# Patient Record
Sex: FEMALE | Race: WHITE | NOT HISPANIC OR LATINO | Employment: PART TIME | URBAN - METROPOLITAN AREA
[De-identification: names, ages, dates, MRNs, and addresses within clinical notes are randomized per-mention and may not be internally consistent; named-entity substitution may affect disease eponyms.]

---

## 2023-09-13 PROBLEM — G89.29 OTHER CHRONIC PAIN: Status: ACTIVE | Noted: 2023-09-13

## 2023-09-13 PROBLEM — F10.10 ALCOHOL ABUSE: Status: ACTIVE | Noted: 2023-09-13

## 2023-09-13 PROBLEM — K72.90: Status: ACTIVE | Noted: 2023-09-13

## 2023-09-13 PROBLEM — R18.8 ASCITES: Status: ACTIVE | Noted: 2023-09-13

## 2023-09-13 PROBLEM — Z86.39 HISTORY OF HYPOTHYROIDISM: Status: ACTIVE | Noted: 2023-09-13

## 2023-09-13 PROBLEM — K70.30 ALCOHOLIC CIRRHOSIS (MULTI): Status: ACTIVE | Noted: 2023-09-13

## 2023-09-13 PROBLEM — R41.82 ACUTE ALTERATION IN MENTAL STATUS: Status: ACTIVE | Noted: 2023-09-13

## 2023-09-13 PROBLEM — E83.19 IRON OVERLOAD: Status: ACTIVE | Noted: 2018-08-03

## 2023-09-13 PROBLEM — R42 DIZZINESS: Status: ACTIVE | Noted: 2023-09-13

## 2023-09-13 PROBLEM — R79.89 ABNORMAL TSH: Status: ACTIVE | Noted: 2018-08-03

## 2023-09-13 PROBLEM — K92.2 GASTROINTESTINAL HEMORRHAGE: Status: ACTIVE | Noted: 2023-09-13

## 2023-09-13 PROBLEM — R20.2 PARESTHESIA: Status: ACTIVE | Noted: 2018-08-08

## 2023-09-13 PROBLEM — K92.0 HEMATEMESIS: Status: ACTIVE | Noted: 2023-09-13

## 2023-09-13 PROBLEM — R93.89 ABNORMAL COMPUTED TOMOGRAPHY SCAN: Status: ACTIVE | Noted: 2023-09-13

## 2023-09-13 PROBLEM — Z78.9 MEDICAL HOME PATIENT: Status: ACTIVE | Noted: 2023-09-13

## 2023-09-13 PROBLEM — R53.1 GENERALIZED WEAKNESS: Status: ACTIVE | Noted: 2023-09-13

## 2023-09-13 PROBLEM — I89.0 LYMPHEDEMA OF BOTH LOWER EXTREMITIES: Status: ACTIVE | Noted: 2023-09-13

## 2023-09-13 PROBLEM — R40.1 CLOUDED CONSCIOUSNESS: Status: ACTIVE | Noted: 2023-06-03

## 2023-09-13 PROBLEM — E03.9 HYPOTHYROIDISM: Status: ACTIVE | Noted: 2023-09-13

## 2023-09-13 PROBLEM — E51.2 WERNICKE'S ENCEPHALOPATHY: Status: ACTIVE | Noted: 2023-09-13

## 2023-09-13 PROBLEM — A69.22 OTHER NEUROLOGIC DISORDERS IN LYME DISEASE: Status: ACTIVE | Noted: 2023-09-13

## 2023-09-13 PROBLEM — K76.9 LIVER DISEASE, UNSPECIFIED: Status: ACTIVE | Noted: 2023-09-13

## 2023-09-13 PROBLEM — F10.19: Status: ACTIVE | Noted: 2023-09-13

## 2023-09-13 PROBLEM — G62.9 SMALL FIBER NEUROPATHY: Status: ACTIVE | Noted: 2018-08-03

## 2023-09-13 PROBLEM — C22.0 LIVER CELL CARCINOMA (MULTI): Status: ACTIVE | Noted: 2023-09-13

## 2023-09-13 PROBLEM — I10 ESSENTIAL HYPERTENSION: Status: ACTIVE | Noted: 2023-09-13

## 2023-09-13 PROBLEM — E72.20 SERUM AMMONIA INCREASED (MULTI): Status: ACTIVE | Noted: 2023-09-13

## 2023-09-13 PROBLEM — K76.82 HEPATIC ENCEPHALOPATHY (MULTI): Status: ACTIVE | Noted: 2023-09-13

## 2023-09-13 PROBLEM — G82.20 PARAPARESIS OF BOTH LOWER LIMBS (MULTI): Status: ACTIVE | Noted: 2023-09-13

## 2023-09-13 PROBLEM — D64.9 ANEMIA: Status: ACTIVE | Noted: 2023-09-13

## 2023-09-13 PROBLEM — R31.0 FRANK HEMATURIA: Status: ACTIVE | Noted: 2023-09-13

## 2023-09-13 PROBLEM — R11.0 NAUSEA: Status: ACTIVE | Noted: 2023-09-13

## 2023-09-13 PROBLEM — K70.10 ALCOHOLIC HEPATITIS (MULTI): Status: ACTIVE | Noted: 2023-09-13

## 2023-09-13 PROBLEM — E87.8 ELECTROLYTE IMBALANCE: Status: ACTIVE | Noted: 2021-04-13

## 2023-09-13 PROBLEM — R74.8 ELEVATED LIVER ENZYMES: Status: ACTIVE | Noted: 2018-08-07

## 2023-09-13 PROBLEM — J15.9 BACTERIAL PNEUMONIA: Status: ACTIVE | Noted: 2023-09-13

## 2023-09-13 PROBLEM — G61.81 CHRONIC INFLAMMATORY DEMYELINATING POLYRADICULONEUROPATHY (MULTI): Status: ACTIVE | Noted: 2023-09-13

## 2023-09-13 PROBLEM — F10.20 ALCOHOLISM (MULTI): Status: ACTIVE | Noted: 2023-09-13

## 2023-09-13 PROBLEM — R79.89 INCREASED AMMONIA LEVEL: Status: ACTIVE | Noted: 2023-09-13

## 2023-09-13 PROBLEM — R17 JAUNDICE: Status: ACTIVE | Noted: 2023-09-13

## 2023-09-13 PROBLEM — E87.1 HYPONATREMIA: Status: ACTIVE | Noted: 2023-09-13

## 2023-09-13 PROBLEM — D72.829 LEUKOCYTOSIS: Status: ACTIVE | Noted: 2023-09-13

## 2023-09-13 PROBLEM — I50.9 CONGESTIVE HEART FAILURE (MULTI): Status: ACTIVE | Noted: 2023-09-13

## 2023-09-13 PROBLEM — R79.89 ABNORMAL LFTS: Status: ACTIVE | Noted: 2018-08-08

## 2023-09-13 PROBLEM — R10.9 FLANK PAIN: Status: ACTIVE | Noted: 2023-09-13

## 2023-09-13 PROBLEM — K74.60 UNSPECIFIED CIRRHOSIS OF LIVER (MULTI): Status: ACTIVE | Noted: 2023-09-13

## 2023-09-13 PROBLEM — R26.89 IMPAIRED GAIT AND MOBILITY: Status: ACTIVE | Noted: 2023-09-13

## 2023-09-13 PROBLEM — R10.9 ABDOMINAL PAIN: Status: ACTIVE | Noted: 2023-09-13

## 2023-09-13 PROBLEM — F32.1 MODERATE MAJOR DEPRESSION (MULTI): Status: ACTIVE | Noted: 2023-09-13

## 2023-09-13 PROBLEM — A41.9 SEPSIS (MULTI): Status: ACTIVE | Noted: 2023-09-13

## 2023-09-13 PROBLEM — K74.60: Status: ACTIVE | Noted: 2023-09-13

## 2023-09-13 PROBLEM — G92.8 TOXIC METABOLIC ENCEPHALOPATHY: Status: ACTIVE | Noted: 2023-09-13

## 2023-09-13 RX ORDER — PANTOPRAZOLE SODIUM 40 MG/1
1 TABLET, DELAYED RELEASE ORAL DAILY
Status: ON HOLD | COMMUNITY
Start: 2021-04-29 | End: 2023-10-25 | Stop reason: SDUPTHER

## 2023-09-13 RX ORDER — LEVOTHYROXINE SODIUM 50 UG/1
1 TABLET ORAL DAILY
COMMUNITY
Start: 2021-04-29 | End: 2024-03-04 | Stop reason: HOSPADM

## 2023-09-13 RX ORDER — NYSTATIN 100000 U/G
1 CREAM TOPICAL 3 TIMES DAILY
COMMUNITY
Start: 2021-04-29 | End: 2023-10-29 | Stop reason: HOSPADM

## 2023-09-13 RX ORDER — LEVONORGESTREL AND ETHINYL ESTRADIOL 6-5-10
1 KIT ORAL DAILY
COMMUNITY
Start: 2018-08-03 | End: 2023-10-29 | Stop reason: HOSPADM

## 2023-09-13 RX ORDER — FLUTICASONE PROPIONATE 50 MCG
1 SPRAY, SUSPENSION (ML) NASAL DAILY
COMMUNITY
Start: 2018-08-03 | End: 2023-10-29 | Stop reason: HOSPADM

## 2023-09-13 RX ORDER — SPIRONOLACTONE 100 MG/1
100 TABLET, FILM COATED ORAL DAILY
Status: ON HOLD | COMMUNITY
Start: 2021-04-29 | End: 2023-10-25 | Stop reason: SDUPTHER

## 2023-09-13 RX ORDER — LEVOTHYROXINE SODIUM 25 UG/1
25 TABLET ORAL DAILY
COMMUNITY
End: 2023-10-29 | Stop reason: HOSPADM

## 2023-09-13 RX ORDER — LANOLIN ALCOHOL/MO/W.PET/CERES
1 CREAM (GRAM) TOPICAL DAILY
Status: ON HOLD | COMMUNITY
Start: 2021-04-29 | End: 2023-10-25 | Stop reason: SDUPTHER

## 2023-09-13 RX ORDER — FUROSEMIDE 40 MG/1
1 TABLET ORAL DAILY
Status: ON HOLD | COMMUNITY
Start: 2021-04-29 | End: 2023-10-25 | Stop reason: SDUPTHER

## 2023-09-13 RX ORDER — IBUPROFEN 400 MG/1
400 TABLET ORAL EVERY 8 HOURS PRN
COMMUNITY
Start: 2021-04-29 | End: 2023-10-29 | Stop reason: HOSPADM

## 2023-09-13 RX ORDER — FOLIC ACID 1 MG/1
1 TABLET ORAL DAILY
Status: ON HOLD | COMMUNITY
Start: 2021-04-29 | End: 2023-10-25 | Stop reason: SDUPTHER

## 2023-09-13 RX ORDER — GABAPENTIN 300 MG/1
1 CAPSULE ORAL 3 TIMES DAILY
COMMUNITY
Start: 2021-10-21 | End: 2023-10-29 | Stop reason: HOSPADM

## 2023-09-13 RX ORDER — LEVONORGESTREL AND ETHINYL ESTRADIOL 6-5-10
1 KIT ORAL DAILY
COMMUNITY
Start: 2017-10-12 | End: 2023-10-29 | Stop reason: HOSPADM

## 2023-09-13 RX ORDER — LISINOPRIL AND HYDROCHLOROTHIAZIDE 20; 25 MG/1; MG/1
0.5 TABLET ORAL DAILY
COMMUNITY
Start: 2018-08-03 | End: 2023-10-29 | Stop reason: HOSPADM

## 2023-09-13 RX ORDER — LACTULOSE 10 G/15ML
30 SOLUTION ORAL; RECTAL DAILY
Status: ON HOLD | COMMUNITY
Start: 2021-05-05 | End: 2023-10-25 | Stop reason: SDUPTHER

## 2023-09-13 RX ORDER — LISINOPRIL 20 MG/1
20 TABLET ORAL DAILY
COMMUNITY
End: 2023-10-29 | Stop reason: HOSPADM

## 2023-10-19 ENCOUNTER — APPOINTMENT (OUTPATIENT)
Dept: GASTROENTEROLOGY | Facility: CLINIC | Age: 48
End: 2023-10-19

## 2023-10-21 ENCOUNTER — APPOINTMENT (OUTPATIENT)
Dept: RADIOLOGY | Facility: HOSPITAL | Age: 48
DRG: 441 | End: 2023-10-21
Payer: MEDICARE

## 2023-10-21 ENCOUNTER — HOSPITAL ENCOUNTER (INPATIENT)
Facility: HOSPITAL | Age: 48
LOS: 8 days | Discharge: HOME | DRG: 441 | End: 2023-10-29
Attending: STUDENT IN AN ORGANIZED HEALTH CARE EDUCATION/TRAINING PROGRAM | Admitting: INTERNAL MEDICINE
Payer: MEDICARE

## 2023-10-21 DIAGNOSIS — G89.29 OTHER CHRONIC PAIN: ICD-10-CM

## 2023-10-21 DIAGNOSIS — R11.0 NAUSEA: ICD-10-CM

## 2023-10-21 DIAGNOSIS — G62.9 SMALL FIBER NEUROPATHY: ICD-10-CM

## 2023-10-21 DIAGNOSIS — E72.20 SERUM AMMONIA INCREASED (MULTI): ICD-10-CM

## 2023-10-21 DIAGNOSIS — R17 JAUNDICE: ICD-10-CM

## 2023-10-21 DIAGNOSIS — A69.22 OTHER NEUROLOGIC DISORDERS IN LYME DISEASE: ICD-10-CM

## 2023-10-21 DIAGNOSIS — D64.9 ANEMIA, UNSPECIFIED TYPE: ICD-10-CM

## 2023-10-21 DIAGNOSIS — R58 BLEEDING: ICD-10-CM

## 2023-10-21 DIAGNOSIS — K70.31 ALCOHOLIC CIRRHOSIS OF LIVER WITH ASCITES (MULTI): ICD-10-CM

## 2023-10-21 DIAGNOSIS — K70.31 ASCITES DUE TO ALCOHOLIC CIRRHOSIS (MULTI): ICD-10-CM

## 2023-10-21 DIAGNOSIS — E87.6 HYPOKALEMIA: ICD-10-CM

## 2023-10-21 DIAGNOSIS — K76.82 HEPATIC ENCEPHALOPATHY (MULTI): Primary | ICD-10-CM

## 2023-10-21 DIAGNOSIS — F10.10 ALCOHOL ABUSE: ICD-10-CM

## 2023-10-21 LAB
ABO GROUP (TYPE) IN BLOOD: NORMAL
ALBUMIN SERPL-MCNC: 2.7 G/DL (ref 3.5–5)
ALP BLD-CCNC: 214 U/L (ref 35–125)
ALT SERPL-CCNC: 27 U/L (ref 5–40)
AMMONIA PLAS-SCNC: 141 UMOL/L (ref 12–45)
AMPHETAMINES UR QL SCN>1000 NG/ML: NEGATIVE
ANION GAP SERPL CALC-SCNC: 14 MMOL/L
ANTIBODY SCREEN: NORMAL
APPEARANCE UR: ABNORMAL
AST SERPL-CCNC: 86 U/L (ref 5–40)
BARBITURATES UR QL SCN>300 NG/ML: NEGATIVE
BASO STIPL BLD QL SMEAR: PRESENT
BASOPHILS # BLD AUTO: 0.03 X10*3/UL (ref 0–0.1)
BASOPHILS NFR BLD AUTO: 0.2 %
BENZODIAZ UR QL SCN>300 NG/ML: NEGATIVE
BILIRUB DIRECT SERPL-MCNC: 6.4 MG/DL (ref 0–0.2)
BILIRUB SERPL-MCNC: 13.5 MG/DL (ref 0.1–1.2)
BILIRUB UR STRIP.AUTO-MCNC: ABNORMAL MG/DL
BLOOD EXPIRATION DATE: NORMAL
BUN SERPL-MCNC: 15 MG/DL (ref 8–25)
BURR CELLS BLD QL SMEAR: NORMAL
BZE UR QL SCN>300 NG/ML: NEGATIVE
CALCIUM SERPL-MCNC: 8.3 MG/DL (ref 8.5–10.4)
CANNABINOIDS UR QL SCN>50 NG/ML: NEGATIVE
CHLORIDE SERPL-SCNC: 93 MMOL/L (ref 97–107)
CO2 SERPL-SCNC: 26 MMOL/L (ref 24–31)
COLOR UR: ABNORMAL
CREAT SERPL-MCNC: 0.6 MG/DL (ref 0.4–1.6)
DISPENSE STATUS: NORMAL
EOSINOPHIL # BLD AUTO: 0.11 X10*3/UL (ref 0–0.7)
EOSINOPHIL NFR BLD AUTO: 0.6 %
ERYTHROCYTE [DISTWIDTH] IN BLOOD BY AUTOMATED COUNT: 20.4 % (ref 11.5–14.5)
ETHANOL SERPL-MCNC: <0.01 G/DL
FENTANYL+NORFENTANYL UR QL SCN: NEGATIVE
GFR SERPL CREATININE-BSD FRML MDRD: >90 ML/MIN/1.73M*2
GLUCOSE SERPL-MCNC: 122 MG/DL (ref 65–99)
GLUCOSE UR STRIP.AUTO-MCNC: NORMAL MG/DL
HCT VFR BLD AUTO: 18.8 % (ref 36–46)
HGB BLD-MCNC: 6.6 G/DL (ref 12–16)
HOLD SPECIMEN: NORMAL
IMM GRANULOCYTES # BLD AUTO: 0.27 X10*3/UL (ref 0–0.7)
IMM GRANULOCYTES NFR BLD AUTO: 1.5 % (ref 0–0.9)
INR PPP: 3.5 (ref 0.9–1.2)
KETONES UR STRIP.AUTO-MCNC: NEGATIVE MG/DL
LEUKOCYTE ESTERASE UR QL STRIP.AUTO: ABNORMAL
LIPASE SERPL-CCNC: 16 U/L (ref 16–63)
LYMPHOCYTES # BLD AUTO: 3.03 X10*3/UL (ref 1.2–4.8)
LYMPHOCYTES NFR BLD AUTO: 16.5 %
MAGNESIUM SERPL-MCNC: 1.7 MG/DL (ref 1.6–3.1)
MCH RBC QN AUTO: 37.9 PG (ref 26–34)
MCHC RBC AUTO-ENTMCNC: 35.1 G/DL (ref 32–36)
MCV RBC AUTO: 108 FL (ref 80–100)
METHADONE UR QL SCN>300 NG/ML: NEGATIVE
MONOCYTES # BLD AUTO: 1.63 X10*3/UL (ref 0.1–1)
MONOCYTES NFR BLD AUTO: 8.9 %
NEUTROPHILS # BLD AUTO: 13.32 X10*3/UL (ref 1.2–7.7)
NEUTROPHILS NFR BLD AUTO: 72.3 %
NITRITE UR QL STRIP.AUTO: NEGATIVE
NRBC BLD-RTO: 0.6 /100 WBCS (ref 0–0)
OPIATES UR QL SCN>300 NG/ML: NEGATIVE
OXYCODONE UR QL: NEGATIVE
PCP UR QL SCN>25 NG/ML: NEGATIVE
PH UR STRIP.AUTO: 5.5 [PH]
PLATELET # BLD AUTO: 178 X10*3/UL (ref 150–450)
PMV BLD AUTO: 10.1 FL (ref 7.5–11.5)
POLYCHROMASIA BLD QL SMEAR: NORMAL
POTASSIUM SERPL-SCNC: 2.9 MMOL/L (ref 3.4–5.1)
PRODUCT BLOOD TYPE: 5100
PRODUCT CODE: NORMAL
PROT SERPL-MCNC: 6.3 G/DL (ref 5.9–7.9)
PROT UR STRIP.AUTO-MCNC: ABNORMAL MG/DL
PROTHROMBIN TIME: 34.5 SECONDS (ref 9.3–12.7)
RBC # BLD AUTO: 1.74 X10*6/UL (ref 4–5.2)
RBC # UR STRIP.AUTO: NEGATIVE /UL
RBC MORPH BLD: NORMAL
RH FACTOR (ANTIGEN D): NORMAL
SODIUM SERPL-SCNC: 133 MMOL/L (ref 133–145)
SP GR UR STRIP.AUTO: 1.02
TARGETS BLD QL SMEAR: NORMAL
UNIT ABO: NORMAL
UNIT NUMBER: NORMAL
UNIT RH: NORMAL
UNIT VOLUME: 400
UROBILINOGEN UR STRIP.AUTO-MCNC: ABNORMAL MG/DL
WBC # BLD AUTO: 18.4 X10*3/UL (ref 4.4–11.3)
XM INTEP: NORMAL

## 2023-10-21 PROCEDURE — 86920 COMPATIBILITY TEST SPIN: CPT

## 2023-10-21 PROCEDURE — 87186 SC STD MICRODIL/AGAR DIL: CPT | Mod: WESLAB | Performed by: PHYSICIAN ASSISTANT

## 2023-10-21 PROCEDURE — 99285 EMERGENCY DEPT VISIT HI MDM: CPT | Mod: 25 | Performed by: STUDENT IN AN ORGANIZED HEALTH CARE EDUCATION/TRAINING PROGRAM

## 2023-10-21 PROCEDURE — 83690 ASSAY OF LIPASE: CPT | Performed by: PHYSICIAN ASSISTANT

## 2023-10-21 PROCEDURE — 36430 TRANSFUSION BLD/BLD COMPNT: CPT

## 2023-10-21 PROCEDURE — 93010 ELECTROCARDIOGRAM REPORT: CPT | Performed by: INTERNAL MEDICINE

## 2023-10-21 PROCEDURE — 83735 ASSAY OF MAGNESIUM: CPT | Performed by: PHYSICIAN ASSISTANT

## 2023-10-21 PROCEDURE — 2500000001 HC RX 250 WO HCPCS SELF ADMINISTERED DRUGS (ALT 637 FOR MEDICARE OP): Performed by: PHYSICIAN ASSISTANT

## 2023-10-21 PROCEDURE — P9016 RBC LEUKOCYTES REDUCED: HCPCS

## 2023-10-21 PROCEDURE — 36415 COLL VENOUS BLD VENIPUNCTURE: CPT | Performed by: PHYSICIAN ASSISTANT

## 2023-10-21 PROCEDURE — 85610 PROTHROMBIN TIME: CPT | Performed by: PHYSICIAN ASSISTANT

## 2023-10-21 PROCEDURE — 2500000004 HC RX 250 GENERAL PHARMACY W/ HCPCS (ALT 636 FOR OP/ED): Performed by: PHYSICIAN ASSISTANT

## 2023-10-21 PROCEDURE — 2060000001 HC INTERMEDIATE ICU ROOM DAILY

## 2023-10-21 PROCEDURE — 2500000004 HC RX 250 GENERAL PHARMACY W/ HCPCS (ALT 636 FOR OP/ED): Performed by: INTERNAL MEDICINE

## 2023-10-21 PROCEDURE — 2550000001 HC RX 255 CONTRASTS: Performed by: STUDENT IN AN ORGANIZED HEALTH CARE EDUCATION/TRAINING PROGRAM

## 2023-10-21 PROCEDURE — 85025 COMPLETE CBC W/AUTO DIFF WBC: CPT | Performed by: PHYSICIAN ASSISTANT

## 2023-10-21 PROCEDURE — 74177 CT ABD & PELVIS W/CONTRAST: CPT | Mod: ME

## 2023-10-21 PROCEDURE — 2500000001 HC RX 250 WO HCPCS SELF ADMINISTERED DRUGS (ALT 637 FOR MEDICARE OP): Performed by: INTERNAL MEDICINE

## 2023-10-21 PROCEDURE — 82248 BILIRUBIN DIRECT: CPT | Performed by: PHYSICIAN ASSISTANT

## 2023-10-21 PROCEDURE — 82077 ASSAY SPEC XCP UR&BREATH IA: CPT | Performed by: PHYSICIAN ASSISTANT

## 2023-10-21 PROCEDURE — 80307 DRUG TEST PRSMV CHEM ANLYZR: CPT | Performed by: PHYSICIAN ASSISTANT

## 2023-10-21 PROCEDURE — C9113 INJ PANTOPRAZOLE SODIUM, VIA: HCPCS | Performed by: STUDENT IN AN ORGANIZED HEALTH CARE EDUCATION/TRAINING PROGRAM

## 2023-10-21 PROCEDURE — 81003 URINALYSIS AUTO W/O SCOPE: CPT | Performed by: PHYSICIAN ASSISTANT

## 2023-10-21 PROCEDURE — 2500000004 HC RX 250 GENERAL PHARMACY W/ HCPCS (ALT 636 FOR OP/ED): Performed by: STUDENT IN AN ORGANIZED HEALTH CARE EDUCATION/TRAINING PROGRAM

## 2023-10-21 PROCEDURE — 70450 CT HEAD/BRAIN W/O DYE: CPT | Mod: MG

## 2023-10-21 PROCEDURE — 71045 X-RAY EXAM CHEST 1 VIEW: CPT

## 2023-10-21 PROCEDURE — 82140 ASSAY OF AMMONIA: CPT | Performed by: PHYSICIAN ASSISTANT

## 2023-10-21 PROCEDURE — 86901 BLOOD TYPING SEROLOGIC RH(D): CPT | Performed by: PHYSICIAN ASSISTANT

## 2023-10-21 RX ORDER — GUAIFENESIN/DEXTROMETHORPHAN 100-10MG/5
5 SYRUP ORAL EVERY 4 HOURS PRN
Status: DISCONTINUED | OUTPATIENT
Start: 2023-10-21 | End: 2023-10-29 | Stop reason: HOSPADM

## 2023-10-21 RX ORDER — POTASSIUM CHLORIDE 14.9 MG/ML
20 INJECTION INTRAVENOUS
Status: DISPENSED | OUTPATIENT
Start: 2023-10-21 | End: 2023-10-21

## 2023-10-21 RX ORDER — CEFTRIAXONE 2 G/50ML
2 INJECTION, SOLUTION INTRAVENOUS DAILY
Status: DISCONTINUED | OUTPATIENT
Start: 2023-10-21 | End: 2023-10-29 | Stop reason: HOSPADM

## 2023-10-21 RX ORDER — LACTULOSE 10 G/15ML
20 SOLUTION ORAL ONCE
Status: COMPLETED | OUTPATIENT
Start: 2023-10-21 | End: 2023-10-21

## 2023-10-21 RX ORDER — LACTULOSE 10 G/15ML
20 SOLUTION ORAL 3 TIMES DAILY
Status: DISCONTINUED | OUTPATIENT
Start: 2023-10-21 | End: 2023-10-29 | Stop reason: HOSPADM

## 2023-10-21 RX ORDER — ACETAMINOPHEN 500 MG
5 TABLET ORAL NIGHTLY PRN
Status: DISCONTINUED | OUTPATIENT
Start: 2023-10-21 | End: 2023-10-29 | Stop reason: HOSPADM

## 2023-10-21 RX ORDER — ONDANSETRON HYDROCHLORIDE 2 MG/ML
4 INJECTION, SOLUTION INTRAVENOUS EVERY 8 HOURS PRN
Status: DISCONTINUED | OUTPATIENT
Start: 2023-10-21 | End: 2023-10-29 | Stop reason: HOSPADM

## 2023-10-21 RX ORDER — PANTOPRAZOLE SODIUM 40 MG/10ML
40 INJECTION, POWDER, LYOPHILIZED, FOR SOLUTION INTRAVENOUS ONCE
Status: COMPLETED | OUTPATIENT
Start: 2023-10-21 | End: 2023-10-21

## 2023-10-21 RX ORDER — PANTOPRAZOLE SODIUM 40 MG/10ML
40 INJECTION, POWDER, LYOPHILIZED, FOR SOLUTION INTRAVENOUS 2 TIMES DAILY
Status: DISCONTINUED | OUTPATIENT
Start: 2023-10-22 | End: 2023-10-24

## 2023-10-21 RX ORDER — ONDANSETRON 4 MG/1
4 TABLET, ORALLY DISINTEGRATING ORAL EVERY 8 HOURS PRN
Status: DISCONTINUED | OUTPATIENT
Start: 2023-10-21 | End: 2023-10-29 | Stop reason: HOSPADM

## 2023-10-21 RX ORDER — CEFTRIAXONE 1 G/50ML
1 INJECTION, SOLUTION INTRAVENOUS ONCE
Status: DISCONTINUED | OUTPATIENT
Start: 2023-10-21 | End: 2023-10-21

## 2023-10-21 RX ADMIN — CEFTRIAXONE SODIUM 2 G: 2 INJECTION, SOLUTION INTRAVENOUS at 22:14

## 2023-10-21 RX ADMIN — IOHEXOL 75 ML: 350 INJECTION, SOLUTION INTRAVENOUS at 17:50

## 2023-10-21 RX ADMIN — POTASSIUM CHLORIDE 20 MEQ: 14.9 INJECTION, SOLUTION INTRAVENOUS at 19:17

## 2023-10-21 RX ADMIN — LACTULOSE 20 G: 20 SOLUTION ORAL at 16:55

## 2023-10-21 RX ADMIN — PANTOPRAZOLE SODIUM 40 MG: 40 INJECTION, POWDER, LYOPHILIZED, FOR SOLUTION INTRAVENOUS at 19:17

## 2023-10-21 RX ADMIN — LACTULOSE 20 G: 20 SOLUTION ORAL at 22:07

## 2023-10-21 RX ADMIN — OCTREOTIDE ACETATE 50 MCG/HR: 500 INJECTION, SOLUTION INTRAVENOUS; SUBCUTANEOUS at 22:00

## 2023-10-21 ASSESSMENT — ENCOUNTER SYMPTOMS
ACTIVITY CHANGE: 1
ABDOMINAL PAIN: 0
ANAL BLEEDING: 0
BLOOD IN STOOL: 0
FATIGUE: 1
ABDOMINAL DISTENTION: 1

## 2023-10-21 ASSESSMENT — PAIN - FUNCTIONAL ASSESSMENT: PAIN_FUNCTIONAL_ASSESSMENT: 0-10

## 2023-10-21 ASSESSMENT — COLUMBIA-SUICIDE SEVERITY RATING SCALE - C-SSRS
6. HAVE YOU EVER DONE ANYTHING, STARTED TO DO ANYTHING, OR PREPARED TO DO ANYTHING TO END YOUR LIFE?: NO
1. IN THE PAST MONTH, HAVE YOU WISHED YOU WERE DEAD OR WISHED YOU COULD GO TO SLEEP AND NOT WAKE UP?: NO
2. HAVE YOU ACTUALLY HAD ANY THOUGHTS OF KILLING YOURSELF?: NO
2. HAVE YOU ACTUALLY HAD ANY THOUGHTS OF KILLING YOURSELF?: NO
1. IN THE PAST MONTH, HAVE YOU WISHED YOU WERE DEAD OR WISHED YOU COULD GO TO SLEEP AND NOT WAKE UP?: NO

## 2023-10-21 ASSESSMENT — LIFESTYLE VARIABLES
EVER FELT BAD OR GUILTY ABOUT YOUR DRINKING: NO
REASON UNABLE TO ASSESS: NO
EVER HAD A DRINK FIRST THING IN THE MORNING TO STEADY YOUR NERVES TO GET RID OF A HANGOVER: NO
HAVE YOU EVER FELT YOU SHOULD CUT DOWN ON YOUR DRINKING: NO
HAVE PEOPLE ANNOYED YOU BY CRITICIZING YOUR DRINKING: NO

## 2023-10-21 ASSESSMENT — PAIN SCALES - GENERAL: PAINLEVEL_OUTOF10: 0 - NO PAIN

## 2023-10-21 NOTE — ED PROVIDER NOTES
HPI   Chief Complaint   Patient presents with    Abdominal Pain     Pt hx of liver failure. Abdomen distended       48-year-old female presenting to the emergency department with a chief complaint of confusion, abdominal pain.  With she is a daily drinker.  She has known history of alcoholic cirrhosis.  She continues to drink alcohol.  She is unsure if she has fallen.  Denies lightheadedness dizziness numbness weakness.  Denies of vomiting or dysuria or diarrhea.  No other complaints.                          No data recorded                Patient History   Past Medical History:   Diagnosis Date    Alcoholic hepatitis without ascites 08/13/2021    Alcoholic hepatitis    Hepatic encephalopathy (CMS/HCC) 06/09/2021    Hepatic encephalopathy     Past Surgical History:   Procedure Laterality Date    OTHER SURGICAL HISTORY  06/09/2021    Breast augmentation    US GUIDED ABDOMINAL PARACENTESIS  4/28/2021    US GUIDED ABDOMINAL PARACENTESIS [facility] INPATIENT LEGACY    US GUIDED ABDOMINAL PARACENTESIS  4/23/2021    US GUIDED ABDOMINAL PARACENTESIS [facility] INPATIENT LEGACY    US GUIDED ABDOMINAL PARACENTESIS  5/14/2021    US GUIDED ABDOMINAL PARACENTESIS [facility] EMERGENCY LEGACY    US GUIDED ABDOMINAL PARACENTESIS  8/17/2023    US GUIDED ABDOMINAL PARACENTESIS [facility] INPATIENT LEGACY    US GUIDED ABDOMINAL PARACENTESIS  8/25/2023    US GUIDED ABDOMINAL PARACENTESIS [facility] INPATIENT LEGACY     Family History   Problem Relation Name Age of Onset    Diabetes Mother      Uterine cancer Mother      Heart attack Father      Other (CHEMICAL DEPENDENCY) Father      Hypertension Father       Social History     Tobacco Use    Smoking status: Not on file    Smokeless tobacco: Not on file   Substance Use Topics    Alcohol use: Not on file    Drug use: Not on file       Physical Exam   ED Triage Vitals [10/21/23 1452]   Temp Heart Rate Resp BP   36.8 °C (98.2 °F) 110 18 146/87      SpO2 Temp Source Heart Rate Source Patient Position   95 % Oral Monitor  Lying      BP Location FiO2 (%)     Left arm --       Physical Exam  Constitutional:       Appearance: She is well-developed.   HENT:      Head: Normocephalic and atraumatic.   Eyes:      Comments: Jaundice   Cardiovascular:      Rate and Rhythm: Normal rate and regular rhythm.   Pulmonary:      Effort: Pulmonary effort is normal.      Breath sounds: Normal breath sounds.   Abdominal:      Comments: Distended abdomen, soft no rebound or guarding   Skin:     General: Skin is warm and dry.      Comments: Diffuse jaundice   Neurological:      General: No focal deficit present.      Mental Status: She is alert and oriented to person, place, and time.   Psychiatric:         Mood and Affect: Mood normal.         ED Course & MDM   Diagnoses as of 10/21/23 1934   Hepatic encephalopathy (CMS/HCC)   Alcohol abuse   Alcoholic cirrhosis of liver with ascites (CMS/HCC)   Anemia, unspecified type       Medical Decision Making  Labs Reviewed  AMMONIA  CBC WITH AUTO DIFFERENTIAL  LIPASE  BASIC METABOLIC PANEL  HEPATIC FUNCTION PANEL  PROTIME-INR  ALCOHOL  URINALYSIS WITH REFLEX MICROSCOPIC AND CULTURE         Narrative: The following orders were created for panel order Urinalysis with Reflex Microscopic and Culture.                  Procedure                               Abnormality         Status                                     ---------                               -----------         ------                                     Urinalysis with Reflex M...[[id number]]                                                                 Extra Urine Gray Tube[[id number]]                                                                                         Please view results for these tests on the individual orders.  DRUG SCREEN,URINE  URINALYSIS WITH REFLEX MICROSCOPIC AND CULTURE  EXTRA URINE GRAY TUBE  XR chest 1 view    (Results Pending)  CT head wo IV contrast    (Results Pending)  CT abdomen pelvis w IV contrast    (Results  Pending)  I have seen and evaluated this patient.  The attending physician has also seen and evaluated this patient.  Vital signs, laboratory testing and diagnostic images if applicable have been reviewed.  All laboratory and imaging is interpreted by myself unless otherwise stated.  Radiology studies are also formally interpreted by radiologist.    Patient with 18,000 leukocytosis, anemia with hemoglobin 6.6.  Potassium 2.9, no renal impairment, patient with bilirubin 13 was 6 a month ago.  CT brain negative, ammonia elevated to 141.  Patient consented for blood transfusion.  1 unit given.  Lactulose given, potassium replaced, dose of IV Rocephin given.  Consulted with gastroenterology.  Admitted for further treatment and management.    I have seen and evaluated this patient and independently provided 31 minutes of nonconcurrent critical care time. This does not include separately billable procedures. Patient with high potential for deterioration, required frequent monitoring and assessment.        Procedure  Procedures     Crispin Hahn PA-C  10/21/23 1935

## 2023-10-21 NOTE — Clinical Note
Pt to IR room via bed for paracentesis.  AAO x 3, al vss, denies pain.  Arrived on 4L O2 NC w/ octreotide gtt infusing

## 2023-10-21 NOTE — ED NOTES
This RN assumed care of the pt. Pt resting in bed and is currently receiving blood. Pt is on the monitor. NO distress noted. Plan of care ongoing.     Jaquelin Godwin RN  10/21/23 [id number]

## 2023-10-21 NOTE — H&P
History Of Present Illness  Briseida Owen is a 48 y.o. female presenting with altered mental status patient has a long known history of alcoholic cirrhosis, and continues to drink alcohol.  She states that she does this to help with her chronic pain.  She does not know how much she has been drinking.  Presents with altered mental status, and found to have a hemoglobin in the 6 range without obvious bleeding in the emergency department, and a unit of packed red blood cells was ordered.  Last admitted to this facility in August.  She has been seen by palliative medicine in the past.  As today, she continues to drink alcohol.     Past Medical History  She has a past medical history of Alcoholic hepatitis without ascites (08/13/2021) and Hepatic encephalopathy (CMS/HCC) (06/09/2021).    Surgical History  She has a past surgical history that includes Other surgical history (06/09/2021); US guided abdominal paracentesis (4/28/2021); US guided abdominal paracentesis (4/23/2021); US guided abdominal paracentesis (5/14/2021); US guided abdominal paracentesis (8/17/2023); and US guided abdominal paracentesis (8/25/2023).     Social History  She reports that she has never smoked. She has never used smokeless tobacco. She reports current alcohol use of about 1.0 standard drink of alcohol per week. No history on file for drug use.    Family History  Family History   Problem Relation Name Age of Onset    Diabetes Mother      Uterine cancer Mother      Heart attack Father      Other (CHEMICAL DEPENDENCY) Father      Hypertension Father          Allergies  Sulfamethoxazole-trimethoprim    Review of Systems   Constitutional:  Positive for activity change and fatigue.   Gastrointestinal:  Positive for abdominal distention. Negative for abdominal pain, anal bleeding and blood in stool.   All other systems reviewed and are negative.       Physical Exam  Constitutional:       General: She is not in acute distress.     Appearance: She  is ill-appearing.   HENT:      Head: Normocephalic.      Comments: Scleral icterus     Nose: Nose normal.      Mouth/Throat:      Mouth: Mucous membranes are dry.   Eyes:      Extraocular Movements: Extraocular movements intact.   Cardiovascular:      Rate and Rhythm: Tachycardia present.   Pulmonary:      Effort: Pulmonary effort is normal.   Abdominal:      General: There is distension.      Tenderness: There is no abdominal tenderness.   Musculoskeletal:         General: No swelling.   Neurological:      Comments: Slow speech, but alert and oriented x3.          Last Recorded Vitals  /70   Pulse 102   Temp 36.8 °C (98.2 °F) (Oral)   Resp 25   Wt 75 kg (165 lb 5.5 oz)   SpO2 93%     Relevant Results        Results for orders placed or performed during the hospital encounter of 10/21/23 (from the past 24 hour(s))   Ammonia   Result Value Ref Range    Ammonia 141 (H) 12 - 45 umol/L   CBC and Auto Differential   Result Value Ref Range    WBC 18.4 (H) 4.4 - 11.3 x10*3/uL    nRBC 0.6 (H) 0.0 - 0.0 /100 WBCs    RBC 1.74 (L) 4.00 - 5.20 x10*6/uL    Hemoglobin 6.6 (L) 12.0 - 16.0 g/dL    Hematocrit 18.8 (L) 36.0 - 46.0 %     (H) 80 - 100 fL    MCH 37.9 (H) 26.0 - 34.0 pg    MCHC 35.1 32.0 - 36.0 g/dL    RDW 20.4 (H) 11.5 - 14.5 %    Platelets 178 150 - 450 x10*3/uL    MPV 10.1 7.5 - 11.5 fL    Neutrophils % 72.3 40.0 - 80.0 %    Immature Granulocytes %, Automated 1.5 (H) 0.0 - 0.9 %    Lymphocytes % 16.5 13.0 - 44.0 %    Monocytes % 8.9 2.0 - 10.0 %    Eosinophils % 0.6 0.0 - 6.0 %    Basophils % 0.2 0.0 - 2.0 %    Neutrophils Absolute 13.32 (H) 1.20 - 7.70 x10*3/uL    Immature Granulocytes Absolute, Automated 0.27 0.00 - 0.70 x10*3/uL    Lymphocytes Absolute 3.03 1.20 - 4.80 x10*3/uL    Monocytes Absolute 1.63 (H) 0.10 - 1.00 x10*3/uL    Eosinophils Absolute 0.11 0.00 - 0.70 x10*3/uL    Basophils Absolute 0.03 0.00 - 0.10 x10*3/uL   Lipase   Result Value Ref Range    Lipase 16 16 - 63 U/L   Basic  metabolic panel   Result Value Ref Range    Glucose 122 (H) 65 - 99 mg/dL    Sodium 133 133 - 145 mmol/L    Potassium 2.9 (LL) 3.4 - 5.1 mmol/L    Chloride 93 (L) 97 - 107 mmol/L    Bicarbonate 26 24 - 31 mmol/L    Urea Nitrogen 15 8 - 25 mg/dL    Creatinine 0.60 0.40 - 1.60 mg/dL    eGFR >90 >60 mL/min/1.73m*2    Calcium 8.3 (L) 8.5 - 10.4 mg/dL    Anion Gap 14 <=19 mmol/L   Hepatic function panel   Result Value Ref Range    AST 86 (H) 5 - 40 U/L    ALT 27 5 - 40 U/L    Alkaline Phosphatase 214 (H) 35 - 125 U/L    Bilirubin, Total 13.5 (H) 0.1 - 1.2 mg/dL    Bilirubin, Direct 6.4 (H) 0.0 - 0.2 mg/dL    Total Protein 6.3 5.9 - 7.9 g/dL    Albumin 2.7 (L) 3.5 - 5.0 g/dL   Protime-INR   Result Value Ref Range    Protime 34.5 (H) 9.3 - 12.7 seconds    INR 3.5 (H) 0.9 - 1.2   Alcohol   Result Value Ref Range    Alcohol <0.010 0.000 - 0.010 g/dL   Morphology   Result Value Ref Range    RBC Morphology See Below     Polychromasia Mild     Target Cells Many     [place] Cells Many     Basophilic Stippling Present    Magnesium   Result Value Ref Range    Magnesium 1.70 1.60 - 3.10 mg/dL   Type and screen   Result Value Ref Range    ABO TYPE O     Rh TYPE POS     ANTIBODY SCREEN NEG    Drug Screen, Urine   Result Value Ref Range    Amphetamine Screen, Urine Negative      Barbiturate Screen, Urine Negative      Benzodiazepines Screen, Urine Negative      Cannabinoid Screen, Urine Negative      Cocaine Metabolite Screen, Urine Negative      Fentanyl Screen, Urine Negative       Methadone Screen, Urine Negative      Opiate Screen, Urine Negative      Oxycodone Screen, Urine Negative      PCP Screen, Urine Negative     Urinalysis with Reflex Microscopic and Culture   Result Value Ref Range    Color, Urine Dark-Orange (N) Light-Yellow, Yellow, Dark-Yellow    Appearance, Urine Turbid (N) Clear    Specific Gravity, Urine 1.024 1.005 - 1.035    pH, Urine 5.5 5.0, 5.5, 6.0, 6.5, 7.0, 7.5, 8.0    Protein, Urine 50 (1+) (A) NEGATIVE, 10  (TRACE), 20 (TRACE) mg/dL    Glucose, Urine Normal Normal mg/dL    Blood, Urine NEGATIVE NEGATIVE    Ketones, Urine NEGATIVE NEGATIVE mg/dL    Bilirubin, Urine 3 (2+) (A) NEGATIVE    Urobilinogen, Urine 6 (3+) (A) Normal mg/dL    Nitrite, Urine NEGATIVE NEGATIVE    Leukocyte Esterase, Urine 75 Dalia/µL (A) NEGATIVE   Extra Urine Gray Tube   Result Value Ref Range    Extra Tube Hold for add-ons.    Prepare RBC: 1 Units   Result Value Ref Range    PRODUCT CODE V0214R45     Unit Number [id number]     Unit ABO O     Unit RH POS     XM INTEP COMP     Dispense Status IS     Blood Expiration Date October 27, 2023 23:59 EDT     PRODUCT BLOOD TYPE 5100     UNIT VOLUME 400          Assessment/Plan   Principal Problem:    Hepatic encephalopathy (CMS/HCC)      Alcoholic cirrhosis  -Worsening, bilirubin into the 13 range with a associated coagulopathy of with INR 3.5.  -Unfortunately, the patient continues to drink alcohol.  Not a candidate for liver transplant.  -For ascites, lower suspicion for spontaneous bacterial peritonitis but will cover ceftriaxone for now and ask interventional radiology for a paracentesis.  -Consider palliative medicine, though we do have established DNR and DNI on last note from previous electronic medical record.  -Continue furosemide and spironolactone    Anemia  -No poor active bleeding, though certainly high risk with cirrhosis  -Continue Protonix 40 mg twice a day.  -Continue octreotide.  -Gastroenterology as above.    Encephalopathy  -Hepatic encephalopathy.  Continue lactulose 3 times a day.    Hypokalemia  -Replaced in the emergency department, and daily labs.    Long-term poor prognosis.       Quintin Gonsales,

## 2023-10-21 NOTE — ED PROVIDER NOTES
Patient was seen by both myself and advanced practitioner.  I performed substantive portion of the visit including all aspects of the medical decision making.  Please refer to advanced practitioner's note further workup, evaluation.    Patient is a 48-year-old female with a history of alcoholic cirrhosis that presents emergency department for evaluation of altered mental status, abdominal discomfort and confusion.  Patient is a daily drinker.  She does have a known history of alcoholic cirrhosis of the liver however continues to drink alcohol.  Patient states her last drink was today.  Family members noticed that she has been increasingly weak and confused over the last several days.  This is consistent with previous episodes of hepatic encephalopathy.  Patient has not been taking her lactulose at home as prescribed.    On exam patient very jaundiced and uncomfortable appearing.  Vital signs are stable on arrival.  She has no abdominal tenderness palpation however abdomen is very swollen with a positive fluid wave.  Patient is alert and oriented to self and place which is worse than her baseline.  Blood work ordered including CBC, CMP, PT/INR, ammonia level.  CT scan of the brain was performed which showed no evidence of intracranial hemorrhage or mass.  Chest x-ray was clear showed no obvious evidence of pneumonia, pneumothorax, wide mediastinum.  CT scan of the abdomen pelvis showed large volume ascites and portal hypertension however no evidence of choledocholithiasis, obstruction or perforation.  Ammonia level is significantly elevated at 141 consistent with hepatic encephalopathy as it is increased from her previous admission.  She was also found to be anemic with a hemoglobin of 6.6.  No active bleeding at this time however given her history of varices and discussion with gastroenterology they recommend starting patient on octreotide infusion, dosing with pantoprazole and giving a dose of Rocephin for possible  GI bleeding.  Patient was transfused 1 unit of blood at this time.  Discussed case with hospitalist who excepted patient for admission to stepdown unit.     Crispin Lea,   10/21/23 1905

## 2023-10-21 NOTE — ED NOTES
Went into patients room to assess her.  Patient is severely jaundice.   When asking patient how much does she drink, she was unable to answer.   Very lethargic.     Significant other was present at bedside.    He stated she use to drink (2) big bottles of [place] wine.  Now she will only drink (1) and it can last a few days.      When asking patient is she would like resources for her drinking she shook her head no.   Significant other is requesting patient to get help.   Will notify       Kamilla Mae LPN  10/21/23 [id number]

## 2023-10-21 NOTE — Clinical Note
Paracentesis complete, total of 5.4L clear yellow fluid removed.  Bandaid to RLQ, pt tolerated well w/ no c/o, all vs remained stable

## 2023-10-21 NOTE — ED TRIAGE NOTES
Refills completed on 03/18/19   Second IV placed. Pt was given a warm blanket. Bed is low and locked. Call light in reach.

## 2023-10-22 LAB
ALBUMIN SERPL-MCNC: 2.4 G/DL (ref 3.5–5)
ALP BLD-CCNC: 173 U/L (ref 35–125)
ALT SERPL-CCNC: 21 U/L (ref 5–40)
ANION GAP SERPL CALC-SCNC: 10 MMOL/L
AST SERPL-CCNC: 66 U/L (ref 5–40)
BASOPHILS # BLD AUTO: 0.03 X10*3/UL (ref 0–0.1)
BASOPHILS NFR BLD AUTO: 0.2 %
BILIRUB SERPL-MCNC: 11.7 MG/DL (ref 0.1–1.2)
BLOOD EXPIRATION DATE: NORMAL
BLOOD EXPIRATION DATE: NORMAL
BUN SERPL-MCNC: 16 MG/DL (ref 8–25)
BURR CELLS BLD QL SMEAR: NORMAL
CALCIUM SERPL-MCNC: 7.7 MG/DL (ref 8.5–10.4)
CHLORIDE SERPL-SCNC: 97 MMOL/L (ref 97–107)
CO2 SERPL-SCNC: 28 MMOL/L (ref 24–31)
CREAT SERPL-MCNC: 0.7 MG/DL (ref 0.4–1.6)
DISPENSE STATUS: NORMAL
DISPENSE STATUS: NORMAL
EOSINOPHIL # BLD AUTO: 0.08 X10*3/UL (ref 0–0.7)
EOSINOPHIL NFR BLD AUTO: 0.6 %
ERYTHROCYTE [DISTWIDTH] IN BLOOD BY AUTOMATED COUNT: 21.5 % (ref 11.5–14.5)
GFR SERPL CREATININE-BSD FRML MDRD: >90 ML/MIN/1.73M*2
GLUCOSE SERPL-MCNC: 129 MG/DL (ref 65–99)
HCT VFR BLD AUTO: 19.1 % (ref 36–46)
HGB BLD-MCNC: 6.7 G/DL (ref 12–16)
IMM GRANULOCYTES # BLD AUTO: 0.14 X10*3/UL (ref 0–0.7)
IMM GRANULOCYTES NFR BLD AUTO: 1.1 % (ref 0–0.9)
INR PPP: 3.5 (ref 0.9–1.2)
LYMPHOCYTES # BLD AUTO: 2.55 X10*3/UL (ref 1.2–4.8)
LYMPHOCYTES NFR BLD AUTO: 20 %
MAGNESIUM SERPL-MCNC: 1.8 MG/DL (ref 1.6–3.1)
MCH RBC QN AUTO: 37 PG (ref 26–34)
MCHC RBC AUTO-ENTMCNC: 35.1 G/DL (ref 32–36)
MCV RBC AUTO: 106 FL (ref 80–100)
MONOCYTES # BLD AUTO: 1.09 X10*3/UL (ref 0.1–1)
MONOCYTES NFR BLD AUTO: 8.5 %
NEUTROPHILS # BLD AUTO: 8.87 X10*3/UL (ref 1.2–7.7)
NEUTROPHILS NFR BLD AUTO: 69.6 %
NRBC BLD-RTO: 0.4 /100 WBCS (ref 0–0)
PLATELET # BLD AUTO: 145 X10*3/UL (ref 150–450)
PMV BLD AUTO: 9.7 FL (ref 7.5–11.5)
POLYCHROMASIA BLD QL SMEAR: NORMAL
POTASSIUM SERPL-SCNC: 3.1 MMOL/L (ref 3.4–5.1)
PRODUCT BLOOD TYPE: 5100
PRODUCT BLOOD TYPE: 5100
PRODUCT CODE: NORMAL
PRODUCT CODE: NORMAL
PROT SERPL-MCNC: 5.3 G/DL (ref 5.9–7.9)
PROTHROMBIN TIME: 33.8 SECONDS (ref 9.3–12.7)
RBC # BLD AUTO: 1.81 X10*6/UL (ref 4–5.2)
RBC MORPH BLD: NORMAL
SCHISTOCYTES BLD QL SMEAR: NORMAL
SODIUM SERPL-SCNC: 135 MMOL/L (ref 133–145)
TARGETS BLD QL SMEAR: NORMAL
UNIT ABO: NORMAL
UNIT ABO: NORMAL
UNIT NUMBER: NORMAL
UNIT NUMBER: NORMAL
UNIT RH: NORMAL
UNIT RH: NORMAL
UNIT VOLUME: 217
UNIT VOLUME: 400
WBC # BLD AUTO: 12.8 X10*3/UL (ref 4.4–11.3)
XM INTEP: NORMAL

## 2023-10-22 PROCEDURE — 2500000001 HC RX 250 WO HCPCS SELF ADMINISTERED DRUGS (ALT 637 FOR MEDICARE OP): Performed by: INTERNAL MEDICINE

## 2023-10-22 PROCEDURE — 85610 PROTHROMBIN TIME: CPT | Performed by: INTERNAL MEDICINE

## 2023-10-22 PROCEDURE — C9113 INJ PANTOPRAZOLE SODIUM, VIA: HCPCS | Performed by: INTERNAL MEDICINE

## 2023-10-22 PROCEDURE — 36415 COLL VENOUS BLD VENIPUNCTURE: CPT | Performed by: INTERNAL MEDICINE

## 2023-10-22 PROCEDURE — 36430 TRANSFUSION BLD/BLD COMPNT: CPT

## 2023-10-22 PROCEDURE — 85025 COMPLETE CBC W/AUTO DIFF WBC: CPT | Performed by: INTERNAL MEDICINE

## 2023-10-22 PROCEDURE — 83735 ASSAY OF MAGNESIUM: CPT | Performed by: INTERNAL MEDICINE

## 2023-10-22 PROCEDURE — 2500000004 HC RX 250 GENERAL PHARMACY W/ HCPCS (ALT 636 FOR OP/ED): Performed by: INTERNAL MEDICINE

## 2023-10-22 PROCEDURE — 84075 ASSAY ALKALINE PHOSPHATASE: CPT | Performed by: INTERNAL MEDICINE

## 2023-10-22 PROCEDURE — P9017 PLASMA 1 DONOR FRZ W/IN 8 HR: HCPCS

## 2023-10-22 PROCEDURE — 2060000001 HC INTERMEDIATE ICU ROOM DAILY

## 2023-10-22 PROCEDURE — P9016 RBC LEUKOCYTES REDUCED: HCPCS

## 2023-10-22 RX ORDER — FUROSEMIDE 40 MG/1
40 TABLET ORAL DAILY
Status: DISCONTINUED | OUTPATIENT
Start: 2023-10-22 | End: 2023-10-29 | Stop reason: HOSPADM

## 2023-10-22 RX ORDER — LEVOTHYROXINE SODIUM 50 UG/1
50 TABLET ORAL
Status: DISCONTINUED | OUTPATIENT
Start: 2023-10-22 | End: 2023-10-29 | Stop reason: HOSPADM

## 2023-10-22 RX ORDER — LANOLIN ALCOHOL/MO/W.PET/CERES
100 CREAM (GRAM) TOPICAL DAILY
Status: DISCONTINUED | OUTPATIENT
Start: 2023-10-22 | End: 2023-10-29 | Stop reason: HOSPADM

## 2023-10-22 RX ORDER — FOLIC ACID 1 MG/1
1 TABLET ORAL DAILY
Status: DISCONTINUED | OUTPATIENT
Start: 2023-10-22 | End: 2023-10-29 | Stop reason: HOSPADM

## 2023-10-22 RX ORDER — FLUTICASONE PROPIONATE 50 MCG
1 SPRAY, SUSPENSION (ML) NASAL DAILY
Status: DISCONTINUED | OUTPATIENT
Start: 2023-10-22 | End: 2023-10-29 | Stop reason: HOSPADM

## 2023-10-22 RX ORDER — THIAMINE HYDROCHLORIDE 100 MG/ML
100 INJECTION, SOLUTION INTRAMUSCULAR; INTRAVENOUS DAILY
Status: DISCONTINUED | OUTPATIENT
Start: 2023-10-22 | End: 2023-10-22

## 2023-10-22 RX ORDER — POTASSIUM CHLORIDE 14.9 MG/ML
20 INJECTION INTRAVENOUS
Status: ACTIVE | OUTPATIENT
Start: 2023-10-22 | End: 2023-10-22

## 2023-10-22 RX ORDER — GABAPENTIN 300 MG/1
300 CAPSULE ORAL 3 TIMES DAILY
Status: DISCONTINUED | OUTPATIENT
Start: 2023-10-22 | End: 2023-10-29 | Stop reason: HOSPADM

## 2023-10-22 RX ORDER — SPIRONOLACTONE 50 MG/1
100 TABLET, FILM COATED ORAL DAILY
Status: DISCONTINUED | OUTPATIENT
Start: 2023-10-22 | End: 2023-10-29 | Stop reason: HOSPADM

## 2023-10-22 RX ORDER — ALBUMIN HUMAN 250 G/1000ML
25 SOLUTION INTRAVENOUS ONCE
Status: COMPLETED | OUTPATIENT
Start: 2023-10-23 | End: 2023-10-23

## 2023-10-22 RX ORDER — POTASSIUM CHLORIDE 14.9 MG/ML
20 INJECTION INTRAVENOUS
Status: COMPLETED | OUTPATIENT
Start: 2023-10-22 | End: 2023-10-22

## 2023-10-22 RX ADMIN — PANTOPRAZOLE SODIUM 40 MG: 40 INJECTION, POWDER, LYOPHILIZED, FOR SOLUTION INTRAVENOUS at 08:18

## 2023-10-22 RX ADMIN — LACTULOSE 20 G: 20 SOLUTION ORAL at 17:29

## 2023-10-22 RX ADMIN — GABAPENTIN 300 MG: 300 CAPSULE ORAL at 21:49

## 2023-10-22 RX ADMIN — CEFTRIAXONE SODIUM 2 G: 2 INJECTION, SOLUTION INTRAVENOUS at 21:51

## 2023-10-22 RX ADMIN — LACTULOSE 20 G: 20 SOLUTION ORAL at 21:50

## 2023-10-22 RX ADMIN — POTASSIUM CHLORIDE 20 MEQ: 14.9 INJECTION, SOLUTION INTRAVENOUS at 21:47

## 2023-10-22 RX ADMIN — ONDANSETRON 4 MG: 2 INJECTION INTRAMUSCULAR; INTRAVENOUS at 14:08

## 2023-10-22 RX ADMIN — POTASSIUM CHLORIDE 20 MEQ: 14.9 INJECTION, SOLUTION INTRAVENOUS at 18:31

## 2023-10-22 RX ADMIN — RIFAXIMIN 550 MG: 550 TABLET ORAL at 21:48

## 2023-10-22 RX ADMIN — LACTULOSE 20 G: 20 SOLUTION ORAL at 08:18

## 2023-10-22 RX ADMIN — PANTOPRAZOLE SODIUM 40 MG: 40 INJECTION, POWDER, LYOPHILIZED, FOR SOLUTION INTRAVENOUS at 21:51

## 2023-10-22 RX ADMIN — FOLIC ACID 1 MG: 1 TABLET ORAL at 14:08

## 2023-10-22 RX ADMIN — OCTREOTIDE ACETATE 50 MCG/HR: 500 INJECTION, SOLUTION INTRAVENOUS; SUBCUTANEOUS at 08:18

## 2023-10-22 SDOH — SOCIAL STABILITY: SOCIAL INSECURITY: DO YOU FEEL UNSAFE GOING BACK TO THE PLACE WHERE YOU ARE LIVING?: NO

## 2023-10-22 SDOH — SOCIAL STABILITY: SOCIAL INSECURITY: HAVE YOU HAD THOUGHTS OF HARMING ANYONE ELSE?: NO

## 2023-10-22 SDOH — SOCIAL STABILITY: SOCIAL INSECURITY: HAS ANYONE EVER THREATENED TO HURT YOUR FAMILY OR YOUR PETS?: NO

## 2023-10-22 SDOH — SOCIAL STABILITY: SOCIAL INSECURITY: ABUSE: ADULT

## 2023-10-22 SDOH — SOCIAL STABILITY: SOCIAL INSECURITY: DOES ANYONE TRY TO KEEP YOU FROM HAVING/CONTACTING OTHER FRIENDS OR DOING THINGS OUTSIDE YOUR HOME?: NO

## 2023-10-22 SDOH — SOCIAL STABILITY: SOCIAL INSECURITY: DO YOU FEEL ANYONE HAS EXPLOITED OR TAKEN ADVANTAGE OF YOU FINANCIALLY OR OF YOUR PERSONAL PROPERTY?: NO

## 2023-10-22 SDOH — SOCIAL STABILITY: SOCIAL INSECURITY: ARE YOU OR HAVE YOU BEEN THREATENED OR ABUSED PHYSICALLY, EMOTIONALLY, OR SEXUALLY BY ANYONE?: NO

## 2023-10-22 SDOH — SOCIAL STABILITY: SOCIAL INSECURITY: ARE THERE ANY APPARENT SIGNS OF INJURIES/BEHAVIORS THAT COULD BE RELATED TO ABUSE/NEGLECT?: NO

## 2023-10-22 SDOH — SOCIAL STABILITY: SOCIAL INSECURITY: WERE YOU ABLE TO COMPLETE ALL THE BEHAVIORAL HEALTH SCREENINGS?: YES

## 2023-10-22 ASSESSMENT — LIFESTYLE VARIABLES
HOW OFTEN DO YOU HAVE 6 OR MORE DRINKS ON ONE OCCASION: WEEKLY
HOW OFTEN DO YOU HAVE A DRINK CONTAINING ALCOHOL: 4 OR MORE TIMES A WEEK
SKIP TO QUESTIONS 9-10: 0
HOW MANY STANDARD DRINKS CONTAINING ALCOHOL DO YOU HAVE ON A TYPICAL DAY: 5 OR 6
AUDIT-C TOTAL SCORE: 9
AUDIT-C TOTAL SCORE: 9

## 2023-10-22 ASSESSMENT — ACTIVITIES OF DAILY LIVING (ADL)
HEARING - RIGHT EAR: FUNCTIONAL
TOILETING: DEPENDENT
HEARING - LEFT EAR: FUNCTIONAL
GROOMING: NEEDS ASSISTANCE
FEEDING YOURSELF: NEEDS ASSISTANCE
ADEQUATE_TO_COMPLETE_ADL: YES
DRESSING YOURSELF: NEEDS ASSISTANCE
WALKS IN HOME: DEPENDENT
PATIENT'S MEMORY ADEQUATE TO SAFELY COMPLETE DAILY ACTIVITIES?: NO
BATHING: NEEDS ASSISTANCE
JUDGMENT_ADEQUATE_SAFELY_COMPLETE_DAILY_ACTIVITIES: NO

## 2023-10-22 ASSESSMENT — PAIN SCALES - GENERAL
PAINLEVEL_OUTOF10: 0 - NO PAIN
PAINLEVEL_OUTOF10: 0 - NO PAIN

## 2023-10-22 ASSESSMENT — COGNITIVE AND FUNCTIONAL STATUS - GENERAL
WALKING IN HOSPITAL ROOM: TOTAL
DRESSING REGULAR LOWER BODY CLOTHING: A LOT
PERSONAL GROOMING: A LOT
DAILY ACTIVITIY SCORE: 13
DRESSING REGULAR UPPER BODY CLOTHING: A LITTLE
TOILETING: TOTAL
DRESSING REGULAR UPPER BODY CLOTHING: A LITTLE
EATING MEALS: A LITTLE
EATING MEALS: A LITTLE
MOVING TO AND FROM BED TO CHAIR: TOTAL
MOBILITY SCORE: 9
HELP NEEDED FOR BATHING: A LOT
HELP NEEDED FOR BATHING: A LOT
TOILETING: TOTAL
STANDING UP FROM CHAIR USING ARMS: TOTAL
STANDING UP FROM CHAIR USING ARMS: TOTAL
DAILY ACTIVITIY SCORE: 13
MOVING TO AND FROM BED TO CHAIR: TOTAL
CLIMB 3 TO 5 STEPS WITH RAILING: TOTAL
DRESSING REGULAR LOWER BODY CLOTHING: A LOT
CLIMB 3 TO 5 STEPS WITH RAILING: TOTAL
MOVING TO AND FROM BED TO CHAIR: TOTAL
TURNING FROM BACK TO SIDE WHILE IN FLAT BAD: A LOT
STANDING UP FROM CHAIR USING ARMS: TOTAL
DRESSING REGULAR UPPER BODY CLOTHING: A LITTLE
EATING MEALS: A LITTLE
MOBILITY SCORE: 9
MOBILITY SCORE: 9
PATIENT BASELINE BEDBOUND: NO
WALKING IN HOSPITAL ROOM: TOTAL
MOVING FROM LYING ON BACK TO SITTING ON SIDE OF FLAT BED WITH BEDRAILS: A LITTLE
TURNING FROM BACK TO SIDE WHILE IN FLAT BAD: A LOT
HELP NEEDED FOR BATHING: A LOT
DRESSING REGULAR LOWER BODY CLOTHING: A LOT
TOILETING: TOTAL
MOVING FROM LYING ON BACK TO SITTING ON SIDE OF FLAT BED WITH BEDRAILS: A LITTLE
MOVING FROM LYING ON BACK TO SITTING ON SIDE OF FLAT BED WITH BEDRAILS: A LITTLE
PERSONAL GROOMING: A LOT
CLIMB 3 TO 5 STEPS WITH RAILING: TOTAL
DAILY ACTIVITIY SCORE: 13
TURNING FROM BACK TO SIDE WHILE IN FLAT BAD: A LOT
PERSONAL GROOMING: A LOT
WALKING IN HOSPITAL ROOM: TOTAL

## 2023-10-22 ASSESSMENT — ENCOUNTER SYMPTOMS
CONFUSION: 1
RESPIRATORY NEGATIVE: 1
COLOR CHANGE: 1
EYES NEGATIVE: 1
ABDOMINAL DISTENTION: 1
ENDOCRINE NEGATIVE: 1
MUSCULOSKELETAL NEGATIVE: 1
ACTIVITY CHANGE: 1
CARDIOVASCULAR NEGATIVE: 1
WEAKNESS: 1
BRUISES/BLEEDS EASILY: 1

## 2023-10-22 ASSESSMENT — PATIENT HEALTH QUESTIONNAIRE - PHQ9
2. FEELING DOWN, DEPRESSED OR HOPELESS: NOT AT ALL
1. LITTLE INTEREST OR PLEASURE IN DOING THINGS: NOT AT ALL
SUM OF ALL RESPONSES TO PHQ9 QUESTIONS 1 & 2: 0

## 2023-10-22 ASSESSMENT — PAIN - FUNCTIONAL ASSESSMENT: PAIN_FUNCTIONAL_ASSESSMENT: 0-10

## 2023-10-22 NOTE — CARE PLAN
Problem: Risk for falls  Goal: I will remain free from falls  Outcome: Progressing     Problem: Resident is at risk for impaired nutritional status  Goal: I will maintain stable nutritional status  Outcome: Progressing     Problem: Skin  Goal: Participates in plan/prevention/treatment measures  Outcome: Progressing  Goal: Prevent/manage excess moisture  Outcome: Progressing  Goal: Prevent/minimize sheer/friction injuries  Outcome: Progressing  Goal: Promote/optimize nutrition  Outcome: Progressing  Goal: Promote skin healing  Outcome: Progressing   The patient's goals for the shift include feel better    The clinical goals for the shift include stable h&h

## 2023-10-22 NOTE — PROGRESS NOTES
Briseida Owen is a 48 y.o. female on day 1 of admission presenting with Hepatic encephalopathy (CMS/HCC).      Subjective   Some slight epistaxis with blowing nose. No BM. No worsening abd pain. SO at bedside states that patient continues to drink 1L at least and orders through simón.        Objective     Last Recorded Vitals  /55 (BP Location: Right arm, Patient Position: Lying)   Pulse 86   Temp 36.3 °C (97.3 °F) (Temporal)   Resp 19   Wt 77.3 kg (170 lb 6.7 oz)   SpO2 92%   Intake/Output last 3 Shifts:    Intake/Output Summary (Last 24 hours) at 10/22/2023 1035  Last data filed at 10/22/2023 0500  Gross per 24 hour   Intake 725 ml   Output --   Net 725 ml       Admission Weight  Weight: 75 kg (165 lb 5.5 oz) (10/21/23 1452)    Daily Weight  10/21/23 : 77.3 kg (170 lb 6.7 oz)    Image Results  CT abdomen pelvis w IV contrast  Narrative: Interpreted By:  Bre Nicholson,   STUDY:  CT ABDOMEN PELVIS W IV CONTRAST; 10/21/2023 5:47 pm      INDICATION:  Signs/Symptoms:abdominal pain;      COMPARISON:  None      ACCESSION NUMBER(S):  [id number]      ORDERING CLINICIAN:  LELO WAY      TECHNIQUE:  Contiguous axial images of the abdomen/pelvis were performed with IV  contrast. 75 ml of Omnipaque 350 was utilized.      FINDINGS:  There is again a cirrhotic configuration of the liver with sequela of  portal hypertension including splenomegaly and moderate to large  volume intra-abdominal ascites.      The  common bile duct, pancreas, spleen, and adrenal glands are  unremarkable. Cholelithiasis without evidence of cholecystitis.      The kidneys enhance symmetrically.  No urolithiasis is seen. No  hydroureteronephrosis is seen.      The visualized aorta is unremarkable.      The small bowel is not dilated. The appendix is normal.      No free intraperitoneal air or fluid is seen.      The bladder is well distended with no gross wall thickening.      The visualized osseous structures are intact.      Limited  images of the lower thorax are unremarkable.      Impression: 1. No acute abdominopelvic pathology.  2. Liver cirrhosis with sequela of portal hypertension with moderate  to large volume ascites.  3. Cholelithiasis without evidence of cholecystitis.      MACRO:  None      Signed by: Bre Nicholson 10/21/2023 6:00 PM  Dictation workstation:   [id number]  CT head wo IV contrast  Narrative: Interpreted By:  Bre Nicholson,   STUDY:  CT HEAD WO IV CONTRAST;  10/21/2023 5:46 pm      INDICATION:  Signs/Symptoms:confusion.      COMPARISON:  04/13/2021.      ACCESSION NUMBER(S):  [id number]      ORDERING CLINICIAN:  LELO WAY      TECHNIQUE:  CT axial images through the Brain were obtained without contrast.  Examination is limited due to a portion of the right skull lying  outside the field of view.      FINDINGS:  There is no mass effect, hemorrhage, or infarct. The ventricles  appear normal. Gray-white differentiation is maintained. The  visualized paranasal sinuses appear clear.      Impression: No acute intracranial abnormality.      MACRO:  None.      Signed by: Bre Nicholson 10/21/2023 5:50 PM  Dictation workstation:   [id number]  XR chest 1 view  Narrative: Interpreted By:  Arley West,   STUDY:  XR CHEST 1 VIEW;  10/21/2023 3:43 pm      INDICATION:  Signs/Symptoms:confusion.      COMPARISON:  None.      ACCESSION NUMBER(S):  [id number]      ORDERING CLINICIAN:  LELO WAY      FINDINGS:                  CARDIOMEDIASTINAL SILHOUETTE:  Cardiomediastinal silhouette is normal in size and configuration.      LUNGS:  Lungs are hypoinflated. No focal airspace consolidations or effusions.      ABDOMEN:  No remarkable upper abdominal findings.      BONES:  No acute osseous changes.      Impression: No evidence of acute cardiopulmonary process.          MACRO:  None      Signed by: Arley West 10/21/2023 3:57 PM  Dictation workstation:   [id number]      Physical Exam  HENT:      Head: Atraumatic.      Nose:       Comments: Small amount of blood in tissue.     Eyes:      Comments: Scleral icterus   Cardiovascular:      Rate and Rhythm: Normal rate.   Pulmonary:      Effort: Pulmonary effort is normal.   Abdominal:      General: There is distension.      Palpations: There is no mass.      Tenderness: There is no abdominal tenderness. There is no guarding.   Skin:     General: Skin is warm.   Neurological:      Mental Status: She is alert. She is disoriented.         Relevant Results               Assessment/Plan   This patient currently has cardiac telemetry ordered; if you would like to modify or discontinue the telemetry order, click here to go to the orders activity to modify/discontinue the order.              Principal Problem:    Hepatic encephalopathy (CMS/HCC)    Alcoholic cirrhosis  -worsening at baseline  -Unfortunately, the patient continues to drink alcohol.  Not a candidate for liver transplant.  -For ascites, possible spontaneous bacterial peritonitis and covering with ceftriaxone for now and ask interventional radiology for a paracentesis.  -Consider palliative medicine, though we do have established DNR and DNI on last note from previous electronic medical record.  -Continue furosemide and spironolactone  - GI on consult     Anemia  -No major active bleeding, though certainly high risk with cirrhosis.   -Continue Protonix 40 mg twice a day.  -Continue octreotide.  - transfuse 1u pRBCs, 1u FFP  -Gastroenterology as above.     Encephalopathy  - improving  -Hepatic encephalopathy.  Continue lactulose 3 times a day.     Hypokalemia  -Replaced in the emergency department, and daily labs.     Long-term poor prognosis.              Quintin Gonsales,

## 2023-10-22 NOTE — NURSING NOTE
Assumed care of this pt. Arrived to room 443 from ED, breathing even and unlabored on 2L NC. Pt is A&Ox3 with some forgetfulness, oriented to room and call light. IV potassium and PRBC transfusing at this time. Hand off report received. Bed low, call light and belongings in reach. Continuing to monitor

## 2023-10-22 NOTE — CONSULTS
Inpatient consult to gastroenterology  Consult performed by: Mikaela Price, ULISSES-CNP  Consult ordered by: Quintin Gonsales DO        Reason For Consult  Cirrhosis    History Of Present Illness  Briseida Owen is a 48 y.o. female presenting with presenting with altered mental status patient has a long known history of alcoholic cirrhosis, and continues to drink alcohol. She was admitted in August for similar symptoms. She admits she has not been taking her medications at home. ER work up showed a hgb of 6. She denies any melena, hematochezia, nausea, vomiting, hematemesis. She is jaundiced, with abdominal distension.      Past Medical History  She has a past medical history of Alcoholic hepatitis without ascites (08/13/2021) and Hepatic encephalopathy (CMS/HCC) (06/09/2021).    Surgical History  She has a past surgical history that includes Other surgical history (06/09/2021); US guided abdominal paracentesis (4/28/2021); US guided abdominal paracentesis (4/23/2021); US guided abdominal paracentesis (5/14/2021); US guided abdominal paracentesis (8/17/2023); and US guided abdominal paracentesis (8/25/2023).     Social History  She reports that she has never smoked. She has never used smokeless tobacco. She reports current alcohol use of about 1.0 standard drink of alcohol per week. No history on file for drug use.    Family History  Family History   Problem Relation Name Age of Onset    Diabetes Mother      Uterine cancer Mother      Heart attack Father      Other (CHEMICAL DEPENDENCY) Father      Hypertension Father          Allergies  Sulfamethoxazole-trimethoprim    Review of Systems   Constitutional:  Positive for activity change.   HENT: Negative.     Eyes: Negative.    Respiratory: Negative.     Cardiovascular: Negative.    Gastrointestinal:  Positive for abdominal distention.   Endocrine: Negative.    Genitourinary: Negative.    Musculoskeletal: Negative.    Skin:  Positive for color change.  "  Neurological:  Positive for weakness.   Hematological:  Bruises/bleeds easily.   Psychiatric/Behavioral:  Positive for confusion.         Physical Exam  Constitutional:       Appearance: She is ill-appearing.   HENT:      Nose: Nose normal.   Eyes:      Pupils: Pupils are equal, round, and reactive to light.   Cardiovascular:      Rate and Rhythm: Normal rate.   Pulmonary:      Effort: Pulmonary effort is normal.   Abdominal:      General: There is distension.   Musculoskeletal:         General: Normal range of motion.      Cervical back: Normal range of motion.   Skin:     Coloration: Skin is jaundiced.   Neurological:      Mental Status: She is disoriented.   Psychiatric:         Mood and Affect: Mood normal.          Last Recorded Vitals  Blood pressure 113/66, pulse 87, temperature 36.7 °C (98.1 °F), temperature source Tympanic, resp. rate 18, height 1.575 m (5' 2\"), weight 77.3 kg (170 lb 6.7 oz), SpO2 98 %.    Relevant Results  Results for orders placed or performed during the hospital encounter of 10/21/23 (from the past 24 hour(s))   Type and screen   Result Value Ref Range    ABO TYPE O     Rh TYPE POS     ANTIBODY SCREEN NEG    Drug Screen, Urine   Result Value Ref Range    Amphetamine Screen, Urine Negative      Barbiturate Screen, Urine Negative      Benzodiazepines Screen, Urine Negative      Cannabinoid Screen, Urine Negative      Cocaine Metabolite Screen, Urine Negative      Fentanyl Screen, Urine Negative       Methadone Screen, Urine Negative      Opiate Screen, Urine Negative      Oxycodone Screen, Urine Negative      PCP Screen, Urine Negative     Urinalysis with Reflex Microscopic and Culture   Result Value Ref Range    Color, Urine Dark-Orange (N) Light-Yellow, Yellow, Dark-Yellow    Appearance, Urine Turbid (N) Clear    Specific Gravity, Urine 1.024 1.005 - 1.035    pH, Urine 5.5 5.0, 5.5, 6.0, 6.5, 7.0, 7.5, 8.0    Protein, Urine 50 (1+) (A) NEGATIVE, 10 (TRACE), 20 (TRACE) mg/dL    " Glucose, Urine Normal Normal mg/dL    Blood, Urine NEGATIVE NEGATIVE    Ketones, Urine NEGATIVE NEGATIVE mg/dL    Bilirubin, Urine 3 (2+) (A) NEGATIVE    Urobilinogen, Urine 6 (3+) (A) Normal mg/dL    Nitrite, Urine NEGATIVE NEGATIVE    Leukocyte Esterase, Urine 75 Dalia/µL (A) NEGATIVE   Extra Urine Gray Tube   Result Value Ref Range    Extra Tube Hold for add-ons.    Prepare RBC: 1 Units   Result Value Ref Range    PRODUCT CODE U5646E54     Unit Number [id number]     Unit ABO O     Unit RH POS     XM INTEP COMP     Dispense Status TR     Blood Expiration Date October 27, 2023 23:59 EDT     PRODUCT BLOOD TYPE 5100     UNIT VOLUME 400    Comprehensive metabolic panel   Result Value Ref Range    Glucose 129 (H) 65 - 99 mg/dL    Sodium 135 133 - 145 mmol/L    Potassium 3.1 (L) 3.4 - 5.1 mmol/L    Chloride 97 97 - 107 mmol/L    Bicarbonate 28 24 - 31 mmol/L    Urea Nitrogen 16 8 - 25 mg/dL    Creatinine 0.70 0.40 - 1.60 mg/dL    eGFR >90 >60 mL/min/1.73m*2    Calcium 7.7 (L) 8.5 - 10.4 mg/dL    Albumin 2.4 (L) 3.5 - 5.0 g/dL    Alkaline Phosphatase 173 (H) 35 - 125 U/L    Total Protein 5.3 (L) 5.9 - 7.9 g/dL    AST 66 (H) 5 - 40 U/L    Bilirubin, Total 11.7 (H) 0.1 - 1.2 mg/dL    ALT 21 5 - 40 U/L    Anion Gap 10 <=19 mmol/L   CBC and Auto Differential   Result Value Ref Range    WBC 12.8 (H) 4.4 - 11.3 x10*3/uL    nRBC 0.4 (H) 0.0 - 0.0 /100 WBCs    RBC 1.81 (L) 4.00 - 5.20 x10*6/uL    Hemoglobin 6.7 (L) 12.0 - 16.0 g/dL    Hematocrit 19.1 (L) 36.0 - 46.0 %     (H) 80 - 100 fL    MCH 37.0 (H) 26.0 - 34.0 pg    MCHC 35.1 32.0 - 36.0 g/dL    RDW 21.5 (H) 11.5 - 14.5 %    Platelets 145 (L) 150 - 450 x10*3/uL    MPV 9.7 7.5 - 11.5 fL    Neutrophils % 69.6 40.0 - 80.0 %    Immature Granulocytes %, Automated 1.1 (H) 0.0 - 0.9 %    Lymphocytes % 20.0 13.0 - 44.0 %    Monocytes % 8.5 2.0 - 10.0 %    Eosinophils % 0.6 0.0 - 6.0 %    Basophils % 0.2 0.0 - 2.0 %    Neutrophils Absolute 8.87 (H) 1.20 - 7.70 x10*3/uL     Immature Granulocytes Absolute, Automated 0.14 0.00 - 0.70 x10*3/uL    Lymphocytes Absolute 2.55 1.20 - 4.80 x10*3/uL    Monocytes Absolute 1.09 (H) 0.10 - 1.00 x10*3/uL    Eosinophils Absolute 0.08 0.00 - 0.70 x10*3/uL    Basophils Absolute 0.03 0.00 - 0.10 x10*3/uL   Protime-INR   Result Value Ref Range    Protime 33.8 (H) 9.3 - 12.7 seconds    INR 3.5 (H) 0.9 - 1.2   Morphology   Result Value Ref Range    RBC Morphology See Below     Polychromasia Mild     RBC Fragments Few     Target Cells Few     Loda Cells Many    Magnesium   Result Value Ref Range    Magnesium 1.80 1.60 - 3.10 mg/dL   Prepare Plasma: 1 Units   Result Value Ref Range    PRODUCT CODE M1958M04     Unit Number [id number]     Unit ABO O     Unit RH POS     Dispense Status TR     Blood Expiration Date October 27, 2023 10:00 EDT     PRODUCT BLOOD TYPE 5100     UNIT VOLUME 217    Prepare RBC: 1 Units   Result Value Ref Range    PRODUCT CODE L7291C30     Unit Number [id number]     Unit ABO O     Unit RH POS     XM INTEP COMP     Dispense Status IS     Blood Expiration Date October 26, 2023 23:59 EDT     PRODUCT BLOOD TYPE 5100     UNIT VOLUME 400       CT abdomen pelvis w IV contrast    Result Date: 10/21/2023  Interpreted By:  Bre Nicholson, STUDY: CT ABDOMEN PELVIS W IV CONTRAST; 10/21/2023 5:47 pm   INDICATION: Signs/Symptoms:abdominal pain;   COMPARISON: None   ACCESSION NUMBER(S): [id number]   ORDERING CLINICIAN: LELO WAY   TECHNIQUE: Contiguous axial images of the abdomen/pelvis were performed with IV contrast. 75 ml of Omnipaque 350 was utilized.   FINDINGS: There is again a cirrhotic configuration of the liver with sequela of portal hypertension including splenomegaly and moderate to large volume intra-abdominal ascites.   The  common bile duct, pancreas, spleen, and adrenal glands are unremarkable. Cholelithiasis without evidence of cholecystitis.   The kidneys enhance symmetrically.  No urolithiasis is seen. No  hydroureteronephrosis is seen.   The visualized aorta is unremarkable.   The small bowel is not dilated. The appendix is normal.   No free intraperitoneal air or fluid is seen.   The bladder is well distended with no gross wall thickening.   The visualized osseous structures are intact.   Limited images of the lower thorax are unremarkable.       1. No acute abdominopelvic pathology. 2. Liver cirrhosis with sequela of portal hypertension with moderate to large volume ascites. 3. Cholelithiasis without evidence of cholecystitis.   MACRO: None   Signed by: Bre Nicholson 10/21/2023 6:00 PM Dictation workstation:   [id number]    CT head wo IV contrast    Result Date: 10/21/2023  Interpreted By:  Bre Nicholson, STUDY: CT HEAD WO IV CONTRAST;  10/21/2023 5:46 pm   INDICATION: Signs/Symptoms:confusion.   COMPARISON: 04/13/2021.   ACCESSION NUMBER(S): [id number]   ORDERING CLINICIAN: LELO WAY   TECHNIQUE: CT axial images through the Brain were obtained without contrast. Examination is limited due to a portion of the right skull lying outside the field of view.   FINDINGS: There is no mass effect, hemorrhage, or infarct. The ventricles appear normal. Gray-white differentiation is maintained. The visualized paranasal sinuses appear clear.       No acute intracranial abnormality.   MACRO: None.   Signed by: Bre Nicholson 10/21/2023 5:50 PM Dictation workstation:   [id number]    XR chest 1 view    Result Date: 10/21/2023  Interpreted By:  Arley West, STUDY: XR CHEST 1 VIEW;  10/21/2023 3:43 pm   INDICATION: Signs/Symptoms:confusion.   COMPARISON: None.   ACCESSION NUMBER(S): [id number]   ORDERING CLINICIAN: LELO WAY   FINDINGS:         CARDIOMEDIASTINAL SILHOUETTE: Cardiomediastinal silhouette is normal in size and configuration.   LUNGS: Lungs are hypoinflated. No focal airspace consolidations or effusions.   ABDOMEN: No remarkable upper abdominal findings.   BONES: No acute osseous changes.       No  evidence of acute cardiopulmonary process.     MACRO: None   Signed by: Arley West 10/21/2023 3:57 PM Dictation workstation:   [id number]       Assessment/Plan     Alcoholic Cirrhosis of Liver  -Details Decomp (HE, ascities) in setting chronic alcohol abuse high MELD score of 31 , Worsening,    bilirubin into the 13 range with a associated coagulopathy of with INR 3.5.  -HE: - asterixis. A/Ox 2 - start lactulose 20-30 gm/dose, 2-3 times per day, titrated to 2-3 soft BMs per day        -EV: June 2023 Dr Yepez, grade II, banded. Started on nadolol. May need repeat based in clinical course        -HCC: AFP ordered Last one in May        -Ascites.  US showed portal hypertension with moderate to large volume ascites. Paracentesis       ordered ceftriaxone started . Will check for SBP          -Transfuse Albumin with paracentesis         -Continue furosemide and spironolactone         - Fluid restriction to 1.2-1.5L daily        -Low Na diet         -CIWA protocol        -Start thiamine daily        - Please avoid opioids, benzos etc. sedative medications as able.        2.    Anemia         Macrocytic anemia hgb 6.6 No overt bleeding         - monitor H/H q 6 for a goal >7         - continue PPI     3.    Alcohol Abuse,          - Advised cessation. She is still drinking daily            I spent 30 minutes in the professional and overall care of this patient.

## 2023-10-22 NOTE — NURSING NOTE
Pt changed of incontinent urine. Assisted to reposition in bed. Pt denies further needs at this time. Prior assessment unchanged.

## 2023-10-23 ENCOUNTER — APPOINTMENT (OUTPATIENT)
Dept: RADIOLOGY | Facility: HOSPITAL | Age: 48
DRG: 441 | End: 2023-10-23
Payer: MEDICARE

## 2023-10-23 LAB
ALBUMIN SERPL-MCNC: 3 G/DL (ref 3.5–5)
ALP BLD-CCNC: 165 U/L (ref 35–125)
ALT SERPL-CCNC: 19 U/L (ref 5–40)
ANION GAP SERPL CALC-SCNC: 11 MMOL/L
AST SERPL-CCNC: 60 U/L (ref 5–40)
BASOPHILS # BLD AUTO: 0.04 X10*3/UL (ref 0–0.1)
BASOPHILS NFR BLD AUTO: 0.4 %
BILIRUB SERPL-MCNC: 11.7 MG/DL (ref 0.1–1.2)
BUN SERPL-MCNC: 15 MG/DL (ref 8–25)
CALCIUM SERPL-MCNC: 8.1 MG/DL (ref 8.5–10.4)
CHLORIDE SERPL-SCNC: 97 MMOL/L (ref 97–107)
CO2 SERPL-SCNC: 27 MMOL/L (ref 24–31)
CREAT SERPL-MCNC: 0.7 MG/DL (ref 0.4–1.6)
EOSINOPHIL # BLD AUTO: 0.18 X10*3/UL (ref 0–0.7)
EOSINOPHIL NFR BLD AUTO: 1.6 %
ERYTHROCYTE [DISTWIDTH] IN BLOOD BY AUTOMATED COUNT: 23.2 % (ref 11.5–14.5)
GFR SERPL CREATININE-BSD FRML MDRD: >90 ML/MIN/1.73M*2
GLUCOSE SERPL-MCNC: 130 MG/DL (ref 65–99)
HCT VFR BLD AUTO: 21.5 % (ref 36–46)
HGB BLD-MCNC: 7.5 G/DL (ref 12–16)
IMM GRANULOCYTES # BLD AUTO: 0.1 X10*3/UL (ref 0–0.7)
IMM GRANULOCYTES NFR BLD AUTO: 0.9 % (ref 0–0.9)
INR PPP: 2.9 (ref 0.9–1.2)
LYMPHOCYTES # BLD AUTO: 1.62 X10*3/UL (ref 1.2–4.8)
LYMPHOCYTES NFR BLD AUTO: 14.6 %
MCH RBC QN AUTO: 35.9 PG (ref 26–34)
MCHC RBC AUTO-ENTMCNC: 34.9 G/DL (ref 32–36)
MCV RBC AUTO: 103 FL (ref 80–100)
MONOCYTES # BLD AUTO: 0.92 X10*3/UL (ref 0.1–1)
MONOCYTES NFR BLD AUTO: 8.3 %
NEUTROPHILS # BLD AUTO: 8.21 X10*3/UL (ref 1.2–7.7)
NEUTROPHILS NFR BLD AUTO: 74.2 %
NRBC BLD-RTO: 0.3 /100 WBCS (ref 0–0)
PLATELET # BLD AUTO: 151 X10*3/UL (ref 150–450)
PMV BLD AUTO: 9.7 FL (ref 7.5–11.5)
POTASSIUM SERPL-SCNC: 3.6 MMOL/L (ref 3.4–5.1)
PROT SERPL-MCNC: 5.9 G/DL (ref 5.9–7.9)
PROTHROMBIN TIME: 28.8 SECONDS (ref 9.3–12.7)
RBC # BLD AUTO: 2.09 X10*6/UL (ref 4–5.2)
SODIUM SERPL-SCNC: 135 MMOL/L (ref 133–145)
WBC # BLD AUTO: 11.1 X10*3/UL (ref 4.4–11.3)

## 2023-10-23 PROCEDURE — 2500000002 HC RX 250 W HCPCS SELF ADMINISTERED DRUGS (ALT 637 FOR MEDICARE OP, ALT 636 FOR OP/ED): Performed by: INTERNAL MEDICINE

## 2023-10-23 PROCEDURE — P9047 ALBUMIN (HUMAN), 25%, 50ML: HCPCS | Mod: JZ

## 2023-10-23 PROCEDURE — 36415 COLL VENOUS BLD VENIPUNCTURE: CPT | Performed by: INTERNAL MEDICINE

## 2023-10-23 PROCEDURE — 0W9G3ZZ DRAINAGE OF PERITONEAL CAVITY, PERCUTANEOUS APPROACH: ICD-10-PCS | Performed by: RADIOLOGY

## 2023-10-23 PROCEDURE — 2500000004 HC RX 250 GENERAL PHARMACY W/ HCPCS (ALT 636 FOR OP/ED): Performed by: INTERNAL MEDICINE

## 2023-10-23 PROCEDURE — 49083 ABD PARACENTESIS W/IMAGING: CPT

## 2023-10-23 PROCEDURE — 49083 ABD PARACENTESIS W/IMAGING: CPT | Performed by: RADIOLOGY

## 2023-10-23 PROCEDURE — 2720000007 HC OR 272 NO HCPCS

## 2023-10-23 PROCEDURE — 80053 COMPREHEN METABOLIC PANEL: CPT | Performed by: INTERNAL MEDICINE

## 2023-10-23 PROCEDURE — C9113 INJ PANTOPRAZOLE SODIUM, VIA: HCPCS | Performed by: INTERNAL MEDICINE

## 2023-10-23 PROCEDURE — 2500000005 HC RX 250 GENERAL PHARMACY W/O HCPCS

## 2023-10-23 PROCEDURE — 2060000001 HC INTERMEDIATE ICU ROOM DAILY

## 2023-10-23 PROCEDURE — 85025 COMPLETE CBC W/AUTO DIFF WBC: CPT | Performed by: INTERNAL MEDICINE

## 2023-10-23 PROCEDURE — 2500000004 HC RX 250 GENERAL PHARMACY W/ HCPCS (ALT 636 FOR OP/ED): Mod: JZ

## 2023-10-23 PROCEDURE — 85610 PROTHROMBIN TIME: CPT | Performed by: INTERNAL MEDICINE

## 2023-10-23 PROCEDURE — 2500000001 HC RX 250 WO HCPCS SELF ADMINISTERED DRUGS (ALT 637 FOR MEDICARE OP): Performed by: INTERNAL MEDICINE

## 2023-10-23 RX ORDER — FOLIC ACID 1 MG/1
1 TABLET ORAL DAILY
Status: DISCONTINUED | OUTPATIENT
Start: 2023-10-23 | End: 2023-10-23

## 2023-10-23 RX ORDER — LIDOCAINE HYDROCHLORIDE 10 MG/ML
INJECTION, SOLUTION EPIDURAL; INFILTRATION; INTRACAUDAL; PERINEURAL AS NEEDED
Status: COMPLETED | OUTPATIENT
Start: 2023-10-23 | End: 2023-10-23

## 2023-10-23 RX ADMIN — FOLIC ACID 1 MG: 1 TABLET ORAL at 08:38

## 2023-10-23 RX ADMIN — GABAPENTIN 300 MG: 300 CAPSULE ORAL at 20:57

## 2023-10-23 RX ADMIN — LIDOCAINE HYDROCHLORIDE 5 ML: 10 INJECTION, SOLUTION EPIDURAL; INFILTRATION; INTRACAUDAL; PERINEURAL at 10:26

## 2023-10-23 RX ADMIN — PANTOPRAZOLE SODIUM 40 MG: 40 INJECTION, POWDER, LYOPHILIZED, FOR SOLUTION INTRAVENOUS at 08:38

## 2023-10-23 RX ADMIN — RIFAXIMIN 550 MG: 550 TABLET ORAL at 20:57

## 2023-10-23 RX ADMIN — Medication 100 MG: at 08:38

## 2023-10-23 RX ADMIN — RIFAXIMIN 550 MG: 550 TABLET ORAL at 08:44

## 2023-10-23 RX ADMIN — ALBUMIN (HUMAN) 25 G: 0.25 INJECTION, SOLUTION INTRAVENOUS at 03:19

## 2023-10-23 RX ADMIN — FLUTICASONE PROPIONATE 1 SPRAY: 50 SPRAY, METERED NASAL at 08:44

## 2023-10-23 RX ADMIN — SPIRONOLACTONE 100 MG: 50 TABLET ORAL at 08:38

## 2023-10-23 RX ADMIN — FUROSEMIDE 40 MG: 40 TABLET ORAL at 08:38

## 2023-10-23 RX ADMIN — CEFTRIAXONE SODIUM 2 G: 2 INJECTION, SOLUTION INTRAVENOUS at 21:10

## 2023-10-23 RX ADMIN — PANTOPRAZOLE SODIUM 40 MG: 40 INJECTION, POWDER, LYOPHILIZED, FOR SOLUTION INTRAVENOUS at 20:57

## 2023-10-23 RX ADMIN — LACTULOSE 20 G: 20 SOLUTION ORAL at 14:12

## 2023-10-23 RX ADMIN — GABAPENTIN 300 MG: 300 CAPSULE ORAL at 14:12

## 2023-10-23 RX ADMIN — OCTREOTIDE ACETATE 50 MCG/HR: 500 INJECTION, SOLUTION INTRAVENOUS; SUBCUTANEOUS at 18:26

## 2023-10-23 RX ADMIN — LACTULOSE 20 G: 20 SOLUTION ORAL at 08:38

## 2023-10-23 RX ADMIN — LEVOTHYROXINE SODIUM 50 MCG: 0.14 TABLET ORAL at 05:54

## 2023-10-23 RX ADMIN — GABAPENTIN 300 MG: 300 CAPSULE ORAL at 08:38

## 2023-10-23 ASSESSMENT — COGNITIVE AND FUNCTIONAL STATUS - GENERAL
MOVING FROM LYING ON BACK TO SITTING ON SIDE OF FLAT BED WITH BEDRAILS: A LITTLE
DAILY ACTIVITIY SCORE: 13
MOBILITY SCORE: 9
STANDING UP FROM CHAIR USING ARMS: TOTAL
MOVING TO AND FROM BED TO CHAIR: TOTAL
DRESSING REGULAR LOWER BODY CLOTHING: A LOT
CLIMB 3 TO 5 STEPS WITH RAILING: TOTAL
MOBILITY SCORE: 9
WALKING IN HOSPITAL ROOM: TOTAL
TOILETING: TOTAL
STANDING UP FROM CHAIR USING ARMS: TOTAL
DRESSING REGULAR LOWER BODY CLOTHING: A LOT
EATING MEALS: A LITTLE
PERSONAL GROOMING: A LOT
TURNING FROM BACK TO SIDE WHILE IN FLAT BAD: A LOT
DRESSING REGULAR UPPER BODY CLOTHING: A LITTLE
CLIMB 3 TO 5 STEPS WITH RAILING: TOTAL
DRESSING REGULAR UPPER BODY CLOTHING: A LITTLE
HELP NEEDED FOR BATHING: A LOT
WALKING IN HOSPITAL ROOM: TOTAL
DAILY ACTIVITIY SCORE: 13
TOILETING: TOTAL
MOVING FROM LYING ON BACK TO SITTING ON SIDE OF FLAT BED WITH BEDRAILS: A LITTLE
HELP NEEDED FOR BATHING: A LOT
PERSONAL GROOMING: A LOT
EATING MEALS: A LITTLE
MOVING TO AND FROM BED TO CHAIR: TOTAL
TURNING FROM BACK TO SIDE WHILE IN FLAT BAD: A LOT

## 2023-10-23 ASSESSMENT — PAIN SCALES - GENERAL
PAINLEVEL_OUTOF10: 0 - NO PAIN

## 2023-10-23 ASSESSMENT — PAIN - FUNCTIONAL ASSESSMENT: PAIN_FUNCTIONAL_ASSESSMENT: 0-10

## 2023-10-23 NOTE — PROGRESS NOTES
Briseida Owen is a 48 y.o. female on day 2 of admission presenting with Hepatic encephalopathy (CMS/HCC).      Subjective   Paracentesis done today with about 6 L removed.  Feels better, though still feeling very weak.       Objective     Last Recorded Vitals  /61 (BP Location: Left arm, Patient Position: Lying)   Pulse 84   Temp 36.4 °C (97.5 °F) (Temporal)   Resp 16   Wt 77.9 kg (171 lb 11.8 oz)   SpO2 95%   Intake/Output last 3 Shifts:    Intake/Output Summary (Last 24 hours) at 10/23/2023 1338  Last data filed at 10/23/2023 1053  Gross per 24 hour   Intake 559.17 ml   Output --   Net 559.17 ml         Admission Weight  Weight: 75 kg (165 lb 5.5 oz) (10/21/23 1452)    Daily Weight  10/23/23 : 77.9 kg (171 lb 11.8 oz)    Image Results  CT abdomen pelvis w IV contrast  Narrative: Interpreted By:  Bre Nicholson,   STUDY:  CT ABDOMEN PELVIS W IV CONTRAST; 10/21/2023 5:47 pm      INDICATION:  Signs/Symptoms:abdominal pain;      COMPARISON:  None      ACCESSION NUMBER(S):  [id number]      ORDERING CLINICIAN:  LELO WAY      TECHNIQUE:  Contiguous axial images of the abdomen/pelvis were performed with IV  contrast. 75 ml of Omnipaque 350 was utilized.      FINDINGS:  There is again a cirrhotic configuration of the liver with sequela of  portal hypertension including splenomegaly and moderate to large  volume intra-abdominal ascites.      The  common bile duct, pancreas, spleen, and adrenal glands are  unremarkable. Cholelithiasis without evidence of cholecystitis.      The kidneys enhance symmetrically.  No urolithiasis is seen. No  hydroureteronephrosis is seen.      The visualized aorta is unremarkable.      The small bowel is not dilated. The appendix is normal.      No free intraperitoneal air or fluid is seen.      The bladder is well distended with no gross wall thickening.      The visualized osseous structures are intact.      Limited images of the lower thorax are unremarkable.       Impression: 1. No acute abdominopelvic pathology.  2. Liver cirrhosis with sequela of portal hypertension with moderate  to large volume ascites.  3. Cholelithiasis without evidence of cholecystitis.      MACRO:  None      Signed by: Bre Nicholson 10/21/2023 6:00 PM  Dictation workstation:   [id number]  CT head wo IV contrast  Narrative: Interpreted By:  Bre Nicholson,   STUDY:  CT HEAD WO IV CONTRAST;  10/21/2023 5:46 pm      INDICATION:  Signs/Symptoms:confusion.      COMPARISON:  04/13/2021.      ACCESSION NUMBER(S):  [id number]      ORDERING CLINICIAN:  LELO WAY      TECHNIQUE:  CT axial images through the Brain were obtained without contrast.  Examination is limited due to a portion of the right skull lying  outside the field of view.      FINDINGS:  There is no mass effect, hemorrhage, or infarct. The ventricles  appear normal. Gray-white differentiation is maintained. The  visualized paranasal sinuses appear clear.      Impression: No acute intracranial abnormality.      MACRO:  None.      Signed by: Bre Nicholson 10/21/2023 5:50 PM  Dictation workstation:   [id number]  XR chest 1 view  Narrative: Interpreted By:  Arley West,   STUDY:  XR CHEST 1 VIEW;  10/21/2023 3:43 pm      INDICATION:  Signs/Symptoms:confusion.      COMPARISON:  None.      ACCESSION NUMBER(S):  [id number]      ORDERING CLINICIAN:  LELO WAY      FINDINGS:                  CARDIOMEDIASTINAL SILHOUETTE:  Cardiomediastinal silhouette is normal in size and configuration.      LUNGS:  Lungs are hypoinflated. No focal airspace consolidations or effusions.      ABDOMEN:  No remarkable upper abdominal findings.      BONES:  No acute osseous changes.      Impression: No evidence of acute cardiopulmonary process.          MACRO:  None      Signed by: Arley West 10/21/2023 3:57 PM  Dictation workstation:   [id number]      Physical Exam  HENT:      Head: Atraumatic.      Nose:      Comments: Small amount of blood in tissue.      Eyes:      Comments: Scleral icterus   Cardiovascular:      Rate and Rhythm: Normal rate.   Pulmonary:      Effort: Pulmonary effort is normal.   Abdominal:      General: There is distension.      Palpations: There is no mass.      Tenderness: There is no abdominal tenderness. There is no guarding.   Skin:     General: Skin is warm.   Neurological:      Mental Status: She is alert. She is disoriented.         Relevant Results               Assessment/Plan   This patient currently has cardiac telemetry ordered; if you would like to modify or discontinue the telemetry order, click here to go to the orders activity to modify/discontinue the order.              Principal Problem:    Hepatic encephalopathy (CMS/HCC)    Alcoholic cirrhosis  -worsening at baseline  -Unfortunately, the patient continues to drink alcohol.  Not a candidate for liver transplant.  -Paracentesis was done.  Currently treating for spontaneous bacterial peritonitis I have a somewhat lower suspicion, suspect the leukocytosis is more from urinary tract infection.  -Consider palliative medicine, though we do have established DNR and DNI on last note from previous electronic medical record.  -Continue furosemide and spironolactone  - GI on consult     Anemia  -No major active bleeding, though certainly high risk with cirrhosis.   -  Continue to monitor, and transfuse for hemoglobin above 7.     Encephalopathy  -Resolving.  -Hepatic encephalopathy.  Continue lactulose 3 times a day.     Hypokalemia  -Replaced in the emergency department, and daily labs.     Long-term poor prognosis.  For time being, had physical therapy Occupational Therapy work with the patient, try get her back to a place where she has some strength, but in the long run I believe a palliative approach would likely be best for her especially if she continues to drink.              Quintin Gonsales, DO

## 2023-10-23 NOTE — CARE PLAN
Problem: Risk for falls  Goal: I will remain free from falls  10/23/2023 0809 by Daniela Yadav RN  Outcome: Progressing  10/23/2023 0805 by Daniela Yadav RN  Outcome: Progressing     Problem: Resident is at risk for impaired nutritional status  Goal: I will maintain stable nutritional status  10/23/2023 0809 by Daniela Yadav RN  Outcome: Progressing  10/23/2023 0805 by Daniela Yadav RN  Outcome: Progressing     Problem: Skin  Goal: Participates in plan/prevention/treatment measures  10/23/2023 0809 by Daniela Yadav RN  Outcome: Progressing  10/23/2023 0805 by Daniela Yadav RN  Outcome: Progressing  Goal: Prevent/manage excess moisture  10/23/2023 0809 by Daniela Yadav RN  Outcome: Progressing  10/23/2023 0805 by Daniela Yadav RN  Outcome: Progressing  Goal: Prevent/minimize sheer/friction injuries  10/23/2023 0809 by Daniela Yadav RN  Outcome: Progressing  10/23/2023 0805 by Daniela Yadav RN  Outcome: Progressing  Goal: Promote/optimize nutrition  10/23/2023 0809 by Daniela Yadav RN  Outcome: Progressing  10/23/2023 0805 by Daniela Yadav RN  Outcome: Progressing  Goal: Promote skin healing  10/23/2023 0809 by Daniela Yadav RN  Outcome: Progressing  10/23/2023 0805 by Daniela Yadav RN  Outcome: Progressing   The patient's goals for the shift include paracentesis    The clinical goals for the shift include monitor labs

## 2023-10-23 NOTE — CARE PLAN
Problem: Risk for falls  Goal: I will remain free from falls  Outcome: Progressing     Problem: Resident is at risk for impaired nutritional status  Goal: I will maintain stable nutritional status  Outcome: Progressing     Problem: Skin  Goal: Participates in plan/prevention/treatment measures  Outcome: Progressing  Goal: Prevent/manage excess moisture  Outcome: Progressing  Goal: Prevent/minimize sheer/friction injuries  Outcome: Progressing  Goal: Promote/optimize nutrition  Outcome: Progressing  Goal: Promote skin healing  Outcome: Progressing

## 2023-10-23 NOTE — PROGRESS NOTES
"Briseida Owen is a 48 y.o. female on day 2 of admission presenting with Hepatic encephalopathy (CMS/HCC).    Subjective   Denies dark, tarry, bloody stools. Having loose stools       Objective     Physical Exam  HENT:      Head: Normocephalic and atraumatic.      Mouth/Throat:      Mouth: Mucous membranes are moist.   Pulmonary:      Effort: Pulmonary effort is normal.   Abdominal:      General: There is distension.      Tenderness: There is no abdominal tenderness. There is no guarding.   Musculoskeletal:         General: Normal range of motion.      Cervical back: Normal range of motion.   Skin:     Coloration: Skin is jaundiced and pale.   Neurological:      General: No focal deficit present.      Mental Status: She is alert and oriented to person, place, and time. Mental status is at baseline.      Comments: +asterixis    Psychiatric:         Mood and Affect: Mood normal.         Last Recorded Vitals  Blood pressure 107/63, pulse 85, temperature 36.4 °C (97.5 °F), temperature source Temporal, resp. rate 20, height 1.575 m (5' 2\"), weight 77.9 kg (171 lb 11.8 oz), SpO2 96 %.  Intake/Output last 3 Shifts:  I/O last 3 completed shifts:  In: 1400.3 (18 mL/kg) [P.O.:300; I.V.:217 (2.8 mL/kg); Blood:833.3; IV Piggyback:50]  Out: - (0 mL/kg)   Weight: 77.9 kg     Relevant Results           This patient currently has cardiac telemetry ordered; if you would like to modify or discontinue the telemetry order, click here to go to the orders activity to modify/discontinue the order.    Results for orders placed or performed during the hospital encounter of 10/21/23 (from the past 24 hour(s))   Prepare Plasma: 1 Units   Result Value Ref Range    PRODUCT CODE E9299M25     Unit Number [id number]     Unit ABO O     Unit RH POS     Dispense Status TR     Blood Expiration Date October 27, 2023 10:00 EDT     PRODUCT BLOOD TYPE 5100     UNIT VOLUME 217    Prepare RBC: 1 Units   Result Value Ref Range    PRODUCT CODE X4856N68 "     Unit Number [id number]     Unit ABO O     Unit RH POS     XM INTEP COMP     Dispense Status TR     Blood Expiration Date October 26, 2023 23:59 EDT     PRODUCT BLOOD TYPE 5100     UNIT VOLUME 400    Comprehensive Metabolic Panel   Result Value Ref Range    Glucose 130 (H) 65 - 99 mg/dL    Sodium 135 133 - 145 mmol/L    Potassium 3.6 3.4 - 5.1 mmol/L    Chloride 97 97 - 107 mmol/L    Bicarbonate 27 24 - 31 mmol/L    Urea Nitrogen 15 8 - 25 mg/dL    Creatinine 0.70 0.40 - 1.60 mg/dL    eGFR >90 >60 mL/min/1.73m*2    Calcium 8.1 (L) 8.5 - 10.4 mg/dL    Albumin 3.0 (L) 3.5 - 5.0 g/dL    Alkaline Phosphatase 165 (H) 35 - 125 U/L    Total Protein 5.9 5.9 - 7.9 g/dL    AST 60 (H) 5 - 40 U/L    Bilirubin, Total 11.7 (H) 0.1 - 1.2 mg/dL    ALT 19 5 - 40 U/L    Anion Gap 11 <=19 mmol/L   CBC and Auto Differential   Result Value Ref Range    WBC 11.1 4.4 - 11.3 x10*3/uL    nRBC 0.3 (H) 0.0 - 0.0 /100 WBCs    RBC 2.09 (L) 4.00 - 5.20 x10*6/uL    Hemoglobin 7.5 (L) 12.0 - 16.0 g/dL    Hematocrit 21.5 (L) 36.0 - 46.0 %     (H) 80 - 100 fL    MCH 35.9 (H) 26.0 - 34.0 pg    MCHC 34.9 32.0 - 36.0 g/dL    RDW 23.2 (H) 11.5 - 14.5 %    Platelets 151 150 - 450 x10*3/uL    MPV 9.7 7.5 - 11.5 fL    Neutrophils % 74.2 40.0 - 80.0 %    Immature Granulocytes %, Automated 0.9 0.0 - 0.9 %    Lymphocytes % 14.6 13.0 - 44.0 %    Monocytes % 8.3 2.0 - 10.0 %    Eosinophils % 1.6 0.0 - 6.0 %    Basophils % 0.4 0.0 - 2.0 %    Neutrophils Absolute 8.21 (H) 1.20 - 7.70 x10*3/uL    Immature Granulocytes Absolute, Automated 0.10 0.00 - 0.70 x10*3/uL    Lymphocytes Absolute 1.62 1.20 - 4.80 x10*3/uL    Monocytes Absolute 0.92 0.10 - 1.00 x10*3/uL    Eosinophils Absolute 0.18 0.00 - 0.70 x10*3/uL    Basophils Absolute 0.04 0.00 - 0.10 x10*3/uL   Protime-INR   Result Value Ref Range    Protime 28.8 (H) 9.3 - 12.7 seconds    INR 2.9 (H) 0.9 - 1.2     CT abdomen pelvis w IV contrast    Result Date: 10/21/2023  Interpreted By:  Bharat  Bre, STUDY: CT ABDOMEN PELVIS W IV CONTRAST; 10/21/2023 5:47 pm   INDICATION: Signs/Symptoms:abdominal pain;   COMPARISON: None   ACCESSION NUMBER(S): [id number]   ORDERING CLINICIAN: LELO WAY   TECHNIQUE: Contiguous axial images of the abdomen/pelvis were performed with IV contrast. 75 ml of Omnipaque 350 was utilized.   FINDINGS: There is again a cirrhotic configuration of the liver with sequela of portal hypertension including splenomegaly and moderate to large volume intra-abdominal ascites.   The  common bile duct, pancreas, spleen, and adrenal glands are unremarkable. Cholelithiasis without evidence of cholecystitis.   The kidneys enhance symmetrically.  No urolithiasis is seen. No hydroureteronephrosis is seen.   The visualized aorta is unremarkable.   The small bowel is not dilated. The appendix is normal.   No free intraperitoneal air or fluid is seen.   The bladder is well distended with no gross wall thickening.   The visualized osseous structures are intact.   Limited images of the lower thorax are unremarkable.       1. No acute abdominopelvic pathology. 2. Liver cirrhosis with sequela of portal hypertension with moderate to large volume ascites. 3. Cholelithiasis without evidence of cholecystitis.   MACRO: None   Signed by: Bre Nicholson 10/21/2023 6:00 PM Dictation workstation:   [id number]    CT head wo IV contrast    Result Date: 10/21/2023  Interpreted By:  Bre Nicholson, STUDY: CT HEAD WO IV CONTRAST;  10/21/2023 5:46 pm   INDICATION: Signs/Symptoms:confusion.   COMPARISON: 04/13/2021.   ACCESSION NUMBER(S): [id number]   ORDERING CLINICIAN: LELO WAY   TECHNIQUE: CT axial images through the Brain were obtained without contrast. Examination is limited due to a portion of the right skull lying outside the field of view.   FINDINGS: There is no mass effect, hemorrhage, or infarct. The ventricles appear normal. Gray-white differentiation is maintained. The visualized paranasal  sinuses appear clear.       No acute intracranial abnormality.   MACRO: None.   Signed by: Bre Nicholson 10/21/2023 5:50 PM Dictation workstation:   [id number]    XR chest 1 view    Result Date: 10/21/2023  Interpreted By:  Arley West, STUDY: XR CHEST 1 VIEW;  10/21/2023 3:43 pm   INDICATION: Signs/Symptoms:confusion.   COMPARISON: None.   ACCESSION NUMBER(S): [id number]   ORDERING CLINICIAN: LELO WAY   FINDINGS:         CARDIOMEDIASTINAL SILHOUETTE: Cardiomediastinal silhouette is normal in size and configuration.   LUNGS: Lungs are hypoinflated. No focal airspace consolidations or effusions.   ABDOMEN: No remarkable upper abdominal findings.   BONES: No acute osseous changes.       No evidence of acute cardiopulmonary process.     MACRO: None   Signed by: Arley West 10/21/2023 3:57 PM Dictation workstation:   [id number]                Assessment/Plan   Principal Problem:    Hepatic encephalopathy (CMS/HCC)    Alcoholic Cirrhosis of Liver (Decomp in setting chronic alcohol abuse high MELD score of 31, poor prognosis)  -HE: - asterixis. A/Ox 3 but asterixis - lactulose 20-30 gm/dose, 2-3 times per day, titrated to 2-3 soft BMs per day        -EV: June 2023 Dr Yepez, grade II, banded. Started on nadolol. No overt bleeding since arrival. INR is 2.9, no plan for urgent repeat         -HCC: AFP pending        -Ascites.  US showed portal hypertension with moderate to large volume ascites. Paracentesis ordered ceftriaxone started . Will check for SBP          -Transfuse Albumin with paracentesis         -Continue furosemide and spironolactone         -Low Na diet         -CIWA protocol        -Start thiamine daily        - Please avoid opioids, benzos etc. sedative medications as able.          2.    Anemia         Macrocytic anemia hgb 7.5 No overt bleeding         - monitor H/H q 6 for a goal >7         - continue PPI      3.    Alcohol Abuse,          - Advised cessation. She is still drinking daily.  Educated that if she continues to drink, she will die        I spent 20 minutes in the professional and overall care of this patient.      Melissa Jerez, APRN-CNP

## 2023-10-23 NOTE — CARE PLAN
Problem: Risk for falls  Goal: I will remain free from falls  Outcome: Progressing     Problem: Resident is at risk for impaired nutritional status  Goal: I will maintain stable nutritional status  Outcome: Progressing     Problem: Skin  Goal: Participates in plan/prevention/treatment measures  Outcome: Progressing  Goal: Prevent/manage excess moisture  Outcome: Progressing  Goal: Prevent/minimize sheer/friction injuries  Outcome: Progressing  Goal: Promote/optimize nutrition  Outcome: Progressing  Goal: Promote skin healing  Outcome: Progressing   The patient's goals for the shift include paracentesis    The clinical goals for the shift include monitor labs

## 2023-10-24 LAB
ALBUMIN SERPL-MCNC: 2.7 G/DL (ref 3.5–5)
ALP BLD-CCNC: 150 U/L (ref 35–125)
ALT SERPL-CCNC: 18 U/L (ref 5–40)
ANION GAP SERPL CALC-SCNC: 10 MMOL/L
AST SERPL-CCNC: 59 U/L (ref 5–40)
BACTERIA UR CULT: ABNORMAL
BASOPHILS # BLD AUTO: 0.05 X10*3/UL (ref 0–0.1)
BASOPHILS NFR BLD AUTO: 0.5 %
BILIRUB SERPL-MCNC: 10.2 MG/DL (ref 0.1–1.2)
BUN SERPL-MCNC: 15 MG/DL (ref 8–25)
BURR CELLS BLD QL SMEAR: NORMAL
CALCIUM SERPL-MCNC: 8.2 MG/DL (ref 8.5–10.4)
CHLORIDE SERPL-SCNC: 97 MMOL/L (ref 97–107)
CO2 SERPL-SCNC: 28 MMOL/L (ref 24–31)
CREAT SERPL-MCNC: 0.8 MG/DL (ref 0.4–1.6)
EOSINOPHIL # BLD AUTO: 0.17 X10*3/UL (ref 0–0.7)
EOSINOPHIL NFR BLD AUTO: 1.6 %
ERYTHROCYTE [DISTWIDTH] IN BLOOD BY AUTOMATED COUNT: 21.5 % (ref 11.5–14.5)
GFR SERPL CREATININE-BSD FRML MDRD: >90 ML/MIN/1.73M*2
GLUCOSE SERPL-MCNC: 154 MG/DL (ref 65–99)
HCT VFR BLD AUTO: 22 % (ref 36–46)
HGB BLD-MCNC: 8 G/DL (ref 12–16)
IMM GRANULOCYTES # BLD AUTO: 0.09 X10*3/UL (ref 0–0.7)
IMM GRANULOCYTES NFR BLD AUTO: 0.8 % (ref 0–0.9)
INR PPP: 2.8 (ref 0.9–1.2)
LYMPHOCYTES # BLD AUTO: 1.58 X10*3/UL (ref 1.2–4.8)
LYMPHOCYTES NFR BLD AUTO: 14.5 %
MCH RBC QN AUTO: 35.9 PG (ref 26–34)
MCHC RBC AUTO-ENTMCNC: 36.4 G/DL (ref 32–36)
MCV RBC AUTO: 99 FL (ref 80–100)
MONOCYTES # BLD AUTO: 0.79 X10*3/UL (ref 0.1–1)
MONOCYTES NFR BLD AUTO: 7.3 %
NEUTROPHILS # BLD AUTO: 8.19 X10*3/UL (ref 1.2–7.7)
NEUTROPHILS NFR BLD AUTO: 75.3 %
NRBC BLD-RTO: 0 /100 WBCS (ref 0–0)
PLATELET # BLD AUTO: 169 X10*3/UL (ref 150–450)
PMV BLD AUTO: 9.8 FL (ref 7.5–11.5)
POLYCHROMASIA BLD QL SMEAR: NORMAL
POTASSIUM SERPL-SCNC: 3.3 MMOL/L (ref 3.4–5.1)
PROT SERPL-MCNC: 5.5 G/DL (ref 5.9–7.9)
PROTHROMBIN TIME: 28 SECONDS (ref 9.3–12.7)
RBC # BLD AUTO: 2.23 X10*6/UL (ref 4–5.2)
RBC MORPH BLD: NORMAL
SCHISTOCYTES BLD QL SMEAR: NORMAL
SODIUM SERPL-SCNC: 135 MMOL/L (ref 133–145)
TARGETS BLD QL SMEAR: NORMAL
WBC # BLD AUTO: 10.9 X10*3/UL (ref 4.4–11.3)

## 2023-10-24 PROCEDURE — 85610 PROTHROMBIN TIME: CPT | Performed by: INTERNAL MEDICINE

## 2023-10-24 PROCEDURE — 80053 COMPREHEN METABOLIC PANEL: CPT | Performed by: INTERNAL MEDICINE

## 2023-10-24 PROCEDURE — 97161 PT EVAL LOW COMPLEX 20 MIN: CPT | Mod: GP

## 2023-10-24 PROCEDURE — 97530 THERAPEUTIC ACTIVITIES: CPT | Mod: GO

## 2023-10-24 PROCEDURE — 2500000001 HC RX 250 WO HCPCS SELF ADMINISTERED DRUGS (ALT 637 FOR MEDICARE OP): Performed by: INTERNAL MEDICINE

## 2023-10-24 PROCEDURE — C9113 INJ PANTOPRAZOLE SODIUM, VIA: HCPCS | Performed by: INTERNAL MEDICINE

## 2023-10-24 PROCEDURE — 2060000001 HC INTERMEDIATE ICU ROOM DAILY

## 2023-10-24 PROCEDURE — 2500000004 HC RX 250 GENERAL PHARMACY W/ HCPCS (ALT 636 FOR OP/ED): Performed by: INTERNAL MEDICINE

## 2023-10-24 PROCEDURE — 36415 COLL VENOUS BLD VENIPUNCTURE: CPT | Performed by: INTERNAL MEDICINE

## 2023-10-24 PROCEDURE — 85025 COMPLETE CBC W/AUTO DIFF WBC: CPT | Performed by: INTERNAL MEDICINE

## 2023-10-24 PROCEDURE — 82140 ASSAY OF AMMONIA: CPT | Performed by: INTERNAL MEDICINE

## 2023-10-24 PROCEDURE — 97165 OT EVAL LOW COMPLEX 30 MIN: CPT | Mod: GO

## 2023-10-24 PROCEDURE — 97530 THERAPEUTIC ACTIVITIES: CPT | Mod: GP

## 2023-10-24 RX ORDER — PANTOPRAZOLE SODIUM 40 MG/1
40 TABLET, DELAYED RELEASE ORAL
Status: DISCONTINUED | OUTPATIENT
Start: 2023-10-24 | End: 2023-10-29 | Stop reason: HOSPADM

## 2023-10-24 RX ADMIN — LEVOTHYROXINE SODIUM 50 MCG: 0.14 TABLET ORAL at 05:30

## 2023-10-24 RX ADMIN — PANTOPRAZOLE SODIUM 40 MG: 40 TABLET, DELAYED RELEASE ORAL at 15:09

## 2023-10-24 RX ADMIN — FUROSEMIDE 40 MG: 40 TABLET ORAL at 09:17

## 2023-10-24 RX ADMIN — RIFAXIMIN 550 MG: 550 TABLET ORAL at 09:17

## 2023-10-24 RX ADMIN — LACTULOSE 20 G: 20 SOLUTION ORAL at 09:17

## 2023-10-24 RX ADMIN — GABAPENTIN 300 MG: 300 CAPSULE ORAL at 21:12

## 2023-10-24 RX ADMIN — Medication 100 MG: at 09:17

## 2023-10-24 RX ADMIN — LACTULOSE 20 G: 20 SOLUTION ORAL at 14:50

## 2023-10-24 RX ADMIN — RIFAXIMIN 550 MG: 550 TABLET ORAL at 21:12

## 2023-10-24 RX ADMIN — FLUTICASONE PROPIONATE 1 SPRAY: 50 SPRAY, METERED NASAL at 09:18

## 2023-10-24 RX ADMIN — PANTOPRAZOLE SODIUM 40 MG: 40 INJECTION, POWDER, LYOPHILIZED, FOR SOLUTION INTRAVENOUS at 09:17

## 2023-10-24 RX ADMIN — SPIRONOLACTONE 100 MG: 50 TABLET ORAL at 09:17

## 2023-10-24 RX ADMIN — GABAPENTIN 300 MG: 300 CAPSULE ORAL at 14:50

## 2023-10-24 RX ADMIN — LACTULOSE 20 G: 20 SOLUTION ORAL at 21:12

## 2023-10-24 RX ADMIN — FOLIC ACID 1 MG: 1 TABLET ORAL at 09:17

## 2023-10-24 RX ADMIN — GABAPENTIN 300 MG: 300 CAPSULE ORAL at 09:17

## 2023-10-24 RX ADMIN — CEFTRIAXONE SODIUM 2 G: 2 INJECTION, SOLUTION INTRAVENOUS at 21:12

## 2023-10-24 ASSESSMENT — COGNITIVE AND FUNCTIONAL STATUS - GENERAL
DAILY ACTIVITIY SCORE: 14
DAILY ACTIVITIY SCORE: 14
PERSONAL GROOMING: A LOT
CLIMB 3 TO 5 STEPS WITH RAILING: TOTAL
DRESSING REGULAR LOWER BODY CLOTHING: A LOT
MOVING FROM LYING ON BACK TO SITTING ON SIDE OF FLAT BED WITH BEDRAILS: A LOT
DRESSING REGULAR LOWER BODY CLOTHING: A LOT
DRESSING REGULAR UPPER BODY CLOTHING: A LITTLE
WALKING IN HOSPITAL ROOM: TOTAL
TOILETING: TOTAL
DAILY ACTIVITIY SCORE: 14
TURNING FROM BACK TO SIDE WHILE IN FLAT BAD: A LOT
WALKING IN HOSPITAL ROOM: TOTAL
MOBILITY SCORE: 10
TURNING FROM BACK TO SIDE WHILE IN FLAT BAD: A LOT
HELP NEEDED FOR BATHING: A LOT
STANDING UP FROM CHAIR USING ARMS: A LOT
MOVING FROM LYING ON BACK TO SITTING ON SIDE OF FLAT BED WITH BEDRAILS: A LOT
DRESSING REGULAR LOWER BODY CLOTHING: A LOT
CLIMB 3 TO 5 STEPS WITH RAILING: TOTAL
TOILETING: TOTAL
MOVING TO AND FROM BED TO CHAIR: A LOT
PERSONAL GROOMING: A LOT
PERSONAL GROOMING: A LOT
DRESSING REGULAR UPPER BODY CLOTHING: A LITTLE
HELP NEEDED FOR BATHING: A LOT
DRESSING REGULAR UPPER BODY CLOTHING: A LITTLE
TOILETING: TOTAL
HELP NEEDED FOR BATHING: A LOT
MOVING TO AND FROM BED TO CHAIR: A LOT
MOBILITY SCORE: 10
STANDING UP FROM CHAIR USING ARMS: A LOT

## 2023-10-24 ASSESSMENT — PAIN - FUNCTIONAL ASSESSMENT
PAIN_FUNCTIONAL_ASSESSMENT: 0-10

## 2023-10-24 ASSESSMENT — ACTIVITIES OF DAILY LIVING (ADL)
BATHING_ASSISTANCE: MODERATE
ADLS_ADDRESSED: NO
ADL_ASSISTANCE: INDEPENDENT
LACK_OF_TRANSPORTATION: PATIENT DECLINED

## 2023-10-24 ASSESSMENT — PAIN SCALES - GENERAL
PAINLEVEL_OUTOF10: 0 - NO PAIN

## 2023-10-24 NOTE — PROGRESS NOTES
MSW went to meet with pt at bedside to discuss outpatient resources and see if pt was interested in any. Pt was sound asleep. Per morning rounds Pt has had a paracentisis and been given 2 units of blood. Palliative med has been consulted per MD. ADOD 1-2 days    Mariana KASPERA, LSW

## 2023-10-24 NOTE — PROGRESS NOTES
Physical Therapy    Physical Therapy Evaluation & Treatment    Patient Name: Briseida Owen  MRN: [id number]  Today's Date: 10/24/2023   Time Calculation  Start Time: 0819  Stop Time: 0846  Time Calculation (min): 27 min    Assessment/Plan   PT Assessment  PT Assessment Results: Decreased strength, Decreased range of motion, Decreased endurance, Impaired balance, Decreased mobility, Decreased coordination, Decreased safety awareness, Impaired tone, Impaired sensation, Decreased skin integrity  Rehab Prognosis: Fair  Barriers to Discharge: medical status  Evaluation/Treatment Tolerance: Patient limited by fatigue  Medical Staff Made Aware: Yes  Barriers to Participation: Comorbidities  End of Session Communication: Bedside nurse  Assessment Comment: pt with significant B LE weakness, poor tolerance to activity, and fair safety awareness. pt is functioning below her baseline level of function and may benefit from continued skilled therapy services to improve funcitonal performance and maximize safety.  End of Session Patient Position: Bed, 3 rail up, Alarm on  IP OR SWING BED PT PLAN  Inpatient or Swing Bed: Inpatient  PT Plan  Treatment/Interventions: Bed mobility, Transfer training, Gait training, Stair training, Balance training, Neuromuscular re-education, Strengthening, Endurance training, Range of motion, Therapeutic exercise, Therapeutic activity  PT Plan: Skilled PT  PT Frequency: 3 times per week  PT Discharge Recommendations: Moderate intensity level of continued care  Equipment Recommended upon Discharge: Wheeled walker  PT Recommended Transfer Status: Assist x2  PT - OK to Discharge: Yes      Subjective     General Visit Information:  General  Reason for Referral: Impaired Mobility, pt admitted with confusion, change in mental status, abdominal pain, and anemia. pt is s/p paracentesis with removal of 6L of ascites.  Referred By: Quintin Gonsales DO  Past Medical History Relevant to Rehab: alcoholic  cirrhosis, Wernicke's encephalopathy, CHF, depression  Family/Caregiver Present: No  Prior to Session Communication: Bedside nurse  Patient Position Received: Bed, 3 rail up, Alarm on  Preferred Learning Style: verbal  General Comment: pt supine in bed, agreeable to therapy, paracentesis site oozing blood, RN in to change dressing.  Home Living:  Home Living  Type of Home: House  Lives With: Significant other  Home Adaptive Equipment: Wheelchair-manual, Cane  Home Layout: Laundry in basement, Stairs to alternate level with rails, Two level, Other (Comment) (walk-in shower in basement, main bathroom is being remodeled)  Alternate Level Stairs-Rails: Right  Alternate Level Stairs-Number of Steps: 10  Home Access: Stairs to enter with rails  Entrance Stairs-Rails: Right  Entrance Stairs-Number of Steps: 2  Bathroom Shower/Tub: Walk-in shower  Bathroom Toilet: Standard  Bathroom Equipment: Shower chair with back  Home Living Comments: pt sleeps in the living room, moves around home very little  Prior Level of Function:  Prior Function Per Pt/Caregiver Report  Level of Eads:  (supervision for stair negotiation and showers, uses the microwave to warm up her meals, furniture walks)  Receives Help From: Other (Comment) (significant other)  Prior Function Comments: pt does not drive, do laundry, or clean. significant other performs all tasks.  Precautions:  Precautions  Hearing/Visual Limitations: wears glasses intermittently  Medical Precautions: Fall precautions    Objective   Pain:  Pain Assessment  Pain Score: 0 - No pain  Patient's Stated Pain Goal: No pain  Cognition:  Cognition  Overall Cognitive Status: Within Functional Limits  Orientation Level: Oriented X4    General Assessments:  General Observation  General Observation: pt with edema in B LEs, scleral icterus and jaundiced skin. paracentesis site oozing blood-RN aware and redressed puncture site.     Activity Tolerance  Endurance: Decreased tolerance for  upright activites  Activity Tolerance Comments: fair-, fatigues quickly    Sensation  Sensation Comment: numbness and tingling B hands/fingers and B feet, beginning to ascend B LEs    Coordination  Coordination Comment: pt tremulous, poor fine motor control, increased time to complete tasks, non-fluid movements    Postural Control  Posture Comment: large abdominal girth from ascites    Static Sitting Balance  Static Sitting-Balance Support: Bilateral upper extremity supported  Static Sitting-Level of Assistance: Close supervision    Static Standing Balance  Static Standing-Balance Support: Bilateral upper extremity supported  Static Standing-Level of Assistance: Minimum assistance  Functional Assessments:  ADL  ADL's Addressed: No    Bed Mobility  Bed Mobility: Yes (supine to sit with mod A for log roll technique, pt using the bed rail to help pull herself over, mod A to elevate trunk. min A to square hips at EOB. mod A for trunk and B LE management to transfer back to supine. pt able to pull self to HOB using rails)    Transfers  Transfer: Yes (sit to stand with mod A to guide trunk up and establish COG over WHITNEY, hands on back of short chair for support. min to return to supine with poor eccentric control.)    Ambulation/Gait Training  Ambulation/Gait Training Performed: Yes (2 lateral steps to HOB with mod A for balance and weight shfiting.)    Stairs  Stairs: No  Extremity/Trunk Assessments:  RLE   RLE : Exceptions to WFL (hypotonia, unable to stretch to neutral at ankle, 3/5 strength)  LLE   LLE : Exceptions to WFL (hypotonia, plantarflexed ankle, lacks several degrees from neutral, strength 3-/5)  Treatments:  Therapeutic Activity  Therapeutic Activity Performed: Yes (pt sat EOB x 5 minutes with close superivsion for balance, stood in place x 45 seconds with min/mod A and B hands supported on back of chair. +multiplanar postural sway noted when standing in place.)  Outcome Measures:  [facility] Basic  Mobility  Turning from your back to your side while in a flat bed without using bedrails: A lot  Moving from lying on your back to sitting on the side of a flat bed without using bedrails: A lot  Moving to and from bed to chair (including a wheelchair): A lot  Standing up from a chair using your arms (e.g. wheelchair or bedside chair): A lot  To walk in hospital room: Total  Climbing 3-5 steps with railing: Total  Basic Mobility - Total Score: 10    Encounter Problems       Encounter Problems (Active)       Balance       LTG - Patient will maintain balance to allow for safe mobility with close supervision. (Progressing)       Start:  10/24/23    Expected End:  11/27/23               Mobility       STG - Patient will ambulate 100' with rolling walker and close supervision (Progressing)       Start:  10/24/23    Expected End:  11/27/23            STG - Patient will ascend and descend a flight of stairs with one rail and min A (Not Progressing)       Start:  10/24/23    Expected End:  11/27/23               Transfers       STG - Transfer from bed to chair with close supervision (Progressing)       Start:  10/24/23    Expected End:  11/27/23            STG - Patient to transfer to and from sit to supine with close supervision (Progressing)       Start:  10/24/23    Expected End:  11/27/23            STG - Patient will transfer sit to and from stand with close supervision (Progressing)       Start:  10/24/23    Expected End:  11/27/23                   Education Documentation  Mobility Training, taught by Constanza Merritt PT at 10/24/2023  9:25 AM.  Learner: Patient  Readiness: Acceptance  Method: Explanation  Response: Verbalizes Understanding    Education Comments  No comments found.

## 2023-10-24 NOTE — PROGRESS NOTES
Briseida Owen is a 48 y.o. female on day 3 of admission presenting with Hepatic encephalopathy (CMS/HCC).      Subjective   Patient feels weak. Not as interested in rehab. Discussed her drinking and lack of options.        Objective     Last Recorded Vitals  /58 (BP Location: Left arm, Patient Position: Lying)   Pulse 97   Temp 36.2 °C (97.2 °F) (Temporal)   Resp 20   Wt 71.5 kg (157 lb 10.1 oz)   SpO2 91%   Intake/Output last 3 Shifts:    Intake/Output Summary (Last 24 hours) at 10/24/2023 1339  Last data filed at 10/24/2023 0919  Gross per 24 hour   Intake 347.5 ml   Output 425 ml   Net -77.5 ml         Admission Weight  Weight: 75 kg (165 lb 5.5 oz) (10/21/23 1452)    Daily Weight  10/24/23 : 71.5 kg (157 lb 10.1 oz)    Image Results  US guided abdominal paracentesis  Narrative: Interpreted By:  Huy Alvarez,   STUDY:  US GUIDED ABDOMINAL PARACENTESIS; 10/23/2023 10:47 am      INDICATION:  Signs/Symptoms:ascites, paracentesis.      COMPARISON:  None.      ACCESSION NUMBER(S):  [id number]      ORDERING CLINICIAN:  EVA GUADARRAMA      TECHNIQUE:  Ultrasound-guided paracentesis      FINDINGS:  Informed consent obtained. Patient positioned supine. Right lower  quadrant prepped, draped and anesthetized. Under ultrasound guidance,  8 Martiniquais centesis sheath needle inserted into peritoneal cavity.  5.4of clear yellowfluid aspiratedwith sample sent for analysis.  Patient tolerated procedure well      Impression: Ultrasound-guided paracentesis.      Signed by: Huy Alvarez 10/23/2023 2:41 PM  Dictation workstation:   [id number]    General: [place] female, chronically ill-appearing,  Eyes: Scleral icterus  Neck: No edema.  Rate and rhythm  Respiratory: No distress.  Abdomen: Protuberant.  Some fluid.  Nontender.  Extremities: Bilateral lower extremity  Neuro: Currently alert and oriented x3.  Somewhat slow speech.  No current tremors.  Psychiatric: Feel there is poor insight.      Relevant  Results               Assessment/Plan   This patient currently has cardiac telemetry ordered; if you would like to modify or discontinue the telemetry order, click here to go to the orders activity to modify/discontinue the order.              Principal Problem:    Hepatic encephalopathy (CMS/HCC)    Alcoholic cirrhosis  -worsening at baseline  -Unfortunately, the patient continues to drink alcohol.  Not a candidate for liver transplant.  -Paracentesis was done.  Currently treating for spontaneous bacterial peritonitis I have a somewhat lower suspicion, suspect the leukocytosis is more from urinary tract infection.  -With her continued drinking and no desire to quit drinking, and her advanced cirrhosis, I discussed pursuing hospice outpatient, I will ask for palliative care consult.  -Continue furosemide and spironolactone  - GI on consult     Anemia  -No major active bleeding, though certainly high risk with cirrhosis.   - improved     Encephalopathy  -Resolving.  -Hepatic encephalopathy.  Continue lactulose 3 times a day.     Hypokalemia  -Replaced in the emergency department, and daily labs.     Feel the patient needs skilled nursing facility though she is resistant to this.  She is clearly an extremely high fall risk.  As above, palliative approach is more appropriate for this patient with alcoholic cirrhosis and who continues to drink, and have consulted palliative medicine.              Quintin Gonsales,

## 2023-10-24 NOTE — CARE PLAN
Problem: Risk for falls  Goal: I will remain free from falls  Outcome: Progressing     Problem: Resident is at risk for impaired nutritional status  Goal: I will maintain stable nutritional status  Outcome: Progressing     Problem: Skin  Goal: Participates in plan/prevention/treatment measures  Outcome: Progressing  Goal: Prevent/manage excess moisture  Outcome: Progressing  Goal: Prevent/minimize sheer/friction injuries  Outcome: Progressing  Goal: Promote/optimize nutrition  Outcome: Progressing  Goal: Promote skin healing  Outcome: Progressing   The patient's goals for the shift include advance diet.    The clinical goals for the shift include safety, monitor for s/s bleeding, bleeding precautions.    Over the shift, the patient did not make progress toward the following goals. Barriers to progression include none. Recommendations to address these barriers include none.

## 2023-10-24 NOTE — PROGRESS NOTES
Occupational Therapy    Evaluation/Treatment    Patient Name: Briseida Owen  MRN: [id number]  : 1975  Today's Date: 10/24/23  Time Calculation  Start Time: 1032  Stop Time: 1052  Time Calculation (min): 20 min       Assessment:  OT Assessment: Pt presents with dizziness, generalized weakness, cognitive deficits, and poor standing balance for ADL's and functional transfers/mobility. Pt will benefit from skilled OT services to address these deficits.  Prognosis: Fair  Evaluation/Treatment Tolerance: Patient limited by fatigue, Other (Comment) (limited by dizziness also)  Medical Staff Made Aware: Yes  End of Session Communication: Bedside nurse  End of Session Patient Position: Bed, 3 rail up, Alarm on  OT Assessment Results: Decreased ADL status, Decreased upper extremity strength, Decreased safe judgment during ADL, Decreased cognition, Decreased endurance, Decreased functional mobility, Decreased trunk control for functional activities  Prognosis: Fair  Evaluation/Treatment Tolerance: Patient limited by fatigue, Other (Comment) (limited by dizziness also)  Medical Staff Made Aware: Yes    Plan:  Treatment Interventions: ADL retraining, Functional transfer training, UE strengthening/ROM, Endurance training, Patient/family training, Equipment evaluation/education, Neuromuscular reeducation, Continued evaluation  OT Frequency: 3 times per week  OT Discharge Recommendations: Moderate intensity level of continued care  OT - OK to Discharge: Yes  Treatment Interventions: ADL retraining, Functional transfer training, UE strengthening/ROM, Endurance training, Patient/family training, Equipment evaluation/education, Neuromuscular reeducation, Continued evaluation    Subjective     Current Problem:  1. Hepatic encephalopathy (CMS/HCC)        2. Alcohol abuse        3. Alcoholic cirrhosis of liver with ascites (CMS/HCC)        4. Anemia, unspecified type        5. Hypokalemia          General:   OT Received On:  10/24/23  General  Reason for Referral: Hepatic Encephalopathy, weakness, impaired ADL's  Referred By: Dr. Gonsales  Past Medical History Relevant to Rehab: alcoholic cirrhosis, Wernicke's encephalopathy, CHF, depression  Family/Caregiver Present: No  Prior to Session Communication: Bedside nurse  Patient Position Received: Bed, 3 rail up, Alarm on  Preferred Learning Style: verbal  General Comment: Cleared by nurse prior to session and pt agreeable to OT eval, but for only minimal activity.    Precautions:  Hearing/Visual Limitations: wears glasses intermittently  Medical Precautions: Fall precautions    Vital Signs:  SpO2: 97 % (currently on 4L 02)    Pain:  Pain Assessment  Pain Assessment: 0-10  Pain Score: 0 - No pain    Objective     Cognition:  Overall Cognitive Status: Impaired  Orientation Level: Oriented X4  Safety Judgment: Decreased awareness of need for assistance  Problem Solving: Assistance required to generate solutions  Cognition Comments: Pt presents during conversation with memory deficits and word finding difficulty, which is most likely related to her Wernicke's Encephalopathy with chronic ETOH.    Home Living:  Type of Home: House  Lives With: Significant other  Home Adaptive Equipment: Wheelchair-manual, Cane  Home Layout: Laundry in basement, Stairs to alternate level with rails, Two level, Other (Comment) (walk-in shower in basement, main bathroom is being remodeled)  Alternate Level Stairs-Rails: Right  Alternate Level Stairs-Number of Steps: 10  Home Access: Stairs to enter with rails  Entrance Stairs-Rails: Right  Entrance Stairs-Number of Steps: 2  Bathroom Shower/Tub: Walk-in shower  Bathroom Toilet: Standard  Bathroom Equipment: Shower chair with back  Home Living Comments: pt sleeps in the living room, moves around home very little    Prior Function:  Level of [place]: Independent with ADLs and functional transfers, Independent with homemaking with ambulation, Other (Comment)  (supervision for stair negotiation and showers, uses the microwave to warm up her meals, furniture walks)  Receives Help From: Other (Comment) (SO)  ADL Assistance: Independent  Homemaking Assistance: Needs assistance (SO)  Ambulatory Assistance: Independent (furniture walks as needed)  Hand Dominance: Right  Prior Function Comments: pt does not drive, do laundry, or clean. significant other performs all tasks.       ADL:  Eating Assistance: Independent  Grooming Assistance: Moderate  Grooming Deficit: Steadying, Supervision/safety (in standing due to poor balance)  Bathing Assistance: Moderate  UE Dressing Assistance: Stand by  UE Dressing Deficit: Setup  LE Dressing Assistance: Moderate  Toileting Assistance with Device: Unable to assess  Toileting Deficit: Other (Comment) (pt reports using a brief or pad at home)    Activity Tolerance:  Activity Tolerance Comments: impaired by generalized weakness and reports of dizziness       Bed Mobility/Transfers: Bed Mobility  Bed Mobility: Yes  Bed Mobility 1  Bed Mobility 1: Supine to sitting, Sitting to supine  Level of Assistance 1: Close supervision, Minimal verbal cues  Bed Mobility Comments 1: Pt requires supervision for safety during performance due to cognitive deficits and verbal cues as needed.       Therapy/Activity: Therapeutic Activity  Therapeutic Activity Performed: Yes  Therapeutic Activity 1: Pt tolerated sitting EOB unsupported for approx. 8-10 minutes with F balance and close S for safety due to reports of worsening dizziness and impaired cognition.    Sensation:  Sensation Comment: SILVIANO's WFL    Strength:  Strength Comments: SILVIANO's AROM WFL, MMT 4-/5    Coordination:  Coordination Comment: SILVIANO's WFL     Hand Function:  Hand Function  Gross Grasp: Functional  Coordination: Functional      Outcome Measures: [facility] Daily Activity  Putting on and taking off regular lower body clothing: A lot  Bathing (including washing, rinsing, drying): A lot  Putting on and  taking off regular upper body clothing: A little  Toileting, which includes using toilet, bedpan or urinal: Total  Taking care of personal grooming such as brushing teeth: A lot  Eating Meals: None  Daily Activity - Total Score: 14      Education Documentation  ADL Training, taught by Dylan Granda Jr., OT at 10/24/2023 12:29 PM.  Learner: Patient  Readiness: Acceptance  Method: Explanation, Demonstration  Response: Needs Reinforcement    Education Comments  No comments found.           Goals:    Problem: OT Goals  Goal: Pt will complete all functional transfers and mobility with mod indep using a device as needed.  Outcome: Progressing  Goal: Pt will complete all grooming tasks with setup in standing.  Outcome: Progressing  Goal: Pt will complete UB dressing/bathing with setup/indep and LB dressing/bathing with setup/mod indep.  Outcome: Progressing  Goal: Pt will complete all toileting tasks with mod indep.  Outcome: Progressing  Goal: Pt will participate in BUE exercises in order to enhance functional performance.  Outcome: Progressing

## 2023-10-24 NOTE — PROGRESS NOTES
"Briseida Owen is a 48 y.o. female on day 3 of admission presenting with Hepatic encephalopathy (CMS/HCC).    Subjective   Denies abdominal pain. Some nausea       Objective     Physical Exam  HENT:      Head: Normocephalic and atraumatic.      Mouth/Throat:      Mouth: Mucous membranes are moist.   Pulmonary:      Effort: Pulmonary effort is normal.   Abdominal:      General: There is distension.      Tenderness: There is no abdominal tenderness. There is no guarding.   Musculoskeletal:         General: Normal range of motion.      Cervical back: Normal range of motion.   Skin:     Coloration: Skin is jaundiced and pale.   Neurological:      General: No focal deficit present.      Mental Status: She is alert and oriented to person, place, and time. Mental status is at baseline.      Comments: +asterixis    Psychiatric:         Mood and Affect: Mood normal.         Last Recorded Vitals  Blood pressure 110/62, pulse 88, temperature 36.5 °C (97.7 °F), temperature source Temporal, resp. rate 20, height 1.575 m (5' 2\"), weight 71.5 kg (157 lb 10.1 oz), SpO2 96 %.  Intake/Output last 3 Shifts:  I/O last 3 completed shifts:  In: 700.8 (9.8 mL/kg) [P.O.:480; I.V.:220.8 (3.1 mL/kg)]  Out: 575 (8 mL/kg) [Urine:575 (0.2 mL/kg/hr)]  Weight: 71.5 kg     Relevant Results           This patient currently has cardiac telemetry ordered; if you would like to modify or discontinue the telemetry order, click here to go to the orders activity to modify/discontinue the order.    Results for orders placed or performed during the hospital encounter of 10/21/23 (from the past 24 hour(s))   Comprehensive Metabolic Panel   Result Value Ref Range    Glucose 154 (H) 65 - 99 mg/dL    Sodium 135 133 - 145 mmol/L    Potassium 3.3 (L) 3.4 - 5.1 mmol/L    Chloride 97 97 - 107 mmol/L    Bicarbonate 28 24 - 31 mmol/L    Urea Nitrogen 15 8 - 25 mg/dL    Creatinine 0.80 0.40 - 1.60 mg/dL    eGFR >90 >60 mL/min/1.73m*2    Calcium 8.2 (L) 8.5 - 10.4 " mg/dL    Albumin 2.7 (L) 3.5 - 5.0 g/dL    Alkaline Phosphatase 150 (H) 35 - 125 U/L    Total Protein 5.5 (L) 5.9 - 7.9 g/dL    AST 59 (H) 5 - 40 U/L    Bilirubin, Total 10.2 (H) 0.1 - 1.2 mg/dL    ALT 18 5 - 40 U/L    Anion Gap 10 <=19 mmol/L   CBC and Auto Differential   Result Value Ref Range    WBC 10.9 4.4 - 11.3 x10*3/uL    nRBC 0.0 0.0 - 0.0 /100 WBCs    RBC 2.23 (L) 4.00 - 5.20 x10*6/uL    Hemoglobin 8.0 (L) 12.0 - 16.0 g/dL    Hematocrit 22.0 (L) 36.0 - 46.0 %    MCV      MCH 35.9 (H) 26.0 - 34.0 pg    MCHC 36.4 (H) 32.0 - 36.0 g/dL    RDW 21.5 (H) 11.5 - 14.5 %    Platelets 169 150 - 450 x10*3/uL    MPV 9.8 7.5 - 11.5 fL    Neutrophils %      Immature Granulocytes %, Automated      Lymphocytes %      Monocytes %      Eosinophils %      Basophils %      Neutrophils Absolute      Lymphocytes Absolute      Monocytes Absolute      Eosinophils Absolute      Basophils Absolute     Protime-INR   Result Value Ref Range    Protime 28.0 (H) 9.3 - 12.7 seconds    INR 2.8 (H) 0.9 - 1.2     CT abdomen pelvis w IV contrast    Result Date: 10/21/2023  Interpreted By:  Bre Nicholson, STUDY: CT ABDOMEN PELVIS W IV CONTRAST; 10/21/2023 5:47 pm   INDICATION: Signs/Symptoms:abdominal pain;   COMPARISON: None   ACCESSION NUMBER(S): [id number]   ORDERING CLINICIAN: LELO WAY   TECHNIQUE: Contiguous axial images of the abdomen/pelvis were performed with IV contrast. 75 ml of Omnipaque 350 was utilized.   FINDINGS: There is again a cirrhotic configuration of the liver with sequela of portal hypertension including splenomegaly and moderate to large volume intra-abdominal ascites.   The  common bile duct, pancreas, spleen, and adrenal glands are unremarkable. Cholelithiasis without evidence of cholecystitis.   The kidneys enhance symmetrically.  No urolithiasis is seen. No hydroureteronephrosis is seen.   The visualized aorta is unremarkable.   The small bowel is not dilated. The appendix is normal.   No free  intraperitoneal air or fluid is seen.   The bladder is well distended with no gross wall thickening.   The visualized osseous structures are intact.   Limited images of the lower thorax are unremarkable.       1. No acute abdominopelvic pathology. 2. Liver cirrhosis with sequela of portal hypertension with moderate to large volume ascites. 3. Cholelithiasis without evidence of cholecystitis.   MACRO: None   Signed by: Bre Nicholson 10/21/2023 6:00 PM Dictation workstation:   [id number]    CT head wo IV contrast    Result Date: 10/21/2023  Interpreted By:  Bre Nicholson, STUDY: CT HEAD WO IV CONTRAST;  10/21/2023 5:46 pm   INDICATION: Signs/Symptoms:confusion.   COMPARISON: 04/13/2021.   ACCESSION NUMBER(S): [id number]   ORDERING CLINICIAN: LELO WAY   TECHNIQUE: CT axial images through the Brain were obtained without contrast. Examination is limited due to a portion of the right skull lying outside the field of view.   FINDINGS: There is no mass effect, hemorrhage, or infarct. The ventricles appear normal. Gray-white differentiation is maintained. The visualized paranasal sinuses appear clear.       No acute intracranial abnormality.   MACRO: None.   Signed by: Bre Nicholson 10/21/2023 5:50 PM Dictation workstation:   [id number]    XR chest 1 view    Result Date: 10/21/2023  Interpreted By:  Arley West, STUDY: XR CHEST 1 VIEW;  10/21/2023 3:43 pm   INDICATION: Signs/Symptoms:confusion.   COMPARISON: None.   ACCESSION NUMBER(S): [id number]   ORDERING CLINICIAN: LELO WAY   FINDINGS:         CARDIOMEDIASTINAL SILHOUETTE: Cardiomediastinal silhouette is normal in size and configuration.   LUNGS: Lungs are hypoinflated. No focal airspace consolidations or effusions.   ABDOMEN: No remarkable upper abdominal findings.   BONES: No acute osseous changes.       No evidence of acute cardiopulmonary process.     MACRO: None   Signed by: Arley West 10/21/2023 3:57 PM Dictation workstation:   [id number]                 Assessment/Plan   Principal Problem:    Hepatic encephalopathy (CMS/HCC)    Alcoholic Cirrhosis of Liver (Decomp in setting chronic alcohol abuse high MELD score of 31, poor prognosis)  -HE: - asterixis. A/Ox 3 but asterixis - lactulose 20-30 gm/dose, 2-3 times per day, titrated to 2-3 soft BMs per day. Continue Xifaxin BID. Treat UTI         -EV: June 2023 Dr Yepez, grade II, banded. Started on nadolol. No overt bleeding since arrival. INR is 2.9, no plan for urgent repeat         -HCC: AFP pending        -Ascites.  S/p paracentesis with 5.4 L. This was not send for culture, she will be on antibiotic coverage for SBP as she has a UTI as well          -Continue furosemide and spironolactone         -Low Na diet     2.    Anemia         Macrocytic anemia. Stable HH. No overt bleeding          - monitor H/H q 6 for a goal >7         - continue PPI      3.    Alcohol Abuse,          - Advised cessation. She is still drinking daily. Educated that if she continues to drink, she will die. She is not interested in quitting. Recommend palliative care. She has a high MELD score, at risk for deterioration. Patient should discuss Hospice is this is what she wants         I spent 20 minutes in the professional and overall care of this patient.      ULISSES Bolden-CNP

## 2023-10-25 ENCOUNTER — HOME HEALTH ADMISSION (OUTPATIENT)
Dept: HOME HEALTH SERVICES | Facility: HOME HEALTH | Age: 48
End: 2023-10-25
Payer: MEDICARE

## 2023-10-25 ENCOUNTER — APPOINTMENT (OUTPATIENT)
Dept: CARDIOLOGY | Facility: HOSPITAL | Age: 48
DRG: 441 | End: 2023-10-25
Payer: MEDICARE

## 2023-10-25 LAB
ABO GROUP (TYPE) IN BLOOD: NORMAL
ACANTHOCYTES BLD QL SMEAR: NORMAL
ALBUMIN SERPL-MCNC: 2.6 G/DL (ref 3.5–5)
ALP BLD-CCNC: 145 U/L (ref 35–125)
ALT SERPL-CCNC: 18 U/L (ref 5–40)
AMMONIA PLAS-SCNC: 93 UMOL/L (ref 12–45)
ANION GAP SERPL CALC-SCNC: 12 MMOL/L
ANTIBODY SCREEN: NORMAL
AST SERPL-CCNC: 65 U/L (ref 5–40)
ATRIAL RATE: 107 BPM
BASOPHILS # BLD AUTO: 0.04 X10*3/UL (ref 0–0.1)
BASOPHILS NFR BLD AUTO: 0.3 %
BILIRUB SERPL-MCNC: 8.4 MG/DL (ref 0.1–1.2)
BLOOD EXPIRATION DATE: NORMAL
BUN SERPL-MCNC: 13 MG/DL (ref 8–25)
BURR CELLS BLD QL SMEAR: NORMAL
CALCIUM SERPL-MCNC: 8.4 MG/DL (ref 8.5–10.4)
CHLORIDE SERPL-SCNC: 98 MMOL/L (ref 97–107)
CO2 SERPL-SCNC: 27 MMOL/L (ref 24–31)
CREAT SERPL-MCNC: 0.9 MG/DL (ref 0.4–1.6)
DISPENSE STATUS: NORMAL
EOSINOPHIL # BLD AUTO: 0.33 X10*3/UL (ref 0–0.7)
EOSINOPHIL NFR BLD AUTO: 2.6 %
ERYTHROCYTE [DISTWIDTH] IN BLOOD BY AUTOMATED COUNT: 21.1 % (ref 11.5–14.5)
GFR SERPL CREATININE-BSD FRML MDRD: 80 ML/MIN/1.73M*2
GLUCOSE SERPL-MCNC: 133 MG/DL (ref 65–99)
HCT VFR BLD AUTO: 19.6 % (ref 36–46)
HGB BLD-MCNC: 7 G/DL (ref 12–16)
IMM GRANULOCYTES # BLD AUTO: 0.17 X10*3/UL (ref 0–0.7)
IMM GRANULOCYTES NFR BLD AUTO: 1.3 % (ref 0–0.9)
INR PPP: 3.1 (ref 0.9–1.2)
LYMPHOCYTES # BLD AUTO: 2.18 X10*3/UL (ref 1.2–4.8)
LYMPHOCYTES NFR BLD AUTO: 17.1 %
MAGNESIUM SERPL-MCNC: 1.5 MG/DL (ref 1.6–3.1)
MCH RBC QN AUTO: 35.2 PG (ref 26–34)
MCHC RBC AUTO-ENTMCNC: 35.7 G/DL (ref 32–36)
MCV RBC AUTO: 99 FL (ref 80–100)
MONOCYTES # BLD AUTO: 1.03 X10*3/UL (ref 0.1–1)
MONOCYTES NFR BLD AUTO: 8.1 %
NEUTROPHILS # BLD AUTO: 8.99 X10*3/UL (ref 1.2–7.7)
NEUTROPHILS NFR BLD AUTO: 70.6 %
NRBC BLD-RTO: 0 /100 WBCS (ref 0–0)
P AXIS: 52 DEGREES
P OFFSET: 200 MS
P ONSET: 156 MS
PLATELET # BLD AUTO: 163 X10*3/UL (ref 150–450)
PMV BLD AUTO: 9.7 FL (ref 7.5–11.5)
POLYCHROMASIA BLD QL SMEAR: NORMAL
POTASSIUM SERPL-SCNC: 3.1 MMOL/L (ref 3.4–5.1)
PR INTERVAL: 138 MS
PRODUCT BLOOD TYPE: 600
PRODUCT CODE: NORMAL
PROT SERPL-MCNC: 5.5 G/DL (ref 5.9–7.9)
PROTHROMBIN TIME: 30.3 SECONDS (ref 9.3–12.7)
Q ONSET: 225 MS
QRS COUNT: 18 BEATS
QRS DURATION: 66 MS
QT INTERVAL: 300 MS
QTC CALCULATION(BAZETT): 400 MS
QTC FREDERICIA: 364 MS
R AXIS: 15 DEGREES
RBC # BLD AUTO: 1.99 X10*6/UL (ref 4–5.2)
RBC MORPH BLD: NORMAL
RH FACTOR (ANTIGEN D): NORMAL
SODIUM SERPL-SCNC: 137 MMOL/L (ref 133–145)
T AXIS: 74 DEGREES
T OFFSET: 375 MS
TARGETS BLD QL SMEAR: NORMAL
UNIT ABO: NORMAL
UNIT NUMBER: NORMAL
UNIT RH: NORMAL
UNIT VOLUME: 300
VENTRICULAR RATE: 107 BPM
WBC # BLD AUTO: 12.7 X10*3/UL (ref 4.4–11.3)

## 2023-10-25 PROCEDURE — P9017 PLASMA 1 DONOR FRZ W/IN 8 HR: HCPCS

## 2023-10-25 PROCEDURE — 36415 COLL VENOUS BLD VENIPUNCTURE: CPT | Performed by: INTERNAL MEDICINE

## 2023-10-25 PROCEDURE — 93005 ELECTROCARDIOGRAM TRACING: CPT

## 2023-10-25 PROCEDURE — 36430 TRANSFUSION BLD/BLD COMPNT: CPT

## 2023-10-25 PROCEDURE — 86920 COMPATIBILITY TEST SPIN: CPT

## 2023-10-25 PROCEDURE — 85025 COMPLETE CBC W/AUTO DIFF WBC: CPT | Performed by: INTERNAL MEDICINE

## 2023-10-25 PROCEDURE — 86901 BLOOD TYPING SEROLOGIC RH(D): CPT | Performed by: INTERNAL MEDICINE

## 2023-10-25 PROCEDURE — 2500000004 HC RX 250 GENERAL PHARMACY W/ HCPCS (ALT 636 FOR OP/ED): Performed by: INTERNAL MEDICINE

## 2023-10-25 PROCEDURE — 83735 ASSAY OF MAGNESIUM: CPT | Performed by: INTERNAL MEDICINE

## 2023-10-25 PROCEDURE — 2500000001 HC RX 250 WO HCPCS SELF ADMINISTERED DRUGS (ALT 637 FOR MEDICARE OP): Performed by: INTERNAL MEDICINE

## 2023-10-25 PROCEDURE — 85610 PROTHROMBIN TIME: CPT | Performed by: INTERNAL MEDICINE

## 2023-10-25 PROCEDURE — 2060000001 HC INTERMEDIATE ICU ROOM DAILY

## 2023-10-25 PROCEDURE — 80053 COMPREHEN METABOLIC PANEL: CPT | Performed by: INTERNAL MEDICINE

## 2023-10-25 RX ORDER — LACTULOSE 10 G/15ML
30 SOLUTION ORAL; RECTAL 2 TIMES DAILY
Qty: 2000 ML | Refills: 2 | Status: SHIPPED | OUTPATIENT
Start: 2023-10-25 | End: 2024-03-04 | Stop reason: HOSPADM

## 2023-10-25 RX ORDER — ONDANSETRON 4 MG/1
4 TABLET, ORALLY DISINTEGRATING ORAL EVERY 8 HOURS PRN
Qty: 20 TABLET | Refills: 2 | Status: SHIPPED | OUTPATIENT
Start: 2023-10-25 | End: 2024-03-04 | Stop reason: HOSPADM

## 2023-10-25 RX ORDER — POTASSIUM CHLORIDE 20 MEQ/1
20 TABLET, EXTENDED RELEASE ORAL DAILY
Qty: 30 TABLET | Refills: 2 | Status: SHIPPED | OUTPATIENT
Start: 2023-10-25 | End: 2024-01-09 | Stop reason: HOSPADM

## 2023-10-25 RX ORDER — MAGNESIUM SULFATE HEPTAHYDRATE 40 MG/ML
2 INJECTION, SOLUTION INTRAVENOUS ONCE
Status: COMPLETED | OUTPATIENT
Start: 2023-10-25 | End: 2023-10-25

## 2023-10-25 RX ORDER — CEFUROXIME AXETIL 500 MG/1
500 TABLET ORAL 2 TIMES DAILY
Qty: 14 TABLET | Refills: 0 | Status: SHIPPED | OUTPATIENT
Start: 2023-10-25 | End: 2024-01-09 | Stop reason: HOSPADM

## 2023-10-25 RX ORDER — PANTOPRAZOLE SODIUM 40 MG/1
40 TABLET, DELAYED RELEASE ORAL DAILY
Qty: 30 TABLET | Refills: 2 | Status: SHIPPED | OUTPATIENT
Start: 2023-10-25 | End: 2024-03-04 | Stop reason: HOSPADM

## 2023-10-25 RX ORDER — POTASSIUM CHLORIDE 1.5 G/1.58G
20 POWDER, FOR SOLUTION ORAL 2 TIMES DAILY
Status: DISCONTINUED | OUTPATIENT
Start: 2023-10-25 | End: 2023-10-29 | Stop reason: HOSPADM

## 2023-10-25 RX ORDER — FOLIC ACID 1 MG/1
1 TABLET ORAL DAILY
Qty: 30 TABLET | Refills: 2 | Status: SHIPPED | OUTPATIENT
Start: 2023-10-25 | End: 2024-03-04 | Stop reason: HOSPADM

## 2023-10-25 RX ORDER — SPIRONOLACTONE 100 MG/1
100 TABLET, FILM COATED ORAL DAILY
Qty: 30 TABLET | Refills: 2 | Status: ON HOLD | OUTPATIENT
Start: 2023-10-25 | End: 2024-01-09 | Stop reason: SDUPTHER

## 2023-10-25 RX ORDER — FUROSEMIDE 40 MG/1
40 TABLET ORAL DAILY
Qty: 30 TABLET | Refills: 2 | Status: SHIPPED | OUTPATIENT
Start: 2023-10-25 | End: 2024-01-09 | Stop reason: HOSPADM

## 2023-10-25 RX ORDER — LANOLIN ALCOHOL/MO/W.PET/CERES
100 CREAM (GRAM) TOPICAL DAILY
Qty: 30 TABLET | Refills: 2 | Status: SHIPPED | OUTPATIENT
Start: 2023-10-25 | End: 2024-03-04 | Stop reason: HOSPADM

## 2023-10-25 RX ORDER — CALCIUM CARBONATE 300MG(750)
400 TABLET,CHEWABLE ORAL DAILY
Qty: 30 TABLET | Refills: 2 | Status: SHIPPED | OUTPATIENT
Start: 2023-10-25 | End: 2024-03-04 | Stop reason: HOSPADM

## 2023-10-25 RX ADMIN — RIFAXIMIN 550 MG: 550 TABLET ORAL at 20:07

## 2023-10-25 RX ADMIN — LEVOTHYROXINE SODIUM 50 MCG: 0.14 TABLET ORAL at 06:26

## 2023-10-25 RX ADMIN — CEFTRIAXONE SODIUM 2 G: 2 INJECTION, SOLUTION INTRAVENOUS at 21:12

## 2023-10-25 RX ADMIN — GABAPENTIN 300 MG: 300 CAPSULE ORAL at 20:07

## 2023-10-25 RX ADMIN — GABAPENTIN 300 MG: 300 CAPSULE ORAL at 08:33

## 2023-10-25 RX ADMIN — RIFAXIMIN 550 MG: 550 TABLET ORAL at 08:34

## 2023-10-25 RX ADMIN — MAGNESIUM SULFATE HEPTAHYDRATE 2 G: 40 INJECTION, SOLUTION INTRAVENOUS at 11:37

## 2023-10-25 RX ADMIN — PANTOPRAZOLE SODIUM 40 MG: 40 TABLET, DELAYED RELEASE ORAL at 06:26

## 2023-10-25 RX ADMIN — FUROSEMIDE 40 MG: 40 TABLET ORAL at 08:41

## 2023-10-25 RX ADMIN — PANTOPRAZOLE SODIUM 40 MG: 40 TABLET, DELAYED RELEASE ORAL at 16:05

## 2023-10-25 RX ADMIN — LACTULOSE 20 G: 20 SOLUTION ORAL at 16:05

## 2023-10-25 RX ADMIN — Medication 100 MG: at 08:33

## 2023-10-25 RX ADMIN — POTASSIUM CHLORIDE 20 MEQ: 1.5 FOR SOLUTION ORAL at 21:00

## 2023-10-25 RX ADMIN — FLUTICASONE PROPIONATE 1 SPRAY: 50 SPRAY, METERED NASAL at 08:33

## 2023-10-25 RX ADMIN — GABAPENTIN 300 MG: 300 CAPSULE ORAL at 16:05

## 2023-10-25 RX ADMIN — LACTULOSE 20 G: 20 SOLUTION ORAL at 08:34

## 2023-10-25 RX ADMIN — ONDANSETRON 4 MG: 2 INJECTION INTRAMUSCULAR; INTRAVENOUS at 11:31

## 2023-10-25 RX ADMIN — POTASSIUM CHLORIDE 20 MEQ: 1.5 FOR SOLUTION ORAL at 08:34

## 2023-10-25 RX ADMIN — SPIRONOLACTONE 100 MG: 50 TABLET ORAL at 08:33

## 2023-10-25 RX ADMIN — FOLIC ACID 1 MG: 1 TABLET ORAL at 08:33

## 2023-10-25 ASSESSMENT — COGNITIVE AND FUNCTIONAL STATUS - GENERAL
PERSONAL GROOMING: A LOT
TOILETING: TOTAL
STANDING UP FROM CHAIR USING ARMS: A LOT
MOVING TO AND FROM BED TO CHAIR: A LOT
DRESSING REGULAR UPPER BODY CLOTHING: A LITTLE
TURNING FROM BACK TO SIDE WHILE IN FLAT BAD: A LOT
DAILY ACTIVITIY SCORE: 14
TOILETING: TOTAL
PERSONAL GROOMING: A LOT
HELP NEEDED FOR BATHING: A LOT
MOBILITY SCORE: 10
TURNING FROM BACK TO SIDE WHILE IN FLAT BAD: A LOT
HELP NEEDED FOR BATHING: A LOT
MOVING TO AND FROM BED TO CHAIR: A LOT
WALKING IN HOSPITAL ROOM: TOTAL
DRESSING REGULAR LOWER BODY CLOTHING: A LOT
MOVING FROM LYING ON BACK TO SITTING ON SIDE OF FLAT BED WITH BEDRAILS: A LOT
WALKING IN HOSPITAL ROOM: TOTAL
STANDING UP FROM CHAIR USING ARMS: A LOT
DAILY ACTIVITIY SCORE: 14
MOVING FROM LYING ON BACK TO SITTING ON SIDE OF FLAT BED WITH BEDRAILS: A LOT
MOBILITY SCORE: 10
DRESSING REGULAR LOWER BODY CLOTHING: A LOT
DRESSING REGULAR UPPER BODY CLOTHING: A LITTLE
CLIMB 3 TO 5 STEPS WITH RAILING: TOTAL
CLIMB 3 TO 5 STEPS WITH RAILING: TOTAL

## 2023-10-25 ASSESSMENT — PAIN SCALES - GENERAL: PAINLEVEL_OUTOF10: 0 - NO PAIN

## 2023-10-25 ASSESSMENT — PAIN - FUNCTIONAL ASSESSMENT: PAIN_FUNCTIONAL_ASSESSMENT: 0-10

## 2023-10-25 NOTE — CARE PLAN
The patient's goals for the shift include paracentesis    The clinical goals for the shift include monitor labs    Over the shift, the patient did not make progress toward the following goals. Barriers to progression include . Recommendations to address these barriers include .

## 2023-10-25 NOTE — DISCHARGE SUMMARY
Discharge Diagnosis  Hepatic encephalopathy (CMS/HCC)    Issues Requiring Follow-Up  Follow-up gastroenterology  Quit drinking alcohol and make every effort to this end.  Take medications as prescribed    Discharge Meds     Your medication list        ASK your doctor about these medications        Instructions Last Dose Given Next Dose Due   amoxicillin 500 mg capsule  Commonly known as: Amoxil      TAKE 1 CAPSULE BY MOUTH THREE TIMES A DAY       ciprofloxacin 500 mg tablet  Commonly known as: Cipro      TAKE 1 TABLET BY MOUTH ONE TIME DAILY       Flonase Allergy Relief 50 mcg/actuation nasal spray  Generic drug: fluticasone           folic acid 1 mg tablet  Commonly known as: Folvite           furosemide 40 mg tablet  Commonly known as: Lasix           gabapentin 300 mg capsule  Commonly known as: Neurontin           ibuprofen 400 mg tablet           lactulose 20 gram/30 mL oral solution           levonorg-eth estrad triphasic 50-30 (6)/75-40 (5)/125-30(10) per tablet  Commonly known as: TriVora,Enpresse           Levonest (28) 50-30 (6)/75-40 (5)/125-30(10) per tablet  Generic drug: levonorg-eth estrad triphasic           levothyroxine 25 mcg tablet  Commonly known as: Synthroid, Levoxyl           levothyroxine 50 mcg tablet  Commonly known as: Synthroid, Levoxyl           lisinopril 20 mg tablet           lisinopriL-hydrochlorothiazide 20-25 mg tablet           MULTIPLE VITAMINS ORAL           nystatin cream  Commonly known as: Mycostatin           pantoprazole 40 mg EC tablet  Commonly known as: ProtoNix           potassium chloride CR 20 mEq ER tablet  Commonly known as: Klor-Con M20      TAKE 1 TABLET BY MOUTH ONE TIME DAILY WITH OR AFTER A MEAL       QUETIAPINE ORAL           rifAXIMin 550 mg tablet  Commonly known as: Xifaxan           spironolactone 100 mg tablet  Commonly known as: Aldactone           thiamine 100 mg tablet  Commonly known as: Vitamin B-1                    Test Results Pending At  Discharge  Pending Labs       No current pending labs.            Hospital Course   48-year-old female past medical history of alcoholic cirrhosis who continues to drink alcohol and daily, presenting with confusion.  Patient was admitted for hepatic encephalopathy and ascites possible spontaneous bacterial peritonitis.  Treated ceftriaxone with improvement.  Paracentesis was done with 6 L removed.  Mentation improved very quickly with lactulose.  Discussion with the patient and with her significant other, and once medications run out home, she does not continue to use.  She does not have any interest in quitting drinking.  I, and gastroenterology, were very candid with the patient that there is no curative treatment for her cirrhosis other than a liver transplant, and she would not be a candidate with her continued drinking.  Patient was very upset with this, and wished for more optimism, I did discuss that I could not paint a very keon picture of this unfortunate situation.  She is given prescriptions for potassium, spironolactone, furosemide, lactulose, rifaximin, and clear instructions to follow-up with gastroenterology for periodic paracenteses.  Ultimately, discussed that following with hospice outpatient would be the best course going forward.    Pertinent Physical Exam At Time of Discharge  Physical Exam    Outpatient Follow-Up  Future Appointments   Date Time Provider Department Center   11/9/2023  2:30 PM Tesha Hill MD [id number] None         Quintin Gonsales DO

## 2023-10-25 NOTE — PROGRESS NOTES
"Briseida Owen is a 48 y.o. female on day 4 of admission presenting with Hepatic encephalopathy (CMS/HCC).    Subjective   Denies abdominal pain. Some nausea, appetite improved        Objective     Physical Exam    Last Recorded Vitals  Blood pressure 113/72, pulse 89, temperature 36.7 °C (98.1 °F), temperature source Temporal, resp. rate 23, height 1.575 m (5' 2\"), weight 71.5 kg (157 lb 10.1 oz), SpO2 94 %.  Intake/Output last 3 Shifts:  I/O last 3 completed shifts:  In: 347.5 (4.9 mL/kg) [P.O.:200; I.V.:147.5 (2.1 mL/kg)]  Out: - (0 mL/kg)   Weight: 71.5 kg     Relevant Results      Scheduled medications  cefTRIAXone, 2 g, intravenous, Daily  fluticasone, 1 spray, Each Nostril, Daily  folic acid, 1 mg, oral, Daily  furosemide, 40 mg, oral, Daily  gabapentin, 300 mg, oral, TID  lactulose, 20 g, oral, TID  levothyroxine, 50 mcg, oral, Daily  magnesium sulfate, 2 g, intravenous, Once  pantoprazole, 40 mg, oral, BID AC  potassium chloride, 20 mEq, oral, BID  rifAXIMin, 550 mg, oral, BID  spironolactone, 100 mg, oral, Daily  thiamine, 100 mg, oral, Daily      Continuous medications     PRN medications  PRN medications: benzocaine-menthol, dextromethorphan-guaifenesin, melatonin, ondansetron ODT **OR** ondansetron, promethazine  Results for orders placed or performed during the hospital encounter of 10/21/23 (from the past 24 hour(s))   Ammonia   Result Value Ref Range    Ammonia 93 (H) 12 - 45 umol/L   Comprehensive Metabolic Panel   Result Value Ref Range    Glucose 133 (H) 65 - 99 mg/dL    Sodium 137 133 - 145 mmol/L    Potassium 3.1 (L) 3.4 - 5.1 mmol/L    Chloride 98 97 - 107 mmol/L    Bicarbonate 27 24 - 31 mmol/L    Urea Nitrogen 13 8 - 25 mg/dL    Creatinine 0.90 0.40 - 1.60 mg/dL    eGFR 80 >60 mL/min/1.73m*2    Calcium 8.4 (L) 8.5 - 10.4 mg/dL    Albumin 2.6 (L) 3.5 - 5.0 g/dL    Alkaline Phosphatase 145 (H) 35 - 125 U/L    Total Protein 5.5 (L) 5.9 - 7.9 g/dL    AST 65 (H) 5 - 40 U/L    Bilirubin, Total " 8.4 (H) 0.1 - 1.2 mg/dL    ALT 18 5 - 40 U/L    Anion Gap 12 <=19 mmol/L   CBC and Auto Differential   Result Value Ref Range    WBC 12.7 (H) 4.4 - 11.3 x10*3/uL    nRBC 0.0 0.0 - 0.0 /100 WBCs    RBC 1.99 (L) 4.00 - 5.20 x10*6/uL    Hemoglobin 7.0 (L) 12.0 - 16.0 g/dL    Hematocrit 19.6 (L) 36.0 - 46.0 %    MCV 99 80 - 100 fL    MCH 35.2 (H) 26.0 - 34.0 pg    MCHC 35.7 32.0 - 36.0 g/dL    RDW 21.1 (H) 11.5 - 14.5 %    Platelets 163 150 - 450 x10*3/uL    MPV 9.7 7.5 - 11.5 fL    Neutrophils % 70.6 40.0 - 80.0 %    Immature Granulocytes %, Automated 1.3 (H) 0.0 - 0.9 %    Lymphocytes % 17.1 13.0 - 44.0 %    Monocytes % 8.1 2.0 - 10.0 %    Eosinophils % 2.6 0.0 - 6.0 %    Basophils % 0.3 0.0 - 2.0 %    Neutrophils Absolute 8.99 (H) 1.20 - 7.70 x10*3/uL    Immature Granulocytes Absolute, Automated 0.17 0.00 - 0.70 x10*3/uL    Lymphocytes Absolute 2.18 1.20 - 4.80 x10*3/uL    Monocytes Absolute 1.03 (H) 0.10 - 1.00 x10*3/uL    Eosinophils Absolute 0.33 0.00 - 0.70 x10*3/uL    Basophils Absolute 0.04 0.00 - 0.10 x10*3/uL   Protime-INR   Result Value Ref Range    Protime 30.3 (H) 9.3 - 12.7 seconds    INR 3.1 (H) 0.9 - 1.2   Magnesium   Result Value Ref Range    Magnesium 1.50 (L) 1.60 - 3.10 mg/dL   Morphology   Result Value Ref Range    RBC Morphology See Below     Polychromasia Mild     Target Cells Few     [place] Cells Many     Acanthocytes Few    ECG 12 lead   Result Value Ref Range    Ventricular Rate 107 BPM    Atrial Rate 107 BPM    NV Interval 138 ms    QRS Duration 66 ms    QT Interval 300 ms    QTC Calculation(Bazett) 400 ms    P Axis 52 degrees    R Axis 15 degrees    T Axis 74 degrees    QRS Count 18 beats    Q Onset 225 ms    P Onset 156 ms    P Offset 200 ms    T Offset 375 ms    QTC Fredericia 364 ms          Assessment/Plan   Principal Problem:    Hepatic encephalopathy (CMS/HCC)  Alcoholic Cirrhosis of Liver (Decomp in setting chronic alcohol abuse high MELD score of 31, poor prognosis)  -HE: - asterixis.  A/Ox 3 but asterixis - lactulose 20-30 gm/dose, 2-3 times per day, titrated to 2-3 soft BMs per day. Continue Xifaxin BID. Treat UTI         -EV: June 2023 Dr Yepez, grade II, banded. Started on nadolol. No overt bleeding since arrival. INR is 2.9, no plan for urgent repeat         -HCC: AFP pending        -Ascites.  S/p paracentesis with 5.4 L. This was not send for culture, she will be on antibiotic coverage for SBP as she has a UTI as well          -Continue furosemide and spironolactone         -Low Na diet      2.    Anemia         Macrocytic anemia. Stable HH. No overt bleeding          - monitor H/H q 6 for a goal >7         - continue PPI      3.    Alcohol Abuse,          - Advised cessation. She is still drinking daily. Educated that if she continues to drink, she will die. She is not interested in quitting. Recommend palliative care. She has a high MELD score, at risk for deterioration. Patient should discuss Hospice is this is what she wants       She will be discharged today.      I spent 20 minutes in the professional and overall care of this patient.      Mikaela Price, APRN-CNP

## 2023-10-25 NOTE — NURSING NOTE
2300 patient became restless, she pulled out all three of her PIV's. Hospitalist called, lynda initiated.

## 2023-10-25 NOTE — PROGRESS NOTES
Occupational Therapy                 Therapy Communication Note    Patient Name: Briseida Owen  MRN: [id number]  Today's Date: 10/25/2023     Discipline: Occupational Therapy    Missed Visit Reason: Missed Visit Reason: Patient refused (Pt verbalizing multiple excuses for not wanting to participate in skilled OT treatment session.)

## 2023-10-25 NOTE — DISCHARGE INSTRUCTIONS
You need to make every effort to quit drinking, and this was discussed.  You ultimately need a liver transplant, and this will not be a possibility with continued alcohol use.    Follow with listed physicians    Take medications as prescribed.  New paper prescription has been provided of necessary medications.

## 2023-10-25 NOTE — CARE PLAN
The patient's goals for the shift include paracentesis    The clinical goals for the shift include monitor labs    Over the shift, the patient did not make progress toward the following goals. Barriers to progression include n/a. Recommendations to address these barriers include n/a.

## 2023-10-25 NOTE — PROGRESS NOTES
[facility] receive call from attending  who states pt will dc home with homecare. He was able to confirm with pt that her PCP is Dr Stacy James. [facility] sent referral to  Homecare intake RN Edgar Juarez via haiku and made them aware of pts need for RN/PT as well as that attending  aware of need for internal referral for homecare which will be put in shortly.

## 2023-10-25 NOTE — PROGRESS NOTES
"Briseida Owen is a 48 y.o. female on day 4 of admission presenting with Hepatic encephalopathy (CMS/HCC).      Subjective   Patient upset that there is no \"optimism\" in her situation when her cirrhosis was discussed.        Objective     Last Recorded Vitals  /72 (BP Location: Right arm, Patient Position: Lying)   Pulse 89   Temp 36.7 °C (98.1 °F) (Temporal)   Resp 23   Wt 71.5 kg (157 lb 10.1 oz)   SpO2 94%   Intake/Output last 3 Shifts:    Intake/Output Summary (Last 24 hours) at 10/25/2023 1557  Last data filed at 10/25/2023 0900  Gross per 24 hour   Intake 360 ml   Output --   Net 360 ml         Admission Weight  Weight: 75 kg (165 lb 5.5 oz) (10/21/23 1452)    Daily Weight  10/24/23 : 71.5 kg (157 lb 10.1 oz)    Image Results  ECG 12 lead  Sinus tachycardia  Low voltage QRS  Cannot rule out Anterior infarct , age undetermined  Abnormal ECG  No previous ECGs available  Confirmed by Castro Clarke ([id number]) on 10/25/2023 7:19:02 AM    General: [place] female, chronically ill-appearing,  Eyes: Scleral icterus  Neck: No edema.  Rate and rhythm  Respiratory: No distress.  Abdomen: Protuberant.  Some fluid.  Nontender.  Extremities: Bilateral lower extremity  Neuro: Currently alert and oriented x3.  Somewhat slow speech.  No current tremors.  Psychiatric: Feel there is poor insight.      Relevant Results               Assessment/Plan   This patient currently has cardiac telemetry ordered; if you would like to modify or discontinue the telemetry order, click here to go to the orders activity to modify/discontinue the order.              Principal Problem:    Hepatic encephalopathy (CMS/HCC)    Alcoholic cirrhosis  -worsening at baseline  -Unfortunately, the patient continues to drink alcohol.  Not a candidate for liver transplant.  -Paracentesis was done.  Currently treating for spontaneous bacterial peritonitis I have a somewhat lower suspicion, suspect the leukocytosis is more from urinary tract " infection.  -With her continued drinking and no desire to quit drinking, and her advanced cirrhosis, I discussed pursuing hospice outpatient, I will ask for palliative care consult.  -Continue furosemide and spironolactone  - GI on consult    Bleeding  -There is continued slow oozing from the paracentesis site  -We will give 1 unit of FFP.  Continue dressing changes.     Anemia  -No major active bleeding, though certainly high risk with cirrhosis.   - improved     Encephalopathy  -Resolving.  -Hepatic encephalopathy.  Continue lactulose 3 times a day.     Hypokalemia  -Replaced in the emergency department, and daily labs.     Patient is not interested in skilled nursing facility despite being extremely high fall risk.  She is also not interested in following with hospice and wishes to continue to drink.  While she is upset about the lack of optimism, I did tell her that it is my job to be candid about her underlying cirrhosis and poor prognosis              Quintin Gonsales, DO

## 2023-10-26 ENCOUNTER — APPOINTMENT (OUTPATIENT)
Dept: RADIOLOGY | Facility: HOSPITAL | Age: 48
DRG: 441 | End: 2023-10-26
Payer: MEDICARE

## 2023-10-26 LAB
ACANTHOCYTES BLD QL SMEAR: NORMAL
ALBUMIN SERPL-MCNC: 2.7 G/DL (ref 3.5–5)
ALP BLD-CCNC: 140 U/L (ref 35–125)
ALT SERPL-CCNC: 19 U/L (ref 5–40)
ANION GAP SERPL CALC-SCNC: 14 MMOL/L
AST SERPL-CCNC: 67 U/L (ref 5–40)
BASOPHILS # BLD AUTO: 0.04 X10*3/UL (ref 0–0.1)
BASOPHILS NFR BLD AUTO: 0.3 %
BILIRUB SERPL-MCNC: 7.6 MG/DL (ref 0.1–1.2)
BLOOD EXPIRATION DATE: NORMAL
BUN SERPL-MCNC: 11 MG/DL (ref 8–25)
BURR CELLS BLD QL SMEAR: NORMAL
CALCIUM SERPL-MCNC: 8.1 MG/DL (ref 8.5–10.4)
CHLORIDE SERPL-SCNC: 95 MMOL/L (ref 97–107)
CO2 SERPL-SCNC: 25 MMOL/L (ref 24–31)
CREAT SERPL-MCNC: 0.7 MG/DL (ref 0.4–1.6)
DISPENSE STATUS: NORMAL
EOSINOPHIL # BLD AUTO: 0.34 X10*3/UL (ref 0–0.7)
EOSINOPHIL NFR BLD AUTO: 2.3 %
ERYTHROCYTE [DISTWIDTH] IN BLOOD BY AUTOMATED COUNT: 20.9 % (ref 11.5–14.5)
GFR SERPL CREATININE-BSD FRML MDRD: >90 ML/MIN/1.73M*2
GLUCOSE SERPL-MCNC: 128 MG/DL (ref 65–99)
HCT VFR BLD AUTO: 19.7 % (ref 36–46)
HCT VFR BLD AUTO: 23 % (ref 36–46)
HGB BLD-MCNC: 6.7 G/DL (ref 12–16)
HGB BLD-MCNC: 8.2 G/DL (ref 12–16)
IMM GRANULOCYTES # BLD AUTO: 0.18 X10*3/UL (ref 0–0.7)
IMM GRANULOCYTES NFR BLD AUTO: 1.2 % (ref 0–0.9)
INR PPP: 2.5 (ref 0.9–1.2)
LYMPHOCYTES # BLD AUTO: 2.43 X10*3/UL (ref 1.2–4.8)
LYMPHOCYTES NFR BLD AUTO: 16.5 %
MCH RBC QN AUTO: 35.6 PG (ref 26–34)
MCHC RBC AUTO-ENTMCNC: 34 G/DL (ref 32–36)
MCV RBC AUTO: 105 FL (ref 80–100)
MONOCYTES # BLD AUTO: 1.24 X10*3/UL (ref 0.1–1)
MONOCYTES NFR BLD AUTO: 8.4 %
NEUTROPHILS # BLD AUTO: 10.48 X10*3/UL (ref 1.2–7.7)
NEUTROPHILS NFR BLD AUTO: 71.3 %
NRBC BLD-RTO: 0 /100 WBCS (ref 0–0)
PLATELET # BLD AUTO: 162 X10*3/UL (ref 150–450)
PMV BLD AUTO: 9.9 FL (ref 7.5–11.5)
POLYCHROMASIA BLD QL SMEAR: NORMAL
POTASSIUM SERPL-SCNC: 3.1 MMOL/L (ref 3.4–5.1)
PRODUCT BLOOD TYPE: 5100
PRODUCT CODE: NORMAL
PROT SERPL-MCNC: 5.4 G/DL (ref 5.9–7.9)
PROTHROMBIN TIME: 24.5 SECONDS (ref 9.3–12.7)
RBC # BLD AUTO: 1.88 X10*6/UL (ref 4–5.2)
RBC MORPH BLD: NORMAL
SCHISTOCYTES BLD QL SMEAR: NORMAL
SODIUM SERPL-SCNC: 134 MMOL/L (ref 133–145)
TARGETS BLD QL SMEAR: NORMAL
UNIT ABO: NORMAL
UNIT NUMBER: NORMAL
UNIT RH: NORMAL
UNIT VOLUME: 200
WBC # BLD AUTO: 14.7 X10*3/UL (ref 4.4–11.3)

## 2023-10-26 PROCEDURE — 87075 CULTR BACTERIA EXCEPT BLOOD: CPT | Mod: WESLAB | Performed by: INTERNAL MEDICINE

## 2023-10-26 PROCEDURE — 36430 TRANSFUSION BLD/BLD COMPNT: CPT

## 2023-10-26 PROCEDURE — 0W9G3ZX DRAINAGE OF PERITONEAL CAVITY, PERCUTANEOUS APPROACH, DIAGNOSTIC: ICD-10-PCS | Performed by: RADIOLOGY

## 2023-10-26 PROCEDURE — 85025 COMPLETE CBC W/AUTO DIFF WBC: CPT | Performed by: INTERNAL MEDICINE

## 2023-10-26 PROCEDURE — 36415 COLL VENOUS BLD VENIPUNCTURE: CPT | Performed by: INTERNAL MEDICINE

## 2023-10-26 PROCEDURE — P9016 RBC LEUKOCYTES REDUCED: HCPCS

## 2023-10-26 PROCEDURE — 97110 THERAPEUTIC EXERCISES: CPT | Mod: GP,CQ

## 2023-10-26 PROCEDURE — 87070 CULTURE OTHR SPECIMN AEROBIC: CPT | Mod: WESLAB | Performed by: INTERNAL MEDICINE

## 2023-10-26 PROCEDURE — P9017 PLASMA 1 DONOR FRZ W/IN 8 HR: HCPCS

## 2023-10-26 PROCEDURE — 87040 BLOOD CULTURE FOR BACTERIA: CPT | Mod: WESLAB | Performed by: INTERNAL MEDICINE

## 2023-10-26 PROCEDURE — 49083 ABD PARACENTESIS W/IMAGING: CPT

## 2023-10-26 PROCEDURE — 2060000001 HC INTERMEDIATE ICU ROOM DAILY

## 2023-10-26 PROCEDURE — C1729 CATH, DRAINAGE: HCPCS

## 2023-10-26 PROCEDURE — 85610 PROTHROMBIN TIME: CPT | Performed by: INTERNAL MEDICINE

## 2023-10-26 PROCEDURE — 2720000007 HC OR 272 NO HCPCS

## 2023-10-26 PROCEDURE — 2500000001 HC RX 250 WO HCPCS SELF ADMINISTERED DRUGS (ALT 637 FOR MEDICARE OP): Performed by: INTERNAL MEDICINE

## 2023-10-26 PROCEDURE — 85014 HEMATOCRIT: CPT | Performed by: INTERNAL MEDICINE

## 2023-10-26 PROCEDURE — 80053 COMPREHEN METABOLIC PANEL: CPT | Performed by: INTERNAL MEDICINE

## 2023-10-26 PROCEDURE — 97116 GAIT TRAINING THERAPY: CPT | Mod: GP,CQ

## 2023-10-26 PROCEDURE — 2500000005 HC RX 250 GENERAL PHARMACY W/O HCPCS: Performed by: RADIOLOGY

## 2023-10-26 PROCEDURE — 49083 ABD PARACENTESIS W/IMAGING: CPT | Mod: GC | Performed by: RADIOLOGY

## 2023-10-26 PROCEDURE — 2500000004 HC RX 250 GENERAL PHARMACY W/ HCPCS (ALT 636 FOR OP/ED): Performed by: INTERNAL MEDICINE

## 2023-10-26 RX ORDER — LIDOCAINE HYDROCHLORIDE 10 MG/ML
INJECTION, SOLUTION EPIDURAL; INFILTRATION; INTRACAUDAL; PERINEURAL AS NEEDED
Status: COMPLETED | OUTPATIENT
Start: 2023-10-26 | End: 2023-10-26

## 2023-10-26 RX ADMIN — LACTULOSE 20 G: 20 SOLUTION ORAL at 08:26

## 2023-10-26 RX ADMIN — GABAPENTIN 300 MG: 300 CAPSULE ORAL at 21:08

## 2023-10-26 RX ADMIN — FLUTICASONE PROPIONATE 1 SPRAY: 50 SPRAY, METERED NASAL at 09:00

## 2023-10-26 RX ADMIN — PANTOPRAZOLE SODIUM 40 MG: 40 TABLET, DELAYED RELEASE ORAL at 15:42

## 2023-10-26 RX ADMIN — CEFTRIAXONE SODIUM 2 G: 2 INJECTION, SOLUTION INTRAVENOUS at 21:08

## 2023-10-26 RX ADMIN — PANTOPRAZOLE SODIUM 40 MG: 40 TABLET, DELAYED RELEASE ORAL at 05:11

## 2023-10-26 RX ADMIN — LEVOTHYROXINE SODIUM 50 MCG: 0.14 TABLET ORAL at 05:12

## 2023-10-26 RX ADMIN — RIFAXIMIN 550 MG: 550 TABLET ORAL at 08:26

## 2023-10-26 RX ADMIN — LACTULOSE 20 G: 20 SOLUTION ORAL at 15:42

## 2023-10-26 RX ADMIN — FOLIC ACID 1 MG: 1 TABLET ORAL at 08:26

## 2023-10-26 RX ADMIN — RIFAXIMIN 550 MG: 550 TABLET ORAL at 21:09

## 2023-10-26 RX ADMIN — LIDOCAINE HYDROCHLORIDE 10 ML: 10 INJECTION, SOLUTION EPIDURAL; INFILTRATION; INTRACAUDAL; PERINEURAL at 16:03

## 2023-10-26 RX ADMIN — GABAPENTIN 300 MG: 300 CAPSULE ORAL at 15:42

## 2023-10-26 RX ADMIN — POTASSIUM CHLORIDE 20 MEQ: 1.5 FOR SOLUTION ORAL at 21:08

## 2023-10-26 RX ADMIN — GABAPENTIN 300 MG: 300 CAPSULE ORAL at 08:27

## 2023-10-26 RX ADMIN — SPIRONOLACTONE 100 MG: 50 TABLET ORAL at 08:26

## 2023-10-26 RX ADMIN — POTASSIUM CHLORIDE 20 MEQ: 1.5 FOR SOLUTION ORAL at 08:26

## 2023-10-26 RX ADMIN — Medication 100 MG: at 09:00

## 2023-10-26 RX ADMIN — FUROSEMIDE 40 MG: 40 TABLET ORAL at 09:00

## 2023-10-26 RX ADMIN — LACTULOSE 20 G: 20 SOLUTION ORAL at 21:08

## 2023-10-26 ASSESSMENT — COGNITIVE AND FUNCTIONAL STATUS - GENERAL
CLIMB 3 TO 5 STEPS WITH RAILING: TOTAL
TURNING FROM BACK TO SIDE WHILE IN FLAT BAD: A LITTLE
TOILETING: TOTAL
MOVING TO AND FROM BED TO CHAIR: A LITTLE
HELP NEEDED FOR BATHING: A LOT
MOBILITY SCORE: 15
DRESSING REGULAR LOWER BODY CLOTHING: A LOT
WALKING IN HOSPITAL ROOM: A LITTLE
MOVING TO AND FROM BED TO CHAIR: A LOT
PERSONAL GROOMING: A LOT
STANDING UP FROM CHAIR USING ARMS: A LOT
DAILY ACTIVITIY SCORE: 14
DRESSING REGULAR LOWER BODY CLOTHING: A LOT
WALKING IN HOSPITAL ROOM: A LITTLE
MOVING FROM LYING ON BACK TO SITTING ON SIDE OF FLAT BED WITH BEDRAILS: A LITTLE
STANDING UP FROM CHAIR USING ARMS: A LOT
MOBILITY SCORE: 15
TURNING FROM BACK TO SIDE WHILE IN FLAT BAD: A LOT
CLIMB 3 TO 5 STEPS WITH RAILING: TOTAL
TOILETING: TOTAL
WALKING IN HOSPITAL ROOM: TOTAL
TURNING FROM BACK TO SIDE WHILE IN FLAT BAD: A LITTLE
MOVING FROM LYING ON BACK TO SITTING ON SIDE OF FLAT BED WITH BEDRAILS: A LOT
MOVING FROM LYING ON BACK TO SITTING ON SIDE OF FLAT BED WITH BEDRAILS: A LITTLE
DAILY ACTIVITIY SCORE: 14
CLIMB 3 TO 5 STEPS WITH RAILING: TOTAL
MOBILITY SCORE: 10
HELP NEEDED FOR BATHING: A LOT
MOVING TO AND FROM BED TO CHAIR: A LITTLE
DRESSING REGULAR UPPER BODY CLOTHING: A LITTLE
PERSONAL GROOMING: A LOT
DRESSING REGULAR UPPER BODY CLOTHING: A LITTLE
STANDING UP FROM CHAIR USING ARMS: A LOT

## 2023-10-26 ASSESSMENT — PAIN SCALES - GENERAL
PAINLEVEL_OUTOF10: 0 - NO PAIN
PAINLEVEL_OUTOF10: 0 - NO PAIN

## 2023-10-26 ASSESSMENT — PAIN - FUNCTIONAL ASSESSMENT
PAIN_FUNCTIONAL_ASSESSMENT: 0-10
PAIN_FUNCTIONAL_ASSESSMENT: 0-10

## 2023-10-26 NOTE — PROGRESS NOTES
Briseida Owen is a 48 y.o. female on day 5 of admission presenting with Hepatic encephalopathy (CMS/HCC).      Subjective   No new bleeding from previous paracentesis site.  Worsening abdominal bloat.  Hemoglobin 6.7.       Objective     Last Recorded Vitals  /64 (BP Location: Right arm, Patient Position: Lying)   Pulse 87   Temp 36.3 °C (97.3 °F) (Temporal)   Resp 15   Wt 71.5 kg (157 lb 10.1 oz)   SpO2 93%   Intake/Output last 3 Shifts:    Intake/Output Summary (Last 24 hours) at 10/26/2023 1243  Last data filed at 10/25/2023 2114  Gross per 24 hour   Intake 357 ml   Output --   Net 357 ml         Admission Weight  Weight: 75 kg (165 lb 5.5 oz) (10/21/23 1452)    Daily Weight  10/24/23 : 71.5 kg (157 lb 10.1 oz)    Image Results  ECG 12 lead  Sinus tachycardia  Low voltage QRS  Cannot rule out Anterior infarct , age undetermined  Abnormal ECG  No previous ECGs available  Confirmed by Castro Clarke ([id number]) on 10/25/2023 7:19:02 AM    General: [place] female, chronically ill-appearing,  Eyes: Scleral icterus  Neck: No edema.  Rate and rhythm  Respiratory: No distress.  Abdomen: Protuberant.  Some fluid.  Nontender.  Extremities: Bilateral lower extremity  Neuro: Currently alert and oriented x3.  Somewhat slow speech.  No current tremors.  Psychiatric: Feel there is poor insight.      Relevant Results               Assessment/Plan   This patient currently has cardiac telemetry ordered; if you would like to modify or discontinue the telemetry order, click here to go to the orders activity to modify/discontinue the order.              Principal Problem:    Hepatic encephalopathy (CMS/HCC)    Alcoholic cirrhosis  -worsening from baseline  -Unfortunately, the patient continues to drink alcohol.  Not a candidate for liver transplant.  -Paracentesis was done and there was some bleeding site improved now after pressure and FFP  -Patient does have some worsening swelling, will ask for another paracentesis with  culture given the worsening leukocytosis  -Limit fluids 1500 mL/day, low-sodium diet  -Continue spironolactone and furosemide.  -We will continue ceftriaxone for possible SBP.  If worsening leukocytosis, will change to broader coverage and will ask for opinion from infectious disease.    Bleeding  -Resolving, though patient is more anemic with needing a transfusion for  -For possible paracentesis, I will give another dose of FFP.     Anemia  -No major active bleeding, though certainly high risk with cirrhosis.   - improved     Encephalopathy  -Resolving.  -Hepatic encephalopathy.  Continue lactulose 3 times a day.     Hypokalemia  -Replaced in the emergency department, and daily labs.     With leukocytosis worsening anemia, patient needs to stay here.  She is not interested in going to a skilled nursing facility which is clearly the best option for her.  I had lengthy discussions about quitting drinking, she is not interested in this.  Other doctors and nurse practitioners have also.  Ultimately, when the patient is ready to be discharged, hospice would be the best fit for her            Quintin Gonsales, DO

## 2023-10-26 NOTE — PROGRESS NOTES
Physical Therapy    Physical Therapy Treatment    Patient Name: Briseida Owen  MRN: [id number]  Today's Date: 10/26/2023  Time Calculation  Start Time: 0835  Stop Time: 0859  Time Calculation (min): 24 min       Assessment/Plan   PT Assessment  PT Assessment Results: Decreased strength, Decreased range of motion, Decreased endurance, Impaired balance, Decreased mobility, Decreased coordination, Decreased safety awareness, Impaired tone, Impaired sensation, Decreased skin integrity  Rehab Prognosis: Fair  Barriers to Discharge: medical status  Evaluation/Treatment Tolerance: Patient limited by fatigue  Medical Staff Made Aware: Yes  Barriers to Participation: Comorbidities  End of Session Communication: Bedside nurse  Assessment Comment: Pt continues to present with decreased strength and endurance. Poor insight into deficits. Pt remains a high fall risk at this time.  End of Session Patient Position: Up in chair  PT Plan  Inpatient/Swing Bed or Outpatient: Inpatient  PT Plan  Treatment/Interventions: Bed mobility, Transfer training, Gait training, Stair training, Balance training, Neuromuscular re-education, Strengthening, Endurance training, Range of motion, Therapeutic exercise, Therapeutic activity  PT Plan: Skilled PT  PT Frequency: 3 times per week  PT Discharge Recommendations: Moderate intensity level of continued care  Equipment Recommended upon Discharge: Wheeled walker  PT Recommended Transfer Status: Assist x2  PT - OK to Discharge: Yes      General Visit Information:   PT  Visit  PT Received On: 10/26/23  General  Prior to Session Communication: Bedside nurse  Patient Position Received: Bed, 3 rail up  General Comment: Cleared by nursing to be seen for therapy, pt agreeable with tx, supine in bed upon arrival.    Subjective   Precautions:  Precautions  Hearing/Visual Limitations: wears glasses intermittently  Medical Precautions: Fall precautions  Vital Signs:       Objective   Pain:  Pain  Assessment  Pain Assessment: 0-10  Pain Score: 0 - No pain  Cognition:     Postural Control:     Activity Tolerance:     Treatments:  Therapeutic Exercise  Therapeutic Exercise Performed: Yes  Therapeutic Exercise Activity 1: Bilateral ankle pumps x15  Therapeutic Exercise Activity 2: Bilateral hip flexion x15  Therapeutic Exercise Activity 3: Bilateral knee extension x15  Therapeutic Exercise Activity 4: Resisted hip abd/add 15    Therapeutic Activity  Therapeutic Activity Performed: No    Balance/Neuromuscular Re-Education  Balance/Neuromuscular Re-Education Activity Performed: Yes  Balance/Neuromuscular Re-Education Activity 1: Fair seated static balance. Poor static standing balance with bilateral UE's on walker.    Bed Mobility  Bed Mobility: Yes  Bed Mobility 1  Bed Mobility 1: Supine to sitting  Level of Assistance 1: Close supervision  Bed Mobility Comments 1: Increased time and effort to complete  bed mobility.    Ambulation/Gait Training  Ambulation/Gait Training Performed: Yes  Ambulation/Gait Training 1  Surface 1: Level tile  Device 1: Rolling walker  Assistance 1: Minimum assistance  Comments/Distance (ft) 1: 10' with wheeled walker, presents with bilateral foot drop, heavy use of UE's on walker, flexed posture, discontinuous step length, cues to remain in WHITNEY of walker, min assist for balance.  Transfers  Transfer: Yes  Transfer 1  Transfer From 1: Sit to  Transfer to 1: Stand  Transfer Level of Assistance 1: Moderate assistance  Trials/Comments 1: Mod assist for trunk up during sit to stand, cues for proper hand placement, decreased eccentric control in sitting.    Stairs  Stairs: No    Outcome Measures:  [facility] Basic Mobility  Turning from your back to your side while in a flat bed without using bedrails: A little  Moving from lying on your back to sitting on the side of a flat bed without using bedrails: A little  Moving to and from bed to chair (including a wheelchair): A little  Standing up from a  chair using your arms (e.g. wheelchair or bedside chair): A lot  To walk in hospital room: A little  Climbing 3-5 steps with railing: Total  Basic Mobility - Total Score: 15    Education Documentation  No documentation found.  Education Comments  No comments found.        OP EDUCATION:       Encounter Problems       Encounter Problems (Active)       Balance       LTG - Patient will maintain balance to allow for safe mobility with close supervision. (Progressing)       Start:  10/24/23    Expected End:  11/27/23               Mobility       STG - Patient will ambulate 100' with rolling walker and close supervision (Progressing)       Start:  10/24/23    Expected End:  11/27/23            STG - Patient will ascend and descend a flight of stairs with one rail and min A (Progressing)       Start:  10/24/23    Expected End:  11/27/23               Transfers       STG - Transfer from bed to chair with close supervision (Progressing)       Start:  10/24/23    Expected End:  11/27/23            STG - Patient to transfer to and from sit to supine with close supervision (Progressing)       Start:  10/24/23    Expected End:  11/27/23            STG - Patient will transfer sit to and from stand with close supervision (Progressing)       Start:  10/24/23    Expected End:  11/27/23

## 2023-10-26 NOTE — PROGRESS NOTES
Pts paracentesis site has been bleeding, if bleeding stops pt anticipated to dc today. Plan is for pt to dc home with  [facility] aware pt might dc today. Internal homecare referral is already in.     Update [facility] called pts daughter Stephani to make her aware pt ready for dc and transport today. Stephani expressing frustration, stating she told many people she wants 8 hours notice before dc, also stating she does not have clarity on vascular plan and also that she wants to be at [facility] to receive the pt when she arrives which cannot happen today as she is on her way to work. [facility] asked vascular CNP to call Stephani and he is agreeable, also made attending Dr aware of daughters request for pt to dc on 10/27 in the morning, he is agreeable. [facility] made bedside Rn aware. [facility] will set up transport morning of 10/27 via roundtrip.     *Daughter would like staff to save the purple and yellow flowers that can be replanted and states she will pick them up.*

## 2023-10-26 NOTE — PROGRESS NOTES
Occupational Therapy                 Therapy Communication Note    Patient Name: Briseida Owen  MRN: [id number]  Today's Date: 10/26/2023     Discipline: Occupational Therapy    Missed Visit Reason: Missed Visit Reason: Patient refused (Consulted with nursing and pt to have paracentesis shortly. Pt is declining to participate in therapy despite Max cues for encouragement and education on OT goals.)    Missed Time: Attempt    Comment:

## 2023-10-27 ENCOUNTER — APPOINTMENT (OUTPATIENT)
Dept: RADIOLOGY | Facility: HOSPITAL | Age: 48
DRG: 441 | End: 2023-10-27
Payer: MEDICARE

## 2023-10-27 LAB
ACANTHOCYTES BLD QL SMEAR: NORMAL
ALBUMIN SERPL-MCNC: 2.9 G/DL (ref 3.5–5)
ALP BLD-CCNC: 142 U/L (ref 35–125)
ALT SERPL-CCNC: 22 U/L (ref 5–40)
ANION GAP SERPL CALC-SCNC: 13 MMOL/L
AST SERPL-CCNC: 83 U/L (ref 5–40)
BASOPHILS # BLD AUTO: 0.05 X10*3/UL (ref 0–0.1)
BASOPHILS NFR BLD AUTO: 0.4 %
BILIRUB SERPL-MCNC: 7.8 MG/DL (ref 0.1–1.2)
BLOOD EXPIRATION DATE: NORMAL
BUN SERPL-MCNC: 10 MG/DL (ref 8–25)
BURR CELLS BLD QL SMEAR: NORMAL
CALCIUM SERPL-MCNC: 8.7 MG/DL (ref 8.5–10.4)
CHLORIDE SERPL-SCNC: 97 MMOL/L (ref 97–107)
CO2 SERPL-SCNC: 26 MMOL/L (ref 24–31)
CREAT SERPL-MCNC: 0.7 MG/DL (ref 0.4–1.6)
DISPENSE STATUS: NORMAL
EOSINOPHIL # BLD AUTO: 0.45 X10*3/UL (ref 0–0.7)
EOSINOPHIL NFR BLD AUTO: 3.3 %
ERYTHROCYTE [DISTWIDTH] IN BLOOD BY AUTOMATED COUNT: 22.1 % (ref 11.5–14.5)
GFR SERPL CREATININE-BSD FRML MDRD: >90 ML/MIN/1.73M*2
GLUCOSE SERPL-MCNC: 153 MG/DL (ref 65–99)
HCT VFR BLD AUTO: 22.6 % (ref 36–46)
HGB BLD-MCNC: 8 G/DL (ref 12–16)
IMM GRANULOCYTES # BLD AUTO: 0.18 X10*3/UL (ref 0–0.7)
IMM GRANULOCYTES NFR BLD AUTO: 1.3 % (ref 0–0.9)
INR PPP: 2.4 (ref 0.9–1.2)
LYMPHOCYTES # BLD AUTO: 2.44 X10*3/UL (ref 1.2–4.8)
LYMPHOCYTES NFR BLD AUTO: 17.7 %
MCH RBC QN AUTO: 34.6 PG (ref 26–34)
MCHC RBC AUTO-ENTMCNC: 35.4 G/DL (ref 32–36)
MCV RBC AUTO: 98 FL (ref 80–100)
MONOCYTES # BLD AUTO: 1.12 X10*3/UL (ref 0.1–1)
MONOCYTES NFR BLD AUTO: 8.1 %
NEUTROPHILS # BLD AUTO: 9.53 X10*3/UL (ref 1.2–7.7)
NEUTROPHILS NFR BLD AUTO: 69.2 %
NRBC BLD-RTO: 0 /100 WBCS (ref 0–0)
PLATELET # BLD AUTO: 196 X10*3/UL (ref 150–450)
PMV BLD AUTO: 10 FL (ref 7.5–11.5)
POLYCHROMASIA BLD QL SMEAR: NORMAL
POTASSIUM SERPL-SCNC: 3.8 MMOL/L (ref 3.4–5.1)
PRODUCT BLOOD TYPE: 5100
PRODUCT CODE: NORMAL
PROT SERPL-MCNC: 5.8 G/DL (ref 5.9–7.9)
PROTHROMBIN TIME: 24 SECONDS (ref 9.3–12.7)
RBC # BLD AUTO: 2.31 X10*6/UL (ref 4–5.2)
RBC MORPH BLD: NORMAL
SCHISTOCYTES BLD QL SMEAR: NORMAL
SODIUM SERPL-SCNC: 136 MMOL/L (ref 133–145)
TARGETS BLD QL SMEAR: NORMAL
UNIT ABO: NORMAL
UNIT NUMBER: NORMAL
UNIT RH: NORMAL
UNIT VOLUME: 350
WBC # BLD AUTO: 13.8 X10*3/UL (ref 4.4–11.3)
XM INTEP: NORMAL

## 2023-10-27 PROCEDURE — 36415 COLL VENOUS BLD VENIPUNCTURE: CPT | Performed by: INTERNAL MEDICINE

## 2023-10-27 PROCEDURE — 2500000001 HC RX 250 WO HCPCS SELF ADMINISTERED DRUGS (ALT 637 FOR MEDICARE OP): Performed by: INTERNAL MEDICINE

## 2023-10-27 PROCEDURE — 49083 ABD PARACENTESIS W/IMAGING: CPT | Performed by: RADIOLOGY

## 2023-10-27 PROCEDURE — P9047 ALBUMIN (HUMAN), 25%, 50ML: HCPCS | Mod: JZ | Performed by: INTERNAL MEDICINE

## 2023-10-27 PROCEDURE — 84075 ASSAY ALKALINE PHOSPHATASE: CPT | Performed by: INTERNAL MEDICINE

## 2023-10-27 PROCEDURE — 85610 PROTHROMBIN TIME: CPT | Performed by: INTERNAL MEDICINE

## 2023-10-27 PROCEDURE — 2500000005 HC RX 250 GENERAL PHARMACY W/O HCPCS

## 2023-10-27 PROCEDURE — 97535 SELF CARE MNGMENT TRAINING: CPT | Mod: GO,CO

## 2023-10-27 PROCEDURE — 85025 COMPLETE CBC W/AUTO DIFF WBC: CPT | Performed by: INTERNAL MEDICINE

## 2023-10-27 PROCEDURE — 2720000007 HC OR 272 NO HCPCS

## 2023-10-27 PROCEDURE — 49083 ABD PARACENTESIS W/IMAGING: CPT

## 2023-10-27 PROCEDURE — 2060000001 HC INTERMEDIATE ICU ROOM DAILY

## 2023-10-27 PROCEDURE — 2500000004 HC RX 250 GENERAL PHARMACY W/ HCPCS (ALT 636 FOR OP/ED): Performed by: INTERNAL MEDICINE

## 2023-10-27 RX ORDER — LIDOCAINE HYDROCHLORIDE 20 MG/ML
INJECTION, SOLUTION EPIDURAL; INFILTRATION; INTRACAUDAL; PERINEURAL AS NEEDED
Status: COMPLETED | OUTPATIENT
Start: 2023-10-27 | End: 2023-10-27

## 2023-10-27 RX ORDER — ALBUMIN HUMAN 250 G/1000ML
25 SOLUTION INTRAVENOUS ONCE
Status: COMPLETED | OUTPATIENT
Start: 2023-10-27 | End: 2023-10-27

## 2023-10-27 RX ADMIN — GABAPENTIN 300 MG: 300 CAPSULE ORAL at 15:17

## 2023-10-27 RX ADMIN — PANTOPRAZOLE SODIUM 40 MG: 40 TABLET, DELAYED RELEASE ORAL at 15:17

## 2023-10-27 RX ADMIN — GABAPENTIN 300 MG: 300 CAPSULE ORAL at 08:21

## 2023-10-27 RX ADMIN — ALBUMIN (HUMAN) 25 G: 12.5 SOLUTION INTRAVENOUS at 16:17

## 2023-10-27 RX ADMIN — RIFAXIMIN 550 MG: 550 TABLET ORAL at 08:21

## 2023-10-27 RX ADMIN — FLUTICASONE PROPIONATE 1 SPRAY: 50 SPRAY, METERED NASAL at 08:22

## 2023-10-27 RX ADMIN — LIDOCAINE HYDROCHLORIDE 5 ML: 20 INJECTION, SOLUTION EPIDURAL; INFILTRATION; INTRACAUDAL at 15:43

## 2023-10-27 RX ADMIN — GABAPENTIN 300 MG: 300 CAPSULE ORAL at 21:28

## 2023-10-27 RX ADMIN — FUROSEMIDE 40 MG: 40 TABLET ORAL at 08:21

## 2023-10-27 RX ADMIN — CEFTRIAXONE SODIUM 2 G: 2 INJECTION, SOLUTION INTRAVENOUS at 21:27

## 2023-10-27 RX ADMIN — LEVOTHYROXINE SODIUM 50 MCG: 0.14 TABLET ORAL at 06:36

## 2023-10-27 RX ADMIN — LACTULOSE 20 G: 20 SOLUTION ORAL at 15:17

## 2023-10-27 RX ADMIN — RIFAXIMIN 550 MG: 550 TABLET ORAL at 21:28

## 2023-10-27 RX ADMIN — SPIRONOLACTONE 100 MG: 50 TABLET ORAL at 08:21

## 2023-10-27 RX ADMIN — POTASSIUM CHLORIDE 20 MEQ: 1.5 FOR SOLUTION ORAL at 08:21

## 2023-10-27 RX ADMIN — Medication 100 MG: at 08:21

## 2023-10-27 RX ADMIN — LACTULOSE 20 G: 20 SOLUTION ORAL at 08:21

## 2023-10-27 RX ADMIN — LACTULOSE 20 G: 20 SOLUTION ORAL at 21:28

## 2023-10-27 RX ADMIN — POTASSIUM CHLORIDE 20 MEQ: 1.5 FOR SOLUTION ORAL at 21:28

## 2023-10-27 RX ADMIN — FOLIC ACID 1 MG: 1 TABLET ORAL at 08:21

## 2023-10-27 RX ADMIN — PANTOPRAZOLE SODIUM 40 MG: 40 TABLET, DELAYED RELEASE ORAL at 06:36

## 2023-10-27 ASSESSMENT — COGNITIVE AND FUNCTIONAL STATUS - GENERAL
TOILETING: A LOT
DRESSING REGULAR LOWER BODY CLOTHING: A LOT
WALKING IN HOSPITAL ROOM: TOTAL
MOBILITY SCORE: 12
DRESSING REGULAR LOWER BODY CLOTHING: A LOT
DRESSING REGULAR UPPER BODY CLOTHING: A LITTLE
PERSONAL GROOMING: A LITTLE
DAILY ACTIVITIY SCORE: 16
DRESSING REGULAR UPPER BODY CLOTHING: A LITTLE
STANDING UP FROM CHAIR USING ARMS: A LOT
PERSONAL GROOMING: A LITTLE
HELP NEEDED FOR BATHING: A LOT
DAILY ACTIVITIY SCORE: 16
TURNING FROM BACK TO SIDE WHILE IN FLAT BAD: A LITTLE
MOVING TO AND FROM BED TO CHAIR: A LOT
MOVING FROM LYING ON BACK TO SITTING ON SIDE OF FLAT BED WITH BEDRAILS: A LITTLE
HELP NEEDED FOR BATHING: A LOT
CLIMB 3 TO 5 STEPS WITH RAILING: TOTAL
TOILETING: A LOT

## 2023-10-27 ASSESSMENT — ACTIVITIES OF DAILY LIVING (ADL)
BATHING_WHERE_ASSESSED: EDGE OF BED
BATHING_LEVEL_OF_ASSISTANCE: CLOSE SUPERVISION

## 2023-10-27 ASSESSMENT — PAIN SCALES - GENERAL
PAINLEVEL_OUTOF10: 7
PAINLEVEL_OUTOF10: 0 - NO PAIN
PAINLEVEL_OUTOF10: 6
PAINLEVEL_OUTOF10: 7
PAINLEVEL_OUTOF10: 0 - NO PAIN

## 2023-10-27 ASSESSMENT — PAIN - FUNCTIONAL ASSESSMENT
PAIN_FUNCTIONAL_ASSESSMENT: 0-10

## 2023-10-27 ASSESSMENT — PAIN DESCRIPTION - DESCRIPTORS
DESCRIPTORS: PRESSURE
DESCRIPTORS: PRESSURE

## 2023-10-27 NOTE — CARE PLAN
Problem: Risk for falls  Goal: I will remain free from falls  Outcome: Progressing     Problem: Resident is at risk for impaired nutritional status  Goal: I will maintain stable nutritional status  Outcome: Progressing     Problem: Skin  Goal: Participates in plan/prevention/treatment measures  Outcome: Progressing  Goal: Prevent/manage excess moisture  Outcome: Progressing  Goal: Prevent/minimize sheer/friction injuries  Outcome: Progressing  Goal: Promote/optimize nutrition  Outcome: Progressing  Goal: Promote skin healing  Outcome: Progressing   The patient's goals for the shift include paracentesis    The clinical goals for the shift include safety, monitor labs    Over the shift, the patient did not make progress toward the following goals. Barriers to progression include H&H and inability to urinate d/t abdomen. Recommendations to address these barriers include monitoring H&H and educating patient on ascites/distended abdomen and encouraging her to try to urinate. Educate on plan for tomorrow regarding paracentesis.

## 2023-10-27 NOTE — NURSING NOTE
EOS: Alert and oriented. RA. NSR. Jaundiced appearance with jaundiced sclera. Ascitic abdomen. Paracentesis today with 4.5L of fluid removed. Albumin given post paracentesis. Incontinent of bowel and bladder. In no acute distress at this time. Call light within reach. Able to verbalize needs.

## 2023-10-27 NOTE — PROGRESS NOTES
Occupational Therapy    OT Treatment    Patient Name: Briseida Owen  MRN: [id number]  Today's Date: 10/27/2023  Time Calculation  Start Time: 0930  Stop Time: 1000  Time Calculation (min): 30 min         Assessment:  End of Session Communication: Bedside nurse  End of Session Patient Position: Bed, 2 rail up  Barriers to Participation: Insight into problems  Plan:  Treatment Interventions: ADL retraining, Functional transfer training  Treatment Interventions: ADL retraining, Functional transfer training    Subjective   General:  OT Received On: 10/27/23  Prior to Session Communication: Bedside nurse  Patient Position Received: Bed, 2 rail up  General Comment: Pt cleared by nursing to be seen for therapy. Pt supine in bed upon arrival and agreeable for therapy session with Max cues for encouragement to participate and education on OT goals.  Vital Signs:     Pain:  Pain Assessment  Pain Assessment: 0-10  Pain Score: 7  Pain Location: Abdomen  Pain Interventions: Medication (See MAR)    Objective    Activities of Daily Living: Grooming  Grooming Level of Assistance: Close supervision, Maximum assistance  Grooming Where Assessed: Edge of bed  Grooming Comments: Pt required close SUP to brush teeth with Mod verbal cues for sequencing. Pt required Max A to comb hair secondary to tangles    UE Bathing  UE Bathing Level of Assistance: Close supervision  UE Bathing Where Assessed: Edge of bed  UE Bathing Comments: to wash face/neck and arms with 1 verbal cue for thoroughness    LE Bathing  LE Bathing Level of Assistance: Close supervision  LE Bathing Where Assessed: Edge of bed  LE Bathing Comments: to wash BLE. Pt demo G sitting balance throughout EOB ADL tasks    UE Dressing  UE Dressing Comments: attempted and pt completed prior    LE Dressing  LE Dressing:  (attempted and pt completed prior)       Functional Standing Tolerance:     Bed Mobility/Transfers: Bed Mobility  Bed Mobility: Yes  Bed Mobility 1  Bed Mobility 1:  Supine to sitting, Sitting to supine  Level of Assistance 1: Minimum assistance  Bed Mobility Comments 1: with Mod verbal/visual cues for proper hand/foot placement    Transfers  Transfer: No (attempted to transfer to chair and pt declined. Pt educated on the importance of getting OOB to prevent deconditioning and pt continued to decline)      Outcome Measures:[facility] Daily Activity  Putting on and taking off regular lower body clothing: A lot  Bathing (including washing, rinsing, drying): A lot  Putting on and taking off regular upper body clothing: A little  Toileting, which includes using toilet, bedpan or urinal: A lot  Taking care of personal grooming such as brushing teeth: A little  Eating Meals: None  Daily Activity - Total Score: 16        Education Documentation  ADL Training, taught by MARIANA Magdaleno at 10/27/2023 11:14 AM.  Learner: Patient  Readiness: Acceptance  Method: Explanation, Demonstration  Response: Verbalizes Understanding, Needs Reinforcement  Comment: Pt educated on safe transfer techniques for proper hand/foot placement to reduce risk of falls    Education Comments  No comments found.        OP EDUCATION:       Goals:      Problem: OT Goals  Goal: Pt will complete all functional transfers and mobility with mod indep using a device as needed.  Outcome: Not Progressing  Goal: Pt will complete all grooming tasks with setup in standing.  Outcome: Not Progressing  Goal: Pt will complete UB dressing/bathing with setup/indep and LB dressing/bathing with setup/mod indep.  Outcome: Not Progressing  Goal: Pt will complete all toileting tasks with mod indep.  Outcome: Not Progressing  Goal: Pt will participate in BUE exercises in order to enhance functional performance.  Outcome: Not Progressing

## 2023-10-27 NOTE — CARE PLAN
Problem: OT Goals  Goal: Pt will complete all functional transfers and mobility with mod indep using a device as needed.  Outcome: Not Progressing  Goal: Pt will complete all grooming tasks with setup in standing.  Outcome: Not Progressing  Goal: Pt will complete UB dressing/bathing with setup/indep and LB dressing/bathing with setup/mod indep.  Outcome: Not Progressing  Goal: Pt will complete all toileting tasks with mod indep.  Outcome: Not Progressing  Goal: Pt will participate in BUE exercises in order to enhance functional performance.  Outcome: Not Progressing

## 2023-10-27 NOTE — PROGRESS NOTES
Pt received blood and plasma as well as paracentesis on 10/26. Anticipate dc on 10/28    DC Plan:  Homecare - [facility] making homecare aware pt anticipated to dc tomorrow.

## 2023-10-27 NOTE — NURSING NOTE
Assumed care of patient from [facility] nurse. Patient resting comfortably in bed at this time with no complaints of pain and or discomfort. Unit of blood hung earlier in day by [facility] nurse infusing at this time. Patient asked if she was going down for another paracentesis tomorrow; I told her I would check the notes from the doctor and let her know. Abdomen distended at this time, non-tender to palpation. Patient asked [facility] nurse and myself why she was not able to urinate; [facility] nurse told the patient that the doctor told her that when the abdomen is distended it can affect the bladder and make it difficult to urinate. Bed in lowest position and locked, bed alarm on and functioning, and call bell and personal belongings within reach. No further interventions at this time

## 2023-10-27 NOTE — PROGRESS NOTES
Briseida Owen is a 48 y.o. female on day 6 of admission presenting with Hepatic encephalopathy (CMS/HCC).      Subjective     Complaining abdominal fullness.  She did have a diagnostic paracentesis done yesterday; I see a nursing report but no formal amount of amount of fluid removed    No new fevers or chills.  No signs of bleeding.         Objective     Last Recorded Vitals  /65 (BP Location: Right arm, Patient Position: Lying)   Pulse 88   Temp 37.1 °C (98.8 °F) (Oral)   Resp 16   Wt 71.5 kg (157 lb 10.1 oz)   SpO2 94%   Intake/Output last 3 Shifts:    Intake/Output Summary (Last 24 hours) at 10/27/2023 1228  Last data filed at 10/26/2023 1530  Gross per 24 hour   Intake 200 ml   Output --   Net 200 ml         Admission Weight  Weight: 75 kg (165 lb 5.5 oz) (10/21/23 1452)    Daily Weight  10/24/23 : 71.5 kg (157 lb 10.1 oz)    Image Results  ECG 12 lead  Sinus tachycardia  Low voltage QRS  Cannot rule out Anterior infarct , age undetermined  Abnormal ECG  No previous ECGs available  Confirmed by Castro Clarke ([id number]) on 10/25/2023 7:19:02 AM    General: [place] female, chronically ill-appearing,  Eyes: Scleral icterus  Neck: No edema.  Rate and rhythm  Respiratory: No distress.  Abdomen: Protuberant.  Some fluid.  Nontender.  Extremities: Bilateral lower extremity  Neuro: Currently alert and oriented x3.  Somewhat slow speech.  No current tremors.  Psychiatric: Feel there is poor insight.      Relevant Results               Assessment/Plan   This patient currently has cardiac telemetry ordered; if you would like to modify or discontinue the telemetry order, click here to go to the orders activity to modify/discontinue the order.              Principal Problem:    Hepatic encephalopathy (CMS/HCC)    Alcoholic cirrhosis  -worsening from baseline  -Unfortunately, the patient continues to drink alcohol.  Not a candidate for liver transplant.  -She had initial therapeutic paracentesis with 5.4 L  removed on 1023.  A paracentesis was done yesterday, and I see a culture was obtained but no formal report on amount removed.  -With worsening swelling today, I am reach out to interventional radiology and have to left a message with them to see how much fluid was removed.  -Repeat ultrasound to also further assess  -Reconsult gastroenterology for further assistance, possibly medical.    Bleeding  -Resolved.  Patient will always be high risk of bleeding given the reduced synthetic ability of the liver.     Anemia  -No major active bleeding, though certainly high risk with cirrhosis.   - improved     Encephalopathy  -Resolved     Hypokalemia  -Replaced in the emergency department, and daily labs.    Reach out to interventional radiology about paracentesis.            Quintin Gonsales, DO

## 2023-10-27 NOTE — NURSING NOTE
Patient resting comfortably in bed at this time with no complaints of and or discomfort. Repeat H&H drawn following unit of blood that was infused earlier in the day. No further interventions at this time.

## 2023-10-28 LAB
ALBUMIN SERPL-MCNC: 2.8 G/DL (ref 3.5–5)
ALP BLD-CCNC: 130 U/L (ref 35–125)
ALT SERPL-CCNC: 20 U/L (ref 5–40)
ANION GAP SERPL CALC-SCNC: 11 MMOL/L
AST SERPL-CCNC: 77 U/L (ref 5–40)
BASOPHILS # BLD AUTO: 0.05 X10*3/UL (ref 0–0.1)
BASOPHILS NFR BLD AUTO: 0.4 %
BILIRUB SERPL-MCNC: 7.5 MG/DL (ref 0.1–1.2)
BUN SERPL-MCNC: 7 MG/DL (ref 8–25)
BURR CELLS BLD QL SMEAR: NORMAL
CALCIUM SERPL-MCNC: 8.5 MG/DL (ref 8.5–10.4)
CHLORIDE SERPL-SCNC: 98 MMOL/L (ref 97–107)
CO2 SERPL-SCNC: 27 MMOL/L (ref 24–31)
CREAT SERPL-MCNC: 0.7 MG/DL (ref 0.4–1.6)
EOSINOPHIL # BLD AUTO: 0.35 X10*3/UL (ref 0–0.7)
EOSINOPHIL NFR BLD AUTO: 3.1 %
ERYTHROCYTE [DISTWIDTH] IN BLOOD BY AUTOMATED COUNT: 23 % (ref 11.5–14.5)
GFR SERPL CREATININE-BSD FRML MDRD: >90 ML/MIN/1.73M*2
GLUCOSE SERPL-MCNC: 105 MG/DL (ref 65–99)
HCT VFR BLD AUTO: 22.6 % (ref 36–46)
HGB BLD-MCNC: 7.7 G/DL (ref 12–16)
IMM GRANULOCYTES # BLD AUTO: 0.13 X10*3/UL (ref 0–0.7)
IMM GRANULOCYTES NFR BLD AUTO: 1.1 % (ref 0–0.9)
INR PPP: 2.2 (ref 0.9–1.2)
LYMPHOCYTES # BLD AUTO: 2.67 X10*3/UL (ref 1.2–4.8)
LYMPHOCYTES NFR BLD AUTO: 23.6 %
MCH RBC QN AUTO: 35 PG (ref 26–34)
MCHC RBC AUTO-ENTMCNC: 34.1 G/DL (ref 32–36)
MCV RBC AUTO: 103 FL (ref 80–100)
MONOCYTES # BLD AUTO: 0.95 X10*3/UL (ref 0.1–1)
MONOCYTES NFR BLD AUTO: 8.4 %
NEUTROPHILS # BLD AUTO: 7.16 X10*3/UL (ref 1.2–7.7)
NEUTROPHILS NFR BLD AUTO: 63.4 %
NRBC BLD-RTO: 0 /100 WBCS (ref 0–0)
PLATELET # BLD AUTO: 178 X10*3/UL (ref 150–450)
PMV BLD AUTO: 10.2 FL (ref 7.5–11.5)
POLYCHROMASIA BLD QL SMEAR: NORMAL
POTASSIUM SERPL-SCNC: 4 MMOL/L (ref 3.4–5.1)
PROT SERPL-MCNC: 5.5 G/DL (ref 5.9–7.9)
PROTHROMBIN TIME: 22.4 SECONDS (ref 9.3–12.7)
RBC # BLD AUTO: 2.2 X10*6/UL (ref 4–5.2)
RBC MORPH BLD: NORMAL
SCHISTOCYTES BLD QL SMEAR: NORMAL
SODIUM SERPL-SCNC: 136 MMOL/L (ref 133–145)
WBC # BLD AUTO: 11.3 X10*3/UL (ref 4.4–11.3)

## 2023-10-28 PROCEDURE — 36415 COLL VENOUS BLD VENIPUNCTURE: CPT | Performed by: INTERNAL MEDICINE

## 2023-10-28 PROCEDURE — 85610 PROTHROMBIN TIME: CPT | Performed by: INTERNAL MEDICINE

## 2023-10-28 PROCEDURE — 2500000004 HC RX 250 GENERAL PHARMACY W/ HCPCS (ALT 636 FOR OP/ED): Performed by: INTERNAL MEDICINE

## 2023-10-28 PROCEDURE — 80053 COMPREHEN METABOLIC PANEL: CPT | Performed by: INTERNAL MEDICINE

## 2023-10-28 PROCEDURE — 85025 COMPLETE CBC W/AUTO DIFF WBC: CPT | Performed by: INTERNAL MEDICINE

## 2023-10-28 PROCEDURE — 2060000001 HC INTERMEDIATE ICU ROOM DAILY

## 2023-10-28 PROCEDURE — 2500000001 HC RX 250 WO HCPCS SELF ADMINISTERED DRUGS (ALT 637 FOR MEDICARE OP): Performed by: INTERNAL MEDICINE

## 2023-10-28 RX ADMIN — LACTULOSE 20 G: 20 SOLUTION ORAL at 15:17

## 2023-10-28 RX ADMIN — LACTULOSE 20 G: 20 SOLUTION ORAL at 22:35

## 2023-10-28 RX ADMIN — GABAPENTIN 300 MG: 300 CAPSULE ORAL at 15:17

## 2023-10-28 RX ADMIN — CEFTRIAXONE SODIUM 2 G: 2 INJECTION, SOLUTION INTRAVENOUS at 22:35

## 2023-10-28 RX ADMIN — RIFAXIMIN 550 MG: 550 TABLET ORAL at 09:24

## 2023-10-28 RX ADMIN — PANTOPRAZOLE SODIUM 40 MG: 40 TABLET, DELAYED RELEASE ORAL at 15:17

## 2023-10-28 RX ADMIN — SPIRONOLACTONE 100 MG: 50 TABLET ORAL at 09:24

## 2023-10-28 RX ADMIN — POTASSIUM CHLORIDE 20 MEQ: 1.5 FOR SOLUTION ORAL at 22:34

## 2023-10-28 RX ADMIN — PANTOPRAZOLE SODIUM 40 MG: 40 TABLET, DELAYED RELEASE ORAL at 06:00

## 2023-10-28 RX ADMIN — FLUTICASONE PROPIONATE 1 SPRAY: 50 SPRAY, METERED NASAL at 09:25

## 2023-10-28 RX ADMIN — RIFAXIMIN 550 MG: 550 TABLET ORAL at 22:34

## 2023-10-28 RX ADMIN — LACTULOSE 20 G: 20 SOLUTION ORAL at 09:24

## 2023-10-28 RX ADMIN — GABAPENTIN 300 MG: 300 CAPSULE ORAL at 09:24

## 2023-10-28 RX ADMIN — Medication 100 MG: at 09:24

## 2023-10-28 RX ADMIN — POTASSIUM CHLORIDE 20 MEQ: 1.5 FOR SOLUTION ORAL at 09:24

## 2023-10-28 RX ADMIN — FUROSEMIDE 40 MG: 40 TABLET ORAL at 12:01

## 2023-10-28 RX ADMIN — GABAPENTIN 300 MG: 300 CAPSULE ORAL at 22:34

## 2023-10-28 RX ADMIN — FOLIC ACID 1 MG: 1 TABLET ORAL at 09:24

## 2023-10-28 RX ADMIN — LEVOTHYROXINE SODIUM 50 MCG: 0.14 TABLET ORAL at 06:00

## 2023-10-28 ASSESSMENT — COGNITIVE AND FUNCTIONAL STATUS - GENERAL
STANDING UP FROM CHAIR USING ARMS: A LOT
PERSONAL GROOMING: A LITTLE
DAILY ACTIVITIY SCORE: 14
DRESSING REGULAR LOWER BODY CLOTHING: A LOT
TOILETING: A LOT
MOVING FROM LYING ON BACK TO SITTING ON SIDE OF FLAT BED WITH BEDRAILS: A LITTLE
TURNING FROM BACK TO SIDE WHILE IN FLAT BAD: A LITTLE
CLIMB 3 TO 5 STEPS WITH RAILING: A LOT
DAILY ACTIVITIY SCORE: 14
STANDING UP FROM CHAIR USING ARMS: A LOT
MOVING TO AND FROM BED TO CHAIR: A LOT
HELP NEEDED FOR BATHING: A LOT
EATING MEALS: A LITTLE
PERSONAL GROOMING: A LITTLE
MOBILITY SCORE: 14
MOBILITY SCORE: 15
HELP NEEDED FOR BATHING: A LOT
WALKING IN HOSPITAL ROOM: A LOT
EATING MEALS: A LITTLE
DRESSING REGULAR UPPER BODY CLOTHING: A LOT
MOVING TO AND FROM BED TO CHAIR: A LOT
DRESSING REGULAR UPPER BODY CLOTHING: A LOT
TURNING FROM BACK TO SIDE WHILE IN FLAT BAD: A LITTLE
CLIMB 3 TO 5 STEPS WITH RAILING: A LOT
WALKING IN HOSPITAL ROOM: A LOT
TOILETING: A LOT
DRESSING REGULAR LOWER BODY CLOTHING: A LOT

## 2023-10-28 ASSESSMENT — PAIN SCALES - GENERAL
PAINLEVEL_OUTOF10: 0 - NO PAIN
PAINLEVEL_OUTOF10: 0 - NO PAIN

## 2023-10-28 ASSESSMENT — PAIN - FUNCTIONAL ASSESSMENT
PAIN_FUNCTIONAL_ASSESSMENT: 0-10
PAIN_FUNCTIONAL_ASSESSMENT: 0-10

## 2023-10-28 NOTE — PROGRESS NOTES
"Briseida Owen is a 48 y.o. female on day 7 of admission presenting with Hepatic encephalopathy (CMS/HCC).    Subjective   Denies abdominal pain, nausea, vomiting         Objective     Physical Exam  Constitutional:       Appearance: Normal appearance.   HENT:      Head: Normocephalic and atraumatic.      Mouth/Throat:      Mouth: Mucous membranes are moist.   Pulmonary:      Effort: Pulmonary effort is normal.   Abdominal:      General: There is distension.      Palpations: Abdomen is soft.      Tenderness: There is no abdominal tenderness. There is no guarding.   Musculoskeletal:      Cervical back: Normal range of motion.   Skin:     General: Skin is warm.      Coloration: Skin is jaundiced.   Neurological:      General: No focal deficit present.      Mental Status: She is alert. Mental status is at baseline.   Psychiatric:         Mood and Affect: Mood normal.         Last Recorded Vitals  Blood pressure 95/62, pulse 86, temperature 36.7 °C (98.1 °F), temperature source Oral, resp. rate 18, height 1.575 m (5' 2\"), weight 70.7 kg (155 lb 13.8 oz), SpO2 94 %.  Intake/Output last 3 Shifts:  I/O last 3 completed shifts:  In: 100 (1.4 mL/kg) [IV Piggyback:100]  Out: - (0 mL/kg)   Weight: 70.7 kg     Relevant Results      Scheduled medications  cefTRIAXone, 2 g, intravenous, Daily  fluticasone, 1 spray, Each Nostril, Daily  folic acid, 1 mg, oral, Daily  furosemide, 40 mg, oral, Daily  gabapentin, 300 mg, oral, TID  lactulose, 20 g, oral, TID  levothyroxine, 50 mcg, oral, Daily  pantoprazole, 40 mg, oral, BID AC  potassium chloride, 20 mEq, oral, BID  rifAXIMin, 550 mg, oral, BID  spironolactone, 100 mg, oral, Daily  thiamine, 100 mg, oral, Daily      Continuous medications     PRN medications  PRN medications: benzocaine-menthol, dextromethorphan-guaifenesin, melatonin, ondansetron ODT **OR** ondansetron, promethazine  Results for orders placed or performed during the hospital encounter of 10/21/23 (from the past " 24 hour(s))   CBC and Auto Differential   Result Value Ref Range    WBC 11.3 4.4 - 11.3 x10*3/uL    nRBC 0.0 0.0 - 0.0 /100 WBCs    RBC 2.20 (L) 4.00 - 5.20 x10*6/uL    Hemoglobin 7.7 (L) 12.0 - 16.0 g/dL    Hematocrit 22.6 (L) 36.0 - 46.0 %     (H) 80 - 100 fL    MCH 35.0 (H) 26.0 - 34.0 pg    MCHC 34.1 32.0 - 36.0 g/dL    RDW 23.0 (H) 11.5 - 14.5 %    Platelets 178 150 - 450 x10*3/uL    MPV 10.2 7.5 - 11.5 fL    Neutrophils % 63.4 40.0 - 80.0 %    Immature Granulocytes %, Automated 1.1 (H) 0.0 - 0.9 %    Lymphocytes % 23.6 13.0 - 44.0 %    Monocytes % 8.4 2.0 - 10.0 %    Eosinophils % 3.1 0.0 - 6.0 %    Basophils % 0.4 0.0 - 2.0 %    Neutrophils Absolute 7.16 1.20 - 7.70 x10*3/uL    Immature Granulocytes Absolute, Automated 0.13 0.00 - 0.70 x10*3/uL    Lymphocytes Absolute 2.67 1.20 - 4.80 x10*3/uL    Monocytes Absolute 0.95 0.10 - 1.00 x10*3/uL    Eosinophils Absolute 0.35 0.00 - 0.70 x10*3/uL    Basophils Absolute 0.05 0.00 - 0.10 x10*3/uL   Morphology   Result Value Ref Range    RBC Morphology See Below     Polychromasia Mild     RBC Fragments Few     [place] Cells Few    Comprehensive Metabolic Panel   Result Value Ref Range    Glucose 105 (H) 65 - 99 mg/dL    Sodium 136 133 - 145 mmol/L    Potassium 4.0 3.4 - 5.1 mmol/L    Chloride 98 97 - 107 mmol/L    Bicarbonate 27 24 - 31 mmol/L    Urea Nitrogen 7 (L) 8 - 25 mg/dL    Creatinine 0.70 0.40 - 1.60 mg/dL    eGFR >90 >60 mL/min/1.73m*2    Calcium 8.5 8.5 - 10.4 mg/dL    Albumin 2.8 (L) 3.5 - 5.0 g/dL    Alkaline Phosphatase 130 (H) 35 - 125 U/L    Total Protein 5.5 (L) 5.9 - 7.9 g/dL    AST 77 (H) 5 - 40 U/L    Bilirubin, Total 7.5 (H) 0.1 - 1.2 mg/dL    ALT 20 5 - 40 U/L    Anion Gap 11 <=19 mmol/L   Protime-INR   Result Value Ref Range    Protime 22.4 (H) 9.3 - 12.7 seconds    INR 2.2 (H) 0.9 - 1.2          Assessment/Plan   Principal Problem:    Hepatic encephalopathy (CMS/HCC)  Alcoholic Cirrhosis of Liver (Decomp in setting chronic alcohol abuse high  MELD score of 31)   HE: Continue Lactulose, Xifaxin         EV: June 2023 Dr Yepez, grade II, banded. Started on nadolol. No overt bleeding since arrival. INR is 2.9, should have repeat EGD as outpatient          HCC: AFP up to date         Ascites: Recurrent paracentesis since admission         -Continue furosemide and spironolactone         -Low Na diet      2.    Anemia         Macrocytic anemia. Stable HH. No overt bleeding          - monitor H/H q 6 for a goal >7         - continue PPI      3.    Alcohol Abuse,          -Wants to speak with  for resources     I gave the patient two options. She can/should stop drinking. She is only 48. She could ultimately become a transplant candidate. OR she can continue to drink and become palliative. At that point, we could talk about placing a PleurX. She would like to talk with psych. I explained to her in detail that we can follow closely for outpatient paracentesis and increase diuretic therapy     I spent 20 minutes in the professional and overall care of this patient.      Melissa Jerez, APRN-CNP

## 2023-10-28 NOTE — CARE PLAN
Problem: Risk for falls  Goal: I will remain free from falls  Outcome: Progressing     Problem: Resident is at risk for impaired nutritional status  Goal: I will maintain stable nutritional status  Outcome: Progressing     Problem: Skin  Goal: Participates in plan/prevention/treatment measures  Outcome: Progressing  Goal: Prevent/manage excess moisture  Outcome: Progressing  Goal: Prevent/minimize sheer/friction injuries  Outcome: Progressing  Goal: Promote/optimize nutrition  Outcome: Progressing  Goal: Promote skin healing  Outcome: Progressing   The patient's goals for the shift include paracentesis    The clinical goals for the shift include safety    Over the shift, the patient did not make progress toward the following goals. Barriers to progression include constant ascites and liver cirrhosis. Recommendations to address these barriers include paracentesis earlier in day and as needed after discharge; changing patient as needed when incontinent to prevent skin break down/infection.

## 2023-10-28 NOTE — CARE PLAN
The patient's goals for the shift include paracentesis    The clinical goals for the shift include safety      Problem: Skin  Goal: Participates in plan/prevention/treatment measures  Outcome: Progressing  Goal: Prevent/manage excess moisture  Outcome: Progressing  Goal: Prevent/minimize sheer/friction injuries  Outcome: Progressing  Goal: Promote/optimize nutrition  Outcome: Progressing  Goal: Promote skin healing  Outcome: Progressing     Problem: Resident is at risk for impaired nutritional status  Goal: I will maintain stable nutritional status  Outcome: Progressing     Problem: Risk for falls  Goal: I will remain free from falls  Outcome: Progressing

## 2023-10-28 NOTE — CARE PLAN
Problem: Skin  Goal: Participates in plan/prevention/treatment measures  10/28/2023 1039 by Yecenia Munoz RN  Outcome: Progressing  10/28/2023 1039 by Yecenia Munoz RN  Outcome: Progressing  10/28/2023 1038 by Yecenia Munoz RN  Outcome: Progressing  Goal: Prevent/manage excess moisture  10/28/2023 1039 by Yecenia Munoz RN  Outcome: Progressing  10/28/2023 1039 by Yecenia Munoz RN  Outcome: Progressing  10/28/2023 1038 by Yecenia Munoz RN  Outcome: Progressing  Goal: Prevent/minimize sheer/friction injuries  10/28/2023 1039 by Yecenia Munoz RN  Outcome: Progressing  10/28/2023 1039 by Yecenia Munoz RN  Outcome: Progressing  10/28/2023 1038 by Yecenia Munoz RN  Outcome: Progressing  Goal: Promote/optimize nutrition  10/28/2023 1039 by Yecenia Munoz RN  Outcome: Progressing  10/28/2023 1039 by Yecenia Munoz RN  Outcome: Progressing  10/28/2023 1038 by Yecenia Munoz RN  Outcome: Progressing  Goal: Promote skin healing  10/28/2023 1039 by Yecenia Munoz RN  Outcome: Progressing  10/28/2023 1039 by Yecenia Munoz RN  Outcome: Progressing  10/28/2023 1038 by Yecenia Munoz RN  Outcome: Progressing   The patient's goals for the shift include paracentesis    The clinical goals for the shift include safety

## 2023-10-28 NOTE — NURSING NOTE
Patient asleep at this time, no signs of pain and or discomfort. No need for interventions at this time.

## 2023-10-28 NOTE — PROGRESS NOTES
Briseida Owen is a 48 y.o. female on day 7 of admission presenting with Hepatic encephalopathy (CMS/HCC).      Subjective     Patient has about discharge.  No new signs of bleeding.  Her significant other is at bedside.  She discussed with gastroenterology today, and stated she would like to have resources she was presented again with the quit drinking or go hospice pathways.         Objective     Last Recorded Vitals  /62 (BP Location: Right arm, Patient Position: Lying)   Pulse 86   Temp 37 °C (98.6 °F) (Oral)   Resp 15   Wt 70.7 kg (155 lb 13.8 oz)   SpO2 92%   Intake/Output last 3 Shifts:    Intake/Output Summary (Last 24 hours) at 10/28/2023 1501  Last data filed at 10/28/2023 0900  Gross per 24 hour   Intake 100 ml   Output 1 ml   Net 99 ml         Admission Weight  Weight: 75 kg (165 lb 5.5 oz) (10/21/23 1452)    Daily Weight  10/28/23 : 70.7 kg (155 lb 13.8 oz)    Image Results  ECG 12 lead  Sinus tachycardia  Low voltage QRS  Cannot rule out Anterior infarct , age undetermined  Abnormal ECG  No previous ECGs available  Confirmed by Castro Clarke ([id number]) on 10/25/2023 7:19:02 AM    General: [place] female, chronically ill-appearing,  Eyes: Scleral icterus  Neck: No edema.  Rate and rhythm  Respiratory: No distress.  Abdomen: Protuberant.  Some fluid.  Nontender.  Extremities: Bilateral lower extremity  Neuro: Currently alert and oriented x3.  Somewhat slow speech.  No current tremors.  Psychiatric: Feel there is poor insight.      Relevant Results               Assessment/Plan   This patient currently has cardiac telemetry ordered; if you would like to modify or discontinue the telemetry order, click here to go to the orders activity to modify/discontinue the order.              Principal Problem:    Hepatic encephalopathy (CMS/HCC)    Alcoholic cirrhosis  -Unfortunately, the patient continues to drink alcohol.  Not a candidate for liver transplant.  -She had initial therapeutic paracentesis  with 5.4 L removed on 1023, and another 4.6 L removed on October 28th.  -Continue Lasix and spironolactone  -Gastroenterology reconsulted due to rapidly feeling ascites, and appreciate their discussion with the patient about quitting alcohol or or transitioning with hospice.    Urinary tract infection  -Patient has been on ceftriaxone, and have doses also for possible spontaneous bacterial peritonitis  -Can likely transition off this in the next few days or even to by mouth.    Bleeding  -Resolved paracentesis site.  -No obvious acute bleeding from gastrointestinal system.     Anemia  -There has been a continued downtrend.  -Need to watch another day.  If stabilized, possible discharge with outpatient follow-up.  Also discussed that she may need endoscopic evaluation if she has continued downtrend.     Encephalopathy  -Resolved      Patient again refused skilled nursing facility.  Watch hemoglobin.  Discussed with significant other with whom the patient lives at home.  If hemoglobin stabilizes, possible discharge to home tomorrow.  She is extremely high fall risk.         Quintin Gonsales, DO

## 2023-10-28 NOTE — NURSING NOTE
Assumed care of patient from dayshift nurse. Patient resting comfortably in bed at this time with no complaints of pain and or discomfort. Patient stated that she feels much better today compared to last night after having a paracentesis done earlier in the day today. Skin and sclera remain jaundiced. Per dayshift nurse patient was able to urinate after paracentesis and had a bout of stool incontinence following morning and afternoon lactulose. Bed in lowest position and locked, bed alarm on and functioning, and call bell and personal belongings within reach. No further interventions at this time.

## 2023-10-28 NOTE — CARE PLAN
The patient's goals for the shift include paracentesis    The clinical goals for the shift include safety    Problem: Skin  Goal: Participates in plan/prevention/treatment measures  10/28/2023 1039 by Yecenia Munoz RN  Outcome: Progressing  10/28/2023 1038 by Yecenia Munoz RN  Outcome: Progressing  Goal: Prevent/manage excess moisture  10/28/2023 1039 by Yecenia Munoz RN  Outcome: Progressing  10/28/2023 1038 by Yecenia Munoz RN  Outcome: Progressing  Goal: Prevent/minimize sheer/friction injuries  10/28/2023 1039 by Yecenia Munoz RN  Outcome: Progressing  10/28/2023 1038 by Yecenia Munoz RN  Outcome: Progressing  Goal: Promote/optimize nutrition  10/28/2023 1039 by Yecenia Munoz RN  Outcome: Progressing  10/28/2023 1038 by Yecenia Munoz RN  Outcome: Progressing  Goal: Promote skin healing  10/28/2023 1039 by Yecenia Munoz RN  Outcome: Progressing  10/28/2023 1038 by Yecenia Munoz RN  Outcome: Progressing

## 2023-10-29 VITALS
WEIGHT: 155.87 LBS | HEART RATE: 96 BPM | TEMPERATURE: 97.7 F | DIASTOLIC BLOOD PRESSURE: 67 MMHG | BODY MASS INDEX: 28.68 KG/M2 | HEIGHT: 62 IN | RESPIRATION RATE: 17 BRPM | OXYGEN SATURATION: 94 % | SYSTOLIC BLOOD PRESSURE: 104 MMHG

## 2023-10-29 LAB
ERYTHROCYTE [DISTWIDTH] IN BLOOD BY AUTOMATED COUNT: 22.2 % (ref 11.5–14.5)
HCT VFR BLD AUTO: 22.9 % (ref 36–46)
HGB BLD-MCNC: 7.8 G/DL (ref 12–16)
MCH RBC QN AUTO: 35.3 PG (ref 26–34)
MCHC RBC AUTO-ENTMCNC: 34.1 G/DL (ref 32–36)
MCV RBC AUTO: 104 FL (ref 80–100)
NRBC BLD-RTO: 0 /100 WBCS (ref 0–0)
PLATELET # BLD AUTO: 208 X10*3/UL (ref 150–450)
PMV BLD AUTO: 10.1 FL (ref 7.5–11.5)
RBC # BLD AUTO: 2.21 X10*6/UL (ref 4–5.2)
WBC # BLD AUTO: 13.7 X10*3/UL (ref 4.4–11.3)

## 2023-10-29 PROCEDURE — 36415 COLL VENOUS BLD VENIPUNCTURE: CPT | Performed by: HOSPITALIST

## 2023-10-29 PROCEDURE — 85027 COMPLETE CBC AUTOMATED: CPT | Performed by: HOSPITALIST

## 2023-10-29 PROCEDURE — 2500000004 HC RX 250 GENERAL PHARMACY W/ HCPCS (ALT 636 FOR OP/ED): Performed by: INTERNAL MEDICINE

## 2023-10-29 PROCEDURE — 2500000001 HC RX 250 WO HCPCS SELF ADMINISTERED DRUGS (ALT 637 FOR MEDICARE OP): Performed by: INTERNAL MEDICINE

## 2023-10-29 RX ADMIN — FUROSEMIDE 40 MG: 40 TABLET ORAL at 08:15

## 2023-10-29 RX ADMIN — SPIRONOLACTONE 100 MG: 50 TABLET ORAL at 08:15

## 2023-10-29 RX ADMIN — PANTOPRAZOLE SODIUM 40 MG: 40 TABLET, DELAYED RELEASE ORAL at 06:43

## 2023-10-29 RX ADMIN — LACTULOSE 20 G: 20 SOLUTION ORAL at 08:15

## 2023-10-29 RX ADMIN — FOLIC ACID 1 MG: 1 TABLET ORAL at 08:15

## 2023-10-29 RX ADMIN — RIFAXIMIN 550 MG: 550 TABLET ORAL at 08:15

## 2023-10-29 RX ADMIN — LEVOTHYROXINE SODIUM 50 MCG: 0.14 TABLET ORAL at 06:44

## 2023-10-29 RX ADMIN — GABAPENTIN 300 MG: 300 CAPSULE ORAL at 08:15

## 2023-10-29 RX ADMIN — Medication 100 MG: at 08:15

## 2023-10-29 RX ADMIN — POTASSIUM CHLORIDE 20 MEQ: 1.5 FOR SOLUTION ORAL at 08:16

## 2023-10-29 ASSESSMENT — COGNITIVE AND FUNCTIONAL STATUS - GENERAL
DAILY ACTIVITIY SCORE: 18
DRESSING REGULAR UPPER BODY CLOTHING: A LITTLE
MOBILITY SCORE: 15
DRESSING REGULAR LOWER BODY CLOTHING: A LITTLE
STANDING UP FROM CHAIR USING ARMS: A LOT
CLIMB 3 TO 5 STEPS WITH RAILING: A LOT
MOVING TO AND FROM BED TO CHAIR: A LOT
HELP NEEDED FOR BATHING: A LITTLE
EATING MEALS: A LITTLE
PERSONAL GROOMING: A LITTLE
TOILETING: A LITTLE
TURNING FROM BACK TO SIDE WHILE IN FLAT BAD: A LITTLE
WALKING IN HOSPITAL ROOM: A LOT

## 2023-10-29 ASSESSMENT — PAIN - FUNCTIONAL ASSESSMENT
PAIN_FUNCTIONAL_ASSESSMENT: FLACC (FACE, LEGS, ACTIVITY, CRY, CONSOLABILITY)
PAIN_FUNCTIONAL_ASSESSMENT: FLACC (FACE, LEGS, ACTIVITY, CRY, CONSOLABILITY)

## 2023-10-29 ASSESSMENT — PAIN SCALES - GENERAL
PAINLEVEL_OUTOF10: 0 - NO PAIN

## 2023-10-29 NOTE — CARE PLAN
Problem: Risk for falls  Goal: I will remain free from falls  Outcome: Progressing     Problem: Resident is at risk for impaired nutritional status  Goal: I will maintain stable nutritional status  Outcome: Progressing     Problem: Skin  Goal: Participates in plan/prevention/treatment measures  Outcome: Progressing  Goal: Prevent/manage excess moisture  Outcome: Progressing  Goal: Prevent/minimize sheer/friction injuries  Outcome: Progressing  Goal: Promote/optimize nutrition  Outcome: Progressing  Goal: Promote skin healing  Outcome: Progressing   The patient's goals for the shift include paracentesis    The clinical goals for the shift include To remain free from injury    Over the shift, the patient did not make progress toward the following goals. Barriers to progression include reportedly not wanting to quit drinking. Recommendations to address these barriers include outside support groups, behavioral health consult input.

## 2023-10-29 NOTE — NURSING NOTE
Took over care of pt at this time. Pt laying in bed, alert, aox3. Pt denies pain and has no needs or complaints at this time. Puncture site on right abdomen dressing c/d/intact. Pt oriented to call bell and it and belongings are placed in reach. Telemetry leads connected and reading on monitor. Bed alarm set. Continuing to monitor.

## 2023-10-29 NOTE — NURSING NOTE
Rounded on pt. Pt laying in bed, eyes closed, respirations even and unlabored. Pt appears to be sleeping and in no apparent pain or distress. Call bell and belongings are placed in reach. Telemetry leads connected and reading on monitor. Bed alarm set. Continuing to monitor.

## 2023-10-29 NOTE — PROGRESS NOTES
Briseida Owen is a 48 y.o. female on day 8 of admission presenting with Hepatic encephalopathy (CMS/HCC).      Subjective   Patient reports that she feels okay today. Says she did not sleep well yesterday.  She wants to know if she can go home today. I had a long discussion with her regarding her anemia and how we need to recheck counts right now before deciding on discharge plan.  She does report some discomfort- says her abdomen is somewhat firm but much better than when she came in.       Objective     Last Recorded Vitals  /67 (BP Location: Right arm, Patient Position: Lying)   Pulse 96   Temp 36.5 °C (97.7 °F) (Temporal)   Resp 17   Wt 70.7 kg (155 lb 13.8 oz)   SpO2 94%   Intake/Output last 3 Shifts:    Intake/Output Summary (Last 24 hours) at 10/29/2023 1127  Last data filed at 10/29/2023 0942  Gross per 24 hour   Intake 390 ml   Output 300 ml   Net 90 ml       Admission Weight  Weight: 75 kg (165 lb 5.5 oz) (10/21/23 1452)    Daily Weight  10/28/23 : 70.7 kg (155 lb 13.8 oz)    Image Results  ECG 12 lead  Sinus tachycardia  Low voltage QRS  Cannot rule out Anterior infarct , age undetermined  Abnormal ECG  No previous ECGs available  Confirmed by Castro Clarke ([id number]) on 10/25/2023 7:19:02 AM      Physical Exam  General: alert, no diaphoresis   Lungs: CTA BL   Heart: RRR, no LE edema BL   GI: abdomen softly distended, nontender, BS present   MSK: no joint effusion or deformity   Skin: no rashes, erythema, or ecchymosis   Neuro: grossly normal cognition, motor strength, sensation      Relevant Results               Assessment/Plan   This patient currently has cardiac telemetry ordered; if you would like to modify or discontinue the telemetry order, click here to go to the orders activity to modify/discontinue the order.              Principal Problem:    Hepatic encephalopathy (CMS/HCC)    Alcoholic cirrhosis  -Unfortunately, the patient continues to drink alcohol.  Not a candidate for liver  transplant at this time. I discussed with her the importance of alcohol cessation and that if she continues to abstain from alcohol, she could potentially be evaluated for transplant in the future.  -She had initial therapeutic paracentesis with 5.4 L removed on 1023, and another 4.6 L removed on October 28th.  -Continue Lasix and spironolactone  -Gastroenterology reconsulted due to rapidly feeling ascites, and appreciate their discussion with the patient about quitting alcohol or transitioning with hospice with potential placement of pleurx for palliative drainage.     Urinary tract infection  -Patient has been on ceftriaxone, and have doses also for possible spontaneous bacterial peritonitis  -Can likely transition off this in the next few days or even to by mouth.     Bleeding  -Resolved paracentesis site.  -No obvious acute bleeding from gastrointestinal system.     Anemia- likely related to chronic disease/hepatic dysfunction  -There has been a continued downtrend. Pending labs right now.  -If stabilized, possible discharge with outpatient follow-up.  Also discussed that she may need endoscopic evaluation if she has continued downtrend.      Encephalopathy  -Resolved        Patient again refused skilled nursing facility.  Discussed with significant other with whom the patient lives at home.  If hemoglobin stabilizes, possible discharge to home today.  She is extremely high fall risk. Otherwise, will keep here again overnight and check labs in the morning. I worry about how quickly her ascites reaccumulate and how long she will stay at home before needing to come in for another paracentesis. GI spoke with patient giving the options of alcohol cessation (to ultimately go for transplant evaluation) vs hospice and palliative pleurx. I likewise reiterated the need for abstinence.                  Leyla Holguin,

## 2023-10-29 NOTE — CARE PLAN
Problem: Risk for falls  Goal: I will remain free from falls  10/28/2023 2043 by Becca Pompa RN  Outcome: Progressing  10/28/2023 2030 by Becca Pompa RN  Outcome: Progressing     Problem: Resident is at risk for impaired nutritional status  Goal: I will maintain stable nutritional status  10/28/2023 2043 by Becca Pompa RN  Outcome: Progressing  10/28/2023 2030 by Becca Pompa RN  Outcome: Progressing     Problem: Skin  Goal: Participates in plan/prevention/treatment measures  10/28/2023 2043 by Becca Pompa RN  Outcome: Progressing  10/28/2023 2030 by Becca Pompa RN  Outcome: Progressing  Goal: Prevent/manage excess moisture  10/28/2023 2043 by Becca Pompa RN  Outcome: Progressing  10/28/2023 2030 by Becca Pompa RN  Outcome: Progressing  Goal: Prevent/minimize sheer/friction injuries  10/28/2023 2043 by Becca Pompa RN  Outcome: Progressing  10/28/2023 2030 by Becca Pompa RN  Outcome: Progressing  Goal: Promote/optimize nutrition  10/28/2023 2043 by Becca Pompa RN  Outcome: Progressing  10/28/2023 2030 by Becca Pompa RN  Outcome: Progressing  Goal: Promote skin healing  10/28/2023 2043 by Becca Pompa RN  Outcome: Progressing  10/28/2023 2030 by Becca Pompa RN  Outcome: Progressing   The patient's goals for the shift include no falls.    The clinical goals for the shift include no falls    Over the shift, the patient did make progress toward the following goals.

## 2023-10-29 NOTE — CARE PLAN
Problem: Risk for falls  Goal: I will remain free from falls  Outcome: Progressing     Problem: Resident is at risk for impaired nutritional status  Goal: I will maintain stable nutritional status  Outcome: Progressing     Problem: Skin  Goal: Participates in plan/prevention/treatment measures  Outcome: Progressing  Goal: Prevent/manage excess moisture  Outcome: Progressing  Goal: Prevent/minimize sheer/friction injuries  Outcome: Progressing  Goal: Promote/optimize nutrition  Outcome: Progressing  Goal: Promote skin healing  Outcome: Progressing   The patient's goals for the shift include paracentesis    The clinical goals for the shift include no falls    Over the shift, the patient did not make progress toward the following goals.

## 2023-10-29 NOTE — DISCHARGE SUMMARY
Discharge Diagnosis  Hepatic encephalopathy (CMS/HCC)    Issues Requiring Follow-Up  Follow up with gastroenterology  Quit drinking alcohol and make every effort to abstain  Take medications as prescribed    Discharge Meds     Your medication list        ASK your doctor about these medications        Instructions Last Dose Given Next Dose Due   amoxicillin 500 mg capsule  Commonly known as: Amoxil      TAKE 1 CAPSULE BY MOUTH THREE TIMES A DAY       ciprofloxacin 500 mg tablet  Commonly known as: Cipro      TAKE 1 TABLET BY MOUTH ONE TIME DAILY       Flonase Allergy Relief 50 mcg/actuation nasal spray  Generic drug: fluticasone           folic acid 1 mg tablet  Commonly known as: Folvite           furosemide 40 mg tablet  Commonly known as: Lasix           gabapentin 300 mg capsule  Commonly known as: Neurontin           ibuprofen 400 mg tablet           lactulose 20 gram/30 mL oral solution           levonorg-eth estrad triphasic 50-30 (6)/75-40 (5)/125-30(10) per tablet  Commonly known as: TriVora,Enpresse           Levonest (28) 50-30 (6)/75-40 (5)/125-30(10) per tablet  Generic drug: levonorg-eth estrad triphasic           levothyroxine 25 mcg tablet  Commonly known as: Synthroid, Levoxyl           levothyroxine 50 mcg tablet  Commonly known as: Synthroid, Levoxyl           lisinopril 20 mg tablet           lisinopriL-hydrochlorothiazide 20-25 mg tablet           MULTIPLE VITAMINS ORAL           nystatin cream  Commonly known as: Mycostatin           pantoprazole 40 mg EC tablet  Commonly known as: ProtoNix           potassium chloride CR 20 mEq ER tablet  Commonly known as: Klor-Con M20      TAKE 1 TABLET BY MOUTH ONE TIME DAILY WITH OR AFTER A MEAL       QUETIAPINE ORAL           rifAXIMin 550 mg tablet  Commonly known as: Xifaxan           spironolactone 100 mg tablet  Commonly known as: Aldactone           thiamine 100 mg tablet  Commonly known as: Vitamin B-1                    Test Results Pending At  Discharge  Pending Labs       Order Current Status    Blood Culture Preliminary result    Blood Culture Preliminary result    Sterile Fluid Culture/Smear Preliminary result            Hospital Course   Patient is a 49yo woman with past medical history of alcoholic cirrhosis, who continues to drink alcohol daily, presenting with confusion. She was admitted for hepatic encephalopathy and ascites, concern for possible spontaneous bacterial peritonitis. Treated with ceftriaxone with improvement. Also receiving rifaximin and lactulose.  She had 2 paracenteses done, total of over 10 L were removed.  Patient issues resolved with these therapies.  Gastroenterology as well as myself were both very candid with patient that there is no curative treatment for her cirrhosis other than a liver transplant.  She was encouraged multiple times that she must abstain from all alcohol and that eventually she could become a transplant candidate.  He was given prescriptions for potassium, spironolactone, furosemide, lactulose, rifaximin, antibiotic.  She needs to follow-up with gastroenterology for periodic paracenteses.  I discussed the importance of calling GI as soon as possible tomorrow.  She was also given the option of hospice which would allow her to get a palliative Pleurx catheter for further draining.  Patient did not make any decisions regarding these conversations.    Pertinent Physical Exam At Time of Discharge  Physical Exam  See same day physical exam      Outpatient Follow-Up  Future Appointments   Date Time Provider Department Center   11/9/2023  2:30 PM Tesha Hill MD [id number] None       35 minutes time spent  Leyla Holguin DO

## 2023-10-30 LAB
BACTERIA BLD CULT: NORMAL
BACTERIA FLD CULT: NORMAL
GRAM STN SPEC: NORMAL
GRAM STN SPEC: NORMAL

## 2023-10-31 LAB — BACTERIA BLD CULT: NORMAL

## 2023-11-09 ENCOUNTER — APPOINTMENT (OUTPATIENT)
Dept: GASTROENTEROLOGY | Facility: CLINIC | Age: 48
End: 2023-11-09
Payer: MEDICARE

## 2023-12-07 ENCOUNTER — APPOINTMENT (OUTPATIENT)
Dept: GASTROENTEROLOGY | Facility: CLINIC | Age: 48
End: 2023-12-07
Payer: MEDICARE

## 2024-01-03 ENCOUNTER — APPOINTMENT (OUTPATIENT)
Dept: RADIOLOGY | Facility: HOSPITAL | Age: 49
DRG: 433 | End: 2024-01-03
Payer: MEDICARE

## 2024-01-03 ENCOUNTER — HOSPITAL ENCOUNTER (INPATIENT)
Facility: HOSPITAL | Age: 49
LOS: 5 days | Discharge: HOME | DRG: 433 | End: 2024-01-09
Attending: STUDENT IN AN ORGANIZED HEALTH CARE EDUCATION/TRAINING PROGRAM | Admitting: INTERNAL MEDICINE
Payer: MEDICARE

## 2024-01-03 ENCOUNTER — APPOINTMENT (OUTPATIENT)
Dept: CARDIOLOGY | Facility: HOSPITAL | Age: 49
DRG: 433 | End: 2024-01-03
Payer: MEDICARE

## 2024-01-03 DIAGNOSIS — E87.1 HYPONATREMIA: ICD-10-CM

## 2024-01-03 DIAGNOSIS — K70.31 ALCOHOLIC CIRRHOSIS OF LIVER WITH ASCITES (MULTI): ICD-10-CM

## 2024-01-03 DIAGNOSIS — R18.8 CIRRHOSIS OF LIVER WITH ASCITES, UNSPECIFIED HEPATIC CIRRHOSIS TYPE (MULTI): ICD-10-CM

## 2024-01-03 DIAGNOSIS — K74.60 CIRRHOSIS OF LIVER WITH ASCITES, UNSPECIFIED HEPATIC CIRRHOSIS TYPE (MULTI): ICD-10-CM

## 2024-01-03 DIAGNOSIS — K70.31 ASCITES DUE TO ALCOHOLIC CIRRHOSIS (MULTI): ICD-10-CM

## 2024-01-03 DIAGNOSIS — R60.0 EDEMA OF BOTH LOWER EXTREMITIES: ICD-10-CM

## 2024-01-03 DIAGNOSIS — M54.9 UPPER BACK PAIN: ICD-10-CM

## 2024-01-03 DIAGNOSIS — D64.9 ANEMIA, UNSPECIFIED TYPE: Primary | ICD-10-CM

## 2024-01-03 DIAGNOSIS — I85.00 ESOPHAGEAL VARICES WITHOUT BLEEDING, UNSPECIFIED ESOPHAGEAL VARICES TYPE (MULTI): ICD-10-CM

## 2024-01-03 LAB
ABO GROUP (TYPE) IN BLOOD: NORMAL
ALBUMIN SERPL-MCNC: 3.4 G/DL (ref 3.5–5)
ALP BLD-CCNC: 179 U/L (ref 35–125)
ALT SERPL-CCNC: 20 U/L (ref 5–40)
AMMONIA PLAS-SCNC: 65 UMOL/L (ref 12–45)
ANION GAP SERPL CALC-SCNC: 14 MMOL/L
ANTIBODY SCREEN: NORMAL
APTT PPP: 42.7 SECONDS (ref 22–32.5)
AST SERPL-CCNC: 53 U/L (ref 5–40)
BILIRUB DIRECT SERPL-MCNC: 6.5 MG/DL (ref 0–0.2)
BILIRUB SERPL-MCNC: 16.4 MG/DL (ref 0.1–1.2)
BUN SERPL-MCNC: 12 MG/DL (ref 8–25)
CALCIUM SERPL-MCNC: 8.7 MG/DL (ref 8.5–10.4)
CHLORIDE SERPL-SCNC: 83 MMOL/L (ref 97–107)
CO2 SERPL-SCNC: 18 MMOL/L (ref 24–31)
CREAT SERPL-MCNC: 0.7 MG/DL (ref 0.4–1.6)
ERYTHROCYTE [DISTWIDTH] IN BLOOD BY AUTOMATED COUNT: 18.4 % (ref 11.5–14.5)
ETHANOL SERPL-MCNC: 0.04 G/DL
FLUAV RNA RESP QL NAA+PROBE: NOT DETECTED
FLUBV RNA RESP QL NAA+PROBE: NOT DETECTED
GFR SERPL CREATININE-BSD FRML MDRD: >90 ML/MIN/1.73M*2
GLUCOSE SERPL-MCNC: 141 MG/DL (ref 65–99)
HCT VFR BLD AUTO: 15.5 % (ref 36–46)
HGB BLD-MCNC: 5.2 G/DL (ref 12–16)
INR PPP: 1.8 (ref 0.9–1.2)
LACTATE BLDV-SCNC: 3.9 MMOL/L (ref 0.4–2)
LIPASE SERPL-CCNC: 17 U/L (ref 16–63)
MCH RBC QN AUTO: 41.6 PG (ref 26–34)
MCHC RBC AUTO-ENTMCNC: 33.5 G/DL (ref 32–36)
MCV RBC AUTO: 124 FL (ref 80–100)
NRBC BLD-RTO: 1.1 /100 WBCS (ref 0–0)
PLATELET # BLD AUTO: 163 X10*3/UL (ref 150–450)
POTASSIUM SERPL-SCNC: 5.2 MMOL/L (ref 3.4–5.1)
PROT SERPL-MCNC: 6.3 G/DL (ref 5.9–7.9)
PROTHROMBIN TIME: 18.7 SECONDS (ref 9.3–12.7)
RBC # BLD AUTO: 1.25 X10*6/UL (ref 4–5.2)
RH FACTOR (ANTIGEN D): NORMAL
SARS-COV-2 RNA RESP QL NAA+PROBE: NOT DETECTED
SODIUM SERPL-SCNC: 115 MMOL/L (ref 133–145)
WBC # BLD AUTO: 15.1 X10*3/UL (ref 4.4–11.3)

## 2024-01-03 PROCEDURE — 87636 SARSCOV2 & INF A&B AMP PRB: CPT | Performed by: STUDENT IN AN ORGANIZED HEALTH CARE EDUCATION/TRAINING PROGRAM

## 2024-01-03 PROCEDURE — 82140 ASSAY OF AMMONIA: CPT | Performed by: STUDENT IN AN ORGANIZED HEALTH CARE EDUCATION/TRAINING PROGRAM

## 2024-01-03 PROCEDURE — 80053 COMPREHEN METABOLIC PANEL: CPT | Performed by: STUDENT IN AN ORGANIZED HEALTH CARE EDUCATION/TRAINING PROGRAM

## 2024-01-03 PROCEDURE — 86920 COMPATIBILITY TEST SPIN: CPT

## 2024-01-03 PROCEDURE — 83690 ASSAY OF LIPASE: CPT | Performed by: STUDENT IN AN ORGANIZED HEALTH CARE EDUCATION/TRAINING PROGRAM

## 2024-01-03 PROCEDURE — 83605 ASSAY OF LACTIC ACID: CPT | Performed by: STUDENT IN AN ORGANIZED HEALTH CARE EDUCATION/TRAINING PROGRAM

## 2024-01-03 PROCEDURE — 84075 ASSAY ALKALINE PHOSPHATASE: CPT | Performed by: STUDENT IN AN ORGANIZED HEALTH CARE EDUCATION/TRAINING PROGRAM

## 2024-01-03 PROCEDURE — 36415 COLL VENOUS BLD VENIPUNCTURE: CPT | Performed by: STUDENT IN AN ORGANIZED HEALTH CARE EDUCATION/TRAINING PROGRAM

## 2024-01-03 PROCEDURE — 93010 ELECTROCARDIOGRAM REPORT: CPT | Performed by: INTERNAL MEDICINE

## 2024-01-03 PROCEDURE — 71045 X-RAY EXAM CHEST 1 VIEW: CPT | Mod: FOREIGN READ | Performed by: RADIOLOGY

## 2024-01-03 PROCEDURE — 2500000004 HC RX 250 GENERAL PHARMACY W/ HCPCS (ALT 636 FOR OP/ED): Performed by: STUDENT IN AN ORGANIZED HEALTH CARE EDUCATION/TRAINING PROGRAM

## 2024-01-03 PROCEDURE — 82077 ASSAY SPEC XCP UR&BREATH IA: CPT | Performed by: STUDENT IN AN ORGANIZED HEALTH CARE EDUCATION/TRAINING PROGRAM

## 2024-01-03 PROCEDURE — 96375 TX/PRO/DX INJ NEW DRUG ADDON: CPT

## 2024-01-03 PROCEDURE — 85027 COMPLETE CBC AUTOMATED: CPT | Performed by: STUDENT IN AN ORGANIZED HEALTH CARE EDUCATION/TRAINING PROGRAM

## 2024-01-03 PROCEDURE — 82248 BILIRUBIN DIRECT: CPT | Performed by: STUDENT IN AN ORGANIZED HEALTH CARE EDUCATION/TRAINING PROGRAM

## 2024-01-03 PROCEDURE — 86850 RBC ANTIBODY SCREEN: CPT | Performed by: STUDENT IN AN ORGANIZED HEALTH CARE EDUCATION/TRAINING PROGRAM

## 2024-01-03 PROCEDURE — 84702 CHORIONIC GONADOTROPIN TEST: CPT | Performed by: STUDENT IN AN ORGANIZED HEALTH CARE EDUCATION/TRAINING PROGRAM

## 2024-01-03 PROCEDURE — 93005 ELECTROCARDIOGRAM TRACING: CPT

## 2024-01-03 PROCEDURE — 85730 THROMBOPLASTIN TIME PARTIAL: CPT | Performed by: STUDENT IN AN ORGANIZED HEALTH CARE EDUCATION/TRAINING PROGRAM

## 2024-01-03 PROCEDURE — 99291 CRITICAL CARE FIRST HOUR: CPT | Mod: 25 | Performed by: STUDENT IN AN ORGANIZED HEALTH CARE EDUCATION/TRAINING PROGRAM

## 2024-01-03 PROCEDURE — 85610 PROTHROMBIN TIME: CPT | Performed by: STUDENT IN AN ORGANIZED HEALTH CARE EDUCATION/TRAINING PROGRAM

## 2024-01-03 PROCEDURE — 83930 ASSAY OF BLOOD OSMOLALITY: CPT | Mod: WESLAB | Performed by: STUDENT IN AN ORGANIZED HEALTH CARE EDUCATION/TRAINING PROGRAM

## 2024-01-03 PROCEDURE — 71045 X-RAY EXAM CHEST 1 VIEW: CPT | Mod: FR

## 2024-01-03 RX ORDER — ONDANSETRON HYDROCHLORIDE 2 MG/ML
4 INJECTION, SOLUTION INTRAVENOUS ONCE
Status: COMPLETED | OUTPATIENT
Start: 2024-01-03 | End: 2024-01-03

## 2024-01-03 RX ORDER — DIPHENHYDRAMINE HYDROCHLORIDE 50 MG/ML
25 INJECTION INTRAMUSCULAR; INTRAVENOUS ONCE
Status: COMPLETED | OUTPATIENT
Start: 2024-01-04 | End: 2024-01-04

## 2024-01-03 RX ORDER — CEFTRIAXONE 1 G/50ML
1 INJECTION, SOLUTION INTRAVENOUS ONCE
Status: COMPLETED | OUTPATIENT
Start: 2024-01-03 | End: 2024-01-04

## 2024-01-03 RX ORDER — PROCHLORPERAZINE EDISYLATE 5 MG/ML
10 INJECTION INTRAMUSCULAR; INTRAVENOUS ONCE
Status: COMPLETED | OUTPATIENT
Start: 2024-01-04 | End: 2024-01-04

## 2024-01-03 RX ADMIN — ONDANSETRON 4 MG: 2 INJECTION INTRAMUSCULAR; INTRAVENOUS at 22:59

## 2024-01-03 ASSESSMENT — COLUMBIA-SUICIDE SEVERITY RATING SCALE - C-SSRS
2. HAVE YOU ACTUALLY HAD ANY THOUGHTS OF KILLING YOURSELF?: NO
6. HAVE YOU EVER DONE ANYTHING, STARTED TO DO ANYTHING, OR PREPARED TO DO ANYTHING TO END YOUR LIFE?: NO
1. IN THE PAST MONTH, HAVE YOU WISHED YOU WERE DEAD OR WISHED YOU COULD GO TO SLEEP AND NOT WAKE UP?: NO

## 2024-01-03 ASSESSMENT — PAIN DESCRIPTION - DESCRIPTORS: DESCRIPTORS: DISCOMFORT;HEAVINESS

## 2024-01-03 ASSESSMENT — PAIN - FUNCTIONAL ASSESSMENT: PAIN_FUNCTIONAL_ASSESSMENT: 0-10

## 2024-01-03 ASSESSMENT — PAIN SCALES - GENERAL: PAINLEVEL_OUTOF10: 10 - WORST POSSIBLE PAIN

## 2024-01-03 ASSESSMENT — PAIN DESCRIPTION - LOCATION: LOCATION: ABDOMEN

## 2024-01-03 ASSESSMENT — PAIN DESCRIPTION - FREQUENCY: FREQUENCY: CONSTANT/CONTINUOUS

## 2024-01-04 ENCOUNTER — APPOINTMENT (OUTPATIENT)
Dept: RADIOLOGY | Facility: HOSPITAL | Age: 49
DRG: 433 | End: 2024-01-04
Payer: MEDICARE

## 2024-01-04 PROBLEM — D64.9 ANEMIA, UNSPECIFIED TYPE: Status: ACTIVE | Noted: 2024-01-04

## 2024-01-04 LAB
ALBUMIN SERPL-MCNC: 3.7 G/DL (ref 3.5–5)
ALP BLD-CCNC: 197 U/L (ref 35–125)
ALT SERPL-CCNC: 21 U/L (ref 5–40)
ANION GAP SERPL CALC-SCNC: 11 MMOL/L
ANION GAP SERPL CALC-SCNC: 12 MMOL/L
ANION GAP SERPL CALC-SCNC: 13 MMOL/L
ANION GAP SERPL CALC-SCNC: 14 MMOL/L
AST SERPL-CCNC: 61 U/L (ref 5–40)
BASOPHILS NFR FLD MANUAL: 0 %
BILIRUB SERPL-MCNC: 18.8 MG/DL (ref 0.1–1.2)
BLASTS NFR FLD MANUAL: 0 %
BUN SERPL-MCNC: 14 MG/DL (ref 8–25)
BUN SERPL-MCNC: 15 MG/DL (ref 8–25)
BUN SERPL-MCNC: 16 MG/DL (ref 8–25)
BUN SERPL-MCNC: 16 MG/DL (ref 8–25)
CALCIUM SERPL-MCNC: 8.7 MG/DL (ref 8.5–10.4)
CALCIUM SERPL-MCNC: 8.7 MG/DL (ref 8.5–10.4)
CALCIUM SERPL-MCNC: 8.8 MG/DL (ref 8.5–10.4)
CALCIUM SERPL-MCNC: 8.9 MG/DL (ref 8.5–10.4)
CHLORIDE SERPL-SCNC: 85 MMOL/L (ref 97–107)
CHLORIDE SERPL-SCNC: 86 MMOL/L (ref 97–107)
CHLORIDE SERPL-SCNC: 87 MMOL/L (ref 97–107)
CHLORIDE SERPL-SCNC: 87 MMOL/L (ref 97–107)
CLARITY FLD: CLEAR
CO2 SERPL-SCNC: 18 MMOL/L (ref 24–31)
CO2 SERPL-SCNC: 19 MMOL/L (ref 24–31)
CO2 SERPL-SCNC: 20 MMOL/L (ref 24–31)
CO2 SERPL-SCNC: 20 MMOL/L (ref 24–31)
COLOR FLD: YELLOW
CREAT SERPL-MCNC: 0.7 MG/DL (ref 0.4–1.6)
CREAT SERPL-MCNC: 0.8 MG/DL (ref 0.4–1.6)
CREAT UR-MCNC: 123.5 MG/DL
EOSINOPHIL NFR FLD MANUAL: 0 %
ERYTHROCYTE [DISTWIDTH] IN BLOOD BY AUTOMATED COUNT: 25.1 % (ref 11.5–14.5)
GFR SERPL CREATININE-BSD FRML MDRD: >90 ML/MIN/1.73M*2
GLUCOSE SERPL-MCNC: 102 MG/DL (ref 65–99)
GLUCOSE SERPL-MCNC: 128 MG/DL (ref 65–99)
GLUCOSE SERPL-MCNC: 52 MG/DL (ref 65–99)
GLUCOSE SERPL-MCNC: 80 MG/DL (ref 65–99)
HCG SERPL-ACNC: <1 MIU/ML
HCT VFR BLD AUTO: 23.2 % (ref 36–46)
HEMOCCULT SP1 STL QL: NEGATIVE
HGB BLD-MCNC: 7.7 G/DL (ref 12–16)
HOLD SPECIMEN: NORMAL
IMMATURE GRANULOCYTES IN FLUID: 0 %
LACTATE BLDV-SCNC: 3.2 MMOL/L (ref 0.4–2)
LYMPHOCYTES NFR FLD MANUAL: 66 %
MCH RBC QN AUTO: 36.3 PG (ref 26–34)
MCHC RBC AUTO-ENTMCNC: 33.2 G/DL (ref 32–36)
MCV RBC AUTO: 109 FL (ref 80–100)
MONOS+MACROS NFR FLD MANUAL: 23 %
NEUTROPHILS NFR FLD MANUAL: 6 %
NRBC BLD-RTO: 1.3 /100 WBCS (ref 0–0)
OSMOLALITY SERPL: 271 MOSM/KG (ref 280–300)
OSMOLALITY UR: 418 MOSM/KG (ref 200–1200)
OTHER CELLS NFR FLD MANUAL: 5 %
PATH REVIEW-CBC DIFFERENTIAL: NORMAL
PLASMA CELLS NFR FLD MANUAL: 0 %
PLATELET # BLD AUTO: 130 X10*3/UL (ref 150–450)
POTASSIUM SERPL-SCNC: 5.1 MMOL/L (ref 3.4–5.1)
POTASSIUM SERPL-SCNC: 5.4 MMOL/L (ref 3.4–5.1)
POTASSIUM SERPL-SCNC: 5.7 MMOL/L (ref 3.4–5.1)
POTASSIUM SERPL-SCNC: 6 MMOL/L (ref 3.4–5.1)
PROT SERPL-MCNC: 6.9 G/DL (ref 5.9–7.9)
RBC # BLD AUTO: 2.12 X10*6/UL (ref 4–5.2)
RBC # FLD AUTO: 3000 /UL
SODIUM SERPL-SCNC: 117 MMOL/L (ref 133–145)
SODIUM SERPL-SCNC: 118 MMOL/L (ref 133–145)
SODIUM SERPL-SCNC: 118 MMOL/L (ref 133–145)
SODIUM SERPL-SCNC: 119 MMOL/L (ref 133–145)
SODIUM UR-SCNC: 20 MMOL/L
SODIUM/CREAT UR-RTO: 16 MMOL/G CREAT
TOTAL CELLS COUNTED FLD: 100
WBC # BLD AUTO: 15 X10*3/UL (ref 4.4–11.3)
WBC # FLD AUTO: 551 /UL

## 2024-01-04 PROCEDURE — P9016 RBC LEUKOCYTES REDUCED: HCPCS

## 2024-01-04 PROCEDURE — 87070 CULTURE OTHR SPECIMN AEROBIC: CPT | Mod: WESLAB | Performed by: INTERNAL MEDICINE

## 2024-01-04 PROCEDURE — 2500000005 HC RX 250 GENERAL PHARMACY W/O HCPCS: Performed by: RADIOLOGY

## 2024-01-04 PROCEDURE — 85027 COMPLETE CBC AUTOMATED: CPT | Performed by: INTERNAL MEDICINE

## 2024-01-04 PROCEDURE — 2500000004 HC RX 250 GENERAL PHARMACY W/ HCPCS (ALT 636 FOR OP/ED): Performed by: INTERNAL MEDICINE

## 2024-01-04 PROCEDURE — 85007 BL SMEAR W/DIFF WBC COUNT: CPT | Performed by: INTERNAL MEDICINE

## 2024-01-04 PROCEDURE — 89051 BODY FLUID CELL COUNT: CPT | Performed by: INTERNAL MEDICINE

## 2024-01-04 PROCEDURE — 2500000002 HC RX 250 W HCPCS SELF ADMINISTERED DRUGS (ALT 637 FOR MEDICARE OP, ALT 636 FOR OP/ED): Performed by: INTERNAL MEDICINE

## 2024-01-04 PROCEDURE — 74177 CT ABD & PELVIS W/CONTRAST: CPT | Mod: FR

## 2024-01-04 PROCEDURE — 85060 BLOOD SMEAR INTERPRETATION: CPT | Performed by: PATHOLOGY

## 2024-01-04 PROCEDURE — 88104 CYTOPATH FL NONGYN SMEARS: CPT | Performed by: PATHOLOGY

## 2024-01-04 PROCEDURE — 0W9G3ZZ DRAINAGE OF PERITONEAL CAVITY, PERCUTANEOUS APPROACH: ICD-10-PCS | Performed by: STUDENT IN AN ORGANIZED HEALTH CARE EDUCATION/TRAINING PROGRAM

## 2024-01-04 PROCEDURE — 36415 COLL VENOUS BLD VENIPUNCTURE: CPT | Performed by: STUDENT IN AN ORGANIZED HEALTH CARE EDUCATION/TRAINING PROGRAM

## 2024-01-04 PROCEDURE — 88112 CYTOPATH CELL ENHANCE TECH: CPT | Mod: TC | Performed by: INTERNAL MEDICINE

## 2024-01-04 PROCEDURE — 2500000001 HC RX 250 WO HCPCS SELF ADMINISTERED DRUGS (ALT 637 FOR MEDICARE OP): Performed by: INTERNAL MEDICINE

## 2024-01-04 PROCEDURE — 49083 ABD PARACENTESIS W/IMAGING: CPT | Performed by: RADIOLOGY

## 2024-01-04 PROCEDURE — 2500000004 HC RX 250 GENERAL PHARMACY W/ HCPCS (ALT 636 FOR OP/ED): Performed by: STUDENT IN AN ORGANIZED HEALTH CARE EDUCATION/TRAINING PROGRAM

## 2024-01-04 PROCEDURE — 74177 CT ABD & PELVIS W/CONTRAST: CPT | Mod: FOREIGN READ | Performed by: RADIOLOGY

## 2024-01-04 PROCEDURE — 2500000005 HC RX 250 GENERAL PHARMACY W/O HCPCS: Performed by: INTERNAL MEDICINE

## 2024-01-04 PROCEDURE — C9113 INJ PANTOPRAZOLE SODIUM, VIA: HCPCS | Performed by: INTERNAL MEDICINE

## 2024-01-04 PROCEDURE — 49083 ABD PARACENTESIS W/IMAGING: CPT

## 2024-01-04 PROCEDURE — 82570 ASSAY OF URINE CREATININE: CPT | Performed by: STUDENT IN AN ORGANIZED HEALTH CARE EDUCATION/TRAINING PROGRAM

## 2024-01-04 PROCEDURE — 2020000001 HC ICU ROOM DAILY

## 2024-01-04 PROCEDURE — 36430 TRANSFUSION BLD/BLD COMPNT: CPT

## 2024-01-04 PROCEDURE — 84075 ASSAY ALKALINE PHOSPHATASE: CPT | Performed by: INTERNAL MEDICINE

## 2024-01-04 PROCEDURE — 89050 BODY FLUID CELL COUNT: CPT | Performed by: INTERNAL MEDICINE

## 2024-01-04 PROCEDURE — 96375 TX/PRO/DX INJ NEW DRUG ADDON: CPT | Mod: 59

## 2024-01-04 PROCEDURE — 88305 TISSUE EXAM BY PATHOLOGIST: CPT | Performed by: PATHOLOGY

## 2024-01-04 PROCEDURE — 82270 OCCULT BLOOD FECES: CPT | Performed by: STUDENT IN AN ORGANIZED HEALTH CARE EDUCATION/TRAINING PROGRAM

## 2024-01-04 PROCEDURE — 80048 BASIC METABOLIC PNL TOTAL CA: CPT | Mod: CCI | Performed by: INTERNAL MEDICINE

## 2024-01-04 PROCEDURE — 2550000001 HC RX 255 CONTRASTS: Performed by: STUDENT IN AN ORGANIZED HEALTH CARE EDUCATION/TRAINING PROGRAM

## 2024-01-04 PROCEDURE — 83935 ASSAY OF URINE OSMOLALITY: CPT | Mod: WESLAB | Performed by: STUDENT IN AN ORGANIZED HEALTH CARE EDUCATION/TRAINING PROGRAM

## 2024-01-04 PROCEDURE — 87040 BLOOD CULTURE FOR BACTERIA: CPT | Mod: WESLAB | Performed by: STUDENT IN AN ORGANIZED HEALTH CARE EDUCATION/TRAINING PROGRAM

## 2024-01-04 PROCEDURE — 96365 THER/PROPH/DIAG IV INF INIT: CPT | Mod: 59

## 2024-01-04 PROCEDURE — 83605 ASSAY OF LACTIC ACID: CPT | Performed by: STUDENT IN AN ORGANIZED HEALTH CARE EDUCATION/TRAINING PROGRAM

## 2024-01-04 PROCEDURE — 2720000007 HC OR 272 NO HCPCS

## 2024-01-04 PROCEDURE — 88112 CYTOPATH CELL ENHANCE TECH: CPT | Performed by: PATHOLOGY

## 2024-01-04 RX ORDER — THIAMINE HYDROCHLORIDE 100 MG/ML
100 INJECTION, SOLUTION INTRAMUSCULAR; INTRAVENOUS DAILY
Status: COMPLETED | OUTPATIENT
Start: 2024-01-04 | End: 2024-01-06

## 2024-01-04 RX ORDER — PANTOPRAZOLE SODIUM 40 MG/10ML
40 INJECTION, POWDER, LYOPHILIZED, FOR SOLUTION INTRAVENOUS
Status: DISCONTINUED | OUTPATIENT
Start: 2024-01-04 | End: 2024-01-04

## 2024-01-04 RX ORDER — LIDOCAINE HYDROCHLORIDE 10 MG/ML
INJECTION, SOLUTION EPIDURAL; INFILTRATION; INTRACAUDAL; PERINEURAL
Status: COMPLETED | OUTPATIENT
Start: 2024-01-04 | End: 2024-01-04

## 2024-01-04 RX ORDER — MORPHINE SULFATE 4 MG/ML
4 INJECTION, SOLUTION INTRAMUSCULAR; INTRAVENOUS ONCE
Status: COMPLETED | OUTPATIENT
Start: 2024-01-04 | End: 2024-01-04

## 2024-01-04 RX ORDER — LANOLIN ALCOHOL/MO/W.PET/CERES
100 CREAM (GRAM) TOPICAL DAILY
Status: DISCONTINUED | OUTPATIENT
Start: 2024-01-07 | End: 2024-01-09 | Stop reason: HOSPADM

## 2024-01-04 RX ORDER — PANTOPRAZOLE SODIUM 40 MG/1
40 TABLET, DELAYED RELEASE ORAL DAILY
Status: DISCONTINUED | OUTPATIENT
Start: 2024-01-05 | End: 2024-01-09 | Stop reason: HOSPADM

## 2024-01-04 RX ORDER — LORAZEPAM 2 MG/ML
2 INJECTION INTRAMUSCULAR EVERY 2 HOUR PRN
Status: DISCONTINUED | OUTPATIENT
Start: 2024-01-04 | End: 2024-01-09 | Stop reason: HOSPADM

## 2024-01-04 RX ORDER — LEVOTHYROXINE SODIUM 50 UG/1
50 TABLET ORAL
Status: DISCONTINUED | OUTPATIENT
Start: 2024-01-04 | End: 2024-01-09 | Stop reason: HOSPADM

## 2024-01-04 RX ORDER — FOLIC ACID 1 MG/1
1 TABLET ORAL DAILY
Status: DISCONTINUED | OUTPATIENT
Start: 2024-01-04 | End: 2024-01-09 | Stop reason: HOSPADM

## 2024-01-04 RX ORDER — LACTULOSE 10 G/15ML
30 SOLUTION ORAL 2 TIMES DAILY
Status: DISCONTINUED | OUTPATIENT
Start: 2024-01-04 | End: 2024-01-09 | Stop reason: HOSPADM

## 2024-01-04 RX ORDER — MULTIVIT-MIN/IRON FUM/FOLIC AC 7.5 MG-4
1 TABLET ORAL DAILY
Status: DISCONTINUED | OUTPATIENT
Start: 2024-01-04 | End: 2024-01-04

## 2024-01-04 RX ORDER — LANOLIN ALCOHOL/MO/W.PET/CERES
400 CREAM (GRAM) TOPICAL DAILY
Status: DISCONTINUED | OUTPATIENT
Start: 2024-01-04 | End: 2024-01-09 | Stop reason: HOSPADM

## 2024-01-04 RX ORDER — BISMUTH SUBSALICYLATE 262 MG
1 TABLET,CHEWABLE ORAL DAILY
Status: DISCONTINUED | OUTPATIENT
Start: 2024-01-05 | End: 2024-01-09 | Stop reason: HOSPADM

## 2024-01-04 RX ORDER — LORAZEPAM 2 MG/ML
1 INJECTION INTRAMUSCULAR EVERY 2 HOUR PRN
Status: DISCONTINUED | OUTPATIENT
Start: 2024-01-04 | End: 2024-01-09 | Stop reason: HOSPADM

## 2024-01-04 RX ORDER — LORAZEPAM 2 MG/ML
0.5 INJECTION INTRAMUSCULAR EVERY 2 HOUR PRN
Status: DISCONTINUED | OUTPATIENT
Start: 2024-01-04 | End: 2024-01-09 | Stop reason: HOSPADM

## 2024-01-04 RX ORDER — FUROSEMIDE 40 MG/1
40 TABLET ORAL DAILY
Status: DISCONTINUED | OUTPATIENT
Start: 2024-01-04 | End: 2024-01-06

## 2024-01-04 RX ORDER — CALCIUM GLUCONATE 20 MG/ML
1 INJECTION, SOLUTION INTRAVENOUS ONCE
Status: COMPLETED | OUTPATIENT
Start: 2024-01-04 | End: 2024-01-04

## 2024-01-04 RX ORDER — DEXTROSE 50 % IN WATER (D50W) INTRAVENOUS SYRINGE
25 ONCE
Status: COMPLETED | OUTPATIENT
Start: 2024-01-04 | End: 2024-01-04

## 2024-01-04 RX ADMIN — PIPERACILLIN SODIUM AND TAZOBACTAM SODIUM 3.38 G: 3; .375 INJECTION, SOLUTION INTRAVENOUS at 04:30

## 2024-01-04 RX ADMIN — FOLIC ACID 1 MG: 1 TABLET ORAL at 09:52

## 2024-01-04 RX ADMIN — DIPHENHYDRAMINE HYDROCHLORIDE 25 MG: 50 INJECTION, SOLUTION INTRAMUSCULAR; INTRAVENOUS at 00:29

## 2024-01-04 RX ADMIN — DEXTROSE MONOHYDRATE 500 MG: 50 INJECTION, SOLUTION INTRAVENOUS at 01:59

## 2024-01-04 RX ADMIN — FUROSEMIDE 40 MG: 40 TABLET ORAL at 13:15

## 2024-01-04 RX ADMIN — LEVOTHYROXINE SODIUM 50 MCG: 0.05 TABLET ORAL at 09:52

## 2024-01-04 RX ADMIN — THIAMINE HYDROCHLORIDE 100 MG: 100 INJECTION, SOLUTION INTRAMUSCULAR; INTRAVENOUS at 09:51

## 2024-01-04 RX ADMIN — RIFAXIMIN 550 MG: 550 TABLET ORAL at 22:34

## 2024-01-04 RX ADMIN — CALCIUM GLUCONATE 1 G: 20 INJECTION, SOLUTION INTRAVENOUS at 11:06

## 2024-01-04 RX ADMIN — IOHEXOL 75 ML: 350 INJECTION, SOLUTION INTRAVENOUS at 01:39

## 2024-01-04 RX ADMIN — CEFTRIAXONE SODIUM 1 G: 1 INJECTION, SOLUTION INTRAVENOUS at 00:30

## 2024-01-04 RX ADMIN — INSULIN HUMAN 10 UNITS: 100 INJECTION, SOLUTION PARENTERAL at 11:05

## 2024-01-04 RX ADMIN — LACTULOSE 30 G: 20 SOLUTION ORAL at 22:34

## 2024-01-04 RX ADMIN — PIPERACILLIN SODIUM AND TAZOBACTAM SODIUM 3.38 G: 3; .375 INJECTION, SOLUTION INTRAVENOUS at 09:52

## 2024-01-04 RX ADMIN — MULTIPLE VITAMINS W/ MINERALS TAB 1 TABLET: TAB at 09:51

## 2024-01-04 RX ADMIN — Medication 400 MG: at 16:03

## 2024-01-04 RX ADMIN — PANTOPRAZOLE SODIUM 40 MG: 40 INJECTION, POWDER, FOR SOLUTION INTRAVENOUS at 09:51

## 2024-01-04 RX ADMIN — LACTULOSE 30 G: 20 SOLUTION ORAL at 09:51

## 2024-01-04 RX ADMIN — RIFAXIMIN 550 MG: 550 TABLET ORAL at 09:51

## 2024-01-04 RX ADMIN — DEXTROSE MONOHYDRATE 25 G: 25 INJECTION, SOLUTION INTRAVENOUS at 11:08

## 2024-01-04 RX ADMIN — PROCHLORPERAZINE EDISYLATE 10 MG: 5 INJECTION INTRAMUSCULAR; INTRAVENOUS at 00:29

## 2024-01-04 RX ADMIN — LIDOCAINE HYDROCHLORIDE 5 ML: 10 INJECTION, SOLUTION EPIDURAL; INFILTRATION; INTRACAUDAL; PERINEURAL at 09:05

## 2024-01-04 RX ADMIN — MORPHINE SULFATE 4 MG: 4 INJECTION, SOLUTION INTRAMUSCULAR; INTRAVENOUS at 02:28

## 2024-01-04 RX ADMIN — PIPERACILLIN SODIUM AND TAZOBACTAM SODIUM 3.38 G: 3; .375 INJECTION, SOLUTION INTRAVENOUS at 22:36

## 2024-01-04 RX ADMIN — PIPERACILLIN SODIUM AND TAZOBACTAM SODIUM 3.38 G: 3; .375 INJECTION, SOLUTION INTRAVENOUS at 16:03

## 2024-01-04 SDOH — SOCIAL STABILITY: SOCIAL INSECURITY: HAS ANYONE EVER THREATENED TO HURT YOUR FAMILY OR YOUR PETS?: NO

## 2024-01-04 SDOH — SOCIAL STABILITY: SOCIAL INSECURITY: DO YOU FEEL ANYONE HAS EXPLOITED OR TAKEN ADVANTAGE OF YOU FINANCIALLY OR OF YOUR PERSONAL PROPERTY?: NO

## 2024-01-04 SDOH — SOCIAL STABILITY: SOCIAL INSECURITY: ARE THERE ANY APPARENT SIGNS OF INJURIES/BEHAVIORS THAT COULD BE RELATED TO ABUSE/NEGLECT?: NO

## 2024-01-04 SDOH — ECONOMIC STABILITY: INCOME INSECURITY: HOW HARD IS IT FOR YOU TO PAY FOR THE VERY BASICS LIKE FOOD, HOUSING, MEDICAL CARE, AND HEATING?: NOT VERY HARD

## 2024-01-04 SDOH — SOCIAL STABILITY: SOCIAL INSECURITY: ARE YOU OR HAVE YOU BEEN THREATENED OR ABUSED PHYSICALLY, EMOTIONALLY, OR SEXUALLY BY ANYONE?: NO

## 2024-01-04 SDOH — SOCIAL STABILITY: SOCIAL INSECURITY: ABUSE: ADULT

## 2024-01-04 SDOH — ECONOMIC STABILITY: HOUSING INSECURITY
IN THE LAST 12 MONTHS, WAS THERE A TIME WHEN YOU DID NOT HAVE A STEADY PLACE TO SLEEP OR SLEPT IN A SHELTER (INCLUDING NOW)?: NO

## 2024-01-04 SDOH — SOCIAL STABILITY: SOCIAL INSECURITY: DO YOU FEEL UNSAFE GOING BACK TO THE PLACE WHERE YOU ARE LIVING?: NO

## 2024-01-04 SDOH — ECONOMIC STABILITY: TRANSPORTATION INSECURITY
IN THE PAST 12 MONTHS, HAS LACK OF TRANSPORTATION KEPT YOU FROM MEETINGS, WORK, OR FROM GETTING THINGS NEEDED FOR DAILY LIVING?: NO

## 2024-01-04 SDOH — ECONOMIC STABILITY: INCOME INSECURITY: IN THE LAST 12 MONTHS, WAS THERE A TIME WHEN YOU WERE NOT ABLE TO PAY THE MORTGAGE OR RENT ON TIME?: NO

## 2024-01-04 SDOH — ECONOMIC STABILITY: HOUSING INSECURITY: IN THE LAST 12 MONTHS, HOW MANY PLACES HAVE YOU LIVED?: 1

## 2024-01-04 SDOH — ECONOMIC STABILITY: TRANSPORTATION INSECURITY
IN THE PAST 12 MONTHS, HAS THE LACK OF TRANSPORTATION KEPT YOU FROM MEDICAL APPOINTMENTS OR FROM GETTING MEDICATIONS?: NO

## 2024-01-04 SDOH — SOCIAL STABILITY: SOCIAL INSECURITY: WERE YOU ABLE TO COMPLETE ALL THE BEHAVIORAL HEALTH SCREENINGS?: YES

## 2024-01-04 SDOH — SOCIAL STABILITY: SOCIAL INSECURITY: HAVE YOU HAD THOUGHTS OF HARMING ANYONE ELSE?: NO

## 2024-01-04 SDOH — SOCIAL STABILITY: SOCIAL INSECURITY: DOES ANYONE TRY TO KEEP YOU FROM HAVING/CONTACTING OTHER FRIENDS OR DOING THINGS OUTSIDE YOUR HOME?: NO

## 2024-01-04 ASSESSMENT — LIFESTYLE VARIABLES
VISUAL DISTURBANCES: NOT PRESENT
VISUAL DISTURBANCES: NOT PRESENT
TREMOR: NOT VISIBLE, BUT CAN BE FELT FINGERTIP TO FINGERTIP
PAROXYSMAL SWEATS: NO SWEAT VISIBLE
AUDIT-C TOTAL SCORE: 5
AUDITORY DISTURBANCES: NOT PRESENT
PAROXYSMAL SWEATS: NO SWEAT VISIBLE
HEADACHE, FULLNESS IN HEAD: NOT PRESENT
ORIENTATION AND CLOUDING OF SENSORIUM: ORIENTED AND CAN DO SERIAL ADDITIONS
NAUSEA AND VOMITING: NO NAUSEA AND NO VOMITING
TREMOR: NO TREMOR
AGITATION: NORMAL ACTIVITY
AUDITORY DISTURBANCES: NOT PRESENT
ANXIETY: MILDLY ANXIOUS
SUBSTANCE_ABUSE_PAST_12_MONTHS: NO
PAROXYSMAL SWEATS: NO SWEAT VISIBLE
ANXIETY: MILDLY ANXIOUS
HEADACHE, FULLNESS IN HEAD: NOT PRESENT
PRESCIPTION_ABUSE_PAST_12_MONTHS: NO
AGITATION: NORMAL ACTIVITY
TOTAL SCORE: 2
ORIENTATION AND CLOUDING OF SENSORIUM: ORIENTED AND CAN DO SERIAL ADDITIONS
AGITATION: NORMAL ACTIVITY
HEADACHE, FULLNESS IN HEAD: NOT PRESENT
HEADACHE, FULLNESS IN HEAD: NOT PRESENT
NAUSEA AND VOMITING: NO NAUSEA AND NO VOMITING
AGITATION: NORMAL ACTIVITY
ANXIETY: MILDLY ANXIOUS
TOTAL SCORE: 1
AGITATION: NORMAL ACTIVITY
NAUSEA AND VOMITING: NO NAUSEA AND NO VOMITING
AUDITORY DISTURBANCES: NOT PRESENT
VISUAL DISTURBANCES: NOT PRESENT
VISUAL DISTURBANCES: NOT PRESENT
AUDITORY DISTURBANCES: NOT PRESENT
AUDITORY DISTURBANCES: NOT PRESENT
HEADACHE, FULLNESS IN HEAD: NOT PRESENT
TREMOR: NO TREMOR
ANXIETY: MILDLY ANXIOUS
NAUSEA AND VOMITING: NO NAUSEA AND NO VOMITING
TOTAL SCORE: 1
VISUAL DISTURBANCES: NOT PRESENT
NAUSEA AND VOMITING: NO NAUSEA AND NO VOMITING
HOW OFTEN DO YOU HAVE A DRINK CONTAINING ALCOHOL: 4 OR MORE TIMES A WEEK
ORIENTATION AND CLOUDING OF SENSORIUM: ORIENTED AND CAN DO SERIAL ADDITIONS
ORIENTATION AND CLOUDING OF SENSORIUM: ORIENTED AND CAN DO SERIAL ADDITIONS
TREMOR: NO TREMOR
TOTAL SCORE: 1
TREMOR: NOT VISIBLE, BUT CAN BE FELT FINGERTIP TO FINGERTIP
HOW OFTEN DO YOU HAVE 6 OR MORE DRINKS ON ONE OCCASION: NEVER
AUDIT-C TOTAL SCORE: 5
ORIENTATION AND CLOUDING OF SENSORIUM: ORIENTED AND CAN DO SERIAL ADDITIONS
ANXIETY: MILDLY ANXIOUS
HOW MANY STANDARD DRINKS CONTAINING ALCOHOL DO YOU HAVE ON A TYPICAL DAY: 3 OR 4
SKIP TO QUESTIONS 9-10: 0
TOTAL SCORE: 2
PAROXYSMAL SWEATS: NO SWEAT VISIBLE
PAROXYSMAL SWEATS: NO SWEAT VISIBLE

## 2024-01-04 ASSESSMENT — ENCOUNTER SYMPTOMS
WHEEZING: 0
JOINT SWELLING: 0
CONSTIPATION: 0
DIARRHEA: 1
FATIGUE: 0
ABDOMINAL PAIN: 1
MYALGIAS: 0
TREMORS: 0
BRUISES/BLEEDS EASILY: 0
NUMBNESS: 0
PALPITATIONS: 0
POLYPHAGIA: 0
DIZZINESS: 0
HEMATURIA: 0
NAUSEA: 0
ADENOPATHY: 0
NECK PAIN: 0
BACK PAIN: 0
SPEECH DIFFICULTY: 0
LIGHT-HEADEDNESS: 0
ARTHRALGIAS: 0
WEAKNESS: 1
SHORTNESS OF BREATH: 0
DIARRHEA: 0
HEADACHES: 0
VOMITING: 0
CHILLS: 0
WOUND: 0
SLEEP DISTURBANCE: 0
FEVER: 0
COLOR CHANGE: 0
RECTAL PAIN: 0
DYSURIA: 0
BLOOD IN STOOL: 0
FATIGUE: 1
ABDOMINAL DISTENTION: 1
FREQUENCY: 0
HALLUCINATIONS: 0
CONFUSION: 0
SORE THROAT: 0
POLYDIPSIA: 0
SEIZURES: 0
PHOTOPHOBIA: 0
SINUS PRESSURE: 0
COUGH: 0
APNEA: 0

## 2024-01-04 ASSESSMENT — PAIN SCALES - GENERAL
PAINLEVEL_OUTOF10: 0 - NO PAIN

## 2024-01-04 ASSESSMENT — ACTIVITIES OF DAILY LIVING (ADL)
ADEQUATE_TO_COMPLETE_ADL: YES
HEARING - LEFT EAR: FUNCTIONAL
FEEDING YOURSELF: NEEDS ASSISTANCE
ASSISTIVE_DEVICE: WALKER
TOILETING: NEEDS ASSISTANCE
WALKS IN HOME: NEEDS ASSISTANCE
JUDGMENT_ADEQUATE_SAFELY_COMPLETE_DAILY_ACTIVITIES: YES
GROOMING: NEEDS ASSISTANCE
HEARING - RIGHT EAR: FUNCTIONAL
PATIENT'S MEMORY ADEQUATE TO SAFELY COMPLETE DAILY ACTIVITIES?: YES
DRESSING YOURSELF: NEEDS ASSISTANCE
BATHING: NEEDS ASSISTANCE

## 2024-01-04 ASSESSMENT — COGNITIVE AND FUNCTIONAL STATUS - GENERAL
MOVING TO AND FROM BED TO CHAIR: A LOT
DRESSING REGULAR UPPER BODY CLOTHING: A LOT
WALKING IN HOSPITAL ROOM: A LOT
HELP NEEDED FOR BATHING: A LOT
MOBILITY SCORE: 15
STANDING UP FROM CHAIR USING ARMS: A LOT
TURNING FROM BACK TO SIDE WHILE IN FLAT BAD: A LITTLE
DRESSING REGULAR LOWER BODY CLOTHING: A LOT
TOILETING: A LITTLE
PATIENT BASELINE BEDBOUND: NO
CLIMB 3 TO 5 STEPS WITH RAILING: A LOT
DAILY ACTIVITIY SCORE: 17

## 2024-01-04 ASSESSMENT — PAIN - FUNCTIONAL ASSESSMENT
PAIN_FUNCTIONAL_ASSESSMENT: 0-10

## 2024-01-04 ASSESSMENT — PATIENT HEALTH QUESTIONNAIRE - PHQ9
1. LITTLE INTEREST OR PLEASURE IN DOING THINGS: NEARLY EVERY DAY
2. FEELING DOWN, DEPRESSED OR HOPELESS: NOT AT ALL
SUM OF ALL RESPONSES TO PHQ9 QUESTIONS 1 & 2: 3

## 2024-01-04 NOTE — ED PROVIDER NOTES
HPI   Chief Complaint   Patient presents with    Abdominal Pain       Patient presents with complaints of abdominal pain and nausea.  She states that she has not had much to eat today secondary to symptoms.  She states that she does drink several glasses of wine daily.  She states that her abdomen has been distended and it has needed to be drained before.  She believes this may need to happen again.                          Dhruv Coma Scale Score: 15                  Patient History   Past Medical History:   Diagnosis Date    Alcoholic hepatitis without ascites 08/13/2021    Alcoholic hepatitis    Hepatic encephalopathy (CMS/HCC) 06/09/2021    Hepatic encephalopathy     Past Surgical History:   Procedure Laterality Date    OTHER SURGICAL HISTORY  06/09/2021    Breast augmentation    US GUIDED ABDOMINAL PARACENTESIS  4/28/2021    US GUIDED ABDOMINAL PARACENTESIS [facility]    US GUIDED ABDOMINAL PARACENTESIS  4/23/2021    US GUIDED ABDOMINAL PARACENTESIS [facility]    US GUIDED ABDOMINAL PARACENTESIS  5/14/2021    US GUIDED ABDOMINAL PARACENTESIS [facility]    US GUIDED ABDOMINAL PARACENTESIS  8/17/2023    US GUIDED ABDOMINAL PARACENTESIS [facility]    US GUIDED ABDOMINAL PARACENTESIS  8/25/2023    US GUIDED ABDOMINAL PARACENTESIS [facility]    US GUIDED ABDOMINAL PARACENTESIS  10/23/2023    US GUIDED ABDOMINAL PARACENTESIS 10/23/2023 Huy Alvarez MD [facility]    US GUIDED ABDOMINAL PARACENTESIS  10/27/2023    US GUIDED ABDOMINAL PARACENTESIS 10/27/2023 Huy Alvarez MD [facility]    US GUIDED ABDOMINAL PARACENTESIS  10/26/2023    US GUIDED ABDOMINAL PARACENTESIS 10/26/2023 [facility]     Family History   Problem Relation Name Age of Onset    Diabetes Mother      Uterine cancer Mother      Heart attack Father      Other (CHEMICAL DEPENDENCY) Father      Hypertension Father       Social History     Tobacco Use    Smoking status: Never    Smokeless tobacco: Never   Substance  Use Topics    Alcohol use: Yes     Alcohol/week: 1.0 standard drink of alcohol     Types: 1 Glasses of wine per week     Comment: daily drinking    Drug use: Not on file       Physical Exam   ED Triage Vitals [01/03/24 2038]   Temp Heart Rate Resp BP   36.8 °C (98.2 °F) 87 16 107/58      SpO2 Temp Source Heart Rate Source Patient Position   92 % Temporal Monitor Lying      BP Location FiO2 (%)     Left arm --       Physical Exam  HENT:      Head: Normocephalic.   Eyes:      General: Scleral icterus present.   Cardiovascular:      Rate and Rhythm: Normal rate.   Pulmonary:      Effort: Pulmonary effort is normal.   Abdominal:      Comments: Diffuse tenderness to palpation without guarding.  Not tense.   Skin:     Coloration: Skin is jaundiced.   Neurological:      Mental Status: She is alert.         ED Course & MDM   ED Course as of 01/03/24 2248 Wed Jan 03, 2024 2116 EKG: Normal sinus rhythm with rate of 87 beats minute.  Normal axis.  QTc is 469 ms, CA interval 168.  No ST elevation or depression, no acute ischemic pattern.  No STEMI.  Low voltage qrs noted.  [NT]      ED Course User Index  [NT] Nomi Anaya DO         Diagnoses as of 01/03/24 2248   Anemia, unspecified type   Hyponatremia   Cirrhosis of liver with ascites, unspecified hepatic cirrhosis type (CMS/HCC)       Medical Decision Making  Patient presents with abdominal pain.  CAT scan most consistent with ascites as etiology of abdominal pain.  My suspicion for bowel obstruction, bowel ischemia, AAA, aortic dissection, ectopic pregnancy, ovarian torsion, appendicitis, cholecystitis as etiology of symptoms is low.  Laboratory studies with multiple abnormalities, most significant for anemia.  Previous laboratory studies reviewed and while patient has been anemic in the past, it is substantially worse today.  2 units of blood transfusion ordered given severe anemia.  Hemoccult test performed and this was negative.  Patient also noted to have  severe hyponatremia.  This is felt to be multifactorial in setting of poor oral intake and alcohol abuse.  Patient accepted to hospitalist service for further management of anemia and hyponatremia.  Chest x-ray was noted to be suggestive of retrocardiac infiltrate, however CT abdomen pelvis which did include portion of lung does not reveal infiltrate.  Clinical picture felt to be more likely consistent with fluid overload/pleural effusion than pneumonia.  I did consider possibility of bacterial peritonitis in setting of abdominal pain, however as patient does not have fever and with only mild leukocytosis, ascites felt to be more likely etiology of pain at this time.  Parts of this chart were completed with dictation software, please excuse any errors in transcription.        Procedure  Critical Care    Performed by: Yonathan Alfred MD  Authorized by: Yonathan Alfred MD    Critical care provider statement:     Critical care time (minutes):  35    Critical care was necessary to treat or prevent imminent or life-threatening deterioration of the following conditions:  Metabolic crisis    Critical care was time spent personally by me on the following activities:  Development of treatment plan with patient or surrogate, evaluation of patient's response to treatment, examination of patient, ordering and performing treatments and interventions, ordering and review of laboratory studies, ordering and review of radiographic studies, pulse oximetry and re-evaluation of patient's condition       Yonathan Alfred MD  01/04/24 7104       Yonathan Alfred MD  01/04/24 3413

## 2024-01-04 NOTE — CONSULTS
.Reason For Consult  Natremia and hyperkalemia    History Of Present Illness  Briseida Owen is a 48 y.o. female who is known to have a history of alcoholic cirrhosis of the liver with hepatitis in the past ascites came into the hospital because of severe distention and pain in her abdomen she just underwent paracentesis this morning I was asked to see the patient in consultation because of of hyponatremia with a sodium level of 115 today her mental status is okay she did not have any seizure activity no nausea vomiting or diarrhea.     Review of Systems  Review of Systems   Constitutional:  Negative for chills, fatigue and fever.   HENT:  Negative for sinus pressure, sore throat and tinnitus.    Eyes:  Negative for photophobia and visual disturbance.   Respiratory:  Negative for apnea, cough, shortness of breath and wheezing.    Cardiovascular:  Negative for chest pain, palpitations and leg swelling.   Gastrointestinal:  Positive for abdominal distention and abdominal pain. Negative for blood in stool, constipation, diarrhea, nausea, rectal pain and vomiting.   Endocrine: Negative for cold intolerance, heat intolerance, polydipsia, polyphagia and polyuria.   Genitourinary:  Negative for decreased urine volume, dysuria, frequency, hematuria and urgency.   Musculoskeletal:  Negative for arthralgias, back pain, joint swelling, myalgias and neck pain.   Skin:  Negative for color change, pallor, rash and wound.   Neurological:  Positive for weakness. Negative for dizziness, tremors, seizures, syncope, speech difficulty, light-headedness, numbness and headaches.   Hematological:  Negative for adenopathy. Does not bruise/bleed easily.   Psychiatric/Behavioral:  Negative for confusion, hallucinations, sleep disturbance and suicidal ideas.         Past Medical History  She has a past medical history of Alcoholic hepatitis without ascites (08/13/2021) and Hepatic encephalopathy (CMS/HCC) (06/09/2021).    Surgical  History  She has a past surgical history that includes Other surgical history (06/09/2021); US guided abdominal paracentesis (4/28/2021); US guided abdominal paracentesis (4/23/2021); US guided abdominal paracentesis (5/14/2021); US guided abdominal paracentesis (8/17/2023); US guided abdominal paracentesis (8/25/2023); US guided abdominal paracentesis (10/23/2023); US guided abdominal paracentesis (10/27/2023); US guided abdominal paracentesis (10/26/2023); and US guided abdominal paracentesis (1/4/2024).     Social History  She reports that she has never smoked. She has never used smokeless tobacco. She reports current alcohol use of about 1.0 standard drink of alcohol per week. No history on file for drug use.    Family History  Family History   Problem Relation Name Age of Onset    Diabetes Mother      Uterine cancer Mother      Heart attack Father      Other (CHEMICAL DEPENDENCY) Father      Hypertension Father          Current Facility-Administered Medications:     folic acid (Folvite) tablet 1 mg, 1 mg, oral, Daily, Ronn Hernandez MD, 1 mg at 01/04/24 0952    lactulose 20 gram/30 mL oral solution 30 g, 30 g, oral, BID, Ronn Hernandez MD, 30 g at 01/04/24 0951    levothyroxine (Synthroid, Levoxyl) tablet 50 mcg, 50 mcg, oral, Daily before breakfast, Ronn Hernandez MD, 50 mcg at 01/04/24 0952    LORazepam (Ativan) injection 0.5 mg, 0.5 mg, intravenous, q2h PRN **OR** LORazepam (Ativan) injection 1 mg, 1 mg, intravenous, q2h PRN **OR** LORazepam (Ativan) injection 2 mg, 2 mg, intravenous, q2h PRN, Ronn Hernandez MD    multivitamin with minerals 1 tablet, 1 tablet, oral, Daily, Ronn Hernandez MD, 1 tablet at 01/04/24 0951    pantoprazole (ProtoNix) injection 40 mg, 40 mg, intravenous, Daily before breakfast, Ronn Hernandez MD, 40 mg at 01/04/24 0951    piperacillin-tazobactam-dextrose (Zosyn) IV 3.375 g, 3.375 g, intravenous, q6h, Ronn Hernandez MD, Stopped at 01/04/24 1022    rifAXIMin (Xifaxan) tablet 550 mg, 550 mg,  oral, BID, Ronn Hernandez MD, 550 mg at 01/04/24 0951    [START ON 1/7/2024] thiamine (Vitamin B-1) tablet 100 mg, 100 mg, oral, Daily, Ronn Hernandez MD    thiamine (Vitamin B1) injection 100 mg, 100 mg, intravenous, Daily, Ronn Hernandez MD, 100 mg at 01/04/24 0951    Current Outpatient Medications:     cefuroxime (Ceftin) 500 mg tablet, Take 1 tablet (500 mg) by mouth 2 times a day., Disp: 14 tablet, Rfl: 0    folic acid (Folvite) 1 mg tablet, Take 1 tablet (1 mg) by mouth once daily., Disp: 30 tablet, Rfl: 2    furosemide (Lasix) 40 mg tablet, Take 1 tablet (40 mg) by mouth once daily., Disp: 30 tablet, Rfl: 2    lactulose 20 gram/30 mL oral solution, Take 45 mL (30 g) by mouth 2 times a day., Disp: 2000 mL, Rfl: 2    levothyroxine (Synthroid, Levoxyl) 50 mcg tablet, Take 1 tablet (50 mcg) by mouth once daily., Disp: , Rfl:     magnesium oxide (Mag-Ox) 400 mg tablet, Take 1 tablet (400 mg) by mouth once daily., Disp: 30 tablet, Rfl: 2    multivitamin (MULTIPLE VITAMINS ORAL), Take 1 tablet by mouth once daily., Disp: , Rfl:     ondansetron ODT (Zofran-ODT) 4 mg disintegrating tablet, Take 1 tablet (4 mg) by mouth every 8 hours if needed for nausea or vomiting., Disp: 20 tablet, Rfl: 2    pantoprazole (ProtoNix) 40 mg EC tablet, Take 1 tablet (40 mg) by mouth once daily., Disp: 30 tablet, Rfl: 2    potassium chloride CR (Klor-Con M20) 20 mEq ER tablet, Take 1 tablet (20 mEq) by mouth once daily. Do not crush or chew., Disp: 30 tablet, Rfl: 2    rifAXIMin (Xifaxan) 550 mg tablet, Take 1 tablet (550 mg) by mouth 2 times a day., Disp: 60 tablet, Rfl: 2    spironolactone (Aldactone) 100 mg tablet, Take 1 tablet (100 mg) by mouth once daily., Disp: 30 tablet, Rfl: 2    thiamine 100 mg tablet, Take 1 tablet (100 mg) by mouth once daily., Disp: 30 tablet, Rfl: 2   Allergies  Sulfamethoxazole-trimethoprim         Physical Exam  Physical Exam         I&O 24HR    Intake/Output Summary (Last 24 hours) at 1/4/2024 1203  Last  "data filed at 1/4/2024 0451  Gross per 24 hour   Intake 700 ml   Output --   Net 700 ml       Vitals 24HR  Heart Rate:  [66-99]   Temp:  [36.8 °C (98.2 °F)-37.2 °C (98.9 °F)]   Resp:  [11-23]   BP: ()/(48-69)   Height:  [157.5 cm (5' 2\")]   Weight:  [65.8 kg (145 lb)]   SpO2:  [87 %-100 %]     Relevant Results        Results for orders placed or performed during the hospital encounter of 01/03/24 (from the past 96 hour(s))   APTT   Result Value Ref Range    aPTT 42.7 (H) 22.0 - 32.5 seconds   Comprehensive metabolic panel   Result Value Ref Range    Glucose 141 (H) 65 - 99 mg/dL    Sodium 115 (LL) 133 - 145 mmol/L    Potassium 5.2 (H) 3.4 - 5.1 mmol/L    Chloride 83 (L) 97 - 107 mmol/L    Bicarbonate 18 (L) 24 - 31 mmol/L    Urea Nitrogen 12 8 - 25 mg/dL    Creatinine 0.70 0.40 - 1.60 mg/dL    eGFR >90 >60 mL/min/1.73m*2    Calcium 8.7 8.5 - 10.4 mg/dL    Albumin 3.4 (L) 3.5 - 5.0 g/dL    Alkaline Phosphatase 179 (H) 35 - 125 U/L    Total Protein 6.3 5.9 - 7.9 g/dL    AST 53 (H) 5 - 40 U/L    Bilirubin, Total 16.4 (H) 0.1 - 1.2 mg/dL    ALT 20 5 - 40 U/L    Anion Gap 14 <=19 mmol/L   CBC   Result Value Ref Range    WBC 15.1 (H) 4.4 - 11.3 x10*3/uL    nRBC 1.1 (H) 0.0 - 0.0 /100 WBCs    RBC 1.25 (L) 4.00 - 5.20 x10*6/uL    Hemoglobin 5.2 (LL) 12.0 - 16.0 g/dL    Hematocrit 15.5 (L) 36.0 - 46.0 %     (H) 80 - 100 fL    MCH 41.6 (H) 26.0 - 34.0 pg    MCHC 33.5 32.0 - 36.0 g/dL    RDW 18.4 (H) 11.5 - 14.5 %    Platelets 163 150 - 450 x10*3/uL   Lipase   Result Value Ref Range    Lipase 17 16 - 63 U/L   Ammonia   Result Value Ref Range    Ammonia 65 (H) 12 - 45 umol/L   BLOOD GAS LACTIC ACID, VENOUS   Result Value Ref Range    POCT Lactate, Venous 3.9 (H) 0.4 - 2.0 mmol/L   Bilirubin, Direct   Result Value Ref Range    Bilirubin, Direct 6.5 (H) 0.0 - 0.2 mg/dL   Protime-INR   Result Value Ref Range    Protime 18.7 (H) 9.3 - 12.7 seconds    INR 1.8 (H) 0.9 - 1.2   Sars-CoV-2 and Influenza A/B PCR   Result " Value Ref Range    Flu A Result Not Detected Not Detected    Flu B Result Not Detected Not Detected    Coronavirus 2019, PCR Not Detected Not Detected   Type and screen   Result Value Ref Range    ABO TYPE O     Rh TYPE POS     ANTIBODY SCREEN NEG    Alcohol   Result Value Ref Range    Alcohol 0.042 (H) 0.000 - 0.010 g/dL   hCG, quantitative, pregnancy   Result Value Ref Range    HCG, Beta-Quantitative <1 SEE COMMENT BELOW mIU/mL   Prepare RBC: 2 Units   Result Value Ref Range    PRODUCT CODE T3124X64     Unit Number [id number]     Unit ABO O     Unit RH POS     XM INTEP COMP     Dispense Status IS     Blood Expiration Date January 25, 2024 23:59 EST     PRODUCT BLOOD TYPE 5100     UNIT VOLUME 350     PRODUCT CODE E8498Z69     Unit Number [id number]     Unit ABO O     Unit RH POS     XM INTEP COMP     Dispense Status TR     Blood Expiration Date January 25, 2024 23:59 EST     PRODUCT BLOOD TYPE 5100     UNIT VOLUME 350    Blood Gas Lactic Acid, Venous   Result Value Ref Range    POCT Lactate, Venous 3.2 (H) 0.4 - 2.0 mmol/L   Occult Blood, Stool    Specimen: Stool   Result Value Ref Range    Occult Blood, Stool X1 Negative Negative   Sodium, urine, random   Result Value Ref Range    Sodium, Urine Random 20 NOT ESTABLISHED mmol/L    Creatinine, Urine Random 123.5 NOT ESTABLISHED mg/dL    Sodium/Creatinine Ratio 16 Not established. mmol/g Creat   Comprehensive Metabolic Panel   Result Value Ref Range    Glucose 102 (H) 65 - 99 mg/dL    Sodium 117 (LL) 133 - 145 mmol/L    Potassium 6.0 (H) 3.4 - 5.1 mmol/L    Chloride 85 (L) 97 - 107 mmol/L    Bicarbonate 18 (L) 24 - 31 mmol/L    Urea Nitrogen 14 8 - 25 mg/dL    Creatinine 0.70 0.40 - 1.60 mg/dL    eGFR >90 >60 mL/min/1.73m*2    Calcium 8.9 8.5 - 10.4 mg/dL    Albumin 3.7 3.5 - 5.0 g/dL    Alkaline Phosphatase 197 (H) 35 - 125 U/L    Total Protein 6.9 5.9 - 7.9 g/dL    AST 61 (H) 5 - 40 U/L    Bilirubin, Total 18.8 (H) 0.1 - 1.2 mg/dL    ALT 21 5 - 40 U/L     Anion Gap 14 <=19 mmol/L   CBC   Result Value Ref Range    WBC 15.0 (H) 4.4 - 11.3 x10*3/uL    nRBC 1.3 (H) 0.0 - 0.0 /100 WBCs    RBC 2.12 (L) 4.00 - 5.20 x10*6/uL    Hemoglobin 7.7 (L) 12.0 - 16.0 g/dL    Hematocrit 23.2 (L) 36.0 - 46.0 %     (H) 80 - 100 fL    MCH 36.3 (H) 26.0 - 34.0 pg    MCHC 33.2 32.0 - 36.0 g/dL    RDW 25.1 (H) 11.5 - 14.5 %    Platelets 130 (L) 150 - 450 x10*3/uL   Body fluid cell count   Result Value Ref Range    Color, Fluid      Clarity, Fluid      WBC, Fluid 551 See Comment /uL    RBC, Fluid 3,000 0  /uL /uL          Assessment/Plan     US guided abdominal paracentesis    Result Date: 1/4/2024  Interpreted By:  Huy Alvarez, STUDY: US GUIDED ABDOMINAL PARACENTESIS; 1/4/2024 9:25 am   INDICATION: Signs/Symptoms:ascites.   COMPARISON: None.   ACCESSION NUMBER(S): [id number]   ORDERING CLINICIAN: MATTHEW MAYORGA   TECHNIQUE: Ultrasound-guided paracentesis   FINDINGS: Informed consent obtained. Patient positioned supine. Left lower quadrant prepped, draped and anesthetized. Under ultrasound guidance, 8 Egyptian centesis sheath needle inserted into peritoneal cavity. 3.1of dark yellowfluid aspiratedwith sample sent for analysis. Patient tolerated procedure well       Ultrasound-guided paracentesis.   Signed by: Huy Alvraez 1/4/2024 10:51 AM Dictation workstation:   [id number]    CT abdomen pelvis w IV contrast    Result Date: 1/4/2024  STUDY: CT Abdomen and Pelvis with IV Contrast; 1/4/2024 1:46 AM. INDICATION: Abdominal pain. COMPARISON: US abd 10/27/2023 .  CT AP 10/21/2023 ACCESSION NUMBER(S): [id number] ORDERING CLINICIAN: JAZMINE GILLESPIE TECHNIQUE: CT of the abdomen and pelvis was performed.  Contiguous axial images were obtained at 3 mm slice thickness through the abdomen and pelvis. Coronal and sagittal reconstructions at 3 mm slice thickness were performed.  Omnipaque 350 75 mL was administered intravenously.  FINDINGS: LOWER CHEST: Cardiac size is mildly enlarged.  No  pericardial effusion.  Moderate to large left pleural effusion is noted with partial collapse of the visualized portion of the left lower lobe. There is suggestion of a right breast prosthesis partially visualized.   ABDOMEN:  LIVER: No hepatomegaly.  Liver demonstrates a markedly cirrhotic morphology with a nodular hepatic contour and hypertrophy of the left hepatic lobe.  Simple fluid density cyst is seen in the anterior segment of the right hepatic lobe measuring approximately 1 cm in diameter. Normal attenuation.  BILE DUCTS: No intrahepatic or extrahepatic biliary ductal dilatation.  GALLBLADDER: The gallbladder is distended and contains gallstones in the gallbladder neck. STOMACH: No abnormalities identified.  PANCREAS: No masses or ductal dilatation.  SPLEEN: Spleen is borderline enlarged.  No focal splenic lesion.  ADRENAL GLANDS: No thickening or nodules.  KIDNEYS AND URETERS: Kidneys are normal in size and location.  No renal or ureteral calculi.  PELVIS:  BLADDER: No abnormalities identified.  REPRODUCTIVE ORGANS: No abnormalities identified.  BOWEL: Appendix is not definitively identified.  Terminal ileum is unremarkable.  No abnormalities identified.  VESSELS: No abnormalities identified.  Abdominal aorta is normal in caliber.  PERITONEUM/RETROPERITONEUM/LYMPH NODES: Large volume of abdominal and pelvic ascites is noted.  No pneumoperitoneum. No lymphadenopathy.  ABDOMINAL WALL: No abnormalities identified. SOFT TISSUES: There is a fluid-filled small indirect right inguinal hernia.  BONES: No acute fracture or aggressive osseous lesion.    Cirrhotic morphology of the liver with a large volume of abdominal and pelvic ascites. Cholelithiasis. Fluid-filled small right inguinal hernia. Moderate to large left pleural effusion with partial collapse of the visualized portion of the left lower lobe. Borderline splenomegaly. Signed by Bartolo SKAGGS chest 1 view    Result Date: 1/3/2024  STUDY: Chest  Radiograph;  01/03/2024 at 9:13 PM INDICATION: Weakness. COMPARISON: XR chest 10/21/23, 04/25/21. ACCESSION NUMBER(S): [id number] ORDERING CLINICIAN: Nomi Anaya TECHNIQUE:  Frontal chest was obtained at 2113 hours. FINDINGS: There is dense retrocardiac consolidation in the left lower lobe, suggesting pneumonia. There is likely left pleural effusion. Heart is top normal size with central vascular prominence. There is no pneumothorax.    Retrocardiac consolidation left lower lobe suggests pneumonia, with likely left pleural effusion. Follow-up advised. Signed by Michael Lewis MD    Impression:  Hyponatremia secondary to ascites and cirrhosis of the liver and fluid overload  Hyperkalemia Fox to Aldactone and potassium supplements secondary to potassium supplements and Aldactone  Massive ascites  Cirrhosis of the liver  Hepatic encephalopathy    Recommendations:  Treat hyperkalemia soon as possible I will give 1 amp of D50 followed by 10 units of regular insulin continue lactulose and repeat potassium in 2 hours  Continue fluid restriction and paracentesis should help her sodium level  GI follow-up  Avoid rapid correction of hyponatremia  Discontinue Aldactone  Discontinue potassium supplements  Placed on furosemide 40 mg daily    Thank you for your consultation    Onur Wilkerson [facility]

## 2024-01-04 NOTE — PROGRESS NOTES
"Briseida Owen is a 48 y.o. female on day 0 of admission presenting with abdominal pain, ascites. She has a history of alcohol abuse.     Subjective   She was awake and alert, about to be taken for a paracentesis.        Objective     Physical Exam  General: awake, alert, oriented, responsive  Cardiovascular: regular, normal S1 and S2  Lungs: good air entry bilaterally, clear to auscultation  Abdomen: soft, distended, nontender, dull to percussion at the flanks, bowel sounds present, normoactive  Extremities: no peripheral cyanosis, no pedal edema  Neuro: alert, oriented x 3, no focal weakness      Last Recorded Vitals  Blood pressure 103/57, pulse 66, temperature 36.9 °C (98.4 °F), temperature source Oral, resp. rate 14, height 1.575 m (5' 2\"), weight 65.8 kg (145 lb), SpO2 100 %.  Intake/Output last 3 Shifts:  I/O last 3 completed shifts:  In: 700 (10.6 mL/kg) [Blood:350; IV Piggyback:350]  Out: - (0 mL/kg)   Weight: 65.8 kg     Relevant Results  Results for orders placed or performed during the hospital encounter of 01/03/24 (from the past 24 hour(s))   APTT   Result Value Ref Range    aPTT 42.7 (H) 22.0 - 32.5 seconds   Comprehensive metabolic panel   Result Value Ref Range    Glucose 141 (H) 65 - 99 mg/dL    Sodium 115 (LL) 133 - 145 mmol/L    Potassium 5.2 (H) 3.4 - 5.1 mmol/L    Chloride 83 (L) 97 - 107 mmol/L    Bicarbonate 18 (L) 24 - 31 mmol/L    Urea Nitrogen 12 8 - 25 mg/dL    Creatinine 0.70 0.40 - 1.60 mg/dL    eGFR >90 >60 mL/min/1.73m*2    Calcium 8.7 8.5 - 10.4 mg/dL    Albumin 3.4 (L) 3.5 - 5.0 g/dL    Alkaline Phosphatase 179 (H) 35 - 125 U/L    Total Protein 6.3 5.9 - 7.9 g/dL    AST 53 (H) 5 - 40 U/L    Bilirubin, Total 16.4 (H) 0.1 - 1.2 mg/dL    ALT 20 5 - 40 U/L    Anion Gap 14 <=19 mmol/L   CBC   Result Value Ref Range    WBC 15.1 (H) 4.4 - 11.3 x10*3/uL    nRBC 1.1 (H) 0.0 - 0.0 /100 WBCs    RBC 1.25 (L) 4.00 - 5.20 x10*6/uL    Hemoglobin 5.2 (LL) 12.0 - 16.0 g/dL    Hematocrit 15.5 (L) " 36.0 - 46.0 %     (H) 80 - 100 fL    MCH 41.6 (H) 26.0 - 34.0 pg    MCHC 33.5 32.0 - 36.0 g/dL    RDW 18.4 (H) 11.5 - 14.5 %    Platelets 163 150 - 450 x10*3/uL   Lipase   Result Value Ref Range    Lipase 17 16 - 63 U/L   Ammonia   Result Value Ref Range    Ammonia 65 (H) 12 - 45 umol/L   BLOOD GAS LACTIC ACID, VENOUS   Result Value Ref Range    POCT Lactate, Venous 3.9 (H) 0.4 - 2.0 mmol/L   Bilirubin, Direct   Result Value Ref Range    Bilirubin, Direct 6.5 (H) 0.0 - 0.2 mg/dL   Protime-INR   Result Value Ref Range    Protime 18.7 (H) 9.3 - 12.7 seconds    INR 1.8 (H) 0.9 - 1.2   Sars-CoV-2 and Influenza A/B PCR   Result Value Ref Range    Flu A Result Not Detected Not Detected    Flu B Result Not Detected Not Detected    Coronavirus 2019, PCR Not Detected Not Detected   Type and screen   Result Value Ref Range    ABO TYPE O     Rh TYPE POS     ANTIBODY SCREEN NEG    Alcohol   Result Value Ref Range    Alcohol 0.042 (H) 0.000 - 0.010 g/dL   hCG, quantitative, pregnancy   Result Value Ref Range    HCG, Beta-Quantitative <1 SEE COMMENT BELOW mIU/mL   Prepare RBC: 2 Units   Result Value Ref Range    PRODUCT CODE N0975M73     Unit Number [id number]     Unit ABO O     Unit RH POS     XM INTEP COMP     Dispense Status IS     Blood Expiration Date January 25, 2024 23:59 EST     PRODUCT BLOOD TYPE 5100     UNIT VOLUME 350     PRODUCT CODE C6736P20     Unit Number [id number]     Unit ABO O     Unit RH POS     XM INTEP COMP     Dispense Status TR     Blood Expiration Date January 25, 2024 23:59 EST     PRODUCT BLOOD TYPE 5100     UNIT VOLUME 350    Blood Gas Lactic Acid, Venous   Result Value Ref Range    POCT Lactate, Venous 3.2 (H) 0.4 - 2.0 mmol/L   Occult Blood, Stool    Specimen: Stool   Result Value Ref Range    Occult Blood, Stool X1 Negative Negative   Sodium, urine, random   Result Value Ref Range    Sodium, Urine Random 20 NOT ESTABLISHED mmol/L    Creatinine, Urine Random 123.5 NOT ESTABLISHED  mg/dL    Sodium/Creatinine Ratio 16 Not established. mmol/g Creat   Comprehensive Metabolic Panel   Result Value Ref Range    Glucose 102 (H) 65 - 99 mg/dL    Sodium 117 (LL) 133 - 145 mmol/L    Potassium 6.0 (H) 3.4 - 5.1 mmol/L    Chloride 85 (L) 97 - 107 mmol/L    Bicarbonate 18 (L) 24 - 31 mmol/L    Urea Nitrogen 14 8 - 25 mg/dL    Creatinine 0.70 0.40 - 1.60 mg/dL    eGFR >90 >60 mL/min/1.73m*2    Calcium 8.9 8.5 - 10.4 mg/dL    Albumin 3.7 3.5 - 5.0 g/dL    Alkaline Phosphatase 197 (H) 35 - 125 U/L    Total Protein 6.9 5.9 - 7.9 g/dL    AST 61 (H) 5 - 40 U/L    Bilirubin, Total 18.8 (H) 0.1 - 1.2 mg/dL    ALT 21 5 - 40 U/L    Anion Gap 14 <=19 mmol/L   CBC   Result Value Ref Range    WBC 15.0 (H) 4.4 - 11.3 x10*3/uL    nRBC 1.3 (H) 0.0 - 0.0 /100 WBCs    RBC 2.12 (L) 4.00 - 5.20 x10*6/uL    Hemoglobin 7.7 (L) 12.0 - 16.0 g/dL    Hematocrit 23.2 (L) 36.0 - 46.0 %     (H) 80 - 100 fL    MCH 36.3 (H) 26.0 - 34.0 pg    MCHC 33.2 32.0 - 36.0 g/dL    RDW 25.1 (H) 11.5 - 14.5 %    Platelets 130 (L) 150 - 450 x10*3/uL        Scheduled medications  folic acid, 1 mg, oral, Daily  lactulose, 30 g, oral, BID  levothyroxine, 50 mcg, oral, Daily before breakfast  multivitamin with minerals, 1 tablet, oral, Daily  pantoprazole, 40 mg, intravenous, Daily before breakfast  piperacillin-tazobactam, 3.375 g, intravenous, q6h  rifAXIMin, 550 mg, oral, BID  [START ON 1/7/2024] thiamine, 100 mg, oral, Daily  thiamine, 100 mg, intravenous, Daily      Continuous medications     PRN medications  PRN medications: LORazepam **OR** LORazepam **OR** LORazepam        Assessment/Plan   Alcoholic cirrhosis of the liver with ascites  Paracentesis to be done by IR. She is being covered with IV zosyn. GI on consult.     Hyponatremia  Holding furosemide, spironolactone  Await nephrology input    Left pleural effusion  Pulmonary medicine consult requested    Anemia  Transfused 2 units of PRBCs    Alcohol abuse  On thiamine, folate  CIWA  protocol    Elevated ammonia  Awake, responsive. On lactulose, rifaximin          Sierra Unger MD

## 2024-01-04 NOTE — CARE PLAN
The clinical goals for the shift include Remain pain free and comfortable      Problem: Fall/Injury  Goal: Not fall by end of shift  Outcome: Progressing  Goal: Be free from injury by end of the shift  Outcome: Progressing  Goal: Verbalize understanding of personal risk factors for fall in the hospital  Outcome: Progressing  Goal: Verbalize understanding of risk factor reduction measures to prevent injury from fall in the home  Outcome: Progressing  Goal: Use assistive devices by end of the shift  Outcome: Progressing  Goal: Pace activities to prevent fatigue by end of the shift  Outcome: Progressing     Problem: Pain  Goal: My pain/discomfort is manageable  Outcome: Progressing     Problem: Safety  Goal: Patient will be injury free during hospitalization  Outcome: Progressing  Goal: I will remain free of falls  Outcome: Progressing     Problem: Daily Care  Goal: Daily care needs are met  Outcome: Progressing     Problem: Psychosocial Needs  Goal: Demonstrates ability to cope with hospitalization/illness  Outcome: Progressing  Goal: Collaborate with me, my family, and caregiver to identify my specific goals  Outcome: Progressing  Flowsheets (Taken 1/4/2024 1330)  Cultural Requests During Hospitalization: N/A  Spiritual Requests During Hospitalization: N/A     Problem: Discharge Barriers  Goal: My discharge needs are met  Outcome: Progressing     Problem: Skin  Goal: Decreased wound size/increased tissue granulation at next dressing change  Outcome: Progressing  Flowsheets (Taken 1/4/2024 1717)  Decreased wound size/increased tissue granulation at next dressing change: Protective dressings over bony prominences  Goal: Participates in plan/prevention/treatment measures  Outcome: Progressing  Flowsheets (Taken 1/4/2024 1717)  Participates in plan/prevention/treatment measures:   Elevate heels   Discuss with provider PT/OT consult   Increase activity/out of bed for meals  Goal: Prevent/manage excess moisture  Outcome:  Progressing  Flowsheets (Taken 1/4/2024 1717)  Prevent/manage excess moisture:   Cleanse incontinence/protect with barrier cream   Moisturize dry skin  Goal: Prevent/minimize sheer/friction injuries  Outcome: Progressing  Flowsheets (Taken 1/4/2024 1717)  Prevent/minimize sheer/friction injuries:   Complete micro-shifts as needed if patient unable. Adjust patient position to relieve pressure points, not a full turn   Increase activity/out of bed for meals   Use pull sheet   HOB 30 degrees or less   Turn/reposition every 2 hours/use positioning/transfer devices  Goal: Promote/optimize nutrition  Outcome: Progressing  Flowsheets (Taken 1/4/2024 1717)  Promote/optimize nutrition:   Monitor/record intake including meals   Consume > 50% meals/supplements   Offer water/supplements/favorite foods   Discuss with provider if NPO > 2 days  Goal: Promote skin healing  Outcome: Progressing  Flowsheets (Taken 1/4/2024 1717)  Promote skin healing:   Assess skin/pad under line(s)/device(s)   Protective dressings over bony prominences   Turn/reposition every 2 hours/use positioning/transfer devices   Rotate device position/do not position patient on device   Ensure correct size (line/device) and apply per  instructions

## 2024-01-04 NOTE — CONSULTS
Consults    Reason For Consult  Cirrhosis, Anemia    History Of Present Illness  Briseida Owen is a 48 y.o. female presenting with altered mental status and ascites. She is well-known by our group for many admissions related to chronic alcohol abuse. On last admission, she had signs of true liver failure. We even discussed palliative care with [facility] if she wanted to keep drinking. She arrived with blood alcohol 0.04 today. Na 115. INR 1.8. Found evidence large ascites on imaging. Macrocytic anemia with hgb 5.2. She has chronic macrocytic anemia require multiple blood transfusions and EGDs as well. Denies current dark, tarry, bloody stools      Past Medical History  She has a past medical history of Alcoholic hepatitis without ascites (08/13/2021) and Hepatic encephalopathy (CMS/HCC) (06/09/2021).    Surgical History  She has a past surgical history that includes Other surgical history (06/09/2021); US guided abdominal paracentesis (4/28/2021); US guided abdominal paracentesis (4/23/2021); US guided abdominal paracentesis (5/14/2021); US guided abdominal paracentesis (8/17/2023); US guided abdominal paracentesis (8/25/2023); US guided abdominal paracentesis (10/23/2023); US guided abdominal paracentesis (10/27/2023); and US guided abdominal paracentesis (10/26/2023).     Social History  She reports that she has never smoked. She has never used smokeless tobacco. She reports current alcohol use of about 1.0 standard drink of alcohol per week. No history on file for drug use.    Family History  Family History   Problem Relation Name Age of Onset    Diabetes Mother      Uterine cancer Mother      Heart attack Father      Other (CHEMICAL DEPENDENCY) Father      Hypertension Father          Allergies  Sulfamethoxazole-trimethoprim    Review of Systems   Constitutional:  Positive for fatigue.   Gastrointestinal:  Positive for abdominal distention, abdominal pain and diarrhea.   Neurological:  Positive for weakness.  "       Physical Exam  Constitutional:       Comments: Chronically ill appearing    HENT:      Head: Normocephalic and atraumatic.      Mouth/Throat:      Mouth: Mucous membranes are moist.   Pulmonary:      Effort: Pulmonary effort is normal.   Abdominal:      General: There is no distension.      Palpations: Abdomen is soft.      Tenderness: There is abdominal tenderness. There is no guarding.   Musculoskeletal:         General: Normal range of motion.      Cervical back: Normal range of motion.   Skin:     General: Skin is warm and dry.      Coloration: Skin is jaundiced and pale.   Neurological:      General: No focal deficit present.      Mental Status: She is alert. Mental status is at baseline.   Psychiatric:         Mood and Affect: Mood normal.          Last Recorded Vitals  Blood pressure 108/66, pulse 68, temperature 36.9 °C (98.4 °F), temperature source Oral, resp. rate 16, height 1.575 m (5' 2\"), weight 65.8 kg (145 lb), SpO2 99 %.    Relevant Results  Results for orders placed or performed during the hospital encounter of 01/03/24 (from the past 24 hour(s))   APTT   Result Value Ref Range    aPTT 42.7 (H) 22.0 - 32.5 seconds   Comprehensive metabolic panel   Result Value Ref Range    Glucose 141 (H) 65 - 99 mg/dL    Sodium 115 (LL) 133 - 145 mmol/L    Potassium 5.2 (H) 3.4 - 5.1 mmol/L    Chloride 83 (L) 97 - 107 mmol/L    Bicarbonate 18 (L) 24 - 31 mmol/L    Urea Nitrogen 12 8 - 25 mg/dL    Creatinine 0.70 0.40 - 1.60 mg/dL    eGFR >90 >60 mL/min/1.73m*2    Calcium 8.7 8.5 - 10.4 mg/dL    Albumin 3.4 (L) 3.5 - 5.0 g/dL    Alkaline Phosphatase 179 (H) 35 - 125 U/L    Total Protein 6.3 5.9 - 7.9 g/dL    AST 53 (H) 5 - 40 U/L    Bilirubin, Total 16.4 (H) 0.1 - 1.2 mg/dL    ALT 20 5 - 40 U/L    Anion Gap 14 <=19 mmol/L   CBC   Result Value Ref Range    WBC 15.1 (H) 4.4 - 11.3 x10*3/uL    nRBC 1.1 (H) 0.0 - 0.0 /100 WBCs    RBC 1.25 (L) 4.00 - 5.20 x10*6/uL    Hemoglobin 5.2 (LL) 12.0 - 16.0 g/dL    " Hematocrit 15.5 (L) 36.0 - 46.0 %     (H) 80 - 100 fL    MCH 41.6 (H) 26.0 - 34.0 pg    MCHC 33.5 32.0 - 36.0 g/dL    RDW 18.4 (H) 11.5 - 14.5 %    Platelets 163 150 - 450 x10*3/uL   Lipase   Result Value Ref Range    Lipase 17 16 - 63 U/L   Ammonia   Result Value Ref Range    Ammonia 65 (H) 12 - 45 umol/L   BLOOD GAS LACTIC ACID, VENOUS   Result Value Ref Range    POCT Lactate, Venous 3.9 (H) 0.4 - 2.0 mmol/L   Bilirubin, Direct   Result Value Ref Range    Bilirubin, Direct 6.5 (H) 0.0 - 0.2 mg/dL   Protime-INR   Result Value Ref Range    Protime 18.7 (H) 9.3 - 12.7 seconds    INR 1.8 (H) 0.9 - 1.2   Sars-CoV-2 and Influenza A/B PCR   Result Value Ref Range    Flu A Result Not Detected Not Detected    Flu B Result Not Detected Not Detected    Coronavirus 2019, PCR Not Detected Not Detected   Type and screen   Result Value Ref Range    ABO TYPE O     Rh TYPE POS     ANTIBODY SCREEN NEG    Alcohol   Result Value Ref Range    Alcohol 0.042 (H) 0.000 - 0.010 g/dL   hCG, quantitative, pregnancy   Result Value Ref Range    HCG, Beta-Quantitative <1 SEE COMMENT BELOW mIU/mL   Prepare RBC: 2 Units   Result Value Ref Range    PRODUCT CODE Y1962L06     Unit Number [id number]     Unit ABO O     Unit RH POS     XM INTEP COMP     Dispense Status IS     Blood Expiration Date January 25, 2024 23:59 EST     PRODUCT BLOOD TYPE 5100     UNIT VOLUME 350     PRODUCT CODE G7183Z46     Unit Number [id number]     Unit ABO O     Unit RH POS     XM INTEP COMP     Dispense Status TR     Blood Expiration Date January 25, 2024 23:59 EST     PRODUCT BLOOD TYPE 5100     UNIT VOLUME 350    Blood Gas Lactic Acid, Venous   Result Value Ref Range    POCT Lactate, Venous 3.2 (H) 0.4 - 2.0 mmol/L   Occult Blood, Stool    Specimen: Stool   Result Value Ref Range    Occult Blood, Stool X1 Negative Negative   Sodium, urine, random   Result Value Ref Range    Sodium, Urine Random 20 NOT ESTABLISHED mmol/L    Creatinine, Urine Random  123.5 NOT ESTABLISHED mg/dL    Sodium/Creatinine Ratio 16 Not established. mmol/g Creat   Comprehensive Metabolic Panel   Result Value Ref Range    Glucose 102 (H) 65 - 99 mg/dL    Sodium 117 (LL) 133 - 145 mmol/L    Potassium 6.0 (H) 3.4 - 5.1 mmol/L    Chloride 85 (L) 97 - 107 mmol/L    Bicarbonate 18 (L) 24 - 31 mmol/L    Urea Nitrogen 14 8 - 25 mg/dL    Creatinine 0.70 0.40 - 1.60 mg/dL    eGFR >90 >60 mL/min/1.73m*2    Calcium 8.9 8.5 - 10.4 mg/dL    Albumin 3.7 3.5 - 5.0 g/dL    Alkaline Phosphatase 197 (H) 35 - 125 U/L    Total Protein 6.9 5.9 - 7.9 g/dL    AST 61 (H) 5 - 40 U/L    Bilirubin, Total 18.8 (H) 0.1 - 1.2 mg/dL    ALT 21 5 - 40 U/L    Anion Gap 14 <=19 mmol/L   CBC   Result Value Ref Range    WBC 15.0 (H) 4.4 - 11.3 x10*3/uL    nRBC 1.3 (H) 0.0 - 0.0 /100 WBCs    RBC 2.12 (L) 4.00 - 5.20 x10*6/uL    Hemoglobin 7.7 (L) 12.0 - 16.0 g/dL    Hematocrit 23.2 (L) 36.0 - 46.0 %     (H) 80 - 100 fL    MCH 36.3 (H) 26.0 - 34.0 pg    MCHC 33.2 32.0 - 36.0 g/dL    RDW 25.1 (H) 11.5 - 14.5 %    Platelets 130 (L) 150 - 450 x10*3/uL     CT abdomen pelvis w IV contrast    Result Date: 1/4/2024  STUDY: CT Abdomen and Pelvis with IV Contrast; 1/4/2024 1:46 AM. INDICATION: Abdominal pain. COMPARISON: US abd 10/27/2023 .  CT AP 10/21/2023 ACCESSION NUMBER(S): [id number] ORDERING CLINICIAN: JAZMINE GILLESPIE TECHNIQUE: CT of the abdomen and pelvis was performed.  Contiguous axial images were obtained at 3 mm slice thickness through the abdomen and pelvis. Coronal and sagittal reconstructions at 3 mm slice thickness were performed.  Omnipaque 350 75 mL was administered intravenously.  FINDINGS: LOWER CHEST: Cardiac size is mildly enlarged.  No pericardial effusion.  Moderate to large left pleural effusion is noted with partial collapse of the visualized portion of the left lower lobe. There is suggestion of a right breast prosthesis partially visualized.   ABDOMEN:  LIVER: No hepatomegaly.  Liver demonstrates a  markedly cirrhotic morphology with a nodular hepatic contour and hypertrophy of the left hepatic lobe.  Simple fluid density cyst is seen in the anterior segment of the right hepatic lobe measuring approximately 1 cm in diameter. Normal attenuation.  BILE DUCTS: No intrahepatic or extrahepatic biliary ductal dilatation.  GALLBLADDER: The gallbladder is distended and contains gallstones in the gallbladder neck. STOMACH: No abnormalities identified.  PANCREAS: No masses or ductal dilatation.  SPLEEN: Spleen is borderline enlarged.  No focal splenic lesion.  ADRENAL GLANDS: No thickening or nodules.  KIDNEYS AND URETERS: Kidneys are normal in size and location.  No renal or ureteral calculi.  PELVIS:  BLADDER: No abnormalities identified.  REPRODUCTIVE ORGANS: No abnormalities identified.  BOWEL: Appendix is not definitively identified.  Terminal ileum is unremarkable.  No abnormalities identified.  VESSELS: No abnormalities identified.  Abdominal aorta is normal in caliber.  PERITONEUM/RETROPERITONEUM/LYMPH NODES: Large volume of abdominal and pelvic ascites is noted.  No pneumoperitoneum. No lymphadenopathy.  ABDOMINAL WALL: No abnormalities identified. SOFT TISSUES: There is a fluid-filled small indirect right inguinal hernia.  BONES: No acute fracture or aggressive osseous lesion.    Cirrhotic morphology of the liver with a large volume of abdominal and pelvic ascites. Cholelithiasis. Fluid-filled small right inguinal hernia. Moderate to large left pleural effusion with partial collapse of the visualized portion of the left lower lobe. Borderline splenomegaly. Signed by Bartolo Torres    XR chest 1 view    Result Date: 1/3/2024  STUDY: Chest Radiograph;  01/03/2024 at 9:13 PM INDICATION: Weakness. COMPARISON: XR chest 10/21/23, 04/25/21. ACCESSION NUMBER(S): [id number] ORDERING CLINICIAN: Nomi Anaya TECHNIQUE:  Frontal chest was obtained at 2113 hours. FINDINGS: There is dense retrocardiac consolidation  in the left lower lobe, suggesting pneumonia. There is likely left pleural effusion. Heart is top normal size with central vascular prominence. There is no pneumothorax.    Retrocardiac consolidation left lower lobe suggests pneumonia, with likely left pleural effusion. Follow-up advised. Signed by Michael Lewis MD        Assessment/Plan   )  Alcoholic Cirrhosis of Liver (Decomp in setting chronic alcohol abuse, still drinking, high MELD score of 32)   HE: Continue Lactulose, Xifaxin. Goal for 2-3 stools daily. Monitor given low Na          EV: June 2023 Dr Yepez, grade II, banded. Started on nadolol. No overt bleeding per patient hx. Once clinically improved with electrolyte stablization, we can consider inpatient repeat EGD        HCC: AFP up to date         Ascites: Chronic recurrent paracenteses, currently very hyponatemic and hypotensive. Will await overall clinical improvement prior to large volume para   -Diuretics per renal   -Low Na diet today      2.    Anemia         Macrocytic anemia. Stable HH. No overt bleeding          - monitor H/H q 6 for a goal >7         - continue PPI   Will consider repeat EGD once clinically improved      3.    Alcohol Abuse,          -Still drinking. Educated on her high risk of death with decomp cirrhosis and ETOH abuse     I spent 30 minutes in the professional and overall care of this patient.

## 2024-01-04 NOTE — H&P
History Of Present Illness 45-minute critical care evaluation  Briseida Owen is a 48 y.o. female presenting with abdominal pain.    Woman is a well-established alcoholic who continues to drink.  She has cirrhosis.  She has been hospitalized in the past for ascites and hepatic encephalopathy.  She comes in with worsening abdominal distention and pain.  Cannot provide much else in terms of history.  She denies melena hematemesis hematochezia.     Past Medical History  She has a past medical history of Alcoholic hepatitis without ascites (08/13/2021) and Hepatic encephalopathy (CMS/HCC) (06/09/2021).    Surgical History  She has a past surgical history that includes Other surgical history (06/09/2021); US guided abdominal paracentesis (4/28/2021); US guided abdominal paracentesis (4/23/2021); US guided abdominal paracentesis (5/14/2021); US guided abdominal paracentesis (8/17/2023); US guided abdominal paracentesis (8/25/2023); US guided abdominal paracentesis (10/23/2023); US guided abdominal paracentesis (10/27/2023); and US guided abdominal paracentesis (10/26/2023).     Social History  She reports that she has never smoked. She has never used smokeless tobacco. She reports current alcohol use of about 1.0 standard drink of alcohol per week. No history on file for drug use.    Family History  Family History   Problem Relation Name Age of Onset    Diabetes Mother      Uterine cancer Mother      Heart attack Father      Other (CHEMICAL DEPENDENCY) Father      Hypertension Father          Allergies  Sulfamethoxazole-trimethoprim    Review of Systems     Physical Exam groggy  Heart regular rate and rhythm  Lungs clear to auscultation  Abdomen distended with dull flanks not particularly tender  Extremities no significant edema     Last Recorded Vitals  /58   Pulse 80   Temp 36.9 °C (98.4 °F) (Oral)   Resp 14   Wt 65.8 kg (145 lb)   SpO2 99%     Relevant Results  CT of her abdomen shows large ascites a  collapsed left lower lobe with a large left-sided effusion.      White count 15 hemoglobin 5.2 PT/INR 1.8 bilirubin 16 sodium 115.     Assessment/Plan   Anemia-probably some combination of GI blood loss and bone marrow suppression.  She is getting transfused 2 units.  Will check labs later in the morning.  Consulting GI.  No blood thinners.  IV Protonix.    Hyponatremia-related to her alcohol intake her cirrhosis and her usage of diuretics.  Will hold the diuretics.  1500 cc fluid restriction.  Consult nephrology.  She will need to stay in the ICU for now.  Recheck labs in the morning    Ascites-will treat for peritonitis with Zosyn.  Ordering an interventional radiology paracentesis.  Consulting GI.  Holding diuretics for now.    Left pleural effusion--covering for pneumonia with Zosyn.  Consult pulmonary.    Alcoholism-ordering IV thiamine CIWA protocol.  She does not seem to have an acute hepatitis.  She does have some degree of liver failure.  Consulting GI.  Ordering lactulose and Xifaxan which she is supposed to take at home for hepatic encephalopathy.      Spent 45 minutes evaluating and treating this critically ill patient    Ronn Hernandez MD

## 2024-01-05 ENCOUNTER — APPOINTMENT (OUTPATIENT)
Dept: RADIOLOGY | Facility: HOSPITAL | Age: 49
DRG: 433 | End: 2024-01-05
Payer: MEDICARE

## 2024-01-05 LAB
ALBUMIN SERPL-MCNC: 3 G/DL (ref 3.5–5)
ALP BLD-CCNC: 160 U/L (ref 35–125)
ALT SERPL-CCNC: 15 U/L (ref 5–40)
AMMONIA PLAS-SCNC: 104 UMOL/L (ref 12–45)
ANION GAP SERPL CALC-SCNC: 11 MMOL/L
ANION GAP SERPL CALC-SCNC: 12 MMOL/L
ANION GAP SERPL CALC-SCNC: 13 MMOL/L
ANION GAP SERPL CALC-SCNC: 15 MMOL/L
ANION GAP SERPL CALC-SCNC: 15 MMOL/L
AST SERPL-CCNC: 47 U/L (ref 5–40)
BASOPHILS # BLD AUTO: 0.04 X10*3/UL (ref 0–0.1)
BASOPHILS NFR BLD AUTO: 0.3 %
BILIRUB DIRECT SERPL-MCNC: 7.3 MG/DL (ref 0–0.2)
BILIRUB SERPL-MCNC: 15.8 MG/DL (ref 0.1–1.2)
BLOOD EXPIRATION DATE: NORMAL
BUN SERPL-MCNC: 18 MG/DL (ref 8–25)
BUN SERPL-MCNC: 18 MG/DL (ref 8–25)
BUN SERPL-MCNC: 19 MG/DL (ref 8–25)
CALCIUM SERPL-MCNC: 8.4 MG/DL (ref 8.5–10.4)
CALCIUM SERPL-MCNC: 8.5 MG/DL (ref 8.5–10.4)
CALCIUM SERPL-MCNC: 8.5 MG/DL (ref 8.5–10.4)
CALCIUM SERPL-MCNC: 8.8 MG/DL (ref 8.5–10.4)
CALCIUM SERPL-MCNC: 8.9 MG/DL (ref 8.5–10.4)
CHLORIDE SERPL-SCNC: 85 MMOL/L (ref 97–107)
CHLORIDE SERPL-SCNC: 85 MMOL/L (ref 97–107)
CHLORIDE SERPL-SCNC: 87 MMOL/L (ref 97–107)
CHLORIDE SERPL-SCNC: 87 MMOL/L (ref 97–107)
CHLORIDE SERPL-SCNC: 90 MMOL/L (ref 97–107)
CO2 SERPL-SCNC: 20 MMOL/L (ref 24–31)
CO2 SERPL-SCNC: 21 MMOL/L (ref 24–31)
CO2 SERPL-SCNC: 22 MMOL/L (ref 24–31)
CREAT SERPL-MCNC: 0.9 MG/DL (ref 0.4–1.6)
CREAT SERPL-MCNC: 1 MG/DL (ref 0.4–1.6)
CREAT SERPL-MCNC: 1 MG/DL (ref 0.4–1.6)
DISPENSE STATUS: NORMAL
EOSINOPHIL # BLD AUTO: 0.13 X10*3/UL (ref 0–0.7)
EOSINOPHIL NFR BLD AUTO: 1 %
ERYTHROCYTE [DISTWIDTH] IN BLOOD BY AUTOMATED COUNT: 26.1 % (ref 11.5–14.5)
GFR SERPL CREATININE-BSD FRML MDRD: 71 ML/MIN/1.73M*2
GFR SERPL CREATININE-BSD FRML MDRD: 71 ML/MIN/1.73M*2
GFR SERPL CREATININE-BSD FRML MDRD: 80 ML/MIN/1.73M*2
GLUCOSE SERPL-MCNC: 111 MG/DL (ref 65–99)
GLUCOSE SERPL-MCNC: 112 MG/DL (ref 65–99)
GLUCOSE SERPL-MCNC: 113 MG/DL (ref 65–99)
GLUCOSE SERPL-MCNC: 113 MG/DL (ref 65–99)
GLUCOSE SERPL-MCNC: 117 MG/DL (ref 65–99)
HAPTOGLOB SERPL-MCNC: <10 MG/DL (ref 37–246)
HCT VFR BLD AUTO: 19.9 % (ref 36–46)
HGB BLD-MCNC: 6.8 G/DL (ref 12–16)
IMM GRANULOCYTES # BLD AUTO: 0.7 X10*3/UL (ref 0–0.7)
IMM GRANULOCYTES NFR BLD AUTO: 5.5 % (ref 0–0.9)
LYMPHOCYTES # BLD AUTO: 1.44 X10*3/UL (ref 1.2–4.8)
LYMPHOCYTES NFR BLD AUTO: 11.3 %
MCH RBC QN AUTO: 36.6 PG (ref 26–34)
MCHC RBC AUTO-ENTMCNC: 34.2 G/DL (ref 32–36)
MCV RBC AUTO: 107 FL (ref 80–100)
MONOCYTES # BLD AUTO: 1.09 X10*3/UL (ref 0.1–1)
MONOCYTES NFR BLD AUTO: 8.6 %
NEUTROPHILS # BLD AUTO: 9.33 X10*3/UL (ref 1.2–7.7)
NEUTROPHILS NFR BLD AUTO: 73.3 %
NRBC BLD-RTO: 0.8 /100 WBCS (ref 0–0)
PATH REVIEW-CELL CT,FLUID: NORMAL
PLATELET # BLD AUTO: 115 X10*3/UL (ref 150–450)
POTASSIUM SERPL-SCNC: 4.5 MMOL/L (ref 3.4–5.1)
POTASSIUM SERPL-SCNC: 4.7 MMOL/L (ref 3.4–5.1)
POTASSIUM SERPL-SCNC: 5 MMOL/L (ref 3.4–5.1)
POTASSIUM SERPL-SCNC: 5 MMOL/L (ref 3.4–5.1)
POTASSIUM SERPL-SCNC: 5.1 MMOL/L (ref 3.4–5.1)
PRODUCT BLOOD TYPE: 5100
PRODUCT CODE: NORMAL
PROT SERPL-MCNC: 5.4 G/DL (ref 5.9–7.9)
RBC # BLD AUTO: 1.86 X10*6/UL (ref 4–5.2)
SODIUM SERPL-SCNC: 118 MMOL/L (ref 133–145)
SODIUM SERPL-SCNC: 120 MMOL/L (ref 133–145)
SODIUM SERPL-SCNC: 125 MMOL/L (ref 133–145)
UNIT ABO: NORMAL
UNIT NUMBER: NORMAL
UNIT RH: NORMAL
UNIT VOLUME: 350
UNIT VOLUME: 350
UNIT VOLUME: 400
VIT B12 SERPL-MCNC: 1136 PG/ML (ref 211–946)
WBC # BLD AUTO: 12.7 X10*3/UL (ref 4.4–11.3)
XM INTEP: NORMAL

## 2024-01-05 PROCEDURE — 36415 COLL VENOUS BLD VENIPUNCTURE: CPT | Performed by: INTERNAL MEDICINE

## 2024-01-05 PROCEDURE — 71045 X-RAY EXAM CHEST 1 VIEW: CPT

## 2024-01-05 PROCEDURE — 80048 BASIC METABOLIC PNL TOTAL CA: CPT | Mod: CCI | Performed by: INTERNAL MEDICINE

## 2024-01-05 PROCEDURE — 2500000004 HC RX 250 GENERAL PHARMACY W/ HCPCS (ALT 636 FOR OP/ED): Performed by: INTERNAL MEDICINE

## 2024-01-05 PROCEDURE — 83010 ASSAY OF HAPTOGLOBIN QUANT: CPT | Performed by: INTERNAL MEDICINE

## 2024-01-05 PROCEDURE — 37799 UNLISTED PX VASCULAR SURGERY: CPT | Performed by: INTERNAL MEDICINE

## 2024-01-05 PROCEDURE — 2060000001 HC INTERMEDIATE ICU ROOM DAILY

## 2024-01-05 PROCEDURE — 2500000001 HC RX 250 WO HCPCS SELF ADMINISTERED DRUGS (ALT 637 FOR MEDICARE OP): Performed by: INTERNAL MEDICINE

## 2024-01-05 PROCEDURE — 82248 BILIRUBIN DIRECT: CPT | Performed by: INTERNAL MEDICINE

## 2024-01-05 PROCEDURE — 82140 ASSAY OF AMMONIA: CPT | Performed by: INTERNAL MEDICINE

## 2024-01-05 PROCEDURE — P9016 RBC LEUKOCYTES REDUCED: HCPCS

## 2024-01-05 PROCEDURE — 80048 BASIC METABOLIC PNL TOTAL CA: CPT | Performed by: INTERNAL MEDICINE

## 2024-01-05 PROCEDURE — 94762 N-INVAS EAR/PLS OXIMTRY CONT: CPT

## 2024-01-05 PROCEDURE — 36430 TRANSFUSION BLD/BLD COMPNT: CPT

## 2024-01-05 PROCEDURE — 85025 COMPLETE CBC W/AUTO DIFF WBC: CPT | Performed by: INTERNAL MEDICINE

## 2024-01-05 PROCEDURE — 82607 VITAMIN B-12: CPT | Performed by: INTERNAL MEDICINE

## 2024-01-05 RX ORDER — FUROSEMIDE 40 MG/1
40 TABLET ORAL DAILY
Status: DISCONTINUED | OUTPATIENT
Start: 2024-01-05 | End: 2024-01-05

## 2024-01-05 RX ADMIN — LACTULOSE 30 G: 20 SOLUTION ORAL at 09:48

## 2024-01-05 RX ADMIN — PIPERACILLIN SODIUM AND TAZOBACTAM SODIUM 3.38 G: 3; .375 INJECTION, SOLUTION INTRAVENOUS at 04:04

## 2024-01-05 RX ADMIN — PANTOPRAZOLE SODIUM 40 MG: 40 TABLET, DELAYED RELEASE ORAL at 09:48

## 2024-01-05 RX ADMIN — RIFAXIMIN 550 MG: 550 TABLET ORAL at 09:48

## 2024-01-05 RX ADMIN — PIPERACILLIN SODIUM AND TAZOBACTAM SODIUM 3.38 G: 3; .375 INJECTION, SOLUTION INTRAVENOUS at 09:48

## 2024-01-05 RX ADMIN — THIAMINE HYDROCHLORIDE 100 MG: 100 INJECTION, SOLUTION INTRAMUSCULAR; INTRAVENOUS at 09:48

## 2024-01-05 RX ADMIN — LACTULOSE 30 G: 20 SOLUTION ORAL at 21:40

## 2024-01-05 RX ADMIN — FUROSEMIDE 40 MG: 40 TABLET ORAL at 09:48

## 2024-01-05 RX ADMIN — RIFAXIMIN 550 MG: 550 TABLET ORAL at 21:41

## 2024-01-05 RX ADMIN — PIPERACILLIN SODIUM AND TAZOBACTAM SODIUM 3.38 G: 3; .375 INJECTION, SOLUTION INTRAVENOUS at 16:54

## 2024-01-05 RX ADMIN — Medication 400 MG: at 09:48

## 2024-01-05 RX ADMIN — MULTIVITAMIN TABLET 1 TABLET: TABLET at 09:48

## 2024-01-05 RX ADMIN — FOLIC ACID 1 MG: 1 TABLET ORAL at 09:48

## 2024-01-05 RX ADMIN — PIPERACILLIN SODIUM AND TAZOBACTAM SODIUM 3.38 G: 3; .375 INJECTION, SOLUTION INTRAVENOUS at 21:41

## 2024-01-05 RX ADMIN — LEVOTHYROXINE SODIUM 50 MCG: 0.05 TABLET ORAL at 07:57

## 2024-01-05 ASSESSMENT — LIFESTYLE VARIABLES
PAROXYSMAL SWEATS: NO SWEAT VISIBLE
PAROXYSMAL SWEATS: NO SWEAT VISIBLE
VISUAL DISTURBANCES: NOT PRESENT
AGITATION: NORMAL ACTIVITY
HEADACHE, FULLNESS IN HEAD: NOT PRESENT
VISUAL DISTURBANCES: NOT PRESENT
ORIENTATION AND CLOUDING OF SENSORIUM: ORIENTED AND CAN DO SERIAL ADDITIONS
NAUSEA AND VOMITING: NO NAUSEA AND NO VOMITING
ORIENTATION AND CLOUDING OF SENSORIUM: ORIENTED AND CAN DO SERIAL ADDITIONS
BLOOD PRESSURE: 96/53
ANXIETY: MILDLY ANXIOUS
AGITATION: NORMAL ACTIVITY
PULSE: 74
TOTAL SCORE: 0
HEADACHE, FULLNESS IN HEAD: NOT PRESENT
AUDITORY DISTURBANCES: NOT PRESENT
PAROXYSMAL SWEATS: NO SWEAT VISIBLE
AUDITORY DISTURBANCES: NOT PRESENT
PAROXYSMAL SWEATS: NO SWEAT VISIBLE
HEADACHE, FULLNESS IN HEAD: NOT PRESENT
BLOOD PRESSURE: 93/48
TOTAL SCORE: 0
PULSE: 74
ORIENTATION AND CLOUDING OF SENSORIUM: ORIENTED AND CAN DO SERIAL ADDITIONS
TREMOR: NO TREMOR
ANXIETY: NO ANXIETY, AT EASE
HEADACHE, FULLNESS IN HEAD: NOT PRESENT
ORIENTATION AND CLOUDING OF SENSORIUM: ORIENTED AND CAN DO SERIAL ADDITIONS
AGITATION: NORMAL ACTIVITY
ORIENTATION AND CLOUDING OF SENSORIUM: ORIENTED AND CAN DO SERIAL ADDITIONS
TOTAL SCORE: 0
NAUSEA AND VOMITING: NO NAUSEA AND NO VOMITING
TREMOR: NO TREMOR
PAROXYSMAL SWEATS: NO SWEAT VISIBLE
BLOOD PRESSURE: 91/53
VISUAL DISTURBANCES: NOT PRESENT
AUDITORY DISTURBANCES: NOT PRESENT
TOTAL SCORE: 1
TOTAL SCORE: 1
AGITATION: NORMAL ACTIVITY
HEADACHE, FULLNESS IN HEAD: NOT PRESENT
ORIENTATION AND CLOUDING OF SENSORIUM: ORIENTED AND CAN DO SERIAL ADDITIONS
TREMOR: NO TREMOR
VISUAL DISTURBANCES: NOT PRESENT
ANXIETY: NO ANXIETY, AT EASE
PULSE: 74
TREMOR: NO TREMOR
ORIENTATION AND CLOUDING OF SENSORIUM: ORIENTED AND CAN DO SERIAL ADDITIONS
NAUSEA AND VOMITING: NO NAUSEA AND NO VOMITING
AUDITORY DISTURBANCES: NOT PRESENT
TREMOR: NO TREMOR
VISUAL DISTURBANCES: NOT PRESENT
AGITATION: NORMAL ACTIVITY
TREMOR: NO TREMOR
AGITATION: NORMAL ACTIVITY
VISUAL DISTURBANCES: NOT PRESENT
PULSE: 77
BLOOD PRESSURE: 100/54
NAUSEA AND VOMITING: NO NAUSEA AND NO VOMITING
ORIENTATION AND CLOUDING OF SENSORIUM: ORIENTED AND CAN DO SERIAL ADDITIONS
TOTAL SCORE: 0
VISUAL DISTURBANCES: NOT PRESENT
ANXIETY: NO ANXIETY, AT EASE
AGITATION: NORMAL ACTIVITY
TOTAL SCORE: 0
NAUSEA AND VOMITING: NO NAUSEA AND NO VOMITING
HEADACHE, FULLNESS IN HEAD: NOT PRESENT
ANXIETY: NO ANXIETY, AT EASE
NAUSEA AND VOMITING: NO NAUSEA AND NO VOMITING
NAUSEA AND VOMITING: NO NAUSEA AND NO VOMITING
BLOOD PRESSURE: 91/53
AGITATION: NORMAL ACTIVITY
NAUSEA AND VOMITING: NO NAUSEA AND NO VOMITING
HEADACHE, FULLNESS IN HEAD: NOT PRESENT
TOTAL SCORE: 0
PULSE: 80
ANXIETY: MILDLY ANXIOUS
PAROXYSMAL SWEATS: NO SWEAT VISIBLE
HEADACHE, FULLNESS IN HEAD: NOT PRESENT
TREMOR: NO TREMOR
VISUAL DISTURBANCES: NOT PRESENT
TREMOR: NO TREMOR
AUDITORY DISTURBANCES: NOT PRESENT
AUDITORY DISTURBANCES: NOT PRESENT

## 2024-01-05 ASSESSMENT — PAIN - FUNCTIONAL ASSESSMENT
PAIN_FUNCTIONAL_ASSESSMENT: 0-10

## 2024-01-05 ASSESSMENT — PAIN SCALES - GENERAL
PAINLEVEL_OUTOF10: 0 - NO PAIN

## 2024-01-05 ASSESSMENT — ACTIVITIES OF DAILY LIVING (ADL): LACK_OF_TRANSPORTATION: NO

## 2024-01-05 NOTE — CONSULTS
"Inpatient consult to Pulmonology  Consult performed by: Sergio Nava MD  Consult ordered by: Ronn Hernandez MD          Pulmonary Consult    Reason For Consult    History Of Present Illness  Briseida Owen is a 48 y.o. female presenting with Anemia, unspecified type.     Past Medical History  She has a past medical history of Alcoholic hepatitis without ascites (08/13/2021) and Hepatic encephalopathy (CMS/HCC) (06/09/2021).    Surgical History  She has a past surgical history that includes Other surgical history (06/09/2021); US guided abdominal paracentesis (4/28/2021); US guided abdominal paracentesis (4/23/2021); US guided abdominal paracentesis (5/14/2021); US guided abdominal paracentesis (8/17/2023); US guided abdominal paracentesis (8/25/2023); US guided abdominal paracentesis (10/23/2023); US guided abdominal paracentesis (10/27/2023); US guided abdominal paracentesis (10/26/2023); and US guided abdominal paracentesis (1/4/2024).     Social History  She reports that she has never smoked. She has never used smokeless tobacco. She reports current alcohol use of about 1.0 standard drink of alcohol per week. No history on file for drug use.    Family History  Family History   Problem Relation Name Age of Onset    Diabetes Mother      Uterine cancer Mother      Heart attack Father      Other (CHEMICAL DEPENDENCY) Father      Hypertension Father          Allergies  Sulfamethoxazole-trimethoprim    Review of Systems   All other systems reviewed and are negative.      Last Recorded Vitals  Blood pressure (!) 90/44, pulse 80, temperature 36.5 °C (97.7 °F), temperature source Temporal, resp. rate 17, height 1.575 m (5' 2.01\"), weight 75.5 kg (166 lb 7.2 oz), SpO2 95 %.    Intake/Output    Intake/Output Summary (Last 24 hours) at 1/4/2024 2040  Last data filed at 1/4/2024 1715  Gross per 24 hour   Intake 800 ml   Output 850 ml   Net -50 ml       Physical Exam  Vitals reviewed.   Constitutional:       Appearance: " She is toxic-appearing.   HENT:      Head: Normocephalic and atraumatic.      Mouth/Throat:      Mouth: Mucous membranes are moist.   Eyes:      Extraocular Movements: Extraocular movements intact.      Pupils: Pupils are equal, round, and reactive to light.   Cardiovascular:      Rate and Rhythm: Normal rate and regular rhythm.      Pulses: Normal pulses.      Heart sounds: Normal heart sounds.   Pulmonary:      Effort: Pulmonary effort is normal.      Breath sounds: Normal breath sounds.   Abdominal:      General: Bowel sounds are normal.      Palpations: Abdomen is soft.   Musculoskeletal:         General: Normal range of motion.   Skin:     General: Skin is warm.   Neurological:      General: No focal deficit present.      Mental Status: She is oriented to person, place, and time.   Psychiatric:         Mood and Affect: Mood normal.      ...    Oxygen Therapy  SpO2: 95 %  Medical Gas Therapy: None (Room air)  O2 Delivery Method: Nasal cannula       Lines and Tubes:  Peripheral IV 01/03/24 20 G Right Antecubital (Active)   Placement Date/Time: 01/03/24 2047   Size (Gauge): 20 G  Orientation: Right  Location: Antecubital  Site Prep: Betadine   Number of days: 0       Peripheral IV 01/04/24 20 G Distal;Left;Upper;Anterior Arm (Active)   Placement Date/Time: 01/04/24 0140   Size (Gauge): 20 G  Orientation: Distal;Left;Upper;Anterior  Location: Arm   Number of days: 0       Urethral Catheter Straight-tip 16 Fr. (Active)   Placement Date/Time: 01/04/24 1707   Placed by: MIRIAM Gomez RN  Hand Hygiene Completed: Yes  Catheter Type: Straight-tip  Tube Size (Fr.): 16 Fr.  Catheter Balloon Size: 10 mL  Urine Returned: Yes   Number of days: 0         Scheduled medications  folic acid, 1 mg, oral, Daily  furosemide, 40 mg, oral, Daily  lactulose, 30 g, oral, BID  levothyroxine, 50 mcg, oral, Daily before breakfast  magnesium oxide, 400 mg, oral, Daily  [START ON 1/5/2024] multivitamin, 1 tablet, oral, Daily  [START ON 1/5/2024]  pantoprazole, 40 mg, oral, Daily  piperacillin-tazobactam, 3.375 g, intravenous, q6h  rifAXIMin, 550 mg, oral, BID  [START ON 1/7/2024] thiamine, 100 mg, oral, Daily  thiamine, 100 mg, intravenous, Daily      Continuous medications     PRN medications  PRN medications: LORazepam **OR** LORazepam **OR** LORazepam    Relevant Results  Results from last 7 days   Lab Units 01/04/24  1331 01/04/24  0658 01/03/24  2044   WBC AUTO x10*3/uL 15.2* 15.0* 15.1*   HEMOGLOBIN g/dL 7.3* 7.7* 5.2*   HEMATOCRIT % 20.9* 23.2* 15.5*   PLATELETS AUTO x10*3/uL 128* 130* 163      Results from last 7 days   Lab Units 01/04/24  1933 01/04/24  1552 01/04/24  1331   SODIUM mmol/L 119* 118* 118*   POTASSIUM mmol/L 5.1 5.7* 5.4*   CHLORIDE mmol/L 87* 86* 87*   CO2 mmol/L 20* 19* 20*   BUN mg/dL 16 16 15   CREATININE mg/dL 0.80 0.70 0.70   GLUCOSE mg/dL 128* 80 52*   CALCIUM mg/dL 8.7 8.8 8.7          XR chest 1 view 01/03/2024    Narrative  STUDY:  Chest Radiograph;  01/03/2024 at 9:13 PM  INDICATION:  Weakness.  COMPARISON:  XR chest 10/21/23, 04/25/21.  ACCESSION NUMBER(S):  [id number]  ORDERING CLINICIAN:  Nomi Anaya  TECHNIQUE:  Frontal chest was obtained at 2113 hours.  FINDINGS:  There is dense retrocardiac consolidation in the left lower lobe,  suggesting pneumonia. There is likely left pleural effusion. Heart is  top normal size with central vascular prominence. There is no  pneumothorax.    Impression  Retrocardiac consolidation left lower lobe suggests pneumonia, with  likely left pleural effusion. Follow-up advised.  Signed by Michael Lewis MD       Assessment/Plan   Principal Problem:    Anemia, unspecified type  Alcoholic cirrhosis of the liver with ascites  Hyponatremia  Left pleural effusion  Anemia  Alcohol abuse  Elevated ammonia    Alcoholic cirrhosis of the liver / Ascites   Paracentesis   IV zosyn.   GI on consult.      Hyponatremia  Holding furosemide, spironolactone  Renal consult     Left pleural  effusion  Mod to large  monitor    Anemia  Transfused 2 units of PRBCs     Alcohol abuse  On thiamine, folate  CIWA protocol     Elevated ammonia  Awake, responsive  Lactulose / rifaximin    Sergio Nava MD  [facility]

## 2024-01-05 NOTE — CONSULTS
"Nutrition Assessment Note  Patient screened for MST 3. SO bedside at time of visit. Patient states no weight loss. Appetite is not as good as usual d/t not feeling well, agreeable to Magic Cup with lunch meal. Hx of alcohol abuse, admitted with abdominal ascites. Paracentesis 1/4, 3L removed.    Nutrition Assessment    Reason for Assessment: Admission nursing screening (MST 3)    Reason for Hospital Admission:  Briseida Owen is a 48 y.o. female who is admitted for cirrhosis of liver.    Nutrition History:     Energy Intake: Fair 50-75 %  Food Allergies/Intolerances:  None  GI Symptoms: None  Oral Problems: None    Anthropometrics:  Ht: 157.5 cm (5' 2.01\"), Wt: 74.3 kg (163 lb 12.8 oz), BMI: 29.95  IBW/kg (Dietitian Calculated): 50 kg  Percent of IBW: 148 %     Weight Change:   None    Nutrition Focused Physical Exam Findings:   Subcutaneous Fat Loss  Orbital Fat Pads: Well nourshed (slightly bulging fat pads)  Buccal Fat Pads: Well nourished (full, rounded cheeks)  Triceps: Well nourished (ample fat tissue)  Ribs: Defer    Muscle Wasting  Temporalis: Well nourished (well-defined muscle)  Pectoralis (Clavicular Region): Well nourished (clavicle not visible)  Deltoid/Trapezius: Well nourished (rounded appearance at arm, shoulder, neck)  Interosseous: Well nourished (muscle bulges)  Trapezius/Infraspinatus/Supraspinatus (Scapular Region): Well nourished (bones not prominent, muscle taut)  Quadriceps: Defer  Gastrocnemius: Defer       Nutrition Significant Labs:  Lab Results   Component Value Date    WBC 12.7 (H) 01/05/2024    HGB 6.8 (L) 01/05/2024    HCT 19.9 (L) 01/05/2024     (L) 01/05/2024    CHOL 226 (H) 04/04/2019    TRIG 90 04/04/2019    HDL 93 04/04/2019    ALT 15 01/05/2024    AST 47 (H) 01/05/2024     (L) 01/05/2024    K 4.7 01/05/2024    CL 87 (L) 01/05/2024    CREATININE 0.90 01/05/2024    BUN 18 01/05/2024    CO2 21 (L) 01/05/2024    TSH 7.37 (H) 05/29/2023    INR 1.8 (H) 01/03/2024    " HGBA1C 5.1 04/04/2019       Current Facility-Administered Medications:     folic acid (Folvite) tablet 1 mg, 1 mg, oral, Daily, Ronn Hernandez MD, 1 mg at 01/05/24 0948    furosemide (Lasix) tablet 40 mg, 40 mg, oral, Daily, Onur Wilkerson MD, 40 mg at 01/05/24 0948    lactulose 20 gram/30 mL oral solution 30 g, 30 g, oral, BID, Ronn Hernandez MD, 30 g at 01/05/24 0948    levothyroxine (Synthroid, Levoxyl) tablet 50 mcg, 50 mcg, oral, Daily before breakfast, Ronn Hernandez MD, 50 mcg at 01/05/24 0757    LORazepam (Ativan) injection 0.5 mg, 0.5 mg, intravenous, q2h PRN **OR** LORazepam (Ativan) injection 1 mg, 1 mg, intravenous, q2h PRN **OR** LORazepam (Ativan) injection 2 mg, 2 mg, intravenous, q2h PRN, Ronn Hernandez MD    magnesium oxide (Mag-Ox) tablet 400 mg, 400 mg, oral, Daily, Sierra Unger MD, 400 mg at 01/05/24 0948    multivitamin 1 tablet, 1 tablet, oral, Daily, Sirera Unger MD, 1 tablet at 01/05/24 0948    oxygen (O2) therapy, , inhalation, Continuous PRN - O2/gases, Sierra Unger MD    pantoprazole (ProtoNix) EC tablet 40 mg, 40 mg, oral, Daily, Sierra Unger MD, 40 mg at 01/05/24 0948    piperacillin-tazobactam-dextrose (Zosyn) IV 3.375 g, 3.375 g, intravenous, q6h, Ronn Hernandez MD, Stopped at 01/05/24 1018    rifAXIMin (Xifaxan) tablet 550 mg, 550 mg, oral, BID, Ronn Hernandez MD, 550 mg at 01/05/24 0948    [START ON 1/7/2024] thiamine (Vitamin B-1) tablet 100 mg, 100 mg, oral, Daily, Ronn Hernandez MD    thiamine (Vitamin B1) injection 100 mg, 100 mg, intravenous, Daily, Ronn Hernandez MD, 100 mg at 01/05/24 0948    Dietary Orders (From admission, onward)       Start     Ordered    01/05/24 1508  Oral nutritional supplements  Until discontinued        Comments: [employer]   Question Answer Comment   Deliver with Lunch    Select supplement: Magic Cup        01/05/24 1507    01/04/24 0323  Adult diet Regular; 1500 mL fluid  Diet effective now        Question Answer  Comment   Diet type Regular    Dietary fluid restriction / 24h: 1500 mL fluid        01/04/24 0326                   Estimated Needs:   Estimated Energy Needs  Total Energy Estimated Needs (kCal): 1750 kCal  Total Estimated Energy Need per Day (kCal/kg): 35 kCal/kg  Method for Estimating Needs: IBW    Estimated Protein Needs  Total Protein Estimated Needs (g):  (60-75)  Total Protein Estimated Needs (g/kg):  (1.2-1.5)  Method for Estimating Needs: IBW    Estimated Fluid Needs  Method for Estimating Needs: 1500 Fluid Restriction      Nutrition Diagnosis   Nutrition Diagnosis:     Nutrition Diagnosis  Patient has Nutrition Diagnosis: Yes  Diagnosis Status (1): New  Nutrition Diagnosis 1: Increased nutrient needs  Related to (1): Increased demand for nutrients  As Evidenced by (1): Cirrhosis     Nutrition Interventions/Recommendations   Nutrition Interventions and Recommendations:    Nutrition Prescription:  Individualized Nutrition Prescription Provided for : 1750 calories, 60-75 gm protien via oral diet    Nutrition Interventions:   Food and/or Nutrient Delivery Interventions  Interventions: Meals and snacks, Medical food supplement  Meals and Snacks: General healthful diet  Goal: Provide as ordered  Medical Food Supplement: Modified food  Goal: Magic Cup with lunch meal to provide 290 calories, 9 gm protein    Education Documentation  No documentation found.         Nutrition Monitoring and Evaluation   Monitoring/Evaluation:   Food/Nutrient Related History Monitoring  Monitoring and Evaluation Plan: Energy intake  Energy Intake: Estimated energy intake  Criteria: Patient will consume =/> 75% of estimated needs         Time Spent/Follow-up:   Follow Up  Time Spent (min): 30 minutes  Last Date of Nutrition Visit: 01/05/24  Nutrition Follow-Up Needed?: 5-7 days  Follow up Comment: 1/10/24

## 2024-01-05 NOTE — CARE PLAN
The clinical goals for the shift include Remain pain free and comfortable      Problem: Fall/Injury  Goal: Not fall by end of shift  Outcome: Progressing  Goal: Be free from injury by end of the shift  Outcome: Progressing  Goal: Verbalize understanding of personal risk factors for fall in the hospital  Outcome: Progressing  Goal: Verbalize understanding of risk factor reduction measures to prevent injury from fall in the home  Outcome: Progressing  Goal: Use assistive devices by end of the shift  Outcome: Progressing  Goal: Pace activities to prevent fatigue by end of the shift  Outcome: Progressing     Problem: Pain  Goal: My pain/discomfort is manageable  Outcome: Progressing     Problem: Safety  Goal: Patient will be injury free during hospitalization  Outcome: Progressing  Goal: I will remain free of falls  Outcome: Progressing     Problem: Daily Care  Goal: Daily care needs are met  Outcome: Progressing     Problem: Psychosocial Needs  Goal: Demonstrates ability to cope with hospitalization/illness  Outcome: Progressing  Goal: Collaborate with me, my family, and caregiver to identify my specific goals  Outcome: Progressing     Problem: Discharge Barriers  Goal: My discharge needs are met  Outcome: Progressing     Problem: Skin  Goal: Decreased wound size/increased tissue granulation at next dressing change  Flowsheets (Taken 1/5/2024 4177)  Decreased wound size/increased tissue granulation at next dressing change: Protective dressings over bony prominences  Goal: Participates in plan/prevention/treatment measures  Flowsheets (Taken 1/4/2024 1717)  Participates in plan/prevention/treatment measures:   Elevate heels   Discuss with provider PT/OT consult   Increase activity/out of bed for meals  Goal: Prevent/manage excess moisture  Flowsheets (Taken 1/4/2024 1717)  Prevent/manage excess moisture:   Cleanse incontinence/protect with barrier cream   Moisturize dry skin  Goal: Prevent/minimize sheer/friction  injuries  Flowsheets (Taken 1/4/2024 1717)  Prevent/minimize sheer/friction injuries:   Complete micro-shifts as needed if patient unable. Adjust patient position to relieve pressure points, not a full turn   Increase activity/out of bed for meals   Use pull sheet   HOB 30 degrees or less   Turn/reposition every 2 hours/use positioning/transfer devices  Goal: Promote/optimize nutrition  Flowsheets (Taken 1/4/2024 1717)  Promote/optimize nutrition:   Monitor/record intake including meals   Consume > 50% meals/supplements   Offer water/supplements/favorite foods   Discuss with provider if NPO > 2 days  Goal: Promote skin healing  Flowsheets (Taken 1/4/2024 1717)  Promote skin healing:   Assess skin/pad under line(s)/device(s)   Protective dressings over bony prominences   Turn/reposition every 2 hours/use positioning/transfer devices   Rotate device position/do not position patient on device   Ensure correct size (line/device) and apply per  instructions

## 2024-01-05 NOTE — PROGRESS NOTES
"TCC met with patient. Assessment complete. Patient lives in a house with her significant other. Patient is fairly independent. Patient states that she needs assistance with bathing. Patient uses a wheelchair if outside the home. Patient does not drive. Her boyfriend shops, cooks and cleans. Patient drinks wine \"almost everyday\". Patient reports that the last doctor she saw was Dr. Stacy James. Patient fills prescriptions at [facility] in [place]. At this time there is not a safe discharge plan in place. Will follow.       Constanza Luke RN      "

## 2024-01-05 NOTE — PROGRESS NOTES
"Briseida Owen is a 48 y.o. female on day 1 of admission presenting with Anemia, unspecified type.    Subjective   Was seen yesterday for profound hyponatremia sodium level of 115 secondary to cirrhosis of the liver and massive ascites she did undergo paracentesis I placed her on fluid restriction and Lasix also she was severely hyperkalemic which was treated medically she feels fine today she is awake and responsive sodium is up to 120 and potassium down to 5 eating breakfast no nausea vomiting       Objective     Physical Exam  Neck:      Vascular: No carotid bruit.   Cardiovascular:      Rate and Rhythm: Normal rate and regular rhythm.      Heart sounds: No murmur heard.     No friction rub. No gallop.   Pulmonary:      Breath sounds: No wheezing, rhonchi or rales.   Chest:      Chest wall: No tenderness.   Abdominal:      General: There is no distension.      Tenderness: There is no abdominal tenderness. There is no guarding or rebound.   Musculoskeletal:         General: No swelling or tenderness.      Cervical back: Neck supple.      Right lower leg: No edema.      Left lower leg: No edema.   Lymphadenopathy:      Cervical: No cervical adenopathy.         Last Recorded Vitals  Blood pressure 91/53, pulse 77, temperature 36.6 °C (97.9 °F), temperature source Temporal, resp. rate 12, height 1.575 m (5' 2.01\"), weight 74.3 kg (163 lb 12.8 oz), SpO2 96 %.    Intake/Output last 3 Shifts:  I/O last 3 completed shifts:  In: 1100 (14.8 mL/kg) [P.O.:100; Blood:350; IV Piggyback:650]  Out: 1425 (19.2 mL/kg) [Urine:1425 (0.5 mL/kg/hr)]  Weight: 74.3 kg     Current Facility-Administered Medications:     folic acid (Folvite) tablet 1 mg, 1 mg, oral, Daily, Ronn Hernandez MD, 1 mg at 01/04/24 0952    furosemide (Lasix) tablet 40 mg, 40 mg, oral, Daily, Onur Wilkerson MD, 40 mg at 01/04/24 1315    lactulose 20 gram/30 mL oral solution 30 g, 30 g, oral, BID, Ronn Hernandez MD, 30 g at 01/04/24 2234    levothyroxine (Synthroid, " Levoxyl) tablet 50 mcg, 50 mcg, oral, Daily before breakfast, Ronn Hernandez MD, 50 mcg at 01/05/24 0757    LORazepam (Ativan) injection 0.5 mg, 0.5 mg, intravenous, q2h PRN **OR** LORazepam (Ativan) injection 1 mg, 1 mg, intravenous, q2h PRN **OR** LORazepam (Ativan) injection 2 mg, 2 mg, intravenous, q2h PRN, Ronn Hernandez MD    magnesium oxide (Mag-Ox) tablet 400 mg, 400 mg, oral, Daily, Sierra Unger MD, 400 mg at 01/04/24 1603    multivitamin 1 tablet, 1 tablet, oral, Daily, Sierra Unger MD    pantoprazole (ProtoNix) EC tablet 40 mg, 40 mg, oral, Daily, Sierra Unger MD    piperacillin-tazobactam-dextrose (Zosyn) IV 3.375 g, 3.375 g, intravenous, q6h, Ronn Hernandez MD, Stopped at 01/05/24 0434    rifAXIMin (Xifaxan) tablet 550 mg, 550 mg, oral, BID, Ronn Hernandez MD, 550 mg at 01/04/24 2234    [START ON 1/7/2024] thiamine (Vitamin B-1) tablet 100 mg, 100 mg, oral, Daily, Ronn Hernandez MD    thiamine (Vitamin B1) injection 100 mg, 100 mg, intravenous, Daily, Ronn Hernandez MD, 100 mg at 01/04/24 0951   Relevant Results    Results for orders placed or performed during the hospital encounter of 01/03/24 (from the past 96 hour(s))   APTT   Result Value Ref Range    aPTT 42.7 (H) 22.0 - 32.5 seconds   Comprehensive metabolic panel   Result Value Ref Range    Glucose 141 (H) 65 - 99 mg/dL    Sodium 115 (LL) 133 - 145 mmol/L    Potassium 5.2 (H) 3.4 - 5.1 mmol/L    Chloride 83 (L) 97 - 107 mmol/L    Bicarbonate 18 (L) 24 - 31 mmol/L    Urea Nitrogen 12 8 - 25 mg/dL    Creatinine 0.70 0.40 - 1.60 mg/dL    eGFR >90 >60 mL/min/1.73m*2    Calcium 8.7 8.5 - 10.4 mg/dL    Albumin 3.4 (L) 3.5 - 5.0 g/dL    Alkaline Phosphatase 179 (H) 35 - 125 U/L    Total Protein 6.3 5.9 - 7.9 g/dL    AST 53 (H) 5 - 40 U/L    Bilirubin, Total 16.4 (H) 0.1 - 1.2 mg/dL    ALT 20 5 - 40 U/L    Anion Gap 14 <=19 mmol/L   CBC   Result Value Ref Range    WBC 15.1 (H) 4.4 - 11.3 x10*3/uL    nRBC 1.1 (H) 0.0 - 0.0 /100  WBCs    RBC 1.25 (L) 4.00 - 5.20 x10*6/uL    Hemoglobin 5.2 (LL) 12.0 - 16.0 g/dL    Hematocrit 15.5 (L) 36.0 - 46.0 %     (H) 80 - 100 fL    MCH 41.6 (H) 26.0 - 34.0 pg    MCHC 33.5 32.0 - 36.0 g/dL    RDW 18.4 (H) 11.5 - 14.5 %    Platelets 163 150 - 450 x10*3/uL   Lipase   Result Value Ref Range    Lipase 17 16 - 63 U/L   Ammonia   Result Value Ref Range    Ammonia 65 (H) 12 - 45 umol/L   BLOOD GAS LACTIC ACID, VENOUS   Result Value Ref Range    POCT Lactate, Venous 3.9 (H) 0.4 - 2.0 mmol/L   Bilirubin, Direct   Result Value Ref Range    Bilirubin, Direct 6.5 (H) 0.0 - 0.2 mg/dL   Protime-INR   Result Value Ref Range    Protime 18.7 (H) 9.3 - 12.7 seconds    INR 1.8 (H) 0.9 - 1.2   Sars-CoV-2 and Influenza A/B PCR   Result Value Ref Range    Flu A Result Not Detected Not Detected    Flu B Result Not Detected Not Detected    Coronavirus 2019, PCR Not Detected Not Detected   Type and screen   Result Value Ref Range    ABO TYPE O     Rh TYPE POS     ANTIBODY SCREEN NEG    Alcohol   Result Value Ref Range    Alcohol 0.042 (H) 0.000 - 0.010 g/dL   Osmolality   Result Value Ref Range    Osmolality, Serum 271 (L) 280 - 300 mOsm/kg   hCG, quantitative, pregnancy   Result Value Ref Range    HCG, Beta-Quantitative <1 SEE COMMENT BELOW mIU/mL   Prepare RBC: 2 Units   Result Value Ref Range    PRODUCT CODE N9203Y83     Unit Number [id number]     Unit ABO O     Unit RH POS     XM INTEP COMP     Dispense Status PT     Blood Expiration Date January 25, 2024 23:59 EST     PRODUCT BLOOD TYPE 5100     UNIT VOLUME 350     PRODUCT CODE [id number]     Unit Number [id number]     Unit ABO O     Unit RH POS     XM INTEP COMP     Dispense Status TR     Blood Expiration Date January 25, 2024 23:59 EST     PRODUCT BLOOD TYPE 5100     UNIT VOLUME 350    Blood Gas Lactic Acid, Venous   Result Value Ref Range    POCT Lactate, Venous 3.2 (H) 0.4 - 2.0 mmol/L   Blood Culture    Specimen: Peripheral Venipuncture; Blood culture    Result Value Ref Range    Blood Culture Loaded on Instrument - Culture in progress    Occult Blood, Stool    Specimen: Stool   Result Value Ref Range    Occult Blood, Stool X1 Negative Negative   Blood Culture    Specimen: Peripheral Venipuncture; Blood culture   Result Value Ref Range    Blood Culture Loaded on Instrument - Culture in progress    Osmolality, urine   Result Value Ref Range    Osmolality, Urine Random 418 200 - 1,200 mOsm/kg   Sodium, urine, random   Result Value Ref Range    Sodium, Urine Random 20 NOT ESTABLISHED mmol/L    Creatinine, Urine Random 123.5 NOT ESTABLISHED mg/dL    Sodium/Creatinine Ratio 16 Not established. mmol/g Creat   Comprehensive Metabolic Panel   Result Value Ref Range    Glucose 102 (H) 65 - 99 mg/dL    Sodium 117 (LL) 133 - 145 mmol/L    Potassium 6.0 (H) 3.4 - 5.1 mmol/L    Chloride 85 (L) 97 - 107 mmol/L    Bicarbonate 18 (L) 24 - 31 mmol/L    Urea Nitrogen 14 8 - 25 mg/dL    Creatinine 0.70 0.40 - 1.60 mg/dL    eGFR >90 >60 mL/min/1.73m*2    Calcium 8.9 8.5 - 10.4 mg/dL    Albumin 3.7 3.5 - 5.0 g/dL    Alkaline Phosphatase 197 (H) 35 - 125 U/L    Total Protein 6.9 5.9 - 7.9 g/dL    AST 61 (H) 5 - 40 U/L    Bilirubin, Total 18.8 (H) 0.1 - 1.2 mg/dL    ALT 21 5 - 40 U/L    Anion Gap 14 <=19 mmol/L   CBC   Result Value Ref Range    WBC 15.0 (H) 4.4 - 11.3 x10*3/uL    nRBC 1.3 (H) 0.0 - 0.0 /100 WBCs    RBC 2.12 (L) 4.00 - 5.20 x10*6/uL    Hemoglobin 7.7 (L) 12.0 - 16.0 g/dL    Hematocrit 23.2 (L) 36.0 - 46.0 %     (H) 80 - 100 fL    MCH 36.3 (H) 26.0 - 34.0 pg    MCHC 33.2 32.0 - 36.0 g/dL    RDW 25.1 (H) 11.5 - 14.5 %    Platelets 130 (L) 150 - 450 x10*3/uL   Body fluid cell count   Result Value Ref Range    Color, Fluid Yellow Colorless, Straw, Yellow    Clarity, Fluid Clear Clear    WBC, Fluid 551 See Comment /uL    RBC, Fluid 3,000 0  /uL /uL   Body Fluid Differential   Result Value Ref Range    Neutrophils %, Manual, Fluid 6 <25 % %    Lymphocytes %, Manual,  Fluid 66 <75 % %    Mono/Macrophages %, Manual, Fluid 23 <70 % %    Eosinophils %, Manual, Fluid 0 0 % %    Basophils %, Manual, Fluid 0 0 % %    Immature Granulocytes %, Manual, Fluid 0 0 % %    Blasts %, Manual, Fluid 0 0 % %    Unclassified Cells %, Manual, Fluid 5 (H) 0 % %    Plasma Cells %, Manual, Fluid 0 0 % %    Total Cells Counted, Fluid 100    Sterile Fluid Culture/Smear    Specimen: Ascites Fluid   Result Value Ref Range    Gram Stain (1+) Rare Polymorphonuclear leukocytes     Gram Stain No organisms seen    PST Top   Result Value Ref Range    Extra Tube Hold for add-ons.    CBC and Auto Differential   Result Value Ref Range    WBC 15.2 (H) 4.4 - 11.3 x10*3/uL    nRBC 1.3 (H) 0.0 - 0.0 /100 WBCs    RBC 2.00 (L) 4.00 - 5.20 x10*6/uL    Hemoglobin 7.3 (L) 12.0 - 16.0 g/dL    Hematocrit 20.9 (L) 36.0 - 46.0 %     (H) 80 - 100 fL    MCH 36.5 (H) 26.0 - 34.0 pg    MCHC 34.9 32.0 - 36.0 g/dL    RDW 26.3 (H) 11.5 - 14.5 %    Platelets 128 (L) 150 - 450 x10*3/uL    Neutrophils %      Immature Granulocytes %, Automated 6.2 (H) 0.0 - 0.9 %    Lymphocytes %      Monocytes %      Eosinophils %      Basophils %      Neutrophils Absolute      Immature Granulocytes Absolute, Automated 0.94 (H) 0.00 - 0.70 x10*3/uL    Lymphocytes Absolute      Monocytes Absolute      Eosinophils Absolute      Basophils Absolute     Basic metabolic panel   Result Value Ref Range    Glucose 52 (L) 65 - 99 mg/dL    Sodium 118 (LL) 133 - 145 mmol/L    Potassium 5.4 (H) 3.4 - 5.1 mmol/L    Chloride 87 (L) 97 - 107 mmol/L    Bicarbonate 20 (L) 24 - 31 mmol/L    Urea Nitrogen 15 8 - 25 mg/dL    Creatinine 0.70 0.40 - 1.60 mg/dL    eGFR >90 >60 mL/min/1.73m*2    Calcium 8.7 8.5 - 10.4 mg/dL    Anion Gap 11 <=19 mmol/L   Pathologist Review-CBC Differential   Result Value Ref Range    Pathologist Review-CBC Differential       Review of peripheral blood smear reveals leukocytosis with mild granulocytic left shift including myelocytes.  Rare  immature/blast-like forms identified.  Macrocytic anemia with anisocytosis, regine cells, acanthocytes, increased polychromasia, and nucleated red blood cells noted.  Thrombocytopenia with rare giant platelets present.  Please correlate clinically.     Basic metabolic panel   Result Value Ref Range    Glucose 80 65 - 99 mg/dL    Sodium 118 (LL) 133 - 145 mmol/L    Potassium 5.7 (H) 3.4 - 5.1 mmol/L    Chloride 86 (L) 97 - 107 mmol/L    Bicarbonate 19 (L) 24 - 31 mmol/L    Urea Nitrogen 16 8 - 25 mg/dL    Creatinine 0.70 0.40 - 1.60 mg/dL    eGFR >90 >60 mL/min/1.73m*2    Calcium 8.8 8.5 - 10.4 mg/dL    Anion Gap 13 <=19 mmol/L   Basic metabolic panel   Result Value Ref Range    Glucose 128 (H) 65 - 99 mg/dL    Sodium 119 (LL) 133 - 145 mmol/L    Potassium 5.1 3.4 - 5.1 mmol/L    Chloride 87 (L) 97 - 107 mmol/L    Bicarbonate 20 (L) 24 - 31 mmol/L    Urea Nitrogen 16 8 - 25 mg/dL    Creatinine 0.80 0.40 - 1.60 mg/dL    eGFR >90 >60 mL/min/1.73m*2    Calcium 8.7 8.5 - 10.4 mg/dL    Anion Gap 12 <=19 mmol/L   Basic metabolic panel   Result Value Ref Range    Glucose 112 (H) 65 - 99 mg/dL    Sodium 118 (LL) 133 - 145 mmol/L    Potassium 5.1 3.4 - 5.1 mmol/L    Chloride 87 (L) 97 - 107 mmol/L    Bicarbonate 20 (L) 24 - 31 mmol/L    Urea Nitrogen 18 8 - 25 mg/dL    Creatinine 0.90 0.40 - 1.60 mg/dL    eGFR 80 >60 mL/min/1.73m*2    Calcium 8.4 (L) 8.5 - 10.4 mg/dL    Anion Gap 11 <=19 mmol/L   Basic metabolic panel   Result Value Ref Range    Glucose 113 (H) 65 - 99 mg/dL    Sodium 120 (L) 133 - 145 mmol/L    Potassium 5.0 3.4 - 5.1 mmol/L    Chloride 85 (L) 97 - 107 mmol/L    Bicarbonate 20 (L) 24 - 31 mmol/L    Urea Nitrogen 19 8 - 25 mg/dL    Creatinine 1.00 0.40 - 1.60 mg/dL    eGFR 71 >60 mL/min/1.73m*2    Calcium 8.5 8.5 - 10.4 mg/dL    Anion Gap 15 <=19 mmol/L   Ammonia   Result Value Ref Range    Ammonia 104 (H) 12 - 45 umol/L   CBC and Auto Differential   Result Value Ref Range    WBC 12.7 (H) 4.4 - 11.3 x10*3/uL     nRBC 0.8 (H) 0.0 - 0.0 /100 WBCs    RBC 1.86 (L) 4.00 - 5.20 x10*6/uL    Hemoglobin 6.8 (L) 12.0 - 16.0 g/dL    Hematocrit 19.9 (L) 36.0 - 46.0 %     (H) 80 - 100 fL    MCH 36.6 (H) 26.0 - 34.0 pg    MCHC 34.2 32.0 - 36.0 g/dL    RDW 26.1 (H) 11.5 - 14.5 %    Platelets 115 (L) 150 - 450 x10*3/uL    Neutrophils % 73.3 40.0 - 80.0 %    Immature Granulocytes %, Automated 5.5 (H) 0.0 - 0.9 %    Lymphocytes % 11.3 13.0 - 44.0 %    Monocytes % 8.6 2.0 - 10.0 %    Eosinophils % 1.0 0.0 - 6.0 %    Basophils % 0.3 0.0 - 2.0 %    Neutrophils Absolute 9.33 (H) 1.20 - 7.70 x10*3/uL    Immature Granulocytes Absolute, Automated 0.70 0.00 - 0.70 x10*3/uL    Lymphocytes Absolute 1.44 1.20 - 4.80 x10*3/uL    Monocytes Absolute 1.09 (H) 0.10 - 1.00 x10*3/uL    Eosinophils Absolute 0.13 0.00 - 0.70 x10*3/uL    Basophils Absolute 0.04 0.00 - 0.10 x10*3/uL   Comprehensive Metabolic Panel   Result Value Ref Range    Glucose 113 (H) 65 - 99 mg/dL    Sodium 120 (L) 133 - 145 mmol/L    Potassium 5.0 3.4 - 5.1 mmol/L    Chloride 85 (L) 97 - 107 mmol/L    Bicarbonate 20 (L) 24 - 31 mmol/L    Urea Nitrogen 19 8 - 25 mg/dL    Creatinine 1.00 0.40 - 1.60 mg/dL    eGFR 71 >60 mL/min/1.73m*2    Calcium 8.5 8.5 - 10.4 mg/dL    Albumin 3.0 (L) 3.5 - 5.0 g/dL    Alkaline Phosphatase 160 (H) 35 - 125 U/L    Total Protein 5.4 (L) 5.9 - 7.9 g/dL    AST 47 (H) 5 - 40 U/L    Bilirubin, Total 15.8 (H) 0.1 - 1.2 mg/dL    ALT 15 5 - 40 U/L    Anion Gap 15 <=19 mmol/L   Haptoglobin   Result Value Ref Range    Haptoglobin <10 (L) 37 - 246 mg/dL   Vitamin B12   Result Value Ref Range    Vitamin B12 1,136 (H) 211 - 946 pg/mL       Assessment/Plan     Hyponatremia secondary to ascites and cirrhosis of the liver and fluid overload much improved we will continue fluid restriction and diuretics  Hyperkalemia secondary to Aldactone and potassium supplements secondary to potassium supplements and Aldactone potassium is normalized today  Massive ascites is post  paracenteses  Cirrhosis of the liver  Hepatic encephalopathy which improved continue with lactulose           Onur Wilkerson MD

## 2024-01-05 NOTE — PROGRESS NOTES
"Briseida Owen is a 48 y.o. female on day 1 of admission presenting with abdominal pain, ascites. She has a history of alcohol abuse.     Subjective   She had a paracentesis yesterday and had 3 liters of ascitic fluid removed.        Objective     Physical Exam  General: awake, alert, oriented, responsive  HEENT: scleral icterus present  Cardiovascular: regular, normal S1 and S2  Lungs: good air entry bilaterally, clear to auscultation  Abdomen: soft, distended, nontender, dull to percussion at the flanks, bowel sounds present, normoactive  Extremities: no peripheral cyanosis, no pedal edema  Neuro: alert, oriented x 3, no focal weakness      Last Recorded Vitals  Blood pressure 112/57, pulse 87, temperature 36.6 °C (97.9 °F), temperature source Temporal, resp. rate 16, height 1.575 m (5' 2.01\"), weight 74.3 kg (163 lb 12.8 oz), SpO2 91 %.  Intake/Output last 3 Shifts:  I/O last 3 completed shifts:  In: 1100 (14.8 mL/kg) [P.O.:100; Blood:350; IV Piggyback:650]  Out: 1425 (19.2 mL/kg) [Urine:1425 (0.5 mL/kg/hr)]  Weight: 74.3 kg     Relevant Results  Results for orders placed or performed during the hospital encounter of 01/03/24 (from the past 24 hour(s))   PST Top   Result Value Ref Range    Extra Tube Hold for add-ons.    CBC and Auto Differential   Result Value Ref Range    WBC 15.2 (H) 4.4 - 11.3 x10*3/uL    nRBC 1.3 (H) 0.0 - 0.0 /100 WBCs    RBC 2.00 (L) 4.00 - 5.20 x10*6/uL    Hemoglobin 7.3 (L) 12.0 - 16.0 g/dL    Hematocrit 20.9 (L) 36.0 - 46.0 %     (H) 80 - 100 fL    MCH 36.5 (H) 26.0 - 34.0 pg    MCHC 34.9 32.0 - 36.0 g/dL    RDW 26.3 (H) 11.5 - 14.5 %    Platelets 128 (L) 150 - 450 x10*3/uL    Neutrophils %      Immature Granulocytes %, Automated 6.2 (H) 0.0 - 0.9 %    Lymphocytes %      Monocytes %      Eosinophils %      Basophils %      Neutrophils Absolute      Immature Granulocytes Absolute, Automated 0.94 (H) 0.00 - 0.70 x10*3/uL    Lymphocytes Absolute      Monocytes Absolute      " Eosinophils Absolute      Basophils Absolute     Basic metabolic panel   Result Value Ref Range    Glucose 52 (L) 65 - 99 mg/dL    Sodium 118 (LL) 133 - 145 mmol/L    Potassium 5.4 (H) 3.4 - 5.1 mmol/L    Chloride 87 (L) 97 - 107 mmol/L    Bicarbonate 20 (L) 24 - 31 mmol/L    Urea Nitrogen 15 8 - 25 mg/dL    Creatinine 0.70 0.40 - 1.60 mg/dL    eGFR >90 >60 mL/min/1.73m*2    Calcium 8.7 8.5 - 10.4 mg/dL    Anion Gap 11 <=19 mmol/L   Pathologist Review-CBC Differential   Result Value Ref Range    Pathologist Review-CBC Differential       Review of peripheral blood smear reveals leukocytosis with mild granulocytic left shift including myelocytes.  Rare immature/blast-like forms identified.  Macrocytic anemia with anisocytosis, regine cells, acanthocytes, increased polychromasia, and nucleated red blood cells noted.  Thrombocytopenia with rare giant platelets present.  Please correlate clinically.     Basic metabolic panel   Result Value Ref Range    Glucose 80 65 - 99 mg/dL    Sodium 118 (LL) 133 - 145 mmol/L    Potassium 5.7 (H) 3.4 - 5.1 mmol/L    Chloride 86 (L) 97 - 107 mmol/L    Bicarbonate 19 (L) 24 - 31 mmol/L    Urea Nitrogen 16 8 - 25 mg/dL    Creatinine 0.70 0.40 - 1.60 mg/dL    eGFR >90 >60 mL/min/1.73m*2    Calcium 8.8 8.5 - 10.4 mg/dL    Anion Gap 13 <=19 mmol/L   Basic metabolic panel   Result Value Ref Range    Glucose 128 (H) 65 - 99 mg/dL    Sodium 119 (LL) 133 - 145 mmol/L    Potassium 5.1 3.4 - 5.1 mmol/L    Chloride 87 (L) 97 - 107 mmol/L    Bicarbonate 20 (L) 24 - 31 mmol/L    Urea Nitrogen 16 8 - 25 mg/dL    Creatinine 0.80 0.40 - 1.60 mg/dL    eGFR >90 >60 mL/min/1.73m*2    Calcium 8.7 8.5 - 10.4 mg/dL    Anion Gap 12 <=19 mmol/L   Basic metabolic panel   Result Value Ref Range    Glucose 112 (H) 65 - 99 mg/dL    Sodium 118 (LL) 133 - 145 mmol/L    Potassium 5.1 3.4 - 5.1 mmol/L    Chloride 87 (L) 97 - 107 mmol/L    Bicarbonate 20 (L) 24 - 31 mmol/L    Urea Nitrogen 18 8 - 25 mg/dL    Creatinine  0.90 0.40 - 1.60 mg/dL    eGFR 80 >60 mL/min/1.73m*2    Calcium 8.4 (L) 8.5 - 10.4 mg/dL    Anion Gap 11 <=19 mmol/L   Basic metabolic panel   Result Value Ref Range    Glucose 113 (H) 65 - 99 mg/dL    Sodium 120 (L) 133 - 145 mmol/L    Potassium 5.0 3.4 - 5.1 mmol/L    Chloride 85 (L) 97 - 107 mmol/L    Bicarbonate 20 (L) 24 - 31 mmol/L    Urea Nitrogen 19 8 - 25 mg/dL    Creatinine 1.00 0.40 - 1.60 mg/dL    eGFR 71 >60 mL/min/1.73m*2    Calcium 8.5 8.5 - 10.4 mg/dL    Anion Gap 15 <=19 mmol/L   Ammonia   Result Value Ref Range    Ammonia 104 (H) 12 - 45 umol/L   CBC and Auto Differential   Result Value Ref Range    WBC 12.7 (H) 4.4 - 11.3 x10*3/uL    nRBC 0.8 (H) 0.0 - 0.0 /100 WBCs    RBC 1.86 (L) 4.00 - 5.20 x10*6/uL    Hemoglobin 6.8 (L) 12.0 - 16.0 g/dL    Hematocrit 19.9 (L) 36.0 - 46.0 %     (H) 80 - 100 fL    MCH 36.6 (H) 26.0 - 34.0 pg    MCHC 34.2 32.0 - 36.0 g/dL    RDW 26.1 (H) 11.5 - 14.5 %    Platelets 115 (L) 150 - 450 x10*3/uL    Neutrophils % 73.3 40.0 - 80.0 %    Immature Granulocytes %, Automated 5.5 (H) 0.0 - 0.9 %    Lymphocytes % 11.3 13.0 - 44.0 %    Monocytes % 8.6 2.0 - 10.0 %    Eosinophils % 1.0 0.0 - 6.0 %    Basophils % 0.3 0.0 - 2.0 %    Neutrophils Absolute 9.33 (H) 1.20 - 7.70 x10*3/uL    Immature Granulocytes Absolute, Automated 0.70 0.00 - 0.70 x10*3/uL    Lymphocytes Absolute 1.44 1.20 - 4.80 x10*3/uL    Monocytes Absolute 1.09 (H) 0.10 - 1.00 x10*3/uL    Eosinophils Absolute 0.13 0.00 - 0.70 x10*3/uL    Basophils Absolute 0.04 0.00 - 0.10 x10*3/uL   Comprehensive Metabolic Panel   Result Value Ref Range    Glucose 113 (H) 65 - 99 mg/dL    Sodium 120 (L) 133 - 145 mmol/L    Potassium 5.0 3.4 - 5.1 mmol/L    Chloride 85 (L) 97 - 107 mmol/L    Bicarbonate 20 (L) 24 - 31 mmol/L    Urea Nitrogen 19 8 - 25 mg/dL    Creatinine 1.00 0.40 - 1.60 mg/dL    eGFR 71 >60 mL/min/1.73m*2    Calcium 8.5 8.5 - 10.4 mg/dL    Albumin 3.0 (L) 3.5 - 5.0 g/dL    Alkaline Phosphatase 160 (H) 35 -  125 U/L    Total Protein 5.4 (L) 5.9 - 7.9 g/dL    AST 47 (H) 5 - 40 U/L    Bilirubin, Total 15.8 (H) 0.1 - 1.2 mg/dL    ALT 15 5 - 40 U/L    Anion Gap 15 <=19 mmol/L   Haptoglobin   Result Value Ref Range    Haptoglobin <10 (L) 37 - 246 mg/dL   Vitamin B12   Result Value Ref Range    Vitamin B12 1,136 (H) 211 - 946 pg/mL   Basic metabolic panel   Result Value Ref Range    Glucose 111 (H) 65 - 99 mg/dL    Sodium 120 (L) 133 - 145 mmol/L    Potassium 4.7 3.4 - 5.1 mmol/L    Chloride 87 (L) 97 - 107 mmol/L    Bicarbonate 21 (L) 24 - 31 mmol/L    Urea Nitrogen 18 8 - 25 mg/dL    Creatinine 0.90 0.40 - 1.60 mg/dL    eGFR 80 >60 mL/min/1.73m*2    Calcium 8.9 8.5 - 10.4 mg/dL    Anion Gap 12 <=19 mmol/L        Scheduled medications  folic acid, 1 mg, oral, Daily  furosemide, 40 mg, oral, Daily  lactulose, 30 g, oral, BID  levothyroxine, 50 mcg, oral, Daily before breakfast  magnesium oxide, 400 mg, oral, Daily  multivitamin, 1 tablet, oral, Daily  pantoprazole, 40 mg, oral, Daily  piperacillin-tazobactam, 3.375 g, intravenous, q6h  rifAXIMin, 550 mg, oral, BID  [START ON 1/7/2024] thiamine, 100 mg, oral, Daily  thiamine, 100 mg, intravenous, Daily      Continuous medications     PRN medications  PRN medications: LORazepam **OR** LORazepam **OR** LORazepam, oxygen        Assessment/Plan   Alcoholic cirrhosis of the liver with ascites  Paracentesis to be done by IR. She is being covered with IV zosyn. GI on consult.     Hyponatremia  Possibly secondary to alcohol and fluid overload  improving    Left pleural effusion  Pulmonary medicine consult requested    Anemia  Transfused 2 units of PRBCs. Monitor hemoglobin    Alcohol abuse  On thiamine, folate  [facility] protocol  Discussed alcohol cessation.     Hyperbilirubinemia  Improving, check direct bilirubin    Elevated ammonia  Awake, responsive. On lactulose, rifaximin    Urinary retention  Monge catheter placed 1/4    Check direct bilirubin  Transfer to SDU when a bed is  available  Transfuse an additional unit of PRBCs today because of hemoglobin of 6.8      Sierra Unger MD

## 2024-01-05 NOTE — PROGRESS NOTES
Occupational Therapy                 Therapy Communication Note    Patient Name: Briseida Owen  MRN: [id number]  Today's Date: 1/5/2024     Discipline: Occupational Therapy    Missed Visit Reason: Missed Visit Reason:  (Eval canceled this date. pt to receive another blood transfusion for low H&H.)    Missed Time: Attempt    Comment:

## 2024-01-05 NOTE — PROGRESS NOTES
"Briseida Owen is a 48 y.o. female on day 1 of admission presenting with Anemia, unspecified type.    Subjective   Denies abdominal pain, nausea, vomiting. No bowel movement since arrival        Objective     Physical Exam  Constitutional:       Appearance: Normal appearance.   HENT:      Head: Normocephalic and atraumatic.      Mouth/Throat:      Mouth: Mucous membranes are moist.   Pulmonary:      Effort: Pulmonary effort is normal.   Abdominal:      General: There is no distension.      Palpations: Abdomen is soft.      Tenderness: There is no abdominal tenderness. There is no guarding.   Musculoskeletal:         General: Normal range of motion.      Cervical back: Normal range of motion.   Skin:     General: Skin is warm and dry.      Coloration: Skin is jaundiced.   Neurological:      General: No focal deficit present.      Mental Status: She is alert. Mental status is at baseline.   Psychiatric:         Mood and Affect: Mood normal.         Last Recorded Vitals  Blood pressure 91/53, pulse 77, temperature 36.6 °C (97.9 °F), temperature source Temporal, resp. rate 12, height 1.575 m (5' 2.01\"), weight 74.3 kg (163 lb 12.8 oz), SpO2 96 %.  Intake/Output last 3 Shifts:  I/O last 3 completed shifts:  In: 1100 (14.8 mL/kg) [P.O.:100; Blood:350; IV Piggyback:650]  Out: 1425 (19.2 mL/kg) [Urine:1425 (0.5 mL/kg/hr)]  Weight: 74.3 kg     Relevant Results                Results for orders placed or performed during the hospital encounter of 01/03/24 (from the past 24 hour(s))   Body fluid cell count   Result Value Ref Range    Color, Fluid Yellow Colorless, Straw, Yellow    Clarity, Fluid Clear Clear    WBC, Fluid 551 See Comment /uL    RBC, Fluid 3,000 0  /uL /uL   Body Fluid Differential   Result Value Ref Range    Neutrophils %, Manual, Fluid 6 <25 % %    Lymphocytes %, Manual, Fluid 66 <75 % %    Mono/Macrophages %, Manual, Fluid 23 <70 % %    Eosinophils %, Manual, Fluid 0 0 % %    Basophils %, Manual, Fluid 0 0 " % %    Immature Granulocytes %, Manual, Fluid 0 0 % %    Blasts %, Manual, Fluid 0 0 % %    Unclassified Cells %, Manual, Fluid 5 (H) 0 % %    Plasma Cells %, Manual, Fluid 0 0 % %    Total Cells Counted, Fluid 100    Sterile Fluid Culture/Smear    Specimen: Ascites Fluid   Result Value Ref Range    Gram Stain (1+) Rare Polymorphonuclear leukocytes     Gram Stain No organisms seen    PST Top   Result Value Ref Range    Extra Tube Hold for add-ons.    CBC and Auto Differential   Result Value Ref Range    WBC 15.2 (H) 4.4 - 11.3 x10*3/uL    nRBC 1.3 (H) 0.0 - 0.0 /100 WBCs    RBC 2.00 (L) 4.00 - 5.20 x10*6/uL    Hemoglobin 7.3 (L) 12.0 - 16.0 g/dL    Hematocrit 20.9 (L) 36.0 - 46.0 %     (H) 80 - 100 fL    MCH 36.5 (H) 26.0 - 34.0 pg    MCHC 34.9 32.0 - 36.0 g/dL    RDW 26.3 (H) 11.5 - 14.5 %    Platelets 128 (L) 150 - 450 x10*3/uL    Neutrophils %      Immature Granulocytes %, Automated 6.2 (H) 0.0 - 0.9 %    Lymphocytes %      Monocytes %      Eosinophils %      Basophils %      Neutrophils Absolute      Immature Granulocytes Absolute, Automated 0.94 (H) 0.00 - 0.70 x10*3/uL    Lymphocytes Absolute      Monocytes Absolute      Eosinophils Absolute      Basophils Absolute     Basic metabolic panel   Result Value Ref Range    Glucose 52 (L) 65 - 99 mg/dL    Sodium 118 (LL) 133 - 145 mmol/L    Potassium 5.4 (H) 3.4 - 5.1 mmol/L    Chloride 87 (L) 97 - 107 mmol/L    Bicarbonate 20 (L) 24 - 31 mmol/L    Urea Nitrogen 15 8 - 25 mg/dL    Creatinine 0.70 0.40 - 1.60 mg/dL    eGFR >90 >60 mL/min/1.73m*2    Calcium 8.7 8.5 - 10.4 mg/dL    Anion Gap 11 <=19 mmol/L   Pathologist Review-CBC Differential   Result Value Ref Range    Pathologist Review-CBC Differential       Review of peripheral blood smear reveals leukocytosis with mild granulocytic left shift including myelocytes.  Rare immature/blast-like forms identified.  Macrocytic anemia with anisocytosis, regine cells, acanthocytes, increased polychromasia, and  nucleated red blood cells noted.  Thrombocytopenia with rare giant platelets present.  Please correlate clinically.     Basic metabolic panel   Result Value Ref Range    Glucose 80 65 - 99 mg/dL    Sodium 118 (LL) 133 - 145 mmol/L    Potassium 5.7 (H) 3.4 - 5.1 mmol/L    Chloride 86 (L) 97 - 107 mmol/L    Bicarbonate 19 (L) 24 - 31 mmol/L    Urea Nitrogen 16 8 - 25 mg/dL    Creatinine 0.70 0.40 - 1.60 mg/dL    eGFR >90 >60 mL/min/1.73m*2    Calcium 8.8 8.5 - 10.4 mg/dL    Anion Gap 13 <=19 mmol/L   Basic metabolic panel   Result Value Ref Range    Glucose 128 (H) 65 - 99 mg/dL    Sodium 119 (LL) 133 - 145 mmol/L    Potassium 5.1 3.4 - 5.1 mmol/L    Chloride 87 (L) 97 - 107 mmol/L    Bicarbonate 20 (L) 24 - 31 mmol/L    Urea Nitrogen 16 8 - 25 mg/dL    Creatinine 0.80 0.40 - 1.60 mg/dL    eGFR >90 >60 mL/min/1.73m*2    Calcium 8.7 8.5 - 10.4 mg/dL    Anion Gap 12 <=19 mmol/L   Basic metabolic panel   Result Value Ref Range    Glucose 112 (H) 65 - 99 mg/dL    Sodium 118 (LL) 133 - 145 mmol/L    Potassium 5.1 3.4 - 5.1 mmol/L    Chloride 87 (L) 97 - 107 mmol/L    Bicarbonate 20 (L) 24 - 31 mmol/L    Urea Nitrogen 18 8 - 25 mg/dL    Creatinine 0.90 0.40 - 1.60 mg/dL    eGFR 80 >60 mL/min/1.73m*2    Calcium 8.4 (L) 8.5 - 10.4 mg/dL    Anion Gap 11 <=19 mmol/L   Basic metabolic panel   Result Value Ref Range    Glucose 113 (H) 65 - 99 mg/dL    Sodium 120 (L) 133 - 145 mmol/L    Potassium 5.0 3.4 - 5.1 mmol/L    Chloride 85 (L) 97 - 107 mmol/L    Bicarbonate 20 (L) 24 - 31 mmol/L    Urea Nitrogen 19 8 - 25 mg/dL    Creatinine 1.00 0.40 - 1.60 mg/dL    eGFR 71 >60 mL/min/1.73m*2    Calcium 8.5 8.5 - 10.4 mg/dL    Anion Gap 15 <=19 mmol/L   Ammonia   Result Value Ref Range    Ammonia 104 (H) 12 - 45 umol/L   CBC and Auto Differential   Result Value Ref Range    WBC 12.7 (H) 4.4 - 11.3 x10*3/uL    nRBC 0.8 (H) 0.0 - 0.0 /100 WBCs    RBC 1.86 (L) 4.00 - 5.20 x10*6/uL    Hemoglobin 6.8 (L) 12.0 - 16.0 g/dL    Hematocrit 19.9  (L) 36.0 - 46.0 %     (H) 80 - 100 fL    MCH 36.6 (H) 26.0 - 34.0 pg    MCHC 34.2 32.0 - 36.0 g/dL    RDW 26.1 (H) 11.5 - 14.5 %    Platelets 115 (L) 150 - 450 x10*3/uL    Neutrophils % 73.3 40.0 - 80.0 %    Immature Granulocytes %, Automated 5.5 (H) 0.0 - 0.9 %    Lymphocytes % 11.3 13.0 - 44.0 %    Monocytes % 8.6 2.0 - 10.0 %    Eosinophils % 1.0 0.0 - 6.0 %    Basophils % 0.3 0.0 - 2.0 %    Neutrophils Absolute 9.33 (H) 1.20 - 7.70 x10*3/uL    Immature Granulocytes Absolute, Automated 0.70 0.00 - 0.70 x10*3/uL    Lymphocytes Absolute 1.44 1.20 - 4.80 x10*3/uL    Monocytes Absolute 1.09 (H) 0.10 - 1.00 x10*3/uL    Eosinophils Absolute 0.13 0.00 - 0.70 x10*3/uL    Basophils Absolute 0.04 0.00 - 0.10 x10*3/uL   Comprehensive Metabolic Panel   Result Value Ref Range    Glucose 113 (H) 65 - 99 mg/dL    Sodium 120 (L) 133 - 145 mmol/L    Potassium 5.0 3.4 - 5.1 mmol/L    Chloride 85 (L) 97 - 107 mmol/L    Bicarbonate 20 (L) 24 - 31 mmol/L    Urea Nitrogen 19 8 - 25 mg/dL    Creatinine 1.00 0.40 - 1.60 mg/dL    eGFR 71 >60 mL/min/1.73m*2    Calcium 8.5 8.5 - 10.4 mg/dL    Albumin 3.0 (L) 3.5 - 5.0 g/dL    Alkaline Phosphatase 160 (H) 35 - 125 U/L    Total Protein 5.4 (L) 5.9 - 7.9 g/dL    AST 47 (H) 5 - 40 U/L    Bilirubin, Total 15.8 (H) 0.1 - 1.2 mg/dL    ALT 15 5 - 40 U/L    Anion Gap 15 <=19 mmol/L   Haptoglobin   Result Value Ref Range    Haptoglobin <10 (L) 37 - 246 mg/dL   Vitamin B12   Result Value Ref Range    Vitamin B12 1,136 (H) 211 - 946 pg/mL     US guided abdominal paracentesis    Result Date: 1/4/2024  Interpreted By:  Huy Alvarez, STUDY: US GUIDED ABDOMINAL PARACENTESIS; 1/4/2024 9:25 am   INDICATION: Signs/Symptoms:ascites.   COMPARISON: None.   ACCESSION NUMBER(S): [id number]   ORDERING CLINICIAN: MATTHEW MAYORGA   TECHNIQUE: Ultrasound-guided paracentesis   FINDINGS: Informed consent obtained. Patient positioned supine. Left lower quadrant prepped, draped and anesthetized. Under ultrasound  guidance, 8 [place] centesis sheath needle inserted into peritoneal cavity. 3.1of dark yellowfluid aspiratedwith sample sent for analysis. Patient tolerated procedure well       Ultrasound-guided paracentesis.   Signed by: Huy Alvarez 1/4/2024 10:51 AM Dictation workstation:   [id number]    CT abdomen pelvis w IV contrast    Result Date: 1/4/2024  STUDY: CT Abdomen and Pelvis with IV Contrast; 1/4/2024 1:46 AM. INDICATION: Abdominal pain. COMPARISON: US abd 10/27/2023 .  CT AP 10/21/2023 ACCESSION NUMBER(S): [id number] ORDERING CLINICIAN: JAZMINE GILLESPIE TECHNIQUE: CT of the abdomen and pelvis was performed.  Contiguous axial images were obtained at 3 mm slice thickness through the abdomen and pelvis. Coronal and sagittal reconstructions at 3 mm slice thickness were performed.  Omnipaque 350 75 mL was administered intravenously.  FINDINGS: LOWER CHEST: Cardiac size is mildly enlarged.  No pericardial effusion.  Moderate to large left pleural effusion is noted with partial collapse of the visualized portion of the left lower lobe. There is suggestion of a right breast prosthesis partially visualized.   ABDOMEN:  LIVER: No hepatomegaly.  Liver demonstrates a markedly cirrhotic morphology with a nodular hepatic contour and hypertrophy of the left hepatic lobe.  Simple fluid density cyst is seen in the anterior segment of the right hepatic lobe measuring approximately 1 cm in diameter. Normal attenuation.  BILE DUCTS: No intrahepatic or extrahepatic biliary ductal dilatation.  GALLBLADDER: The gallbladder is distended and contains gallstones in the gallbladder neck. STOMACH: No abnormalities identified.  PANCREAS: No masses or ductal dilatation.  SPLEEN: Spleen is borderline enlarged.  No focal splenic lesion.  ADRENAL GLANDS: No thickening or nodules.  KIDNEYS AND URETERS: Kidneys are normal in size and location.  No renal or ureteral calculi.  PELVIS:  BLADDER: No abnormalities identified.  REPRODUCTIVE ORGANS: No  abnormalities identified.  BOWEL: Appendix is not definitively identified.  Terminal ileum is unremarkable.  No abnormalities identified.  VESSELS: No abnormalities identified.  Abdominal aorta is normal in caliber.  PERITONEUM/RETROPERITONEUM/LYMPH NODES: Large volume of abdominal and pelvic ascites is noted.  No pneumoperitoneum. No lymphadenopathy.  ABDOMINAL WALL: No abnormalities identified. SOFT TISSUES: There is a fluid-filled small indirect right inguinal hernia.  BONES: No acute fracture or aggressive osseous lesion.    Cirrhotic morphology of the liver with a large volume of abdominal and pelvic ascites. Cholelithiasis. Fluid-filled small right inguinal hernia. Moderate to large left pleural effusion with partial collapse of the visualized portion of the left lower lobe. Borderline splenomegaly. Signed by Bartolo Torres    XR chest 1 view    Result Date: 1/3/2024  STUDY: Chest Radiograph;  01/03/2024 at 9:13 PM INDICATION: Weakness. COMPARISON: XR chest 10/21/23, 04/25/21. ACCESSION NUMBER(S): [id number] ORDERING CLINICIAN: Nomi Anaya TECHNIQUE:  Frontal chest was obtained at 2113 hours. FINDINGS: There is dense retrocardiac consolidation in the left lower lobe, suggesting pneumonia. There is likely left pleural effusion. Heart is top normal size with central vascular prominence. There is no pneumothorax.    Retrocardiac consolidation left lower lobe suggests pneumonia, with likely left pleural effusion. Follow-up advised. Signed by Michael Lewis MD                Assessment/Plan   Principal Problem:    Anemia, unspecified type    Alcoholic Cirrhosis of Liver (Decomp in setting chronic alcohol abuse, still drinking, high MELD score of 32)   HE: Continue Lactulose, Xifaxin. Goal for 2-3 stools daily. Monitor given low Na          EV: June 2023 Dr Yepez, grade II, banded. Started on nadolol. No overt bleeding per patient hx.   -Once clinically improved with electrolyte stablization, we can  consider inpatient repeat EGD  -Tentative plan for Tuesday         HCC: AFP up to date         Ascites: S/p paracentesis 3.1 L removed. Neg SBP              -Diuretics per renal              -Low Na diet today      2.    Anemia         Macrocytic anemia. Stable HH. No overt bleeding          - monitor H/H q 6 for a goal >7         - continue PPI   Will consider repeat EGD once clinically improved      3.    Alcohol Abuse,          -Still drinking. Educated on her high risk of death with decomp cirrhosis and ETOH abuse and hx esophageal varices        I spent 20 minutes in the professional and overall care of this patient.      Melissa Jerez, APRN-CNP

## 2024-01-05 NOTE — PROGRESS NOTES
Physical Therapy                 Therapy Communication Note    Patient Name: Briseida Owen  MRN: [id number]  Today's Date: 1/5/2024     Discipline: Physical Therapy    Missed Visit Reason: Missed Visit Reason: Cancel (PT consult received. Eval canceled this date. pt to receive another blood transfusion for low H&H.)    Missed Time: Cancel

## 2024-01-06 ENCOUNTER — APPOINTMENT (OUTPATIENT)
Dept: RADIOLOGY | Facility: HOSPITAL | Age: 49
DRG: 433 | End: 2024-01-06
Payer: MEDICARE

## 2024-01-06 PROBLEM — J90 PLEURAL EFFUSION ON LEFT: Status: ACTIVE | Noted: 2024-01-06

## 2024-01-06 LAB
ALBUMIN SERPL-MCNC: 2.8 G/DL (ref 3.5–5)
ALP BLD-CCNC: 156 U/L (ref 35–125)
ALT SERPL-CCNC: 14 U/L (ref 5–40)
ANION GAP SERPL CALC-SCNC: 14 MMOL/L
AST SERPL-CCNC: 46 U/L (ref 5–40)
BASOPHILS # BLD AUTO: 0.05 X10*3/UL (ref 0–0.1)
BASOPHILS NFR BLD AUTO: 0.5 %
BILIRUB DIRECT SERPL-MCNC: 6.9 MG/DL (ref 0–0.2)
BILIRUB SERPL-MCNC: 14.5 MG/DL (ref 0.1–1.2)
BUN SERPL-MCNC: 17 MG/DL (ref 8–25)
BURR CELLS BLD QL SMEAR: NORMAL
CALCIUM SERPL-MCNC: 9.1 MG/DL (ref 8.5–10.4)
CHLORIDE SERPL-SCNC: 94 MMOL/L (ref 97–107)
CO2 SERPL-SCNC: 21 MMOL/L (ref 24–31)
CREAT SERPL-MCNC: 0.9 MG/DL (ref 0.4–1.6)
EOSINOPHIL # BLD AUTO: 0.2 X10*3/UL (ref 0–0.7)
EOSINOPHIL NFR BLD AUTO: 1.9 %
ERYTHROCYTE [DISTWIDTH] IN BLOOD BY AUTOMATED COUNT: 26.9 % (ref 11.5–14.5)
GFR SERPL CREATININE-BSD FRML MDRD: 80 ML/MIN/1.73M*2
GLUCOSE SERPL-MCNC: 132 MG/DL (ref 65–99)
HCT VFR BLD AUTO: 21.8 % (ref 36–46)
HGB BLD-MCNC: 7.4 G/DL (ref 12–16)
IMM GRANULOCYTES # BLD AUTO: 0.48 X10*3/UL (ref 0–0.7)
IMM GRANULOCYTES NFR BLD AUTO: 4.6 % (ref 0–0.9)
LYMPHOCYTES # BLD AUTO: 1.28 X10*3/UL (ref 1.2–4.8)
LYMPHOCYTES NFR BLD AUTO: 12.2 %
MAGNESIUM SERPL-MCNC: 2.1 MG/DL (ref 1.6–3.1)
MCH RBC QN AUTO: 35.2 PG (ref 26–34)
MCHC RBC AUTO-ENTMCNC: 33.9 G/DL (ref 32–36)
MCV RBC AUTO: 104 FL (ref 80–100)
MONOCYTES # BLD AUTO: 0.99 X10*3/UL (ref 0.1–1)
MONOCYTES NFR BLD AUTO: 9.4 %
NEUTROPHILS # BLD AUTO: 7.5 X10*3/UL (ref 1.2–7.7)
NEUTROPHILS NFR BLD AUTO: 71.4 %
NRBC BLD-RTO: 0.8 /100 WBCS (ref 0–0)
PLATELET # BLD AUTO: 103 X10*3/UL (ref 150–450)
POLYCHROMASIA BLD QL SMEAR: NORMAL
POTASSIUM SERPL-SCNC: 3.6 MMOL/L (ref 3.4–5.1)
PROT SERPL-MCNC: 5.5 G/DL (ref 5.9–7.9)
RBC # BLD AUTO: 2.1 X10*6/UL (ref 4–5.2)
RBC MORPH BLD: NORMAL
SODIUM SERPL-SCNC: 129 MMOL/L (ref 133–145)
WBC # BLD AUTO: 10.5 X10*3/UL (ref 4.4–11.3)

## 2024-01-06 PROCEDURE — 84075 ASSAY ALKALINE PHOSPHATASE: CPT | Performed by: INTERNAL MEDICINE

## 2024-01-06 PROCEDURE — 2500000004 HC RX 250 GENERAL PHARMACY W/ HCPCS (ALT 636 FOR OP/ED): Performed by: INTERNAL MEDICINE

## 2024-01-06 PROCEDURE — 97161 PT EVAL LOW COMPLEX 20 MIN: CPT | Mod: GP

## 2024-01-06 PROCEDURE — 83735 ASSAY OF MAGNESIUM: CPT | Performed by: INTERNAL MEDICINE

## 2024-01-06 PROCEDURE — 2500000001 HC RX 250 WO HCPCS SELF ADMINISTERED DRUGS (ALT 637 FOR MEDICARE OP): Performed by: INTERNAL MEDICINE

## 2024-01-06 PROCEDURE — 97166 OT EVAL MOD COMPLEX 45 MIN: CPT | Mod: GO

## 2024-01-06 PROCEDURE — 85025 COMPLETE CBC W/AUTO DIFF WBC: CPT | Performed by: INTERNAL MEDICINE

## 2024-01-06 PROCEDURE — 76705 ECHO EXAM OF ABDOMEN: CPT

## 2024-01-06 PROCEDURE — 36415 COLL VENOUS BLD VENIPUNCTURE: CPT | Performed by: INTERNAL MEDICINE

## 2024-01-06 PROCEDURE — 97110 THERAPEUTIC EXERCISES: CPT | Mod: GP

## 2024-01-06 PROCEDURE — 97530 THERAPEUTIC ACTIVITIES: CPT | Mod: GO

## 2024-01-06 PROCEDURE — 1210000001 HC SEMI-PRIVATE ROOM DAILY

## 2024-01-06 RX ORDER — GABAPENTIN 300 MG/1
300 CAPSULE ORAL EVERY 8 HOURS SCHEDULED
Status: DISCONTINUED | OUTPATIENT
Start: 2024-01-06 | End: 2024-01-09 | Stop reason: HOSPADM

## 2024-01-06 RX ORDER — FUROSEMIDE 40 MG/1
40 TABLET ORAL
Status: DISCONTINUED | OUTPATIENT
Start: 2024-01-06 | End: 2024-01-09 | Stop reason: HOSPADM

## 2024-01-06 RX ADMIN — THIAMINE HYDROCHLORIDE 100 MG: 100 INJECTION, SOLUTION INTRAMUSCULAR; INTRAVENOUS at 09:22

## 2024-01-06 RX ADMIN — FUROSEMIDE 40 MG: 40 TABLET ORAL at 09:24

## 2024-01-06 RX ADMIN — PIPERACILLIN SODIUM AND TAZOBACTAM SODIUM 3.38 G: 3; .375 INJECTION, SOLUTION INTRAVENOUS at 04:54

## 2024-01-06 RX ADMIN — GABAPENTIN 300 MG: 300 CAPSULE ORAL at 21:48

## 2024-01-06 RX ADMIN — FUROSEMIDE 40 MG: 40 TABLET ORAL at 17:02

## 2024-01-06 RX ADMIN — Medication 400 MG: at 09:21

## 2024-01-06 RX ADMIN — PIPERACILLIN SODIUM AND TAZOBACTAM SODIUM 3.38 G: 3; .375 INJECTION, SOLUTION INTRAVENOUS at 21:48

## 2024-01-06 RX ADMIN — PIPERACILLIN SODIUM AND TAZOBACTAM SODIUM 3.38 G: 3; .375 INJECTION, SOLUTION INTRAVENOUS at 09:24

## 2024-01-06 RX ADMIN — FOLIC ACID 1 MG: 1 TABLET ORAL at 09:21

## 2024-01-06 RX ADMIN — PIPERACILLIN SODIUM AND TAZOBACTAM SODIUM 3.38 G: 3; .375 INJECTION, SOLUTION INTRAVENOUS at 17:02

## 2024-01-06 RX ADMIN — PANTOPRAZOLE SODIUM 40 MG: 40 TABLET, DELAYED RELEASE ORAL at 09:21

## 2024-01-06 RX ADMIN — MULTIVITAMIN TABLET 1 TABLET: TABLET at 09:22

## 2024-01-06 RX ADMIN — GABAPENTIN 300 MG: 300 CAPSULE ORAL at 09:24

## 2024-01-06 RX ADMIN — RIFAXIMIN 550 MG: 550 TABLET ORAL at 21:48

## 2024-01-06 RX ADMIN — GABAPENTIN 300 MG: 300 CAPSULE ORAL at 14:25

## 2024-01-06 RX ADMIN — LEVOTHYROXINE SODIUM 50 MCG: 0.05 TABLET ORAL at 08:08

## 2024-01-06 RX ADMIN — RIFAXIMIN 550 MG: 550 TABLET ORAL at 09:22

## 2024-01-06 ASSESSMENT — COGNITIVE AND FUNCTIONAL STATUS - GENERAL
MOVING TO AND FROM BED TO CHAIR: A LITTLE
DAILY ACTIVITIY SCORE: 18
MOVING TO AND FROM BED TO CHAIR: A LITTLE
TURNING FROM BACK TO SIDE WHILE IN FLAT BAD: A LITTLE
DAILY ACTIVITIY SCORE: 17
DRESSING REGULAR LOWER BODY CLOTHING: A LITTLE
HELP NEEDED FOR BATHING: A LITTLE
CLIMB 3 TO 5 STEPS WITH RAILING: A LOT
WALKING IN HOSPITAL ROOM: A LITTLE
WALKING IN HOSPITAL ROOM: A LITTLE
TOILETING: TOTAL
TURNING FROM BACK TO SIDE WHILE IN FLAT BAD: A LITTLE
MOVING FROM LYING ON BACK TO SITTING ON SIDE OF FLAT BED WITH BEDRAILS: A LITTLE
STANDING UP FROM CHAIR USING ARMS: A LITTLE
PERSONAL GROOMING: A LITTLE
CLIMB 3 TO 5 STEPS WITH RAILING: A LOT
MOBILITY SCORE: 17
DRESSING REGULAR UPPER BODY CLOTHING: A LITTLE
HELP NEEDED FOR BATHING: A LITTLE
DRESSING REGULAR LOWER BODY CLOTHING: A LITTLE
MOVING FROM LYING ON BACK TO SITTING ON SIDE OF FLAT BED WITH BEDRAILS: A LITTLE
TOILETING: A LOT
MOBILITY SCORE: 17
DRESSING REGULAR UPPER BODY CLOTHING: A LITTLE
PERSONAL GROOMING: A LITTLE
STANDING UP FROM CHAIR USING ARMS: A LITTLE

## 2024-01-06 ASSESSMENT — LIFESTYLE VARIABLES
TOTAL SCORE: 0
PAROXYSMAL SWEATS: NO SWEAT VISIBLE
TREMOR: NO TREMOR
ANXIETY: NO ANXIETY, AT EASE
TREMOR: NO TREMOR
NAUSEA AND VOMITING: NO NAUSEA AND NO VOMITING
HEADACHE, FULLNESS IN HEAD: NOT PRESENT
ORIENTATION AND CLOUDING OF SENSORIUM: ORIENTED AND CAN DO SERIAL ADDITIONS
PAROXYSMAL SWEATS: NO SWEAT VISIBLE
VISUAL DISTURBANCES: NOT PRESENT
AGITATION: NORMAL ACTIVITY
ANXIETY: NO ANXIETY, AT EASE
AUDITORY DISTURBANCES: NOT PRESENT
VISUAL DISTURBANCES: NOT PRESENT
TOTAL SCORE: 0
TOTAL SCORE: 0
ORIENTATION AND CLOUDING OF SENSORIUM: ORIENTED AND CAN DO SERIAL ADDITIONS
HEADACHE, FULLNESS IN HEAD: NOT PRESENT
AGITATION: NORMAL ACTIVITY
VISUAL DISTURBANCES: NOT PRESENT
TREMOR: NO TREMOR
NAUSEA AND VOMITING: NO NAUSEA AND NO VOMITING
PAROXYSMAL SWEATS: NO SWEAT VISIBLE
HEADACHE, FULLNESS IN HEAD: NOT PRESENT
AUDITORY DISTURBANCES: NOT PRESENT
AGITATION: NORMAL ACTIVITY
NAUSEA AND VOMITING: NO NAUSEA AND NO VOMITING
ORIENTATION AND CLOUDING OF SENSORIUM: ORIENTED AND CAN DO SERIAL ADDITIONS
AUDITORY DISTURBANCES: NOT PRESENT
ANXIETY: NO ANXIETY, AT EASE

## 2024-01-06 ASSESSMENT — PAIN - FUNCTIONAL ASSESSMENT
PAIN_FUNCTIONAL_ASSESSMENT: 0-10

## 2024-01-06 ASSESSMENT — ACTIVITIES OF DAILY LIVING (ADL)
ADL_ASSISTANCE: NEEDS ASSISTANCE
EFFECT OF PAIN ON DAILY ACTIVITIES: INADEQUATE SLEEP
BATHING_ASSISTANCE: NOT PERFORMED
ADL_ASSISTANCE: NEEDS ASSISTANCE

## 2024-01-06 ASSESSMENT — PAIN SCALES - GENERAL
PAINLEVEL_OUTOF10: 8
PAINLEVEL_OUTOF10: 8
PAINLEVEL_OUTOF10: 0 - NO PAIN
PAINLEVEL_OUTOF10: 0 - NO PAIN
PAINLEVEL_OUTOF10: 8

## 2024-01-06 ASSESSMENT — PAIN DESCRIPTION - DESCRIPTORS: DESCRIPTORS: BURNING;ACHING;NUMBNESS;TINGLING

## 2024-01-06 NOTE — PROGRESS NOTES
"Briseida Owen is a 48 y.o. female on day 2 of admission presenting with Anemia, unspecified type.    Subjective   Doing okay.  No overt bleeding.  Tolerating diet.  Feels like she is starting to fill back up with fluid    Objective     Physical Exam  Abdominal:      General: Bowel sounds are normal. There is distension.      Tenderness: There is no abdominal tenderness.   Skin:     Coloration: Skin is jaundiced.         Last Recorded Vitals  Blood pressure 107/61, pulse 99, temperature 36.8 °C (98.2 °F), temperature source Temporal, resp. rate 17, height 1.575 m (5' 2.01\"), weight 73.5 kg (162 lb 0.6 oz), SpO2 92 %.  Intake/Output last 3 Shifts:  I/O last 3 completed shifts:  In: 988 (13.4 mL/kg) [P.O.:200; Blood:288; IV Piggyback:500]  Out: 3950 (53.7 mL/kg) [Urine:3950 (1.5 mL/kg/hr)]  Weight: 73.5 kg     Relevant Results  Results for orders placed or performed during the hospital encounter of 01/03/24 (from the past 24 hour(s))   Basic metabolic panel   Result Value Ref Range    Glucose 117 (H) 65 - 99 mg/dL    Sodium 125 (L) 133 - 145 mmol/L    Potassium 4.5 3.4 - 5.1 mmol/L    Chloride 90 (L) 97 - 107 mmol/L    Bicarbonate 22 (L) 24 - 31 mmol/L    Urea Nitrogen 19 8 - 25 mg/dL    Creatinine 0.90 0.40 - 1.60 mg/dL    eGFR 80 >60 mL/min/1.73m*2    Calcium 8.8 8.5 - 10.4 mg/dL    Anion Gap 13 <=19 mmol/L   Hepatic Function Panel   Result Value Ref Range    AST 46 (H) 5 - 40 U/L    ALT 14 5 - 40 U/L    Alkaline Phosphatase 156 (H) 35 - 125 U/L    Bilirubin, Total 14.5 (H) 0.1 - 1.2 mg/dL    Bilirubin, Direct 6.9 (H) 0.0 - 0.2 mg/dL    Total Protein 5.5 (L) 5.9 - 7.9 g/dL    Albumin 2.8 (L) 3.5 - 5.0 g/dL   CBC and Auto Differential   Result Value Ref Range    WBC 10.5 4.4 - 11.3 x10*3/uL    nRBC 0.8 (H) 0.0 - 0.0 /100 WBCs    RBC 2.10 (L) 4.00 - 5.20 x10*6/uL    Hemoglobin 7.4 (L) 12.0 - 16.0 g/dL    Hematocrit 21.8 (L) 36.0 - 46.0 %     (H) 80 - 100 fL    MCH 35.2 (H) 26.0 - 34.0 pg    MCHC 33.9 32.0 - " 36.0 g/dL    RDW 26.9 (H) 11.5 - 14.5 %    Platelets 103 (L) 150 - 450 x10*3/uL    Neutrophils % 71.4 40.0 - 80.0 %    Immature Granulocytes %, Automated 4.6 (H) 0.0 - 0.9 %    Lymphocytes % 12.2 13.0 - 44.0 %    Monocytes % 9.4 2.0 - 10.0 %    Eosinophils % 1.9 0.0 - 6.0 %    Basophils % 0.5 0.0 - 2.0 %    Neutrophils Absolute 7.50 1.20 - 7.70 x10*3/uL    Immature Granulocytes Absolute, Automated 0.48 0.00 - 0.70 x10*3/uL    Lymphocytes Absolute 1.28 1.20 - 4.80 x10*3/uL    Monocytes Absolute 0.99 0.10 - 1.00 x10*3/uL    Eosinophils Absolute 0.20 0.00 - 0.70 x10*3/uL    Basophils Absolute 0.05 0.00 - 0.10 x10*3/uL   Magnesium   Result Value Ref Range    Magnesium 2.10 1.60 - 3.10 mg/dL   Morphology   Result Value Ref Range    RBC Morphology See Below     Polychromasia Mild     [place] Cells Many    Basic metabolic panel   Result Value Ref Range    Glucose 132 (H) 65 - 99 mg/dL    Sodium 129 (L) 133 - 145 mmol/L    Potassium 3.6 3.4 - 5.1 mmol/L    Chloride 94 (L) 97 - 107 mmol/L    Bicarbonate 21 (L) 24 - 31 mmol/L    Urea Nitrogen 17 8 - 25 mg/dL    Creatinine 0.90 0.40 - 1.60 mg/dL    eGFR 80 >60 mL/min/1.73m*2    Calcium 9.1 8.5 - 10.4 mg/dL    Anion Gap 14 <=19 mmol/L      US abdomen limited    Result Date: 1/6/2024  Interpreted By:  Bre Nicholson, STUDY: US ABDOMEN LIMITED; 11:32 am   INDICATION: Signs/Symptoms:ascites.   COMPARISON: None.   ACCESSION NUMBER(S): [id number]   ORDERING CLINICIAN: BERKLEY ALBERTS   TECHNIQUE: Limited abdominal ultrasound of the right upper quadrant was performed utilizing gray scale imaging.   FINDINGS: Trace amount of fluid is noted within the left upper and lower quadrants of the abdomen.       Trace amount of fluid noted within the left upper and lower quadrant the abdomen.   MACRO: None.   Signed by: Bre Nicholson 1/6/2024 11:33 AM Dictation workstation:   [id number]    XR chest 1 view    Result Date: 1/5/2024  Interpreted By:  Berkley Reynoso, STUDY: XR CHEST 1  VIEW; 1/5/2024 10:16 am   INDICATION: pleural effusion.   COMPARISON: January 3, 2024   ACCESSION NUMBER(S): [id number]   ORDERING CLINICIAN: MARICARMEN SANTIAGO   FINDINGS: RESULT:  The cardiac silhouette is within normal limits for size. Mediastinal contours are unremarkable. Left basilar opacity is again noted obscuring the left hemidiaphragm, not significantly changed compared to previous exam. The osseous structures are unremarkable.       Left basilar opacity obscuring the left hemidiaphragm is again noted which may represent pleural fluid and atelectasis. Underlying infiltrate is not excluded.     Signed by: Tomi Reynoso 1/5/2024 10:48 AM Dictation workstation:   [id number]    US guided abdominal paracentesis    Result Date: 1/4/2024  Interpreted By:  Huy Alvarez, STUDY: US GUIDED ABDOMINAL PARACENTESIS; 1/4/2024 9:25 am   INDICATION: Signs/Symptoms:ascites.   COMPARISON: None.   ACCESSION NUMBER(S): [id number]   ORDERING CLINICIAN: MATTHEW MAYORGA   TECHNIQUE: Ultrasound-guided paracentesis   FINDINGS: Informed consent obtained. Patient positioned supine. Left lower quadrant prepped, draped and anesthetized. Under ultrasound guidance, 8 [place] centesis sheath needle inserted into peritoneal cavity. 3.1of dark yellowfluid aspiratedwith sample sent for analysis. Patient tolerated procedure well       Ultrasound-guided paracentesis.   Signed by: Huy Alvarez 1/4/2024 10:51 AM Dictation workstation:   [id number]    CT abdomen pelvis w IV contrast    Result Date: 1/4/2024  STUDY: CT Abdomen and Pelvis with IV Contrast; 1/4/2024 1:46 AM. INDICATION: Abdominal pain. COMPARISON: US abd 10/27/2023 .  CT AP 10/21/2023 ACCESSION NUMBER(S): [id number] ORDERING CLINICIAN: JAZMINE GILLESPIE TECHNIQUE: CT of the abdomen and pelvis was performed.  Contiguous axial images were obtained at 3 mm slice thickness through the abdomen and pelvis. Coronal and sagittal reconstructions at 3 mm slice thickness were performed.   Omnipaque 350 75 mL was administered intravenously.  FINDINGS: LOWER CHEST: Cardiac size is mildly enlarged.  No pericardial effusion.  Moderate to large left pleural effusion is noted with partial collapse of the visualized portion of the left lower lobe. There is suggestion of a right breast prosthesis partially visualized.   ABDOMEN:  LIVER: No hepatomegaly.  Liver demonstrates a markedly cirrhotic morphology with a nodular hepatic contour and hypertrophy of the left hepatic lobe.  Simple fluid density cyst is seen in the anterior segment of the right hepatic lobe measuring approximately 1 cm in diameter. Normal attenuation.  BILE DUCTS: No intrahepatic or extrahepatic biliary ductal dilatation.  GALLBLADDER: The gallbladder is distended and contains gallstones in the gallbladder neck. STOMACH: No abnormalities identified.  PANCREAS: No masses or ductal dilatation.  SPLEEN: Spleen is borderline enlarged.  No focal splenic lesion.  ADRENAL GLANDS: No thickening or nodules.  KIDNEYS AND URETERS: Kidneys are normal in size and location.  No renal or ureteral calculi.  PELVIS:  BLADDER: No abnormalities identified.  REPRODUCTIVE ORGANS: No abnormalities identified.  BOWEL: Appendix is not definitively identified.  Terminal ileum is unremarkable.  No abnormalities identified.  VESSELS: No abnormalities identified.  Abdominal aorta is normal in caliber.  PERITONEUM/RETROPERITONEUM/LYMPH NODES: Large volume of abdominal and pelvic ascites is noted.  No pneumoperitoneum. No lymphadenopathy.  ABDOMINAL WALL: No abnormalities identified. SOFT TISSUES: There is a fluid-filled small indirect right inguinal hernia.  BONES: No acute fracture or aggressive osseous lesion.    Cirrhotic morphology of the liver with a large volume of abdominal and pelvic ascites. Cholelithiasis. Fluid-filled small right inguinal hernia. Moderate to large left pleural effusion with partial collapse of the visualized portion of the left lower lobe.  Borderline splenomegaly. Signed by Bartolo Torres    XR chest 1 view    Result Date: 1/3/2024  STUDY: Chest Radiograph;  01/03/2024 at 9:13 PM INDICATION: Weakness. COMPARISON: XR chest 10/21/23, 04/25/21. ACCESSION NUMBER(S): [id number] ORDERING CLINICIAN: Nomi Anaya TECHNIQUE:  Frontal chest was obtained at 2113 hours. FINDINGS: There is dense retrocardiac consolidation in the left lower lobe, suggesting pneumonia. There is likely left pleural effusion. Heart is top normal size with central vascular prominence. There is no pneumothorax.    Retrocardiac consolidation left lower lobe suggests pneumonia, with likely left pleural effusion. Follow-up advised. Signed by Michael Lewis MD                         Assessment/Plan   Principal Problem:    Anemia, unspecified type    Alcoholic Cirrhosis of Liver (Decomp in setting chronic alcohol abuse, still drinking, high MELD score of 32)   HE: Continue Lactulose, Xifaxin. Goal for 2-3 stools daily. Monitor given low Na          EV: June 2023 Dr Yepez, grade II, banded. Started on nadolol. No overt bleeding per patient hx.  -Once clinically improved with electrolyte stablization, we can consider inpatient repeat EGD  -Tentative plan for Monday vs Tuesday         HCC: AFP up to date         Ascites: S/p paracentesis 3.1 L removed. Neg SBP. May need repeat para for Tuesday.              -Diuretics per renal              -Low Na diet today      2.    Anemia         Macrocytic anemia. Stable HH. No overt bleeding          - monitor H/H q 6 for a goal >7         - continue PPI   Will consider repeat EGD once clinically improved      3.    Alcohol Abuse,          -Still drinking. Educated on her high risk of death with decomp cirrhosis and ETOH abuse and hx esophageal varices       Syd Moreno, APRN-CNP

## 2024-01-06 NOTE — PROGRESS NOTES
"Briseida Owen is a 48 y.o. female on day 2 of admission presenting with Anemia, unspecified type.    Subjective   Patient seen for profound hyponatremia in [place] to massive ascites and cirrhosis of the liver patient is placed on fluid restriction she had paracentesis and she started on loop diuretic she feels much better her mental status is intact    Objective     Physical Exam  Neck:      Vascular: No carotid bruit.   Cardiovascular:      Rate and Rhythm: Normal rate and regular rhythm.      Heart sounds: No murmur heard.     No friction rub. No gallop.   Pulmonary:      Breath sounds: No wheezing, rhonchi or rales.   Chest:      Chest wall: No tenderness.   Abdominal:      General: There is no distension.      Tenderness: There is no abdominal tenderness. There is no guarding or rebound.   Musculoskeletal:         General: No swelling or tenderness.      Cervical back: Neck supple.      Right lower leg: No edema.      Left lower leg: No edema.   Lymphadenopathy:      Cervical: No cervical adenopathy.         Last Recorded Vitals  Blood pressure 107/61, pulse 99, temperature 36.8 °C (98.2 °F), temperature source Temporal, resp. rate 17, height 1.575 m (5' 2.01\"), weight 73.5 kg (162 lb 0.6 oz), SpO2 92 %.    Intake/Output last 3 Shifts:  I/O last 3 completed shifts:  In: 988 (13.4 mL/kg) [P.O.:200; Blood:288; IV Piggyback:500]  Out: 3950 (53.7 mL/kg) [Urine:3950 (1.5 mL/kg/hr)]  Weight: 73.5 kg     Current Facility-Administered Medications:     folic acid (Folvite) tablet 1 mg, 1 mg, oral, Daily, Ronn Hernandez MD, 1 mg at 01/06/24 0921    furosemide (Lasix) tablet 40 mg, 40 mg, oral, BID with meals, Tomi Beasley DO, 40 mg at 01/06/24 0924    gabapentin (Neurontin) capsule 300 mg, 300 mg, oral, q8h VICKY, Tomi Beasley DO, 300 mg at 01/06/24 0924    lactulose 20 gram/30 mL oral solution 30 g, 30 g, oral, BID, Ronn Hernandez MD, 30 g at 01/05/24 2140    levothyroxine (Synthroid, Levoxyl) tablet 50 " mcg, 50 mcg, oral, Daily before breakfast, Ronn Hernandez MD, 50 mcg at 01/06/24 0808    LORazepam (Ativan) injection 0.5 mg, 0.5 mg, intravenous, q2h PRN **OR** LORazepam (Ativan) injection 1 mg, 1 mg, intravenous, q2h PRN **OR** LORazepam (Ativan) injection 2 mg, 2 mg, intravenous, q2h PRN, Ronn Hernandez MD    magnesium oxide (Mag-Ox) tablet 400 mg, 400 mg, oral, Daily, Sierra Unger MD, 400 mg at 01/06/24 0921    multivitamin 1 tablet, 1 tablet, oral, Daily, Sierra Unger MD, 1 tablet at 01/06/24 0922    oxygen (O2) therapy, , inhalation, Continuous PRN - O2/gases, Sierra Unger MD    pantoprazole (ProtoNix) EC tablet 40 mg, 40 mg, oral, Daily, iSerra Unger MD, 40 mg at 01/06/24 0921    piperacillin-tazobactam-dextrose (Zosyn) IV 3.375 g, 3.375 g, intravenous, q6h, Ronn Hernandez MD, Last Rate: 100 mL/hr at 01/06/24 0924, 3.375 g at 01/06/24 0924    rifAXIMin (Xifaxan) tablet 550 mg, 550 mg, oral, BID, Ronn Hernandez MD, 550 mg at 01/06/24 0922    [START ON 1/7/2024] thiamine (Vitamin B-1) tablet 100 mg, 100 mg, oral, Daily, Ronn Hernandez MD   Relevant Results    Results for orders placed or performed during the hospital encounter of 01/03/24 (from the past 96 hour(s))   APTT   Result Value Ref Range    aPTT 42.7 (H) 22.0 - 32.5 seconds   Comprehensive metabolic panel   Result Value Ref Range    Glucose 141 (H) 65 - 99 mg/dL    Sodium 115 (LL) 133 - 145 mmol/L    Potassium 5.2 (H) 3.4 - 5.1 mmol/L    Chloride 83 (L) 97 - 107 mmol/L    Bicarbonate 18 (L) 24 - 31 mmol/L    Urea Nitrogen 12 8 - 25 mg/dL    Creatinine 0.70 0.40 - 1.60 mg/dL    eGFR >90 >60 mL/min/1.73m*2    Calcium 8.7 8.5 - 10.4 mg/dL    Albumin 3.4 (L) 3.5 - 5.0 g/dL    Alkaline Phosphatase 179 (H) 35 - 125 U/L    Total Protein 6.3 5.9 - 7.9 g/dL    AST 53 (H) 5 - 40 U/L    Bilirubin, Total 16.4 (H) 0.1 - 1.2 mg/dL    ALT 20 5 - 40 U/L    Anion Gap 14 <=19 mmol/L   CBC   Result Value Ref Range    WBC 15.1 (H)  4.4 - 11.3 x10*3/uL    nRBC 1.1 (H) 0.0 - 0.0 /100 WBCs    RBC 1.25 (L) 4.00 - 5.20 x10*6/uL    Hemoglobin 5.2 (LL) 12.0 - 16.0 g/dL    Hematocrit 15.5 (L) 36.0 - 46.0 %     (H) 80 - 100 fL    MCH 41.6 (H) 26.0 - 34.0 pg    MCHC 33.5 32.0 - 36.0 g/dL    RDW 18.4 (H) 11.5 - 14.5 %    Platelets 163 150 - 450 x10*3/uL   Lipase   Result Value Ref Range    Lipase 17 16 - 63 U/L   Ammonia   Result Value Ref Range    Ammonia 65 (H) 12 - 45 umol/L   BLOOD GAS LACTIC ACID, VENOUS   Result Value Ref Range    POCT Lactate, Venous 3.9 (H) 0.4 - 2.0 mmol/L   Bilirubin, Direct   Result Value Ref Range    Bilirubin, Direct 6.5 (H) 0.0 - 0.2 mg/dL   Protime-INR   Result Value Ref Range    Protime 18.7 (H) 9.3 - 12.7 seconds    INR 1.8 (H) 0.9 - 1.2   Sars-CoV-2 and Influenza A/B PCR   Result Value Ref Range    Flu A Result Not Detected Not Detected    Flu B Result Not Detected Not Detected    Coronavirus 2019, PCR Not Detected Not Detected   Type and screen   Result Value Ref Range    ABO TYPE O     Rh TYPE POS     ANTIBODY SCREEN NEG    Alcohol   Result Value Ref Range    Alcohol 0.042 (H) 0.000 - 0.010 g/dL   Osmolality   Result Value Ref Range    Osmolality, Serum 271 (L) 280 - 300 mOsm/kg   hCG, quantitative, pregnancy   Result Value Ref Range    HCG, Beta-Quantitative <1 SEE COMMENT BELOW mIU/mL   Prepare RBC: 2 Units   Result Value Ref Range    PRODUCT CODE S4503C27     Unit Number [id number]     Unit ABO O     Unit RH POS     XM INTEP COMP     Dispense Status PT     Blood Expiration Date January 25, 2024 23:59 EST     PRODUCT BLOOD TYPE 5100     UNIT VOLUME 350     PRODUCT CODE A4904W86     Unit Number [id number]     Unit ABO O     Unit RH POS     XM INTEP COMP     Dispense Status TR     Blood Expiration Date January 25, 2024 23:59 EST     PRODUCT BLOOD TYPE 5100     UNIT VOLUME 350    Blood Gas Lactic Acid, Venous   Result Value Ref Range    POCT Lactate, Venous 3.2 (H) 0.4 - 2.0 mmol/L   Blood Culture     Specimen: Peripheral Venipuncture; Blood culture   Result Value Ref Range    Blood Culture No growth at 1 day    Occult Blood, Stool    Specimen: Stool   Result Value Ref Range    Occult Blood, Stool X1 Negative Negative   Blood Culture    Specimen: Peripheral Venipuncture; Blood culture   Result Value Ref Range    Blood Culture No growth at 1 day    Osmolality, urine   Result Value Ref Range    Osmolality, Urine Random 418 200 - 1,200 mOsm/kg   Sodium, urine, random   Result Value Ref Range    Sodium, Urine Random 20 NOT ESTABLISHED mmol/L    Creatinine, Urine Random 123.5 NOT ESTABLISHED mg/dL    Sodium/Creatinine Ratio 16 Not established. mmol/g Creat   Comprehensive Metabolic Panel   Result Value Ref Range    Glucose 102 (H) 65 - 99 mg/dL    Sodium 117 (LL) 133 - 145 mmol/L    Potassium 6.0 (H) 3.4 - 5.1 mmol/L    Chloride 85 (L) 97 - 107 mmol/L    Bicarbonate 18 (L) 24 - 31 mmol/L    Urea Nitrogen 14 8 - 25 mg/dL    Creatinine 0.70 0.40 - 1.60 mg/dL    eGFR >90 >60 mL/min/1.73m*2    Calcium 8.9 8.5 - 10.4 mg/dL    Albumin 3.7 3.5 - 5.0 g/dL    Alkaline Phosphatase 197 (H) 35 - 125 U/L    Total Protein 6.9 5.9 - 7.9 g/dL    AST 61 (H) 5 - 40 U/L    Bilirubin, Total 18.8 (H) 0.1 - 1.2 mg/dL    ALT 21 5 - 40 U/L    Anion Gap 14 <=19 mmol/L   CBC   Result Value Ref Range    WBC 15.0 (H) 4.4 - 11.3 x10*3/uL    nRBC 1.3 (H) 0.0 - 0.0 /100 WBCs    RBC 2.12 (L) 4.00 - 5.20 x10*6/uL    Hemoglobin 7.7 (L) 12.0 - 16.0 g/dL    Hematocrit 23.2 (L) 36.0 - 46.0 %     (H) 80 - 100 fL    MCH 36.3 (H) 26.0 - 34.0 pg    MCHC 33.2 32.0 - 36.0 g/dL    RDW 25.1 (H) 11.5 - 14.5 %    Platelets 130 (L) 150 - 450 x10*3/uL   Body fluid cell count   Result Value Ref Range    Color, Fluid Yellow Colorless, Straw, Yellow    Clarity, Fluid Clear Clear    WBC, Fluid 551 See Comment /uL    RBC, Fluid 3,000 0  /uL /uL   Body Fluid Differential   Result Value Ref Range    Neutrophils %, Manual, Fluid 6 <25 % %    Lymphocytes %, Manual,  Fluid 66 <75 % %    Mono/Macrophages %, Manual, Fluid 23 <70 % %    Eosinophils %, Manual, Fluid 0 0 % %    Basophils %, Manual, Fluid 0 0 % %    Immature Granulocytes %, Manual, Fluid 0 0 % %    Blasts %, Manual, Fluid 0 0 % %    Unclassified Cells %, Manual, Fluid 5 (H) 0 % %    Plasma Cells %, Manual, Fluid 0 0 % %    Total Cells Counted, Fluid 100    Pathologist Review-Cell Count,Fluid   Result Value Ref Range    Pathologist Review-Cell Count, Fluid       Specimen consists of reactive mesothelial cells, mononuclear cells, lymphocytes, neutrophils, and background mature red blood cells.  Correlation with cytology specimen ([id number]) is recommended.   Sterile Fluid Culture/Smear    Specimen: Ascites Fluid   Result Value Ref Range    Sterile Fluid Culture/Smear No growth to date     Gram Stain (1+) Rare Polymorphonuclear leukocytes     Gram Stain No organisms seen    PST Top   Result Value Ref Range    Extra Tube Hold for add-ons.    CBC and Auto Differential   Result Value Ref Range    WBC 15.2 (H) 4.4 - 11.3 x10*3/uL    nRBC 1.3 (H) 0.0 - 0.0 /100 WBCs    RBC 2.00 (L) 4.00 - 5.20 x10*6/uL    Hemoglobin 7.3 (L) 12.0 - 16.0 g/dL    Hematocrit 20.9 (L) 36.0 - 46.0 %     (H) 80 - 100 fL    MCH 36.5 (H) 26.0 - 34.0 pg    MCHC 34.9 32.0 - 36.0 g/dL    RDW 26.3 (H) 11.5 - 14.5 %    Platelets 128 (L) 150 - 450 x10*3/uL    Neutrophils %      Immature Granulocytes %, Automated 6.2 (H) 0.0 - 0.9 %    Lymphocytes %      Monocytes %      Eosinophils %      Basophils %      Neutrophils Absolute      Immature Granulocytes Absolute, Automated 0.94 (H) 0.00 - 0.70 x10*3/uL    Lymphocytes Absolute      Monocytes Absolute      Eosinophils Absolute      Basophils Absolute     Basic metabolic panel   Result Value Ref Range    Glucose 52 (L) 65 - 99 mg/dL    Sodium 118 (LL) 133 - 145 mmol/L    Potassium 5.4 (H) 3.4 - 5.1 mmol/L    Chloride 87 (L) 97 - 107 mmol/L    Bicarbonate 20 (L) 24 - 31 mmol/L    Urea Nitrogen 15 8 - 25  mg/dL    Creatinine 0.70 0.40 - 1.60 mg/dL    eGFR >90 >60 mL/min/1.73m*2    Calcium 8.7 8.5 - 10.4 mg/dL    Anion Gap 11 <=19 mmol/L   Pathologist Review-CBC Differential   Result Value Ref Range    Pathologist Review-CBC Differential       Review of peripheral blood smear reveals leukocytosis with mild granulocytic left shift including myelocytes.  Rare immature/blast-like forms identified.  Macrocytic anemia with anisocytosis, regine cells, acanthocytes, increased polychromasia, and nucleated red blood cells noted.  Thrombocytopenia with rare giant platelets present.  Please correlate clinically.     Basic metabolic panel   Result Value Ref Range    Glucose 80 65 - 99 mg/dL    Sodium 118 (LL) 133 - 145 mmol/L    Potassium 5.7 (H) 3.4 - 5.1 mmol/L    Chloride 86 (L) 97 - 107 mmol/L    Bicarbonate 19 (L) 24 - 31 mmol/L    Urea Nitrogen 16 8 - 25 mg/dL    Creatinine 0.70 0.40 - 1.60 mg/dL    eGFR >90 >60 mL/min/1.73m*2    Calcium 8.8 8.5 - 10.4 mg/dL    Anion Gap 13 <=19 mmol/L   Basic metabolic panel   Result Value Ref Range    Glucose 128 (H) 65 - 99 mg/dL    Sodium 119 (LL) 133 - 145 mmol/L    Potassium 5.1 3.4 - 5.1 mmol/L    Chloride 87 (L) 97 - 107 mmol/L    Bicarbonate 20 (L) 24 - 31 mmol/L    Urea Nitrogen 16 8 - 25 mg/dL    Creatinine 0.80 0.40 - 1.60 mg/dL    eGFR >90 >60 mL/min/1.73m*2    Calcium 8.7 8.5 - 10.4 mg/dL    Anion Gap 12 <=19 mmol/L   Basic metabolic panel   Result Value Ref Range    Glucose 112 (H) 65 - 99 mg/dL    Sodium 118 (LL) 133 - 145 mmol/L    Potassium 5.1 3.4 - 5.1 mmol/L    Chloride 87 (L) 97 - 107 mmol/L    Bicarbonate 20 (L) 24 - 31 mmol/L    Urea Nitrogen 18 8 - 25 mg/dL    Creatinine 0.90 0.40 - 1.60 mg/dL    eGFR 80 >60 mL/min/1.73m*2    Calcium 8.4 (L) 8.5 - 10.4 mg/dL    Anion Gap 11 <=19 mmol/L   Basic metabolic panel   Result Value Ref Range    Glucose 113 (H) 65 - 99 mg/dL    Sodium 120 (L) 133 - 145 mmol/L    Potassium 5.0 3.4 - 5.1 mmol/L    Chloride 85 (L) 97 - 107 mmol/L     Bicarbonate 20 (L) 24 - 31 mmol/L    Urea Nitrogen 19 8 - 25 mg/dL    Creatinine 1.00 0.40 - 1.60 mg/dL    eGFR 71 >60 mL/min/1.73m*2    Calcium 8.5 8.5 - 10.4 mg/dL    Anion Gap 15 <=19 mmol/L   Ammonia   Result Value Ref Range    Ammonia 104 (H) 12 - 45 umol/L   CBC and Auto Differential   Result Value Ref Range    WBC 12.7 (H) 4.4 - 11.3 x10*3/uL    nRBC 0.8 (H) 0.0 - 0.0 /100 WBCs    RBC 1.86 (L) 4.00 - 5.20 x10*6/uL    Hemoglobin 6.8 (L) 12.0 - 16.0 g/dL    Hematocrit 19.9 (L) 36.0 - 46.0 %     (H) 80 - 100 fL    MCH 36.6 (H) 26.0 - 34.0 pg    MCHC 34.2 32.0 - 36.0 g/dL    RDW 26.1 (H) 11.5 - 14.5 %    Platelets 115 (L) 150 - 450 x10*3/uL    Neutrophils % 73.3 40.0 - 80.0 %    Immature Granulocytes %, Automated 5.5 (H) 0.0 - 0.9 %    Lymphocytes % 11.3 13.0 - 44.0 %    Monocytes % 8.6 2.0 - 10.0 %    Eosinophils % 1.0 0.0 - 6.0 %    Basophils % 0.3 0.0 - 2.0 %    Neutrophils Absolute 9.33 (H) 1.20 - 7.70 x10*3/uL    Immature Granulocytes Absolute, Automated 0.70 0.00 - 0.70 x10*3/uL    Lymphocytes Absolute 1.44 1.20 - 4.80 x10*3/uL    Monocytes Absolute 1.09 (H) 0.10 - 1.00 x10*3/uL    Eosinophils Absolute 0.13 0.00 - 0.70 x10*3/uL    Basophils Absolute 0.04 0.00 - 0.10 x10*3/uL   Comprehensive Metabolic Panel   Result Value Ref Range    Glucose 113 (H) 65 - 99 mg/dL    Sodium 120 (L) 133 - 145 mmol/L    Potassium 5.0 3.4 - 5.1 mmol/L    Chloride 85 (L) 97 - 107 mmol/L    Bicarbonate 20 (L) 24 - 31 mmol/L    Urea Nitrogen 19 8 - 25 mg/dL    Creatinine 1.00 0.40 - 1.60 mg/dL    eGFR 71 >60 mL/min/1.73m*2    Calcium 8.5 8.5 - 10.4 mg/dL    Albumin 3.0 (L) 3.5 - 5.0 g/dL    Alkaline Phosphatase 160 (H) 35 - 125 U/L    Total Protein 5.4 (L) 5.9 - 7.9 g/dL    AST 47 (H) 5 - 40 U/L    Bilirubin, Total 15.8 (H) 0.1 - 1.2 mg/dL    ALT 15 5 - 40 U/L    Anion Gap 15 <=19 mmol/L   Haptoglobin   Result Value Ref Range    Haptoglobin <10 (L) 37 - 246 mg/dL   Vitamin B12   Result Value Ref Range    Vitamin B12  1,136 (H) 211 - 946 pg/mL   Basic metabolic panel   Result Value Ref Range    Glucose 111 (H) 65 - 99 mg/dL    Sodium 120 (L) 133 - 145 mmol/L    Potassium 4.7 3.4 - 5.1 mmol/L    Chloride 87 (L) 97 - 107 mmol/L    Bicarbonate 21 (L) 24 - 31 mmol/L    Urea Nitrogen 18 8 - 25 mg/dL    Creatinine 0.90 0.40 - 1.60 mg/dL    eGFR 80 >60 mL/min/1.73m*2    Calcium 8.9 8.5 - 10.4 mg/dL    Anion Gap 12 <=19 mmol/L   Bilirubin, Direct   Result Value Ref Range    Bilirubin, Direct 7.3 (H) 0.0 - 0.2 mg/dL   Prepare RBC: 1 Units   Result Value Ref Range    PRODUCT CODE V1495A00     Unit Number [id number]     Unit ABO O     Unit RH POS     XM INTEP COMP     Dispense Status TR     Blood Expiration Date January 22, 2024 23:59 EST     PRODUCT BLOOD TYPE 5100     UNIT VOLUME 400    Basic metabolic panel   Result Value Ref Range    Glucose 117 (H) 65 - 99 mg/dL    Sodium 125 (L) 133 - 145 mmol/L    Potassium 4.5 3.4 - 5.1 mmol/L    Chloride 90 (L) 97 - 107 mmol/L    Bicarbonate 22 (L) 24 - 31 mmol/L    Urea Nitrogen 19 8 - 25 mg/dL    Creatinine 0.90 0.40 - 1.60 mg/dL    eGFR 80 >60 mL/min/1.73m*2    Calcium 8.8 8.5 - 10.4 mg/dL    Anion Gap 13 <=19 mmol/L   Hepatic Function Panel   Result Value Ref Range    AST 46 (H) 5 - 40 U/L    ALT 14 5 - 40 U/L    Alkaline Phosphatase 156 (H) 35 - 125 U/L    Bilirubin, Total 14.5 (H) 0.1 - 1.2 mg/dL    Bilirubin, Direct 6.9 (H) 0.0 - 0.2 mg/dL    Total Protein 5.5 (L) 5.9 - 7.9 g/dL    Albumin 2.8 (L) 3.5 - 5.0 g/dL   CBC and Auto Differential   Result Value Ref Range    WBC 10.5 4.4 - 11.3 x10*3/uL    nRBC 0.8 (H) 0.0 - 0.0 /100 WBCs    RBC 2.10 (L) 4.00 - 5.20 x10*6/uL    Hemoglobin 7.4 (L) 12.0 - 16.0 g/dL    Hematocrit 21.8 (L) 36.0 - 46.0 %     (H) 80 - 100 fL    MCH 35.2 (H) 26.0 - 34.0 pg    MCHC 33.9 32.0 - 36.0 g/dL    RDW 26.9 (H) 11.5 - 14.5 %    Platelets 103 (L) 150 - 450 x10*3/uL    Neutrophils % 71.4 40.0 - 80.0 %    Immature Granulocytes %, Automated 4.6 (H) 0.0 - 0.9  %    Lymphocytes % 12.2 13.0 - 44.0 %    Monocytes % 9.4 2.0 - 10.0 %    Eosinophils % 1.9 0.0 - 6.0 %    Basophils % 0.5 0.0 - 2.0 %    Neutrophils Absolute 7.50 1.20 - 7.70 x10*3/uL    Immature Granulocytes Absolute, Automated 0.48 0.00 - 0.70 x10*3/uL    Lymphocytes Absolute 1.28 1.20 - 4.80 x10*3/uL    Monocytes Absolute 0.99 0.10 - 1.00 x10*3/uL    Eosinophils Absolute 0.20 0.00 - 0.70 x10*3/uL    Basophils Absolute 0.05 0.00 - 0.10 x10*3/uL   Magnesium   Result Value Ref Range    Magnesium 2.10 1.60 - 3.10 mg/dL   Morphology   Result Value Ref Range    RBC Morphology See Below     Polychromasia Mild     [place] Cells Many    Basic metabolic panel   Result Value Ref Range    Glucose 132 (H) 65 - 99 mg/dL    Sodium 129 (L) 133 - 145 mmol/L    Potassium 3.6 3.4 - 5.1 mmol/L    Chloride 94 (L) 97 - 107 mmol/L    Bicarbonate 21 (L) 24 - 31 mmol/L    Urea Nitrogen 17 8 - 25 mg/dL    Creatinine 0.90 0.40 - 1.60 mg/dL    eGFR 80 >60 mL/min/1.73m*2    Calcium 9.1 8.5 - 10.4 mg/dL    Anion Gap 14 <=19 mmol/L       Assessment/Plan     Hyponatremia secondary to ascites and cirrhosis of the liver and fluid overload much improved we will continue fluid restriction 100 and mL in 24 hours and diuretics move Monge catheter  Hyperkalemia secondary to Aldactone and potassium supplements secondary to potassium supplements and Aldactone potassium is normalized today  Massive ascites is post paracenteses  Cirrhosis of the liver  Hepatic encephalopathy which improved continue with lactulose           Onur Wilkerson MD

## 2024-01-06 NOTE — PROGRESS NOTES
Critical Care Progress Note    Briseida Owen is a 48 y.o. female on day 1 of admission presenting with Anemia, unspecified type.    Subjective   No acute events  Objective   Vital Signs      1/5/2024     1:32 PM 1/5/2024     2:00 PM 1/5/2024     2:40 PM 1/5/2024     3:00 PM 1/5/2024     4:00 PM 1/5/2024     5:00 PM 1/5/2024     6:00 PM   Vitals   Systolic 103 103 102 103 103 99 102   Diastolic 56 56 52 59 59 56 59   Heart Rate 81 80 80 82 82 89 85   Temp 36.7 °C (98.1 °F)  36.6 °C (97.9 °F)  36.6 °C (97.9 °F)     Resp 16 21 20 20 19 17 20       Oxygen Therapy  SpO2: 92 %  Medical Gas Therapy: Supplemental oxygen  O2 Delivery Method: Nasal cannula         Intake/Output previous 24 hours:    Intake/Output Summary (Last 24 hours) at 1/5/2024 1937  Last data filed at 1/5/2024 1800  Gross per 24 hour   Intake 688 ml   Output 2500 ml   Net -1812 ml       Physical Exam  HENT:      Head: Normocephalic and atraumatic.   Eyes:      Pupils: Pupils are equal, round, and reactive to light.   Cardiovascular:      Rate and Rhythm: Normal rate and regular rhythm.      Pulses: Normal pulses.      Heart sounds: Normal heart sounds.   Pulmonary:      Effort: Pulmonary effort is normal.      Breath sounds: Normal breath sounds.   Abdominal:      General: Bowel sounds are normal. There is distension.      Palpations: Abdomen is soft.   Skin:     Coloration: Skin is jaundiced.   Neurological:      General: No focal deficit present.      Mental Status: She is alert and oriented to person, place, and time.         Lines and Tubes:  Peripheral IV 01/03/24 20 G Right Antecubital (Active)   Placement Date/Time: 01/03/24 2047   Size (Gauge): 20 G  Orientation: Right  Location: Antecubital  Site Prep: Betadine   Number of days: 1       Peripheral IV 01/04/24 20 G Distal;Left;Upper;Anterior Arm (Active)   Placement Date/Time: 01/04/24 0140   Size (Gauge): 20 G  Orientation: Distal;Left;Upper;Anterior  Location: Arm   Number of days: 1        Urethral Catheter Straight-tip 16 Fr. (Active)   Placement Date/Time: 01/04/24 1707   Placed by: MIRIAM Gomez RN  Hand Hygiene Completed: Yes  Catheter Type: Straight-tip  Tube Size (Fr.): 16 Fr.  Catheter Balloon Size: 10 mL  Urine Returned: Yes   Number of days: 1         Scheduled medications  folic acid, 1 mg, oral, Daily  furosemide, 40 mg, oral, Daily  lactulose, 30 g, oral, BID  levothyroxine, 50 mcg, oral, Daily before breakfast  magnesium oxide, 400 mg, oral, Daily  multivitamin, 1 tablet, oral, Daily  pantoprazole, 40 mg, oral, Daily  piperacillin-tazobactam, 3.375 g, intravenous, q6h  rifAXIMin, 550 mg, oral, BID  [START ON 1/7/2024] thiamine, 100 mg, oral, Daily  thiamine, 100 mg, intravenous, Daily      Continuous medications     PRN medications  PRN medications: LORazepam **OR** LORazepam **OR** LORazepam, oxygen    Relevant Results  Results from last 7 days   Lab Units 01/05/24  0435 01/04/24  1331 01/04/24  0658   WBC AUTO x10*3/uL 12.7* 15.2* 15.0*   HEMOGLOBIN g/dL 6.8* 7.3* 7.7*   HEMATOCRIT % 19.9* 20.9* 23.2*   PLATELETS AUTO x10*3/uL 115* 128* 130*      Results from last 7 days   Lab Units 01/05/24  1455 01/05/24  0859 01/05/24  0435   SODIUM mmol/L 125* 120* 120*  120*   POTASSIUM mmol/L 4.5 4.7 5.0  5.0   CHLORIDE mmol/L 90* 87* 85*  85*   CO2 mmol/L 22* 21* 20*  20*   BUN mg/dL 19 18 19  19   CREATININE mg/dL 0.90 0.90 1.00  1.00   GLUCOSE mg/dL 117* 111* 113*  113*   CALCIUM mg/dL 8.8 8.9 8.5  8.5          XR chest 1 view 01/05/2024    Narrative  Interpreted By:  Tomi Reynoso,  STUDY:  XR CHEST 1 VIEW; 1/5/2024 10:16 am    INDICATION:  pleural effusion.    COMPARISON:  January 3, 2024    ACCESSION NUMBER(S):  [id number]    ORDERING CLINICIAN:  MARICARMEN SANTIAGO    FINDINGS:  RESULT:  The cardiac silhouette is within normal limits for size.  Mediastinal contours are unremarkable. Left basilar opacity is again  noted obscuring the left hemidiaphragm, not significantly  changed  compared to previous exam. The osseous structures are unremarkable.    Impression  Left basilar opacity obscuring the left hemidiaphragm is again noted  which may represent pleural fluid and atelectasis. Underlying  infiltrate is not excluded.      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)    Clouded consciousness    Congestive heart failure (CMS/HCC)    Dizziness    Electrolyte imbalance    Elevated liver enzymes    Essential hypertension    Abdominal pain    Flank pain    Jae hematuria    Gastrointestinal hemorrhage    Generalized weakness    Hematemesis    Hepatic encephalopathy (CMS/HCC)    History of hypothyroidism    Hyponatremia    Hypothyroidism    Impaired gait and mobility    Abnormal LFTs    Abnormal TSH    Increased ammonia level    Iron overload    Jaundice    Leukocytosis    Liver cell carcinoma (CMS/HCC)    Ascites    Hepatic failure (CMS/HCC)    Liver disease, unspecified    Lymphedema of both lower extremities    Moderate major depression (CMS/HCC)    Nausea    Other chronic pain    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   GI on consult.      Hyponatremia  Holding furosemide, spironolactone  Renal consult     Left pleural effusion  Mod to large  monitor     Anemia  Transfused 2 units of PRBCs     Alcohol abuse  On thiamine, folate  CIWA protocol     Elevated ammonia  Awake, responsive  Lactulose / rifaximin      Sergio Nava MD  [facility] [place]            Disclaimer: Parts of this chart were dictated with voice recognition software. Please excuse any errors of grammar, spelling, or transcription which are not corrected. Please contact me with any questions regarding documentation.

## 2024-01-06 NOTE — PROGRESS NOTES
Pulmonary Progress Note 01/06/24     Patient seen in follow-up for anemia, hypoxia and critical care management.    Subjective   Interval History:   Case signed out to me by Dr. Nava.  Patient without any respiratory complaints.    Objective   Vital signs in last 24 hours:  Temp:  [36.6 °C (97.9 °F)-37.4 °C (99.3 °F)] 36.8 °C (98.2 °F)  Heart Rate:  [] 99  Resp:  [14-24] 17  BP: ()/(46-61) 107/61    Intake/Output last 3 shifts:  I/O last 3 completed shifts:  In: 988 (13.4 mL/kg) [P.O.:200; Blood:288; IV Piggyback:500]  Out: 3950 (53.7 mL/kg) [Urine:3950 (1.5 mL/kg/hr)]  Weight: 73.5 kg   Intake/Output this shift:  No intake/output data recorded.    Physical Exam  Vital signs reviewed  Alert, no acute distress  Scleral icterus  S1-S2 +  Lungs are clear to auscultation without any adventitious sounds  Abdomen is protuberant, soft  No CCE  Normal mood and affect  Comprehension intact, no gross focal deficits    Scheduled medications  folic acid, 1 mg, oral, Daily  furosemide, 40 mg, oral, BID with meals  gabapentin, 300 mg, oral, q8h VICKY  lactulose, 30 g, oral, BID  levothyroxine, 50 mcg, oral, Daily before breakfast  magnesium oxide, 400 mg, oral, Daily  multivitamin, 1 tablet, oral, Daily  pantoprazole, 40 mg, oral, Daily  piperacillin-tazobactam, 3.375 g, intravenous, q6h  rifAXIMin, 550 mg, oral, BID  [START ON 1/7/2024] thiamine, 100 mg, oral, Daily      Continuous medications     PRN medications  PRN medications: LORazepam **OR** LORazepam **OR** LORazepam, oxygen     Labs:  Lab Results   Component Value Date     (L) 01/06/2024    K 3.6 01/06/2024    CL 94 (L) 01/06/2024    CO2 21 (L) 01/06/2024    BUN 17 01/06/2024    CREATININE 0.90 01/06/2024    GLUCOSE 132 (H) 01/06/2024    CALCIUM 9.1 01/06/2024     Lab Results   Component Value Date    WBC 10.5 01/06/2024    HGB 7.4 (L) 01/06/2024    HCT 21.8 (L) 01/06/2024     (H) 01/06/2024     (L) 01/06/2024        Imaging:  US abdomen limited    Result Date: 1/6/2024  Interpreted By:  Bre Nicholson, STUDY: US ABDOMEN LIMITED; 11:32 am   INDICATION: Signs/Symptoms:ascites.   COMPARISON: None.   ACCESSION NUMBER(S): [id number]   ORDERING CLINICIAN: TOMI ALBERTS   TECHNIQUE: Limited abdominal ultrasound of the right upper quadrant was performed utilizing gray scale imaging.   FINDINGS: Trace amount of fluid is noted within the left upper and lower quadrants of the abdomen.       Trace amount of fluid noted within the left upper and lower quadrant the abdomen.   MACRO: None.   Signed by: Bre Nicholson 1/6/2024 11:33 AM Dictation workstation:   [id number]    XR chest 1 view    Result Date: 1/5/2024  Interpreted By:  Tomi Reynoso, STUDY: XR CHEST 1 VIEW; 1/5/2024 10:16 am   INDICATION: pleural effusion.   COMPARISON: January 3, 2024   ACCESSION NUMBER(S): [id number]   ORDERING CLINICIAN: MARICARMEN SANTIAGO   FINDINGS: RESULT:  The cardiac silhouette is within normal limits for size. Mediastinal contours are unremarkable. Left basilar opacity is again noted obscuring the left hemidiaphragm, not significantly changed compared to previous exam. The osseous structures are unremarkable.       Left basilar opacity obscuring the left hemidiaphragm is again noted which may represent pleural fluid and atelectasis. Underlying infiltrate is not excluded.     Signed by: Tomi Reynoso 1/5/2024 10:48 AM Dictation workstation:   [id number]    US guided abdominal paracentesis    Result Date: 1/4/2024  Interpreted By:  Huy Alvarez, STUDY: US GUIDED ABDOMINAL PARACENTESIS; 1/4/2024 9:25 am   INDICATION: Signs/Symptoms:ascites.   COMPARISON: None.   ACCESSION NUMBER(S): [id number]   ORDERING CLINICIAN: MATTHEW MAYORGA   TECHNIQUE: Ultrasound-guided paracentesis   FINDINGS: Informed consent obtained. Patient positioned supine. Left lower quadrant prepped, draped and anesthetized. Under ultrasound guidance, 8 [place] centesis  sheath needle inserted into peritoneal cavity. 3.1of dark yellowfluid aspiratedwith sample sent for analysis. Patient tolerated procedure well       Ultrasound-guided paracentesis.   Signed by: Huy Alvarez 1/4/2024 10:51 AM Dictation workstation:   [id number]    CT abdomen pelvis w IV contrast    Result Date: 1/4/2024  STUDY: CT Abdomen and Pelvis with IV Contrast; 1/4/2024 1:46 AM. INDICATION: Abdominal pain. COMPARISON: US abd 10/27/2023 .  CT AP 10/21/2023 ACCESSION NUMBER(S): [id number] ORDERING CLINICIAN: JAZMINE GILLESPIE TECHNIQUE: CT of the abdomen and pelvis was performed.  Contiguous axial images were obtained at 3 mm slice thickness through the abdomen and pelvis. Coronal and sagittal reconstructions at 3 mm slice thickness were performed.  Omnipaque 350 75 mL was administered intravenously.  FINDINGS: LOWER CHEST: Cardiac size is mildly enlarged.  No pericardial effusion.  Moderate to large left pleural effusion is noted with partial collapse of the visualized portion of the left lower lobe. There is suggestion of a right breast prosthesis partially visualized.   ABDOMEN:  LIVER: No hepatomegaly.  Liver demonstrates a markedly cirrhotic morphology with a nodular hepatic contour and hypertrophy of the left hepatic lobe.  Simple fluid density cyst is seen in the anterior segment of the right hepatic lobe measuring approximately 1 cm in diameter. Normal attenuation.  BILE DUCTS: No intrahepatic or extrahepatic biliary ductal dilatation.  GALLBLADDER: The gallbladder is distended and contains gallstones in the gallbladder neck. STOMACH: No abnormalities identified.  PANCREAS: No masses or ductal dilatation.  SPLEEN: Spleen is borderline enlarged.  No focal splenic lesion.  ADRENAL GLANDS: No thickening or nodules.  KIDNEYS AND URETERS: Kidneys are normal in size and location.  No renal or ureteral calculi.  PELVIS:  BLADDER: No abnormalities identified.  REPRODUCTIVE ORGANS: No abnormalities identified.   BOWEL: Appendix is not definitively identified.  Terminal ileum is unremarkable.  No abnormalities identified.  VESSELS: No abnormalities identified.  Abdominal aorta is normal in caliber.  PERITONEUM/RETROPERITONEUM/LYMPH NODES: Large volume of abdominal and pelvic ascites is noted.  No pneumoperitoneum. No lymphadenopathy.  ABDOMINAL WALL: No abnormalities identified. SOFT TISSUES: There is a fluid-filled small indirect right inguinal hernia.  BONES: No acute fracture or aggressive osseous lesion.    Cirrhotic morphology of the liver with a large volume of abdominal and pelvic ascites. Cholelithiasis. Fluid-filled small right inguinal hernia. Moderate to large left pleural effusion with partial collapse of the visualized portion of the left lower lobe. Borderline splenomegaly. Signed by Bartolo Torres    XR chest 1 view    Result Date: 1/3/2024  STUDY: Chest Radiograph;  01/03/2024 at 9:13 PM INDICATION: Weakness. COMPARISON: XR chest 10/21/23, 04/25/21. ACCESSION NUMBER(S): [id number] ORDERING CLINICIAN: Nomi Anaya TECHNIQUE:  Frontal chest was obtained at 2113 hours. FINDINGS: There is dense retrocardiac consolidation in the left lower lobe, suggesting pneumonia. There is likely left pleural effusion. Heart is top normal size with central vascular prominence. There is no pneumothorax.    Retrocardiac consolidation left lower lobe suggests pneumonia, with likely left pleural effusion. Follow-up advised. Signed by Michael Lewis MD              Assessment/Plan   Principal Problem:    Anemia, unspecified type  Active Problems:    Alcoholic cirrhosis (CMS/HCC)    Pleural effusion on left      No new recommendations from my perspective.  Left pleural effusion may be in the setting of her cirrhosis and could potentially be hepatic hydrothorax.  Anticipate with paracentesis and diuretics that this should improve.  No need for thoracentesis given patient asymptomatic.  Management of other chronic concerns per  GI, nephrology and primary.  I will sign off.  Please reconsult as needed or call if there is any questions.      Alexis Frank MD  Pulmonary/CC Medicine  [facility]     LOS: 2 days

## 2024-01-06 NOTE — PROGRESS NOTES
Physical Therapy    Physical Therapy Evaluation & Treatment    Patient Name: Briseida Owen  MRN: [id number]  Today's Date: 1/6/2024   Time Calculation  Start Time: 0810  Stop Time: 0840  Time Calculation (min): 30 min    Assessment/Plan   PT Assessment  PT Assessment Results: Decreased strength, Decreased endurance, Impaired balance, Decreased mobility, Decreased coordination, Decreased safety awareness, Impaired vision, Impaired sensation, Pain  Rehab Prognosis: Good  Evaluation/Treatment Tolerance: Patient limited by fatigue  Medical Staff Made Aware: Yes  Strengths: Premorbid level of function, Housing layout  Barriers to Participation: Insight into problems  End of Session Communication: Bedside nurse  Assessment Comment: pt required min assist of 1 for the above limited mobility; Demonstrates  generalized weakness, balance, endurance, mobility ease and safety as compared to baseline however should progress well enough for d/c home with increased S/A from family initially, PRN FWW and low intensity rehab  End of Session Patient Position: Up in chair   IP OR SWING BED PT PLAN  Inpatient or Swing Bed: Inpatient  PT Plan  Treatment/Interventions: Bed mobility, Transfer training, Gait training, Stair training, Balance training, Strengthening, Endurance training, Therapeutic exercise, Therapeutic activity, Home exercise program  PT Plan: Skilled PT  PT Frequency: 4 times per week  Equipment Recommended upon Discharge: Wheeled walker  PT Recommended Transfer Status: Assist x1  PT - OK to Discharge: Yes      Subjective     General Visit Information:  General  Reason for Referral: impaired mobility  Past Medical History Relevant to Rehab: ETOH, CIDP, CHF, Depression, Wernicke's encephalopathy, lymphedema, ? liver CA  Co-Treatment: OT  Co-Treatment Reason: part co-treat with OT---ICU patient---safety reasons and fatigues easily  Prior to Session Communication: Bedside nurse  Patient Position Received: Bed, 4 rail  "up  General Comment: 47 yo WF admitted to [facility] via [facility] with c/o abdominal pain; Patient with chronic ETOH issues---CT Scan of abdomen revealed large ascites with large left pleural effusion; + anemia and hyponatremia with NA+ 118. Underwent paracentesis 1/4/24;  3 units of PRBC transfusions.  Home Living:  Home Living  Type of Home: House  Lives With: Spouse  Home Adaptive Equipment: Walker rolling or standard, Cane, Wheelchair-manual  Home Layout: One level  Home Access: Stairs to enter with rails  Entrance Stairs-Rails: Right  Entrance Stairs-Number of Steps: 3  Bathroom Shower/Tub: Walk-in shower  Bathroom Toilet: Standard  Bathroom Equipment: Shower chair without back  Bathroom Accessibility: walk-in shower in basement; 13 steps with 1 handrail; pt reports full bath on main floor is \" not finished and being worked on.\" has standard commode working at this time  Prior Level of Function:  Prior Function Per Pt/Caregiver Report  Level of [place]: Needs assistance with ADLs, Needs assistance with homemaking  Receives Help From:  (SO)  ADL Assistance: Needs assistance (assist with bathing; walk-in shower in basement)  Homemaking Assistance: Needs assistance (SO does all household chores)  Ambulatory Assistance: Independent  Vocational:  (does not work)  Precautions:  Precautions  Hearing/Visual Limitations: glasses; no hearing difficulties  Medical Precautions: Fall precautions (+ CIWA protocol)  Vital Signs:  Vital Signs  Heart Rate: 87  SpO2: 95 % (room air)  BP: 107/61  BP Location: Left arm  BP Method: Automatic  Patient Position: Lying    Objective   Pain:  Pain Assessment  Pain Assessment: 0-10  Pain Score: 8  Pain Type: Chronic pain  Pain Location:  (su hands/wrists, feet/ankles)  Pain Frequency: Constant/continuous  Pain Interventions:  (pain meds per nurse; exercises, positioning for comfort)  Cognition:  Cognition  Overall Cognitive Status: Within Functional Limits  Safety/Judgement:  (decreased safety " insight during functional mobility)    General Assessments:  General Observation  General Observation: + generalized jaundice               Activity Tolerance  Endurance: Decreased tolerance for upright activites    Sensation  Light Touch:  (pt reports + numbness su hands and feet)    Strength  Strength Comments: su hips 3-/5, knees 3+/5, ankles 2/5 dorsiflexion, 2+/5 PF  Coordination  Heel to Shin: Impaired (due to su LE weakness and + neuropathy)    Postural Control  Posture Comment: mild forward head, protracted shldrs    Static Sitting Balance  Static Sitting-Balance Support: Feet supported  Static Sitting-Level of Assistance: Close supervision  Static Sitting-Comment/Number of Minutes: 5 min with fair + balance  Dynamic Sitting Balance  Dynamic Sitting-Balance Support: Feet supported  Dynamic Sitting-Comments: fair balance with contact guard of 1    Static Standing Balance  Static Standing-Balance Support: Bilateral upper extremity supported  Static Standing-Level of Assistance: Minimum assistance  Static Standing-Comment/Number of Minutes: 4 min with fair balance, FWW  Dynamic Standing Balance  Dynamic Standing-Balance Support: Bilateral upper extremity supported  Dynamic Standing-Comments: fair - with min assist of 1, FWW  Functional Assessments:  Bed Mobility  Bed Mobility: Yes  Bed Mobility 1  Bed Mobility 1: Supine to sitting  Level of Assistance 1: Minimum assistance, Minimal verbal cues  Bed Mobility Comments 1: head of bed elevated 40 degrees, min assist for trunk up, verbal cues for active use of su UE's    Transfers  Transfer: Yes  Transfer 1  Transfer From 1: Sit to  Transfer to 1: Stand  Technique 1: Sit to stand  Transfer Device 1: Walker  Transfer Level of Assistance 1: Minimum assistance, Minimal verbal cues  Trials/Comments 1: min assist of 1 for trunk up, verbal cues for proper su hand placement  Transfers 2  Transfer From 2: Stand to  Transfer to 2: Sit  Technique 2: Stand to  sit  Transfer Device 2: Walker  Transfer Level of Assistance 2: Minimum assistance, Minimal verbal cues  Trials/Comments 2: min assist of 1 for trunk down, verbal cues for reaching back for arms of chair prior to attempting to sit    Ambulation/Gait Training  Ambulation/Gait Training Performed: Yes  Ambulation/Gait Training 1  Surface 1: Level tile  Device 1: Rolling walker  Assistance 1: Minimum assistance, Minimal verbal cues  Quality of Gait 1: Narrow base of support, Diminished heel strike, Decreased step length, Shuffling gait, Forward flexed posture  Comments/Distance (ft) 1: pt stood at bedside with FWW, min assist of 1 x 2 min, then amb bed to armchair approx 14 small steps with min assist of 1, FWW, verbal cues for erect posture and increased step height; Stood another 2 min with FWW and min assist of 1--pt refused further amb trial. Vitals monitored  Extremity/Trunk Assessments:  Cervical Spine   Cervical Spine:  (mild forward head)  RLE   RLE :  (see above strength comments)  LLE   LLE :  (see above strength comments)  Treatments:  Therapeutic Exercise  Therapeutic Exercise Performed: Yes  Therapeutic Exercise Activity 1: su LE AP/heel raises x 20 reps  Therapeutic Exercise Activity 2: seated su LAQ's x 20 reps  Therapeutic Exercise Activity 3: seated su marching x 15 reps  Therapeutic Exercise Activity 4: seated su hip abd/add x 15 reps  Therapeutic Exercise Activity 5: seated deep breathing exercises 2 sets of 5 reps each  Outcome Measures:  [facility] Basic Mobility  Turning from your back to your side while in a flat bed without using bedrails: A little  Moving from lying on your back to sitting on the side of a flat bed without using bedrails: A little  Moving to and from bed to chair (including a wheelchair): A little  Standing up from a chair using your arms (e.g. wheelchair or bedside chair): A little  To walk in hospital room: A little  Climbing 3-5 steps with railing: A lot  Basic Mobility -  Total Score: 17    Encounter Problems       Encounter Problems (Active)       Balance       STG - Maintains dynamic standing balance with upper extremity support (Progressing)       Start:  01/06/24    Expected End:  02/03/24       INTERVENTIONS:  1. Practice standing with minimal support.  2. Educate patient about standing tolerance.  3. Educate patient about independence with gait, transfers, and ADL's.  4. Educate patient about use of assistive device.  5. Educate patient about self-directed care.            Mobility       STG - Patient will ambulate 150 ft with FWW, mod I (Progressing)       Start:  01/06/24    Expected End:  02/03/24            pt ascends/descends 3 steps with 1 handrail distant supervision of 1. (Progressing)       Start:  01/06/24    Expected End:  02/03/24               Pain       pt verbalizes no > 6/10 pain during functional mobility (Progressing)       Start:  01/06/24    Expected End:  02/03/24               Transfers       STG - Patient to transfer to and from sit to supine mod I (Progressing)       Start:  01/06/24    Expected End:  02/03/24            STG - Patient will transfer sit to and from stand mod I (Progressing)       Start:  01/06/24    Expected End:  02/03/24                   Education Documentation  Mobility Training, taught by Najma Montes De Oca, PT at 1/6/2024 10:44 AM.  Learner: Patient  Readiness: Acceptance  Method: Explanation  Response: Needs Reinforcement    Education Comments  No comments found.

## 2024-01-06 NOTE — CARE PLAN
The patient's goals for the shift include      The clinical goals for the shift include Remain pain free and comfortable    Over the shift, the patient did not make progress toward the following goals. Barriers to progression include . Recommendations to address these barriers include .

## 2024-01-06 NOTE — PROGRESS NOTES
"Briseida Owen is a 48 y.o. female on day 2 of admission presenting with Anemia, unspecified type.    Subjective   Seen and examined patient is hemodynamically stable no further evidence of bleeding complaining of neuropathy type symptoms related to her alcoholism also increasing abdominal girth suggestive of recurrent ascites.       Objective     Physical Exam  Vitals and nursing note reviewed.   Constitutional:       Appearance: Normal appearance.   HENT:      Head: Normocephalic and atraumatic.      Mouth/Throat:      Mouth: Mucous membranes are moist.   Eyes:      Extraocular Movements: Extraocular movements intact.      Pupils: Pupils are equal, round, and reactive to light.   Cardiovascular:      Rate and Rhythm: Normal rate and regular rhythm.      Pulses: Normal pulses.      Heart sounds: Normal heart sounds.   Pulmonary:      Effort: Pulmonary effort is normal.      Breath sounds: Normal breath sounds.   Abdominal:      General: There is distension.      Palpations: Abdomen is soft.   Musculoskeletal:         General: Normal range of motion.      Cervical back: Normal range of motion and neck supple.   Skin:     General: Skin is warm and dry.      Capillary Refill: Capillary refill takes less than 2 seconds.      Coloration: Skin is jaundiced.   Neurological:      General: No focal deficit present.      Mental Status: She is alert and oriented to person, place, and time. Mental status is at baseline.   Psychiatric:         Mood and Affect: Mood normal.         Last Recorded Vitals  Blood pressure (!) 102/46, pulse 91, temperature 36.8 °C (98.2 °F), temperature source Temporal, resp. rate 20, height 1.575 m (5' 2.01\"), weight 73.5 kg (162 lb 0.6 oz), SpO2 96 %.  Intake/Output last 3 Shifts:  I/O last 3 completed shifts:  In: 988 (13.4 mL/kg) [P.O.:200; Blood:288; IV Piggyback:500]  Out: 3950 (53.7 mL/kg) [Urine:3950 (1.5 mL/kg/hr)]  Weight: 73.5 kg     Relevant Results              Results for orders " placed or performed during the hospital encounter of 01/03/24 (from the past 24 hour(s))   Basic metabolic panel   Result Value Ref Range    Glucose 111 (H) 65 - 99 mg/dL    Sodium 120 (L) 133 - 145 mmol/L    Potassium 4.7 3.4 - 5.1 mmol/L    Chloride 87 (L) 97 - 107 mmol/L    Bicarbonate 21 (L) 24 - 31 mmol/L    Urea Nitrogen 18 8 - 25 mg/dL    Creatinine 0.90 0.40 - 1.60 mg/dL    eGFR 80 >60 mL/min/1.73m*2    Calcium 8.9 8.5 - 10.4 mg/dL    Anion Gap 12 <=19 mmol/L   Bilirubin, Direct   Result Value Ref Range    Bilirubin, Direct 7.3 (H) 0.0 - 0.2 mg/dL   Prepare RBC: 1 Units   Result Value Ref Range    PRODUCT CODE C7574X12     Unit Number [id number]     Unit ABO O     Unit RH POS     XM INTEP COMP     Dispense Status TR     Blood Expiration Date January 22, 2024 23:59 EST     PRODUCT BLOOD TYPE 5100     UNIT VOLUME 400    Basic metabolic panel   Result Value Ref Range    Glucose 117 (H) 65 - 99 mg/dL    Sodium 125 (L) 133 - 145 mmol/L    Potassium 4.5 3.4 - 5.1 mmol/L    Chloride 90 (L) 97 - 107 mmol/L    Bicarbonate 22 (L) 24 - 31 mmol/L    Urea Nitrogen 19 8 - 25 mg/dL    Creatinine 0.90 0.40 - 1.60 mg/dL    eGFR 80 >60 mL/min/1.73m*2    Calcium 8.8 8.5 - 10.4 mg/dL    Anion Gap 13 <=19 mmol/L   Hepatic Function Panel   Result Value Ref Range    AST 46 (H) 5 - 40 U/L    ALT 14 5 - 40 U/L    Alkaline Phosphatase 156 (H) 35 - 125 U/L    Bilirubin, Total 14.5 (H) 0.1 - 1.2 mg/dL    Bilirubin, Direct 6.9 (H) 0.0 - 0.2 mg/dL    Total Protein 5.5 (L) 5.9 - 7.9 g/dL    Albumin 2.8 (L) 3.5 - 5.0 g/dL   CBC and Auto Differential   Result Value Ref Range    WBC 10.5 4.4 - 11.3 x10*3/uL    nRBC 0.8 (H) 0.0 - 0.0 /100 WBCs    RBC 2.10 (L) 4.00 - 5.20 x10*6/uL    Hemoglobin 7.4 (L) 12.0 - 16.0 g/dL    Hematocrit 21.8 (L) 36.0 - 46.0 %     (H) 80 - 100 fL    MCH 35.2 (H) 26.0 - 34.0 pg    MCHC 33.9 32.0 - 36.0 g/dL    RDW 26.9 (H) 11.5 - 14.5 %    Platelets 103 (L) 150 - 450 x10*3/uL    Neutrophils % 71.4 40.0 -  80.0 %    Immature Granulocytes %, Automated 4.6 (H) 0.0 - 0.9 %    Lymphocytes % 12.2 13.0 - 44.0 %    Monocytes % 9.4 2.0 - 10.0 %    Eosinophils % 1.9 0.0 - 6.0 %    Basophils % 0.5 0.0 - 2.0 %    Neutrophils Absolute 7.50 1.20 - 7.70 x10*3/uL    Immature Granulocytes Absolute, Automated 0.48 0.00 - 0.70 x10*3/uL    Lymphocytes Absolute 1.28 1.20 - 4.80 x10*3/uL    Monocytes Absolute 0.99 0.10 - 1.00 x10*3/uL    Eosinophils Absolute 0.20 0.00 - 0.70 x10*3/uL    Basophils Absolute 0.05 0.00 - 0.10 x10*3/uL   Magnesium   Result Value Ref Range    Magnesium 2.10 1.60 - 3.10 mg/dL   Morphology   Result Value Ref Range    RBC Morphology See Below     Polychromasia Mild     [place] Cells Many    Basic metabolic panel   Result Value Ref Range    Glucose 132 (H) 65 - 99 mg/dL    Sodium 129 (L) 133 - 145 mmol/L    Potassium 3.6 3.4 - 5.1 mmol/L    Chloride 94 (L) 97 - 107 mmol/L    Bicarbonate 21 (L) 24 - 31 mmol/L    Urea Nitrogen 17 8 - 25 mg/dL    Creatinine 0.90 0.40 - 1.60 mg/dL    eGFR 80 >60 mL/min/1.73m*2    Calcium 9.1 8.5 - 10.4 mg/dL    Anion Gap 14 <=19 mmol/L     Scheduled medications  folic acid, 1 mg, oral, Daily  furosemide, 40 mg, oral, BID with meals  gabapentin, 300 mg, oral, q8h VICKY  lactulose, 30 g, oral, BID  levothyroxine, 50 mcg, oral, Daily before breakfast  magnesium oxide, 400 mg, oral, Daily  multivitamin, 1 tablet, oral, Daily  pantoprazole, 40 mg, oral, Daily  piperacillin-tazobactam, 3.375 g, intravenous, q6h  rifAXIMin, 550 mg, oral, BID  [START ON 1/7/2024] thiamine, 100 mg, oral, Daily  thiamine, 100 mg, intravenous, Daily      Continuous medications     PRN medications  PRN medications: LORazepam **OR** LORazepam **OR** LORazepam, oxygen                 Assessment/Plan   Principal Problem:    Anemia, unspecified type    Alcoholic cirrhosis with ascites  Question portal hypertension with esophageal and gastric varices will require EGD at the beginning of the week to evaluate further  discussed with GI  Patient does not require ICU or stepdown level of care and can be de-escalated to regular nursing floor is no indication for ongoing telemetry monitoring but does require monitoring of renal function potassium magnesium and H&H spitting discharge in approximately 48 hours pending discussion with gastroenterology [place] to timing of EGD.  Given her increasing abdominal girth will check ultrasound and ask IR to consider repeat paracentesis if this is necessary       I spent 40 minutes in the professional and overall care of this patient.      Tomi Beasley, DO

## 2024-01-06 NOTE — PROGRESS NOTES
Occupational Therapy    Evaluation/Treatment    Patient Name: Briseida Owen  MRN: [id number]  : 1975  Today's Date: 24  Time Calculation  Start Time: 818  Stop Time: 848  Time Calculation (min): 30 min       Assessment:  OT Assessment: Pt presents on eval with generalized weakness, baseline cognitive/psychosocial deficits noted, decreased activity tolerance, and impaired standing balance affecting her self-care and functional transfers/mobility. Pt will benefit from continued skilled OT to address these deficits.  Prognosis: Fair  Evaluation/Treatment Tolerance: Patient limited by fatigue  End of Session Communication: Bedside nurse  End of Session Patient Position: Up in chair, Alarm off, not on at start of session (Needs in reach.)  OT Assessment Results: Decreased ADL status, Decreased upper extremity strength, Decreased safe judgment during ADL, Decreased cognition, Decreased endurance, Decreased sensation, Decreased functional mobility    Plan:  Treatment Interventions: ADL retraining, Functional transfer training, UE strengthening/ROM, Endurance training, Patient/family training, Equipment evaluation/education, Neuromuscular reeducation  OT Frequency: 3 times per week  OT Discharge Recommendations: Low intensity level of continued care  OT Recommended Transfer Status: Minimal assist  OT - OK to Discharge: Yes      Subjective     General:   OT Received On: 24  General  Reason for Referral: Anemia, weakness, impaired ADL's  Referred By: Dr. Ute MD  Past Medical History Relevant to Rehab: ETOH, CIDP, CHF, Depression, Wernicke's encephalopathy, lymphedema, liver CA  Family/Caregiver Present: No  Co-Treatment: PT  Co-Treatment Reason: part co-treat with PT---ICU patient---safety reasons and fatigues easily  Prior to Session Communication: Bedside nurse  Patient Position Received: Bed, 4 rail up  General Comment: 49 yo WF admitted to [facility] via Ed with c/o abdominal pain; Patient with  "chronic ETOH issues---CT Scan of abdomen revealed large ascites with large left pleural effusion; + anemia and hyponatremia with NA+ 118. Underwent paracentesis 1/4/24;  3 units of PRBC transfusions.    Precautions:  Hearing/Visual Limitations: glasses; no hearing difficulties  Medical Precautions: Fall precautions  Precautions Comment: CIWA protocol    Vital Signs:  Heart Rate: 96  Heart Rate Source: Monitor  SpO2: 94 %  BP: 107/61    Pain:  Pain Assessment  Pain Assessment: 0-10  Pain Score: 8  Pain Type: Chronic pain (multiple areas)    Objective     Cognition:  Overall Cognitive Status: Impaired at baseline  Orientation Level: Oriented X4  Problem Solving: Assistance required to identify errors made  Cognition Comments: Pt has a h/o Wernicke's Encephalopathy from chronic alcohol abuse and presents with memory deficits and other cognitive deficits as a result.    Home Living:  Type of Home: House  Lives With: Spouse  Home Adaptive Equipment: Walker rolling or standard, Cane, Wheelchair-manual  Home Layout: One level  Home Access: Stairs to enter with rails  Entrance Stairs-Rails: Right  Entrance Stairs-Number of Steps: 3  Bathroom Shower/Tub: Walk-in shower  Bathroom Toilet: Standard  Bathroom Equipment: Shower chair without back  Bathroom Accessibility: walk-in shower in basement; 13 steps with 1 handrail; pt reports full bath on main floor is \" not finished and being worked on.\" has standard commode working at this time    Prior Function:  Level of [place]: Needs assistance with ADLs, Needs assistance with homemaking  Receives Help From: Other (Comment) (SO)  ADL Assistance: Needs assistance (assist with bathing)  Homemaking Assistance: Needs assistance (SO does all)  Ambulatory Assistance: Independent  Hand Dominance: Right    ADL:  Eating Assistance: Independent  Grooming Assistance: Minimal  Grooming Deficit: Steadying, Supervision/safety  Bathing Assistance: Not performed  Bathing Deficit:  (pt has " assist at baseline)  UE Dressing Assistance: Stand by  UE Dressing Deficit: Setup  LE Dressing Assistance: Minimal  Toileting Assistance with Device: Not performed  Toileting Deficit: Urinary Catheter    Activity Tolerance:  Activity Tolerance Comments: impaired due to generalized weakness and limited activity since admit    Bed Mobility/Transfers: Bed Mobility  Bed Mobility:  (Pt completed supine to sit in bed with min A for safety and due to generalized weakness.)    Transfers  Transfer:  (Pt completed sit<>stand from both the bed and chair with min A to steady for safety and verbal cues for sequencing.)    Sitting Balance:  Static Sitting Balance  Static Sitting-Balance Support: Bilateral upper extremity supported  Static Sitting-Level of Assistance: Close supervision  Static Sitting-Comment/Number of Minutes: 4-5    Therapy/Activity: Therapeutic Activity  Therapeutic Activity Performed:  (See bed mobility, transfers, and static sitting balance.)    Sensation:  Sensation Comment: Pt reports chronic numbness in hands and wrists.    Strength:  Strength Comments: SILVIANO's 3+/5    Coordination:  Coordination Comment: SILVIANO's WFL     Hand Function:  Hand Function  Gross Grasp: Functional  Coordination: Functional      Outcome Measures: [facility] Daily Activity  Putting on and taking off regular lower body clothing: A little  Bathing (including washing, rinsing, drying): A little  Putting on and taking off regular upper body clothing: A little  Toileting, which includes using toilet, bedpan or urinal: Total  Taking care of personal grooming such as brushing teeth: A little  Eating Meals: None  Daily Activity - Total Score: 17      Education Documentation  ADL Training, taught by Dylan Granda Jr., OT at 1/6/2024 11:07 AM.  Learner: Patient  Readiness: Acceptance  Method: Explanation  Response: Verbalizes Understanding, Needs Reinforcement    Education Comments  No comments found.         Goals:    Problem: OT Goals  Goal: Pt  will complete all functional transfers and mobility with mod indep/indep using a device as recommended by skilled PT.  Outcome: Progressing  Goal: Pt will complete all ADL's(except bathing) with mod indep/indep using AE as needed.  Outcome: Progressing  Goal: Pt will tolerate functional mobility for a household distance using a RW with mod indep or without a device with indep.  Outcome: Progressing

## 2024-01-07 LAB
ACANTHOCYTES BLD QL SMEAR: NORMAL
ALBUMIN SERPL-MCNC: 2.9 G/DL (ref 3.5–5)
ALP BLD-CCNC: 132 U/L (ref 35–125)
ALT SERPL-CCNC: 13 U/L (ref 5–40)
ANION GAP SERPL CALC-SCNC: 12 MMOL/L
AST SERPL-CCNC: 41 U/L (ref 5–40)
BASOPHILS # BLD AUTO: 0.04 X10*3/UL (ref 0–0.1)
BASOPHILS NFR BLD AUTO: 0.5 %
BILIRUB SERPL-MCNC: 12.2 MG/DL (ref 0.1–1.2)
BUN SERPL-MCNC: 12 MG/DL (ref 8–25)
BURR CELLS BLD QL SMEAR: NORMAL
CALCIUM SERPL-MCNC: 8.4 MG/DL (ref 8.5–10.4)
CHLORIDE SERPL-SCNC: 95 MMOL/L (ref 97–107)
CO2 SERPL-SCNC: 25 MMOL/L (ref 24–31)
CREAT SERPL-MCNC: 0.7 MG/DL (ref 0.4–1.6)
EOSINOPHIL # BLD AUTO: 0.26 X10*3/UL (ref 0–0.7)
EOSINOPHIL NFR BLD AUTO: 3.2 %
ERYTHROCYTE [DISTWIDTH] IN BLOOD BY AUTOMATED COUNT: 25.8 % (ref 11.5–14.5)
GFR SERPL CREATININE-BSD FRML MDRD: >90 ML/MIN/1.73M*2
GLUCOSE SERPL-MCNC: 113 MG/DL (ref 65–99)
HCT VFR BLD AUTO: 21.6 % (ref 36–46)
HGB BLD-MCNC: 7.5 G/DL (ref 12–16)
IMM GRANULOCYTES # BLD AUTO: 0.17 X10*3/UL (ref 0–0.7)
IMM GRANULOCYTES NFR BLD AUTO: 2.1 % (ref 0–0.9)
LYMPHOCYTES # BLD AUTO: 1.37 X10*3/UL (ref 1.2–4.8)
LYMPHOCYTES NFR BLD AUTO: 17.1 %
MAGNESIUM SERPL-MCNC: 1.6 MG/DL (ref 1.6–3.1)
MCH RBC QN AUTO: 35.7 PG (ref 26–34)
MCHC RBC AUTO-ENTMCNC: 34.7 G/DL (ref 32–36)
MCV RBC AUTO: 103 FL (ref 80–100)
MONOCYTES # BLD AUTO: 0.77 X10*3/UL (ref 0.1–1)
MONOCYTES NFR BLD AUTO: 9.6 %
NEUTROPHILS # BLD AUTO: 5.4 X10*3/UL (ref 1.2–7.7)
NEUTROPHILS NFR BLD AUTO: 67.5 %
NRBC BLD-RTO: 0.6 /100 WBCS (ref 0–0)
PHOSPHATE SERPL-MCNC: 3.5 MG/DL (ref 2.5–4.5)
PLATELET # BLD AUTO: 83 X10*3/UL (ref 150–450)
POLYCHROMASIA BLD QL SMEAR: NORMAL
POTASSIUM SERPL-SCNC: 3.4 MMOL/L (ref 3.4–5.1)
PROT SERPL-MCNC: 5.4 G/DL (ref 5.9–7.9)
RBC # BLD AUTO: 2.1 X10*6/UL (ref 4–5.2)
RBC MORPH BLD: NORMAL
SODIUM SERPL-SCNC: 132 MMOL/L (ref 133–145)
SPHEROCYTES BLD QL SMEAR: NORMAL
WBC # BLD AUTO: 8 X10*3/UL (ref 4.4–11.3)

## 2024-01-07 PROCEDURE — 83735 ASSAY OF MAGNESIUM: CPT | Performed by: INTERNAL MEDICINE

## 2024-01-07 PROCEDURE — 2500000001 HC RX 250 WO HCPCS SELF ADMINISTERED DRUGS (ALT 637 FOR MEDICARE OP): Performed by: INTERNAL MEDICINE

## 2024-01-07 PROCEDURE — 2500000004 HC RX 250 GENERAL PHARMACY W/ HCPCS (ALT 636 FOR OP/ED): Performed by: INTERNAL MEDICINE

## 2024-01-07 PROCEDURE — 80053 COMPREHEN METABOLIC PANEL: CPT | Performed by: INTERNAL MEDICINE

## 2024-01-07 PROCEDURE — 1210000001 HC SEMI-PRIVATE ROOM DAILY

## 2024-01-07 PROCEDURE — 36415 COLL VENOUS BLD VENIPUNCTURE: CPT | Performed by: INTERNAL MEDICINE

## 2024-01-07 PROCEDURE — 84100 ASSAY OF PHOSPHORUS: CPT | Performed by: INTERNAL MEDICINE

## 2024-01-07 PROCEDURE — 85025 COMPLETE CBC W/AUTO DIFF WBC: CPT | Performed by: INTERNAL MEDICINE

## 2024-01-07 RX ORDER — OXYCODONE HYDROCHLORIDE 5 MG/1
5 TABLET ORAL EVERY 6 HOURS PRN
Status: DISCONTINUED | OUTPATIENT
Start: 2024-01-07 | End: 2024-01-09 | Stop reason: HOSPADM

## 2024-01-07 RX ADMIN — LACTULOSE 30 G: 20 SOLUTION ORAL at 10:05

## 2024-01-07 RX ADMIN — Medication 100 MG: at 04:17

## 2024-01-07 RX ADMIN — GABAPENTIN 300 MG: 300 CAPSULE ORAL at 06:00

## 2024-01-07 RX ADMIN — RIFAXIMIN 550 MG: 550 TABLET ORAL at 10:05

## 2024-01-07 RX ADMIN — LEVOTHYROXINE SODIUM 50 MCG: 0.05 TABLET ORAL at 06:00

## 2024-01-07 RX ADMIN — OXYCODONE HYDROCHLORIDE 5 MG: 5 TABLET ORAL at 17:34

## 2024-01-07 RX ADMIN — PIPERACILLIN SODIUM AND TAZOBACTAM SODIUM 3.38 G: 3; .375 INJECTION, SOLUTION INTRAVENOUS at 10:05

## 2024-01-07 RX ADMIN — PIPERACILLIN SODIUM AND TAZOBACTAM SODIUM 3.38 G: 3; .375 INJECTION, SOLUTION INTRAVENOUS at 04:15

## 2024-01-07 RX ADMIN — MULTIVITAMIN TABLET 1 TABLET: TABLET at 10:06

## 2024-01-07 RX ADMIN — LACTULOSE 30 G: 20 SOLUTION ORAL at 21:02

## 2024-01-07 RX ADMIN — FUROSEMIDE 40 MG: 40 TABLET ORAL at 10:06

## 2024-01-07 RX ADMIN — FUROSEMIDE 40 MG: 40 TABLET ORAL at 17:00

## 2024-01-07 RX ADMIN — GABAPENTIN 300 MG: 300 CAPSULE ORAL at 13:59

## 2024-01-07 RX ADMIN — FOLIC ACID 1 MG: 1 TABLET ORAL at 10:06

## 2024-01-07 RX ADMIN — RIFAXIMIN 550 MG: 550 TABLET ORAL at 21:02

## 2024-01-07 RX ADMIN — Medication 400 MG: at 10:06

## 2024-01-07 RX ADMIN — PANTOPRAZOLE SODIUM 40 MG: 40 TABLET, DELAYED RELEASE ORAL at 10:06

## 2024-01-07 RX ADMIN — GABAPENTIN 300 MG: 300 CAPSULE ORAL at 21:02

## 2024-01-07 ASSESSMENT — COGNITIVE AND FUNCTIONAL STATUS - GENERAL
HELP NEEDED FOR BATHING: A LITTLE
TOILETING: A LOT
WALKING IN HOSPITAL ROOM: A LITTLE
PERSONAL GROOMING: A LITTLE
DRESSING REGULAR UPPER BODY CLOTHING: A LITTLE
MOBILITY SCORE: 17
DAILY ACTIVITIY SCORE: 18
STANDING UP FROM CHAIR USING ARMS: A LITTLE
MOVING TO AND FROM BED TO CHAIR: A LITTLE
DRESSING REGULAR LOWER BODY CLOTHING: A LITTLE
MOBILITY SCORE: 17
STANDING UP FROM CHAIR USING ARMS: A LITTLE
MOVING TO AND FROM BED TO CHAIR: A LITTLE
DAILY ACTIVITIY SCORE: 18
TURNING FROM BACK TO SIDE WHILE IN FLAT BAD: A LITTLE
HELP NEEDED FOR BATHING: A LITTLE
WALKING IN HOSPITAL ROOM: A LITTLE
MOVING FROM LYING ON BACK TO SITTING ON SIDE OF FLAT BED WITH BEDRAILS: A LITTLE
TOILETING: A LOT
DRESSING REGULAR UPPER BODY CLOTHING: A LITTLE
CLIMB 3 TO 5 STEPS WITH RAILING: A LOT
CLIMB 3 TO 5 STEPS WITH RAILING: A LOT
MOVING FROM LYING ON BACK TO SITTING ON SIDE OF FLAT BED WITH BEDRAILS: A LITTLE
TURNING FROM BACK TO SIDE WHILE IN FLAT BAD: A LITTLE
PERSONAL GROOMING: A LITTLE
DRESSING REGULAR LOWER BODY CLOTHING: A LITTLE

## 2024-01-07 ASSESSMENT — LIFESTYLE VARIABLES
TOTAL SCORE: 0
VISUAL DISTURBANCES: NOT PRESENT
AUDITORY DISTURBANCES: NOT PRESENT
HEADACHE, FULLNESS IN HEAD: NOT PRESENT
ORIENTATION AND CLOUDING OF SENSORIUM: ORIENTED AND CAN DO SERIAL ADDITIONS
ANXIETY: NO ANXIETY, AT EASE
AGITATION: NORMAL ACTIVITY
NAUSEA AND VOMITING: NO NAUSEA AND NO VOMITING
TREMOR: NO TREMOR
PAROXYSMAL SWEATS: NO SWEAT VISIBLE

## 2024-01-07 ASSESSMENT — PAIN SCALES - GENERAL
PAINLEVEL_OUTOF10: 0 - NO PAIN
PAINLEVEL_OUTOF10: 7
PAINLEVEL_OUTOF10: 4

## 2024-01-07 ASSESSMENT — PAIN DESCRIPTION - LOCATION: LOCATION: ABDOMEN

## 2024-01-07 ASSESSMENT — PAIN - FUNCTIONAL ASSESSMENT
PAIN_FUNCTIONAL_ASSESSMENT: 0-10
PAIN_FUNCTIONAL_ASSESSMENT: 0-10

## 2024-01-07 NOTE — PROGRESS NOTES
"Briseida Owen is a 48 y.o. female on day 3 of admission presenting with Anemia, unspecified type.    Subjective   Moved to regular nursing floor. No acute events. No bleeding. Not having abd pain. Belly actually feels softer compared to yesterday       Objective     Physical Exam  Vitals reviewed.   Constitutional:       General: She is not in acute distress.  HENT:      Head: Normocephalic.   Eyes:      Pupils: Pupils are equal, round, and reactive to light.   Cardiovascular:      Rate and Rhythm: Normal rate and regular rhythm.      Heart sounds: No murmur heard.     No gallop.   Pulmonary:      Breath sounds: Normal breath sounds. No stridor. No wheezing, rhonchi or rales.   Abdominal:      General: Bowel sounds are normal. There is no distension.      Palpations: Abdomen is soft.      Tenderness: There is no abdominal tenderness. There is no guarding or rebound.   Skin:     General: Skin is warm.      Capillary Refill: Capillary refill takes less than 2 seconds.      Coloration: Skin is jaundiced. Skin is not pale.   Neurological:      Mental Status: She is alert and oriented to person, place, and time.         Last Recorded Vitals  Blood pressure 107/55, pulse 83, temperature 36.6 °C (97.9 °F), temperature source Oral, resp. rate 18, height 1.575 m (5' 2.01\"), weight 73.5 kg (162 lb 0.6 oz), SpO2 93 %.  Intake/Output last 3 Shifts:  I/O last 3 completed shifts:  In: 880 (12 mL/kg) [P.O.:580; IV Piggyback:300]  Out: 4400 (59.9 mL/kg) [Urine:4400 (1.7 mL/kg/hr)]  Weight: 73.5 kg     Relevant Results  Results for orders placed or performed during the hospital encounter of 01/03/24 (from the past 24 hour(s))   Comprehensive Metabolic Panel   Result Value Ref Range    Glucose 113 (H) 65 - 99 mg/dL    Sodium 132 (L) 133 - 145 mmol/L    Potassium 3.4 3.4 - 5.1 mmol/L    Chloride 95 (L) 97 - 107 mmol/L    Bicarbonate 25 24 - 31 mmol/L    Urea Nitrogen 12 8 - 25 mg/dL    Creatinine 0.70 0.40 - 1.60 mg/dL    eGFR >90 " >60 mL/min/1.73m*2    Calcium 8.4 (L) 8.5 - 10.4 mg/dL    Albumin 2.9 (L) 3.5 - 5.0 g/dL    Alkaline Phosphatase 132 (H) 35 - 125 U/L    Total Protein 5.4 (L) 5.9 - 7.9 g/dL    AST 41 (H) 5 - 40 U/L    Bilirubin, Total 12.2 (H) 0.1 - 1.2 mg/dL    ALT 13 5 - 40 U/L    Anion Gap 12 <=19 mmol/L   Magnesium   Result Value Ref Range    Magnesium 1.60 1.60 - 3.10 mg/dL   Phosphorus   Result Value Ref Range    Phosphorus 3.5 2.5 - 4.5 mg/dL   CBC and Auto Differential   Result Value Ref Range    WBC 8.0 4.4 - 11.3 x10*3/uL    nRBC 0.6 (H) 0.0 - 0.0 /100 WBCs    RBC 2.10 (L) 4.00 - 5.20 x10*6/uL    Hemoglobin 7.5 (L) 12.0 - 16.0 g/dL    Hematocrit 21.6 (L) 36.0 - 46.0 %     (H) 80 - 100 fL    MCH 35.7 (H) 26.0 - 34.0 pg    MCHC 34.7 32.0 - 36.0 g/dL    RDW 25.8 (H) 11.5 - 14.5 %    Platelets 83 (L) 150 - 450 x10*3/uL    Neutrophils % 67.5 40.0 - 80.0 %    Immature Granulocytes %, Automated 2.1 (H) 0.0 - 0.9 %    Lymphocytes % 17.1 13.0 - 44.0 %    Monocytes % 9.6 2.0 - 10.0 %    Eosinophils % 3.2 0.0 - 6.0 %    Basophils % 0.5 0.0 - 2.0 %    Neutrophils Absolute 5.40 1.20 - 7.70 x10*3/uL    Immature Granulocytes Absolute, Automated 0.17 0.00 - 0.70 x10*3/uL    Lymphocytes Absolute 1.37 1.20 - 4.80 x10*3/uL    Monocytes Absolute 0.77 0.10 - 1.00 x10*3/uL    Eosinophils Absolute 0.26 0.00 - 0.70 x10*3/uL    Basophils Absolute 0.04 0.00 - 0.10 x10*3/uL   Morphology   Result Value Ref Range    RBC Morphology See Below     Polychromasia Mild     Spherocytes Few     [place] Cells Many     Acanthocytes Many       US abdomen limited    Result Date: 1/6/2024  Interpreted By:  Bre Nicholson, STUDY: US ABDOMEN LIMITED; 11:32 am   INDICATION: Signs/Symptoms:ascites.   COMPARISON: None.   ACCESSION NUMBER(S): [id number]   ORDERING CLINICIAN: BERKLEY ALBERTS   TECHNIQUE: Limited abdominal ultrasound of the right upper quadrant was performed utilizing gray scale imaging.   FINDINGS: Trace amount of fluid is noted within the  left upper and lower quadrants of the abdomen.       Trace amount of fluid noted within the left upper and lower quadrant the abdomen.   MACRO: None.   Signed by: Bre Nicholson 1/6/2024 11:33 AM Dictation workstation:   [id number]    XR chest 1 view    Result Date: 1/5/2024  Interpreted By:  Tomi Reynoso, STUDY: XR CHEST 1 VIEW; 1/5/2024 10:16 am   INDICATION: pleural effusion.   COMPARISON: January 3, 2024   ACCESSION NUMBER(S): [id number]   ORDERING CLINICIAN: MARICARMEN SANTIAGO   FINDINGS: RESULT:  The cardiac silhouette is within normal limits for size. Mediastinal contours are unremarkable. Left basilar opacity is again noted obscuring the left hemidiaphragm, not significantly changed compared to previous exam. The osseous structures are unremarkable.       Left basilar opacity obscuring the left hemidiaphragm is again noted which may represent pleural fluid and atelectasis. Underlying infiltrate is not excluded.     Signed by: Tomi Reynoso 1/5/2024 10:48 AM Dictation workstation:   [id number]    US guided abdominal paracentesis    Result Date: 1/4/2024  Interpreted By:  Huy Alvarez, STUDY: US GUIDED ABDOMINAL PARACENTESIS; 1/4/2024 9:25 am   INDICATION: Signs/Symptoms:ascites.   COMPARISON: None.   ACCESSION NUMBER(S): [id number]   ORDERING CLINICIAN: MATTHEW MAYORGA   TECHNIQUE: Ultrasound-guided paracentesis   FINDINGS: Informed consent obtained. Patient positioned supine. Left lower quadrant prepped, draped and anesthetized. Under ultrasound guidance, 8 Yi centesis sheath needle inserted into peritoneal cavity. 3.1of dark yellowfluid aspiratedwith sample sent for analysis. Patient tolerated procedure well       Ultrasound-guided paracentesis.   Signed by: Huy Alvarez 1/4/2024 10:51 AM Dictation workstation:   [id number]    CT abdomen pelvis w IV contrast    Result Date: 1/4/2024  STUDY: CT Abdomen and Pelvis with IV Contrast; 1/4/2024 1:46 AM. INDICATION: Abdominal pain. COMPARISON: US  abd 10/27/2023 .  CT AP 10/21/2023 ACCESSION NUMBER(S): [id number] ORDERING CLINICIAN: JAZMINE GILLESPIE TECHNIQUE: CT of the abdomen and pelvis was performed.  Contiguous axial images were obtained at 3 mm slice thickness through the abdomen and pelvis. Coronal and sagittal reconstructions at 3 mm slice thickness were performed.  Omnipaque 350 75 mL was administered intravenously.  FINDINGS: LOWER CHEST: Cardiac size is mildly enlarged.  No pericardial effusion.  Moderate to large left pleural effusion is noted with partial collapse of the visualized portion of the left lower lobe. There is suggestion of a right breast prosthesis partially visualized.   ABDOMEN:  LIVER: No hepatomegaly.  Liver demonstrates a markedly cirrhotic morphology with a nodular hepatic contour and hypertrophy of the left hepatic lobe.  Simple fluid density cyst is seen in the anterior segment of the right hepatic lobe measuring approximately 1 cm in diameter. Normal attenuation.  BILE DUCTS: No intrahepatic or extrahepatic biliary ductal dilatation.  GALLBLADDER: The gallbladder is distended and contains gallstones in the gallbladder neck. STOMACH: No abnormalities identified.  PANCREAS: No masses or ductal dilatation.  SPLEEN: Spleen is borderline enlarged.  No focal splenic lesion.  ADRENAL GLANDS: No thickening or nodules.  KIDNEYS AND URETERS: Kidneys are normal in size and location.  No renal or ureteral calculi.  PELVIS:  BLADDER: No abnormalities identified.  REPRODUCTIVE ORGANS: No abnormalities identified.  BOWEL: Appendix is not definitively identified.  Terminal ileum is unremarkable.  No abnormalities identified.  VESSELS: No abnormalities identified.  Abdominal aorta is normal in caliber.  PERITONEUM/RETROPERITONEUM/LYMPH NODES: Large volume of abdominal and pelvic ascites is noted.  No pneumoperitoneum. No lymphadenopathy.  ABDOMINAL WALL: No abnormalities identified. SOFT TISSUES: There is a fluid-filled small indirect right  inguinal hernia.  BONES: No acute fracture or aggressive osseous lesion.    Cirrhotic morphology of the liver with a large volume of abdominal and pelvic ascites. Cholelithiasis. Fluid-filled small right inguinal hernia. Moderate to large left pleural effusion with partial collapse of the visualized portion of the left lower lobe. Borderline splenomegaly. Signed by Bartolo Torres    XR chest 1 view    Result Date: 1/3/2024  STUDY: Chest Radiograph;  01/03/2024 at 9:13 PM INDICATION: Weakness. COMPARISON: XR chest 10/21/23, 04/25/21. ACCESSION NUMBER(S): [id number] ORDERING CLINICIAN: Nomi Anaya TECHNIQUE:  Frontal chest was obtained at 2113 hours. FINDINGS: There is dense retrocardiac consolidation in the left lower lobe, suggesting pneumonia. There is likely left pleural effusion. Heart is top normal size with central vascular prominence. There is no pneumothorax.    Retrocardiac consolidation left lower lobe suggests pneumonia, with likely left pleural effusion. Follow-up advised. Signed by Michael Lewis MD    * Cannot find OR log *  Last relevant procedure:                          Assessment/Plan   Principal Problem:    Anemia, unspecified type  Active Problems:    Alcoholic cirrhosis (CMS/HCC)    Pleural effusion on left    Alcoholic Cirrhosis of Liver (Decomp in setting chronic alcohol abuse, still drinking, high MELD score of 32)   HE: Continue Lactulose, Xifaxin. Goal for 2-3 stools daily. No asterixis. Monitor given low Na          EV: June 2023 Dr Yepez, grade II, banded. Started on nadolol. No overt bleeding per patient hx.  -Once clinically improved with electrolyte stablization, we can consider inpatient repeat EGD  -Tentative plan for Monday vs Tuesday         HCC: AFP up to date         Ascites: S/p paracentesis 3.1 L removed. Neg SBP. May need repeat para for early next week.              -Diuretics per renal              -Low Na diet today      2.    Anemia         Macrocytic anemia.  Stable HH of 7.5 today. No overt bleeding          - monitor H/H q 6 for a goal >7         - continue PPI   Tentatively EGD tomm vs Tuesday for reevaluation of varices. Will discuss with Dr. Yepez.   3.    Alcohol Abuse,          -Still drinking. Educated on her high risk of death with decomp cirrhosis and ETOH abuse and hx esophageal varices         Syd Moreno, APRN-CNP

## 2024-01-07 NOTE — CARE PLAN
The patient's goals for the shift include      The clinical goals for the shift include remain pain free      Problem: Fall/Injury  Goal: Not fall by end of shift  Outcome: Progressing  Goal: Be free from injury by end of the shift  Outcome: Progressing  Goal: Verbalize understanding of personal risk factors for fall in the hospital  Outcome: Progressing  Goal: Verbalize understanding of risk factor reduction measures to prevent injury from fall in the home  Outcome: Progressing  Goal: Use assistive devices by end of the shift  Outcome: Progressing  Goal: Pace activities to prevent fatigue by end of the shift  Outcome: Progressing     Problem: Pain  Goal: My pain/discomfort is manageable  Outcome: Progressing     Problem: Safety  Goal: Patient will be injury free during hospitalization  Outcome: Progressing  Goal: I will remain free of falls  Outcome: Progressing     Problem: Daily Care  Goal: Daily care needs are met  Outcome: Progressing     Problem: Psychosocial Needs  Goal: Demonstrates ability to cope with hospitalization/illness  Outcome: Progressing  Goal: Collaborate with me, my family, and caregiver to identify my specific goals  Outcome: Progressing     Problem: Discharge Barriers  Goal: My discharge needs are met  Outcome: Progressing     Problem: Skin  Goal: Decreased wound size/increased tissue granulation at next dressing change  Outcome: Progressing  Goal: Participates in plan/prevention/treatment measures  Outcome: Progressing  Goal: Prevent/manage excess moisture  Outcome: Progressing  Goal: Prevent/minimize sheer/friction injuries  Outcome: Progressing  Goal: Promote/optimize nutrition  Outcome: Progressing  Goal: Promote skin healing  Outcome: Progressing

## 2024-01-07 NOTE — NURSING NOTE
Patient arrived to the unit from sdu, in bed resting, bed low and locked, alarm on, call light in reach

## 2024-01-07 NOTE — PROGRESS NOTES
"Briseida Owen is a 48 y.o. female on day 3 of admission presenting with Anemia, unspecified type.    Subjective   Seen and examined urine output good catheter remains due to urinary retention plan for IR ultrasound paracentesis if significant fluid tomorrow morning awaiting GI for EGD Monday or Tuesday for evaluation of esophageal/gastric varices and subsequent banding would anticipate discharge the day following her EGD       Objective     Physical Exam  Vitals and nursing note reviewed.   Constitutional:       Appearance: Normal appearance.   HENT:      Head: Normocephalic and atraumatic.      Mouth/Throat:      Mouth: Mucous membranes are moist.   Eyes:      General: Scleral icterus present.      Extraocular Movements: Extraocular movements intact.      Pupils: Pupils are equal, round, and reactive to light.   Cardiovascular:      Rate and Rhythm: Normal rate and regular rhythm.      Pulses: Normal pulses.      Heart sounds: Normal heart sounds.   Pulmonary:      Effort: Pulmonary effort is normal.      Breath sounds: Normal breath sounds.   Abdominal:      Palpations: Abdomen is soft.   Musculoskeletal:         General: Normal range of motion.      Cervical back: Normal range of motion and neck supple.   Skin:     General: Skin is warm and dry.      Capillary Refill: Capillary refill takes less than 2 seconds.   Neurological:      General: No focal deficit present.      Mental Status: She is alert and oriented to person, place, and time. Mental status is at baseline.   Psychiatric:         Mood and Affect: Mood normal.         Last Recorded Vitals  Blood pressure 107/55, pulse 83, temperature 36.6 °C (97.9 °F), temperature source Oral, resp. rate 18, height 1.575 m (5' 2.01\"), weight 73.5 kg (162 lb 0.6 oz), SpO2 93 %.  Intake/Output last 3 Shifts:  I/O last 3 completed shifts:  In: 880 (12 mL/kg) [P.O.:580; IV Piggyback:300]  Out: 4400 (59.9 mL/kg) [Urine:4400 (1.7 mL/kg/hr)]  Weight: 73.5 kg     Relevant " Results              Results for orders placed or performed during the hospital encounter of 01/03/24 (from the past 24 hour(s))   Comprehensive Metabolic Panel   Result Value Ref Range    Glucose 113 (H) 65 - 99 mg/dL    Sodium 132 (L) 133 - 145 mmol/L    Potassium 3.4 3.4 - 5.1 mmol/L    Chloride 95 (L) 97 - 107 mmol/L    Bicarbonate 25 24 - 31 mmol/L    Urea Nitrogen 12 8 - 25 mg/dL    Creatinine 0.70 0.40 - 1.60 mg/dL    eGFR >90 >60 mL/min/1.73m*2    Calcium 8.4 (L) 8.5 - 10.4 mg/dL    Albumin 2.9 (L) 3.5 - 5.0 g/dL    Alkaline Phosphatase 132 (H) 35 - 125 U/L    Total Protein 5.4 (L) 5.9 - 7.9 g/dL    AST 41 (H) 5 - 40 U/L    Bilirubin, Total 12.2 (H) 0.1 - 1.2 mg/dL    ALT 13 5 - 40 U/L    Anion Gap 12 <=19 mmol/L   Magnesium   Result Value Ref Range    Magnesium 1.60 1.60 - 3.10 mg/dL   Phosphorus   Result Value Ref Range    Phosphorus 3.5 2.5 - 4.5 mg/dL   CBC and Auto Differential   Result Value Ref Range    WBC 8.0 4.4 - 11.3 x10*3/uL    nRBC 0.6 (H) 0.0 - 0.0 /100 WBCs    RBC 2.10 (L) 4.00 - 5.20 x10*6/uL    Hemoglobin 7.5 (L) 12.0 - 16.0 g/dL    Hematocrit 21.6 (L) 36.0 - 46.0 %     (H) 80 - 100 fL    MCH 35.7 (H) 26.0 - 34.0 pg    MCHC 34.7 32.0 - 36.0 g/dL    RDW 25.8 (H) 11.5 - 14.5 %    Platelets 83 (L) 150 - 450 x10*3/uL    Neutrophils % 67.5 40.0 - 80.0 %    Immature Granulocytes %, Automated 2.1 (H) 0.0 - 0.9 %    Lymphocytes % 17.1 13.0 - 44.0 %    Monocytes % 9.6 2.0 - 10.0 %    Eosinophils % 3.2 0.0 - 6.0 %    Basophils % 0.5 0.0 - 2.0 %    Neutrophils Absolute 5.40 1.20 - 7.70 x10*3/uL    Immature Granulocytes Absolute, Automated 0.17 0.00 - 0.70 x10*3/uL    Lymphocytes Absolute 1.37 1.20 - 4.80 x10*3/uL    Monocytes Absolute 0.77 0.10 - 1.00 x10*3/uL    Eosinophils Absolute 0.26 0.00 - 0.70 x10*3/uL    Basophils Absolute 0.04 0.00 - 0.10 x10*3/uL   Morphology   Result Value Ref Range    RBC Morphology See Below     Polychromasia Mild     Spherocytes Few     Kristina Cells Many      Acanthocytes Many      Scheduled medications  folic acid, 1 mg, oral, Daily  furosemide, 40 mg, oral, BID with meals  gabapentin, 300 mg, oral, q8h VICKY  lactulose, 30 g, oral, BID  levothyroxine, 50 mcg, oral, Daily before breakfast  magnesium oxide, 400 mg, oral, Daily  multivitamin, 1 tablet, oral, Daily  pantoprazole, 40 mg, oral, Daily  piperacillin-tazobactam, 3.375 g, intravenous, q6h  rifAXIMin, 550 mg, oral, BID  thiamine, 100 mg, oral, Daily      Continuous medications     PRN medications  PRN medications: LORazepam **OR** LORazepam **OR** LORazepam, oxygen                 Assessment/Plan   Principal Problem:    Anemia, unspecified type  Active Problems:    Alcoholic cirrhosis (CMS/HCC)    Pleural effusion on left    Repeat ultrasound possible paracentesis tomorrow GI for EGD to evaluate for esophageal and gastric varices with potential clipping recheck laboratories discontinue IV antibiotics no evidence of pneumonia clinically       I spent 30 minutes in the professional and overall care of this patient.      Tomi Beasley DO

## 2024-01-08 ENCOUNTER — ANESTHESIA EVENT (OUTPATIENT)
Dept: GASTROENTEROLOGY | Facility: HOSPITAL | Age: 49
DRG: 433 | End: 2024-01-08
Payer: MEDICARE

## 2024-01-08 ENCOUNTER — ANESTHESIA (OUTPATIENT)
Dept: GASTROENTEROLOGY | Facility: HOSPITAL | Age: 49
DRG: 433 | End: 2024-01-08
Payer: MEDICARE

## 2024-01-08 ENCOUNTER — APPOINTMENT (OUTPATIENT)
Dept: RADIOLOGY | Facility: HOSPITAL | Age: 49
DRG: 433 | End: 2024-01-08
Payer: MEDICARE

## 2024-01-08 ENCOUNTER — APPOINTMENT (OUTPATIENT)
Dept: GASTROENTEROLOGY | Facility: HOSPITAL | Age: 49
DRG: 433 | End: 2024-01-08
Payer: MEDICARE

## 2024-01-08 LAB
ACANTHOCYTES BLD QL SMEAR: NORMAL
ALBUMIN SERPL-MCNC: 3 G/DL (ref 3.5–5)
ALP BLD-CCNC: 122 U/L (ref 35–125)
ALT SERPL-CCNC: 13 U/L (ref 5–40)
ANION GAP SERPL CALC-SCNC: 13 MMOL/L
AST SERPL-CCNC: 44 U/L (ref 5–40)
BACTERIA BLD CULT: NORMAL
BACTERIA BLD CULT: NORMAL
BACTERIA FLD CULT: NORMAL
BASOPHILS # BLD AUTO: 0.05 X10*3/UL (ref 0–0.1)
BASOPHILS NFR BLD AUTO: 0.6 %
BILIRUB SERPL-MCNC: 12.1 MG/DL (ref 0.1–1.2)
BUN SERPL-MCNC: 11 MG/DL (ref 8–25)
BURR CELLS BLD QL SMEAR: NORMAL
CALCIUM SERPL-MCNC: 8 MG/DL (ref 8.5–10.4)
CHLORIDE SERPL-SCNC: 95 MMOL/L (ref 97–107)
CO2 SERPL-SCNC: 25 MMOL/L (ref 24–31)
CREAT SERPL-MCNC: 0.8 MG/DL (ref 0.4–1.6)
EOSINOPHIL # BLD AUTO: 0.27 X10*3/UL (ref 0–0.7)
EOSINOPHIL NFR BLD AUTO: 3.3 %
ERYTHROCYTE [DISTWIDTH] IN BLOOD BY AUTOMATED COUNT: 26 % (ref 11.5–14.5)
GFR SERPL CREATININE-BSD FRML MDRD: >90 ML/MIN/1.73M*2
GLUCOSE SERPL-MCNC: 130 MG/DL (ref 65–99)
GRAM STN SPEC: NORMAL
GRAM STN SPEC: NORMAL
HCT VFR BLD AUTO: 20.9 % (ref 36–46)
HGB BLD-MCNC: 7.1 G/DL (ref 12–16)
IMM GRANULOCYTES # BLD AUTO: 0.2 X10*3/UL (ref 0–0.7)
IMM GRANULOCYTES NFR BLD AUTO: 2.4 % (ref 0–0.9)
LYMPHOCYTES # BLD AUTO: 1.63 X10*3/UL (ref 1.2–4.8)
LYMPHOCYTES NFR BLD AUTO: 19.9 %
MAGNESIUM SERPL-MCNC: 1.6 MG/DL (ref 1.6–3.1)
MCH RBC QN AUTO: 35 PG (ref 26–34)
MCHC RBC AUTO-ENTMCNC: 34 G/DL (ref 32–36)
MCV RBC AUTO: 103 FL (ref 80–100)
MONOCYTES # BLD AUTO: 0.77 X10*3/UL (ref 0.1–1)
MONOCYTES NFR BLD AUTO: 9.4 %
NEUTROPHILS # BLD AUTO: 5.28 X10*3/UL (ref 1.2–7.7)
NEUTROPHILS NFR BLD AUTO: 64.4 %
NRBC BLD-RTO: 0.4 /100 WBCS (ref 0–0)
PHOSPHATE SERPL-MCNC: 2.9 MG/DL (ref 2.5–4.5)
PLATELET # BLD AUTO: 83 X10*3/UL (ref 150–450)
POLYCHROMASIA BLD QL SMEAR: NORMAL
POTASSIUM SERPL-SCNC: 3.3 MMOL/L (ref 3.4–5.1)
PROT SERPL-MCNC: 5.4 G/DL (ref 5.9–7.9)
RBC # BLD AUTO: 2.03 X10*6/UL (ref 4–5.2)
RBC MORPH BLD: NORMAL
SODIUM SERPL-SCNC: 133 MMOL/L (ref 133–145)
SPHEROCYTES BLD QL SMEAR: NORMAL
WBC # BLD AUTO: 8.2 X10*3/UL (ref 4.4–11.3)

## 2024-01-08 PROCEDURE — 80053 COMPREHEN METABOLIC PANEL: CPT | Performed by: INTERNAL MEDICINE

## 2024-01-08 PROCEDURE — 2500000001 HC RX 250 WO HCPCS SELF ADMINISTERED DRUGS (ALT 637 FOR MEDICARE OP): Performed by: INTERNAL MEDICINE

## 2024-01-08 PROCEDURE — 3700000001 HC GENERAL ANESTHESIA TIME - INITIAL BASE CHARGE

## 2024-01-08 PROCEDURE — 2500000005 HC RX 250 GENERAL PHARMACY W/O HCPCS: Performed by: INTERNAL MEDICINE

## 2024-01-08 PROCEDURE — 7100000002 HC RECOVERY ROOM TIME - EACH INCREMENTAL 1 MINUTE

## 2024-01-08 PROCEDURE — 84100 ASSAY OF PHOSPHORUS: CPT | Performed by: INTERNAL MEDICINE

## 2024-01-08 PROCEDURE — 7100000001 HC RECOVERY ROOM TIME - INITIAL BASE CHARGE

## 2024-01-08 PROCEDURE — 2500000004 HC RX 250 GENERAL PHARMACY W/ HCPCS (ALT 636 FOR OP/ED): Performed by: INTERNAL MEDICINE

## 2024-01-08 PROCEDURE — 83735 ASSAY OF MAGNESIUM: CPT | Performed by: INTERNAL MEDICINE

## 2024-01-08 PROCEDURE — 1210000001 HC SEMI-PRIVATE ROOM DAILY

## 2024-01-08 PROCEDURE — 2720000007 HC OR 272 NO HCPCS

## 2024-01-08 PROCEDURE — 36415 COLL VENOUS BLD VENIPUNCTURE: CPT | Performed by: INTERNAL MEDICINE

## 2024-01-08 PROCEDURE — 2500000004 HC RX 250 GENERAL PHARMACY W/ HCPCS (ALT 636 FOR OP/ED): Performed by: ANESTHESIOLOGY

## 2024-01-08 PROCEDURE — 3700000002 HC GENERAL ANESTHESIA TIME - EACH INCREMENTAL 1 MINUTE

## 2024-01-08 PROCEDURE — 85025 COMPLETE CBC W/AUTO DIFF WBC: CPT | Performed by: INTERNAL MEDICINE

## 2024-01-08 PROCEDURE — 43244 EGD VARICES LIGATION: CPT | Performed by: INTERNAL MEDICINE

## 2024-01-08 PROCEDURE — 06L38CZ OCCLUSION OF ESOPHAGEAL VEIN WITH EXTRALUMINAL DEVICE, VIA NATURAL OR ARTIFICIAL OPENING ENDOSCOPIC: ICD-10-PCS

## 2024-01-08 RX ORDER — LIDOCAINE HYDROCHLORIDE 10 MG/ML
0.1 INJECTION INFILTRATION; PERINEURAL ONCE
Status: DISCONTINUED | OUTPATIENT
Start: 2024-01-08 | End: 2024-01-09 | Stop reason: HOSPADM

## 2024-01-08 RX ORDER — SODIUM CHLORIDE, SODIUM LACTATE, POTASSIUM CHLORIDE, CALCIUM CHLORIDE 600; 310; 30; 20 MG/100ML; MG/100ML; MG/100ML; MG/100ML
100 INJECTION, SOLUTION INTRAVENOUS CONTINUOUS
Status: ACTIVE | OUTPATIENT
Start: 2024-01-08 | End: 2024-01-08

## 2024-01-08 RX ORDER — FENTANYL CITRATE 50 UG/ML
25 INJECTION, SOLUTION INTRAMUSCULAR; INTRAVENOUS EVERY 5 MIN PRN
Status: ACTIVE | OUTPATIENT
Start: 2024-01-08 | End: 2024-01-08

## 2024-01-08 RX ORDER — POTASSIUM CHLORIDE 1.5 G/1.58G
40 POWDER, FOR SOLUTION ORAL ONCE
Status: COMPLETED | OUTPATIENT
Start: 2024-01-08 | End: 2024-01-08

## 2024-01-08 RX ORDER — PROPOFOL 10 MG/ML
INJECTION, EMULSION INTRAVENOUS AS NEEDED
Status: DISCONTINUED | OUTPATIENT
Start: 2024-01-08 | End: 2024-01-08

## 2024-01-08 RX ORDER — MIDAZOLAM HYDROCHLORIDE 1 MG/ML
INJECTION, SOLUTION INTRAMUSCULAR; INTRAVENOUS AS NEEDED
Status: DISCONTINUED | OUTPATIENT
Start: 2024-01-08 | End: 2024-01-08

## 2024-01-08 RX ORDER — LIDOCAINE 560 MG/1
1 PATCH PERCUTANEOUS; TOPICAL; TRANSDERMAL DAILY
Status: DISCONTINUED | OUTPATIENT
Start: 2024-01-08 | End: 2024-01-09 | Stop reason: HOSPADM

## 2024-01-08 RX ADMIN — LIDOCAINE 1 PATCH: 4 PATCH TOPICAL at 14:34

## 2024-01-08 RX ADMIN — PANTOPRAZOLE SODIUM 40 MG: 40 TABLET, DELAYED RELEASE ORAL at 09:46

## 2024-01-08 RX ADMIN — GABAPENTIN 300 MG: 300 CAPSULE ORAL at 21:03

## 2024-01-08 RX ADMIN — POTASSIUM CHLORIDE 40 MEQ: 1.5 FOR SOLUTION ORAL at 09:46

## 2024-01-08 RX ADMIN — PROPOFOL 30 MG: 10 INJECTION, EMULSION INTRAVENOUS at 08:34

## 2024-01-08 RX ADMIN — FENTANYL CITRATE 25 MCG: 0.05 INJECTION, SOLUTION INTRAMUSCULAR; INTRAVENOUS at 09:04

## 2024-01-08 RX ADMIN — MULTIVITAMIN TABLET 1 TABLET: TABLET at 09:47

## 2024-01-08 RX ADMIN — GABAPENTIN 300 MG: 300 CAPSULE ORAL at 06:02

## 2024-01-08 RX ADMIN — RIFAXIMIN 550 MG: 550 TABLET ORAL at 21:03

## 2024-01-08 RX ADMIN — Medication 100 MG: at 09:47

## 2024-01-08 RX ADMIN — RIFAXIMIN 550 MG: 550 TABLET ORAL at 09:47

## 2024-01-08 RX ADMIN — GABAPENTIN 300 MG: 300 CAPSULE ORAL at 13:11

## 2024-01-08 RX ADMIN — MIDAZOLAM 2 MG: 1 INJECTION INTRAMUSCULAR; INTRAVENOUS at 08:30

## 2024-01-08 RX ADMIN — FUROSEMIDE 40 MG: 40 TABLET ORAL at 16:31

## 2024-01-08 RX ADMIN — FOLIC ACID 1 MG: 1 TABLET ORAL at 09:47

## 2024-01-08 RX ADMIN — LEVOTHYROXINE SODIUM 50 MCG: 0.05 TABLET ORAL at 06:01

## 2024-01-08 RX ADMIN — Medication 400 MG: at 09:47

## 2024-01-08 RX ADMIN — LACTULOSE 30 G: 20 SOLUTION ORAL at 09:46

## 2024-01-08 RX ADMIN — LACTULOSE 30 G: 20 SOLUTION ORAL at 21:00

## 2024-01-08 RX ADMIN — PROPOFOL 30 MG: 10 INJECTION, EMULSION INTRAVENOUS at 08:37

## 2024-01-08 RX ADMIN — FUROSEMIDE 40 MG: 40 TABLET ORAL at 09:46

## 2024-01-08 RX ADMIN — PROPOFOL 30 MG: 10 INJECTION, EMULSION INTRAVENOUS at 08:30

## 2024-01-08 RX ADMIN — FENTANYL CITRATE 25 MCG: 0.05 INJECTION, SOLUTION INTRAMUSCULAR; INTRAVENOUS at 09:09

## 2024-01-08 SDOH — HEALTH STABILITY: MENTAL HEALTH: CURRENT SMOKER: 0

## 2024-01-08 ASSESSMENT — PAIN - FUNCTIONAL ASSESSMENT
PAIN_FUNCTIONAL_ASSESSMENT: 0-10
PAIN_FUNCTIONAL_ASSESSMENT: 0-10
PAIN_FUNCTIONAL_ASSESSMENT: WONG-BAKER FACES
PAIN_FUNCTIONAL_ASSESSMENT: 0-10
PAIN_FUNCTIONAL_ASSESSMENT: WONG-BAKER FACES
PAIN_FUNCTIONAL_ASSESSMENT: 0-10

## 2024-01-08 ASSESSMENT — COGNITIVE AND FUNCTIONAL STATUS - GENERAL
STANDING UP FROM CHAIR USING ARMS: A LITTLE
DAILY ACTIVITIY SCORE: 19
DRESSING REGULAR LOWER BODY CLOTHING: A LITTLE
CLIMB 3 TO 5 STEPS WITH RAILING: A LOT
MOVING FROM LYING ON BACK TO SITTING ON SIDE OF FLAT BED WITH BEDRAILS: A LITTLE
DAILY ACTIVITIY SCORE: 19
DRESSING REGULAR UPPER BODY CLOTHING: A LITTLE
WALKING IN HOSPITAL ROOM: A LITTLE
DRESSING REGULAR UPPER BODY CLOTHING: A LITTLE
HELP NEEDED FOR BATHING: A LITTLE
MOVING FROM LYING ON BACK TO SITTING ON SIDE OF FLAT BED WITH BEDRAILS: A LITTLE
TURNING FROM BACK TO SIDE WHILE IN FLAT BAD: A LITTLE
TURNING FROM BACK TO SIDE WHILE IN FLAT BAD: A LITTLE
MOBILITY SCORE: 17
CLIMB 3 TO 5 STEPS WITH RAILING: A LOT
WALKING IN HOSPITAL ROOM: A LITTLE
DRESSING REGULAR LOWER BODY CLOTHING: A LITTLE
PERSONAL GROOMING: A LITTLE
MOBILITY SCORE: 17
HELP NEEDED FOR BATHING: A LITTLE
MOVING TO AND FROM BED TO CHAIR: A LITTLE
STANDING UP FROM CHAIR USING ARMS: A LITTLE
TOILETING: A LITTLE
MOVING TO AND FROM BED TO CHAIR: A LITTLE
PERSONAL GROOMING: A LITTLE
TOILETING: A LITTLE

## 2024-01-08 ASSESSMENT — ACTIVITIES OF DAILY LIVING (ADL): LACK_OF_TRANSPORTATION: NO

## 2024-01-08 ASSESSMENT — PAIN SCALES - GENERAL
PAINLEVEL_OUTOF10: 8
PAINLEVEL_OUTOF10: 9
PAINLEVEL_OUTOF10: 0 - NO PAIN
PAIN_LEVEL: 3
PAINLEVEL_OUTOF10: 0 - NO PAIN
PAINLEVEL_OUTOF10: 9
PAINLEVEL_OUTOF10: 0 - NO PAIN

## 2024-01-08 ASSESSMENT — LIFESTYLE VARIABLES
ORIENTATION AND CLOUDING OF SENSORIUM: ORIENTED AND CAN DO SERIAL ADDITIONS
AGITATION: NORMAL ACTIVITY
VISUAL DISTURBANCES: NOT PRESENT
HEADACHE, FULLNESS IN HEAD: NOT PRESENT
TREMOR: NO TREMOR
NAUSEA AND VOMITING: NO NAUSEA AND NO VOMITING
PAROXYSMAL SWEATS: NO SWEAT VISIBLE
AUDITORY DISTURBANCES: NOT PRESENT
ANXIETY: NO ANXIETY, AT EASE
TOTAL SCORE: 0

## 2024-01-08 NOTE — PROGRESS NOTES
Physical Therapy                 Therapy Communication Note    Patient Name: Briseida Owen  MRN: [id number]  Today's Date: 1/8/2024     Discipline: Physical Therapy    Missed Visit Reason: Missed Visit Reason: Patient in a medical procedure (Pt off the floor for EGD.)    Missed Time: Attempt    Comment:

## 2024-01-08 NOTE — PROGRESS NOTES
"Briseida Owen is a 48 y.o. female on day 4 of admission presenting with abdominal pain, ascites. She has a history of alcohol abuse.     Subjective   She had a paracentesis on 1/4 yesterday and had 3 liters of ascitic fluid removed.   She had an EGD today 1/8 and had banding of varices done.        Objective     Physical Exam  General: awake, alert, oriented, responsive  HEENT: scleral icterus present  Cardiovascular: regular, normal S1 and S2  Lungs: good air entry bilaterally, clear to auscultation  Abdomen: soft, nontender, bowel sounds present.   Extremities: no peripheral cyanosis, no pedal edema  Neuro: alert, oriented x 3, no focal weakness      Last Recorded Vitals  Blood pressure 118/76, pulse 80, temperature 36 °C (96.8 °F), temperature source Temporal, resp. rate 16, height 1.575 m (5' 2.01\"), weight 73.5 kg (162 lb 0.6 oz), SpO2 98 %.  Intake/Output last 3 Shifts:  I/O last 3 completed shifts:  In: 0 (0 mL/kg)   Out: 2250 (30.6 mL/kg) [Urine:2250 (0.9 mL/kg/hr)]  Weight: 73.5 kg     Relevant Results  Results for orders placed or performed during the hospital encounter of 01/03/24 (from the past 24 hour(s))   Comprehensive Metabolic Panel   Result Value Ref Range    Glucose 130 (H) 65 - 99 mg/dL    Sodium 133 133 - 145 mmol/L    Potassium 3.3 (L) 3.4 - 5.1 mmol/L    Chloride 95 (L) 97 - 107 mmol/L    Bicarbonate 25 24 - 31 mmol/L    Urea Nitrogen 11 8 - 25 mg/dL    Creatinine 0.80 0.40 - 1.60 mg/dL    eGFR >90 >60 mL/min/1.73m*2    Calcium 8.0 (L) 8.5 - 10.4 mg/dL    Albumin 3.0 (L) 3.5 - 5.0 g/dL    Alkaline Phosphatase 122 35 - 125 U/L    Total Protein 5.4 (L) 5.9 - 7.9 g/dL    AST 44 (H) 5 - 40 U/L    Bilirubin, Total 12.1 (H) 0.1 - 1.2 mg/dL    ALT 13 5 - 40 U/L    Anion Gap 13 <=19 mmol/L   Magnesium   Result Value Ref Range    Magnesium 1.60 1.60 - 3.10 mg/dL   Phosphorus   Result Value Ref Range    Phosphorus 2.9 2.5 - 4.5 mg/dL   CBC and Auto Differential   Result Value Ref Range    WBC 8.2 4.4 " - 11.3 x10*3/uL    nRBC 0.4 (H) 0.0 - 0.0 /100 WBCs    RBC 2.03 (L) 4.00 - 5.20 x10*6/uL    Hemoglobin 7.1 (L) 12.0 - 16.0 g/dL    Hematocrit 20.9 (L) 36.0 - 46.0 %     (H) 80 - 100 fL    MCH 35.0 (H) 26.0 - 34.0 pg    MCHC 34.0 32.0 - 36.0 g/dL    RDW 26.0 (H) 11.5 - 14.5 %    Platelets 83 (L) 150 - 450 x10*3/uL    Neutrophils % 64.4 40.0 - 80.0 %    Immature Granulocytes %, Automated 2.4 (H) 0.0 - 0.9 %    Lymphocytes % 19.9 13.0 - 44.0 %    Monocytes % 9.4 2.0 - 10.0 %    Eosinophils % 3.3 0.0 - 6.0 %    Basophils % 0.6 0.0 - 2.0 %    Neutrophils Absolute 5.28 1.20 - 7.70 x10*3/uL    Immature Granulocytes Absolute, Automated 0.20 0.00 - 0.70 x10*3/uL    Lymphocytes Absolute 1.63 1.20 - 4.80 x10*3/uL    Monocytes Absolute 0.77 0.10 - 1.00 x10*3/uL    Eosinophils Absolute 0.27 0.00 - 0.70 x10*3/uL    Basophils Absolute 0.05 0.00 - 0.10 x10*3/uL   Morphology   Result Value Ref Range    RBC Morphology See Below     Polychromasia Mild     Spherocytes Few     Kristina Cells Many     Acanthocytes Many         Scheduled medications  folic acid, 1 mg, oral, Daily  furosemide, 40 mg, oral, BID with meals  gabapentin, 300 mg, oral, q8h VICKY  lactulose, 30 g, oral, BID  levothyroxine, 50 mcg, oral, Daily before breakfast  lidocaine, 0.1 mL, subcutaneous, Once  magnesium oxide, 400 mg, oral, Daily  multivitamin, 1 tablet, oral, Daily  pantoprazole, 40 mg, oral, Daily  rifAXIMin, 550 mg, oral, BID  thiamine, 100 mg, oral, Daily      Continuous medications  lactated Ringer's, 100 mL/hr    PRN medications  PRN medications: fentaNYL PF, LORazepam **OR** LORazepam **OR** LORazepam, oxyCODONE, oxygen        Assessment/Plan   Alcoholic cirrhosis of the liver with ascites  Paracentesis done by IR on 1/4,  She iwas receiving IV zosyn, discontinued.     Hyponatremia  Possibly secondary to alcohol and fluid overload  improving    Left pleural effusion  Seen by pulmonary: recommend paracentesis and diuresis for suspected hepatic  hydrothorax, no thoracentesis    Anemia  Transfused 3 units of PRBCs. Monitor hemoglobin    Alcohol abuse  On thiamine, folate  Discussed alcohol cessation.     Hyperbilirubinemia  Improving, check direct bilirubin    Elevated ammonia  Awake, responsive. On lactulose, rifaximin    Urinary retention  Blackwell catheter placed 1/4    Remove blackwell tomorrow 1/9.   Discharge planning      Sierra Unger MD

## 2024-01-08 NOTE — CARE PLAN
The patient's goals for the shift include      The clinical goals for the shift include patient will be free from pain      Problem: Fall/Injury  Goal: Not fall by end of shift  Outcome: Progressing  Goal: Be free from injury by end of the shift  Outcome: Progressing  Goal: Verbalize understanding of personal risk factors for fall in the hospital  Outcome: Progressing  Goal: Verbalize understanding of risk factor reduction measures to prevent injury from fall in the home  Outcome: Progressing  Goal: Use assistive devices by end of the shift  Outcome: Progressing  Goal: Pace activities to prevent fatigue by end of the shift  Outcome: Progressing     Problem: Pain  Goal: My pain/discomfort is manageable  Outcome: Progressing     Problem: Safety  Goal: Patient will be injury free during hospitalization  Outcome: Progressing  Goal: I will remain free of falls  Outcome: Progressing     Problem: Daily Care  Goal: Daily care needs are met  Outcome: Progressing     Problem: Psychosocial Needs  Goal: Demonstrates ability to cope with hospitalization/illness  Outcome: Progressing  Goal: Collaborate with me, my family, and caregiver to identify my specific goals  Outcome: Progressing     Problem: Discharge Barriers  Goal: My discharge needs are met  Outcome: Progressing     Problem: Skin  Goal: Decreased wound size/increased tissue granulation at next dressing change  Outcome: Progressing  Goal: Participates in plan/prevention/treatment measures  Outcome: Progressing  Goal: Prevent/manage excess moisture  Outcome: Progressing  Goal: Prevent/minimize sheer/friction injuries  Outcome: Progressing  Goal: Promote/optimize nutrition  Outcome: Progressing  Goal: Promote skin healing  Outcome: Progressing

## 2024-01-08 NOTE — ANESTHESIA PREPROCEDURE EVALUATION
Patient: Briseida Owen    Procedure Information       Date/Time: 01/08/24 0805    Scheduled providers: Melida Yepez MD; Ryan Polanco MD    Procedure: EGD    Location: [facility]            Visit Vitals  Smoking Status Never        Current Outpatient Medications   Medication Instructions    cefuroxime (CEFTIN) 500 mg, oral, 2 times daily    folic acid (FOLVITE) 1 mg, oral, Daily    furosemide (LASIX) 40 mg, oral, Daily    lactulose 30 g, oral, 2 times daily    levothyroxine (Synthroid, Levoxyl) 50 mcg tablet 1 tablet, oral, Daily    magnesium oxide (MAG-OX) 400 mg, oral, Daily    multivitamin (MULTIPLE VITAMINS ORAL) 1 tablet, oral, Daily    ondansetron ODT (ZOFRAN-ODT) 4 mg, oral, Every 8 hours PRN    pantoprazole (PROTONIX) 40 mg, oral, Daily    potassium chloride CR (Klor-Con M20) 20 mEq ER tablet 20 mEq, oral, Daily, Do not crush or chew.    rifAXIMin (XIFAXAN) 550 mg, oral, 2 times daily    spironolactone (ALDACTONE) 100 mg, oral, Daily    thiamine (VITAMIN B-1) 100 mg, oral, Daily        Allergies   Allergen Reactions    Sulfamethoxazole-Trimethoprim Hives and Unknown        Past Surgical History:   Procedure Laterality Date    OTHER SURGICAL HISTORY  06/09/2021    Breast augmentation    US GUIDED ABDOMINAL PARACENTESIS  4/28/2021    US GUIDED ABDOMINAL PARACENTESIS [facility]    US GUIDED ABDOMINAL PARACENTESIS  4/23/2021    US GUIDED ABDOMINAL PARACENTESIS [facility]    US GUIDED ABDOMINAL PARACENTESIS  5/14/2021    US GUIDED ABDOMINAL PARACENTESIS [facility]    US GUIDED ABDOMINAL PARACENTESIS  8/17/2023    US GUIDED ABDOMINAL PARACENTESIS [facility]    US GUIDED ABDOMINAL PARACENTESIS  8/25/2023    US GUIDED ABDOMINAL PARACENTESIS [facility]    US GUIDED ABDOMINAL PARACENTESIS  10/23/2023    US GUIDED ABDOMINAL PARACENTESIS 10/23/2023 Huy Alvarez MD [facility]    US GUIDED ABDOMINAL PARACENTESIS  10/27/2023    US GUIDED ABDOMINAL  PARACENTESIS 10/27/2023 Huy Alvarez MD [facility]    US GUIDED ABDOMINAL PARACENTESIS  10/26/2023    US GUIDED ABDOMINAL PARACENTESIS 10/26/2023 [facility]    US GUIDED ABDOMINAL PARACENTESIS  1/4/2024    US GUIDED ABDOMINAL PARACENTESIS 1/4/2024 [facility]        Relevant Problems   Cardiovascular   (+) Congestive heart failure (CMS/HCC)   (+) Essential hypertension      Endocrine   (+) Hyponatremia   (+) Hypothyroidism      GI   (+) Gastrointestinal hemorrhage   (+) Hematemesis      /Renal   (+) Advanced hepatic cirrhosis (CMS/HCC)   (+) Alcoholic cirrhosis (CMS/HCC)   (+) Alcoholic hepatitis   (+) Hepatic failure (CMS/HCC)   (+) Liver cell carcinoma (CMS/HCC)   (+) Liver disease, unspecified   (+) Unspecified cirrhosis of liver (CMS/HCC)      Neuro/Psych   (+) 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)   (+) Moderate major depression (CMS/HCC)   (+) Small fiber neuropathy      Pulmonary   (+) Bacterial pneumonia      GI/Hepatic   (+) Advanced hepatic cirrhosis (CMS/HCC)   (+) Alcoholic cirrhosis (CMS/HCC)   (+) Alcoholic hepatitis   (+) Liver cell carcinoma (CMS/HCC)   (+) Unspecified cirrhosis of liver (CMS/HCC)      Hematology   (+) Anemia   (+) Anemia, unspecified type      Infectious Disease   (+) Bacterial pneumonia   (+) Other neurologic disorders in Lyme disease       Active Ambulatory Problems     Diagnosis Date Noted    Medical home patient 09/13/2023    Abnormal computed tomography scan 09/13/2023    Acute alteration in mental status 09/13/2023    Advanced hepatic cirrhosis (CMS/HCC) 09/13/2023    Unspecified cirrhosis of liver (CMS/HCC) 09/13/2023    Alcohol abuse with unspecified alcohol-induced disorder (CMS/HCC) 09/13/2023    Alcohol abuse 09/13/2023    Toxic metabolic encephalopathy 09/13/2023    Wernicke's encephalopathy 09/13/2023    Alcoholic cirrhosis (CMS/HCC) 09/13/2023    Alcoholic hepatitis 09/13/2023    Alcoholism (CMS/HCC) 09/13/2023    Anemia 09/13/2023    Bacterial  pneumonia 09/13/2023    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    Clouded consciousness 06/03/2023    Congestive heart failure (CMS/HCC) 09/13/2023    Dizziness 09/13/2023    Electrolyte imbalance 04/13/2021    Elevated liver enzymes 08/07/2018    Essential hypertension 09/13/2023    Abdominal pain 09/13/2023    Flank pain 09/13/2023    Jae hematuria 09/13/2023    Gastrointestinal hemorrhage 09/13/2023    Generalized weakness 09/13/2023    Hematemesis 09/13/2023    Hepatic encephalopathy (CMS/HCC) 09/13/2023    History of hypothyroidism 09/13/2023    Hyponatremia 09/13/2023    Hypothyroidism 09/13/2023    Impaired gait and mobility 09/13/2023    Abnormal LFTs 08/08/2018    Abnormal TSH 08/03/2018    Increased ammonia level 09/13/2023    Iron overload 08/03/2018    Jaundice 09/13/2023    Leukocytosis 09/13/2023    Liver cell carcinoma (CMS/HCC) 09/13/2023    Ascites 09/13/2023    Hepatic failure (CMS/HCC) 09/13/2023    Liver disease, unspecified 09/13/2023    Lymphedema of both lower extremities 09/13/2023    Moderate major depression (CMS/HCC) 09/13/2023    Nausea 09/13/2023    Other chronic pain 09/13/2023    Other neurologic disorders in Lyme disease 09/13/2023    Paraparesis of both lower limbs (CMS/HCC) 09/13/2023    Paresthesia 08/08/2018    Sepsis (CMS/HCC) 09/13/2023    Serum ammonia increased (CMS/HCC) 09/13/2023    Small fiber neuropathy 08/03/2018    Anemia, unspecified type 01/04/2024    Pleural effusion on left 01/06/2024     Resolved Ambulatory Problems     Diagnosis Date Noted    No Resolved Ambulatory Problems     Past Medical History:   Diagnosis Date    Alcoholic hepatitis without ascites 08/13/2021       Clinical information reviewed:                   NPO Detail:    No data recorded     Physical Exam    Airway  Mallampati: I  TM distance: >3 FB  Neck ROM: full     Cardiovascular - normal exam     Dental   Comments: Poor     Pulmonary - normal exam      Abdominal            Anesthesia Plan    ASA 4     MAC     The patient is not a current smoker.    Anesthetic plan and risks discussed with patient.

## 2024-01-08 NOTE — PROGRESS NOTES
Physical Therapy                 Therapy Communication Note    Patient Name: Briseida Owen  MRN: [id number]  Today's Date: 1/8/2024     Discipline: Physical Therapy    Missed Visit Reason: Missed Visit Reason: Patient refused (second attempt to see pt for PT f/u, pt declined.)    Missed Time: Attempt    Comment:

## 2024-01-08 NOTE — NURSING NOTE
Patient returned from EGD. Patient sleeping in bed, able to arouse to answer orientation questions.

## 2024-01-08 NOTE — PROGRESS NOTES
Spoke with patient at the bedside. PT/OT skilled patient low intensity. Pt declinging [facility] at this time and does not want any services provided. At the time of discharge patient will return home no skilled needs.     PATIENT HAS A SECURE DISCHARGE TO RETURN HOME NO SKILLED NEEDS.        01/08/24 1333   Discharge Planning   Living Arrangements Spouse/significant other   Support Systems Spouse/significant other   Assistance Needed none   Type of Residence Private residence   Do you have animals or pets at home? Yes   Type of Animals or Pets dog   Who is requesting discharge planning? Provider   Home or Post Acute Services None   Patient expects to be discharged to: home   Does the patient need discharge transport arranged? Yes   RoundTrip coordination needed? Yes   Has discharge transport been arranged? No   Financial Resource Strain   How hard is it for you to pay for the very basics like food, housing, medical care, and heating? Not very   Housing Stability   In the last 12 months, was there a time when you were not able to pay the mortgage or rent on time? N   In the last 12 months, how many places have you lived? 1   In the last 12 months, was there a time when you did not have a steady place to sleep or slept in a shelter (including now)? N   Transportation Needs   In the past 12 months, has lack of transportation kept you from medical appointments or from getting medications? no   In the past 12 months, has lack of transportation kept you from meetings, work, or from getting things needed for daily living? No   Patient Choice   Provider Choice list and CMS website (https://medicare.gov/care-compare#search) for post-acute Quality and Resource Measure Data were provided and reviewed with: Patient   Patient / Family choosing to utilize agency / facility established prior to hospitalization No

## 2024-01-08 NOTE — PROGRESS NOTES
01/08/24 1336   Current Planned Discharge Disposition   Current Planned Discharge Disposition Home

## 2024-01-08 NOTE — CARE PLAN
The clinical goals for the shift include decreased pain      Problem: Fall/Injury  Goal: Not fall by end of shift  Outcome: Progressing  Goal: Be free from injury by end of the shift  Outcome: Progressing  Goal: Verbalize understanding of personal risk factors for fall in the hospital  Outcome: Progressing  Goal: Verbalize understanding of risk factor reduction measures to prevent injury from fall in the home  Outcome: Progressing  Goal: Use assistive devices by end of the shift  Outcome: Progressing  Goal: Pace activities to prevent fatigue by end of the shift  Outcome: Progressing     Problem: Pain  Goal: My pain/discomfort is manageable  Outcome: Progressing     Problem: Safety  Goal: Patient will be injury free during hospitalization  Outcome: Progressing  Goal: I will remain free of falls  Outcome: Progressing     Problem: Daily Care  Goal: Daily care needs are met  Outcome: Progressing     Problem: Psychosocial Needs  Goal: Demonstrates ability to cope with hospitalization/illness  Outcome: Progressing  Goal: Collaborate with me, my family, and caregiver to identify my specific goals  Outcome: Progressing     Problem: Discharge Barriers  Goal: My discharge needs are met  Outcome: Progressing     Problem: Skin  Goal: Decreased wound size/increased tissue granulation at next dressing change  Outcome: Progressing  Goal: Participates in plan/prevention/treatment measures  Outcome: Progressing  Goal: Prevent/manage excess moisture  Outcome: Progressing  Goal: Prevent/minimize sheer/friction injuries  Outcome: Progressing  Goal: Promote/optimize nutrition  Outcome: Progressing  Goal: Promote skin healing  Outcome: Progressing    Patient alert and oriented. EGD today. Patient resting after. Monge catheter.

## 2024-01-08 NOTE — PROGRESS NOTES
Sodium up to 132, potassium normalized, will sign off at this time, please call back with any questions, thank you.    Kalyan Wilkerson MD

## 2024-01-08 NOTE — ANESTHESIA POSTPROCEDURE EVALUATION
Patient: Briseida Owen    Procedure Summary       Date: 01/08/24 Room / Location: [facility]    Anesthesia Start: 0827 Anesthesia Stop: 0851    Procedure: EGD Diagnosis:       Alcoholic cirrhosis of liver with ascites (CMS/HCC)      Esophageal varices without bleeding, unspecified esophageal varices type (CMS/HCC)    Scheduled Providers: Melida Yepez MD; Ryan Polanco MD Responsible Provider: Ryan Polanco MD    Anesthesia Type: MAC ASA Status: 4            Anesthesia Type: MAC    Vitals Value Taken Time   /76 01/08/24 0930   Temp 36 °C (96.8 °F) 01/08/24 0848   Pulse 80 01/08/24 0930   Resp 16 01/08/24 0930   SpO2 98 % 01/08/24 0930       Anesthesia Post Evaluation    Patient location during evaluation: bedside  Patient participation: complete - patient participated  Level of consciousness: awake  Pain score: 3  Pain management: adequate  Multimodal analgesia pain management approach  Airway patency: patent  Two or more strategies used to mitigate risk of obstructive sleep apnea  Cardiovascular status: acceptable  Respiratory status: acceptable  Hydration status: acceptable  Postoperative Nausea and Vomiting: none        No notable events documented.

## 2024-01-09 VITALS
TEMPERATURE: 98.6 F | HEART RATE: 89 BPM | OXYGEN SATURATION: 93 % | WEIGHT: 162.04 LBS | RESPIRATION RATE: 16 BRPM | SYSTOLIC BLOOD PRESSURE: 113 MMHG | BODY MASS INDEX: 29.82 KG/M2 | HEIGHT: 62 IN | DIASTOLIC BLOOD PRESSURE: 59 MMHG

## 2024-01-09 LAB
ACANTHOCYTES BLD QL SMEAR: NORMAL
ALBUMIN SERPL-MCNC: 2.9 G/DL (ref 3.5–5)
ALP BLD-CCNC: 117 U/L (ref 35–125)
ALT SERPL-CCNC: 12 U/L (ref 5–40)
ANION GAP SERPL CALC-SCNC: 11 MMOL/L
AST SERPL-CCNC: 45 U/L (ref 5–40)
BASOPHILS # BLD AUTO: 0.04 X10*3/UL (ref 0–0.1)
BASOPHILS NFR BLD AUTO: 0.5 %
BILIRUB SERPL-MCNC: 11.7 MG/DL (ref 0.1–1.2)
BUN SERPL-MCNC: 9 MG/DL (ref 8–25)
BURR CELLS BLD QL SMEAR: NORMAL
CALCIUM SERPL-MCNC: 8.3 MG/DL (ref 8.5–10.4)
CHLORIDE SERPL-SCNC: 98 MMOL/L (ref 97–107)
CO2 SERPL-SCNC: 24 MMOL/L (ref 24–31)
CREAT SERPL-MCNC: 0.6 MG/DL (ref 0.4–1.6)
EGFRCR SERPLBLD CKD-EPI 2021: >90 ML/MIN/1.73M*2
EOSINOPHIL # BLD AUTO: 0.34 X10*3/UL (ref 0–0.7)
EOSINOPHIL NFR BLD AUTO: 4 %
ERYTHROCYTE [DISTWIDTH] IN BLOOD BY AUTOMATED COUNT: 26.1 % (ref 11.5–14.5)
GLUCOSE SERPL-MCNC: 111 MG/DL (ref 65–99)
HCT VFR BLD AUTO: 22.9 % (ref 36–46)
HGB BLD-MCNC: 7.7 G/DL (ref 12–16)
IMM GRANULOCYTES # BLD AUTO: 0.16 X10*3/UL (ref 0–0.7)
IMM GRANULOCYTES NFR BLD AUTO: 1.9 % (ref 0–0.9)
LABORATORY COMMENT REPORT: NORMAL
LABORATORY COMMENT REPORT: NORMAL
LYMPHOCYTES # BLD AUTO: 1.72 X10*3/UL (ref 1.2–4.8)
LYMPHOCYTES NFR BLD AUTO: 20.3 %
MAGNESIUM SERPL-MCNC: 1.9 MG/DL (ref 1.6–3.1)
MCH RBC QN AUTO: 35.3 PG (ref 26–34)
MCHC RBC AUTO-ENTMCNC: 33.6 G/DL (ref 32–36)
MCV RBC AUTO: 105 FL (ref 80–100)
MONOCYTES # BLD AUTO: 0.9 X10*3/UL (ref 0.1–1)
MONOCYTES NFR BLD AUTO: 10.6 %
NEUTROPHILS # BLD AUTO: 5.33 X10*3/UL (ref 1.2–7.7)
NEUTROPHILS NFR BLD AUTO: 62.7 %
NRBC BLD-RTO: 0.4 /100 WBCS (ref 0–0)
PATH REPORT.FINAL DX SPEC: NORMAL
PATH REPORT.GROSS SPEC: NORMAL
PATH REPORT.RELEVANT HX SPEC: NORMAL
PATH REPORT.TOTAL CANCER: NORMAL
PLATELET # BLD AUTO: 92 X10*3/UL (ref 150–450)
POLYCHROMASIA BLD QL SMEAR: NORMAL
POTASSIUM SERPL-SCNC: 3.6 MMOL/L (ref 3.4–5.1)
PROT SERPL-MCNC: 5.3 G/DL (ref 5.9–7.9)
RBC # BLD AUTO: 2.18 X10*6/UL (ref 4–5.2)
RBC MORPH BLD: NORMAL
SODIUM SERPL-SCNC: 133 MMOL/L (ref 133–145)
SPHEROCYTES BLD QL SMEAR: NORMAL
WBC # BLD AUTO: 8.5 X10*3/UL (ref 4.4–11.3)

## 2024-01-09 PROCEDURE — 2500000004 HC RX 250 GENERAL PHARMACY W/ HCPCS (ALT 636 FOR OP/ED): Performed by: INTERNAL MEDICINE

## 2024-01-09 PROCEDURE — 36415 COLL VENOUS BLD VENIPUNCTURE: CPT | Performed by: INTERNAL MEDICINE

## 2024-01-09 PROCEDURE — 85025 COMPLETE CBC W/AUTO DIFF WBC: CPT | Performed by: INTERNAL MEDICINE

## 2024-01-09 PROCEDURE — 2500000001 HC RX 250 WO HCPCS SELF ADMINISTERED DRUGS (ALT 637 FOR MEDICARE OP): Performed by: INTERNAL MEDICINE

## 2024-01-09 PROCEDURE — 84075 ASSAY ALKALINE PHOSPHATASE: CPT | Performed by: INTERNAL MEDICINE

## 2024-01-09 PROCEDURE — 2500000005 HC RX 250 GENERAL PHARMACY W/O HCPCS: Performed by: INTERNAL MEDICINE

## 2024-01-09 PROCEDURE — 83735 ASSAY OF MAGNESIUM: CPT | Performed by: INTERNAL MEDICINE

## 2024-01-09 RX ORDER — LIDOCAINE 560 MG/1
1 PATCH PERCUTANEOUS; TOPICAL; TRANSDERMAL DAILY
Qty: 10 PATCH | Refills: 0 | Status: SHIPPED | OUTPATIENT
Start: 2024-01-10 | End: 2024-03-04 | Stop reason: HOSPADM

## 2024-01-09 RX ORDER — SPIRONOLACTONE 25 MG/1
25 TABLET ORAL DAILY
Qty: 30 TABLET | Refills: 2 | Status: SHIPPED | OUTPATIENT
Start: 2024-01-09 | End: 2024-01-26 | Stop reason: HOSPADM

## 2024-01-09 RX ORDER — FUROSEMIDE 40 MG/1
40 TABLET ORAL
Qty: 60 TABLET | Refills: 2 | Status: SHIPPED | OUTPATIENT
Start: 2024-01-09 | End: 2024-01-26 | Stop reason: HOSPADM

## 2024-01-09 RX ADMIN — RIFAXIMIN 550 MG: 550 TABLET ORAL at 09:43

## 2024-01-09 RX ADMIN — LIDOCAINE 1 PATCH: 4 PATCH TOPICAL at 09:44

## 2024-01-09 RX ADMIN — FOLIC ACID 1 MG: 1 TABLET ORAL at 09:43

## 2024-01-09 RX ADMIN — Medication 100 MG: at 09:43

## 2024-01-09 RX ADMIN — MULTIVITAMIN TABLET 1 TABLET: TABLET at 09:43

## 2024-01-09 RX ADMIN — GABAPENTIN 300 MG: 300 CAPSULE ORAL at 06:20

## 2024-01-09 RX ADMIN — GABAPENTIN 300 MG: 300 CAPSULE ORAL at 14:29

## 2024-01-09 RX ADMIN — PANTOPRAZOLE SODIUM 40 MG: 40 TABLET, DELAYED RELEASE ORAL at 09:43

## 2024-01-09 RX ADMIN — FUROSEMIDE 40 MG: 40 TABLET ORAL at 09:43

## 2024-01-09 RX ADMIN — LEVOTHYROXINE SODIUM 50 MCG: 0.05 TABLET ORAL at 06:20

## 2024-01-09 RX ADMIN — FUROSEMIDE 40 MG: 40 TABLET ORAL at 16:55

## 2024-01-09 RX ADMIN — Medication 400 MG: at 09:43

## 2024-01-09 RX ADMIN — LACTULOSE 30 G: 20 SOLUTION ORAL at 09:40

## 2024-01-09 ASSESSMENT — COGNITIVE AND FUNCTIONAL STATUS - GENERAL
DAILY ACTIVITIY SCORE: 18
WALKING IN HOSPITAL ROOM: A LITTLE
PERSONAL GROOMING: A LITTLE
MOVING TO AND FROM BED TO CHAIR: A LITTLE
HELP NEEDED FOR BATHING: A LITTLE
STANDING UP FROM CHAIR USING ARMS: A LITTLE
MOBILITY SCORE: 20
EATING MEALS: A LITTLE
DRESSING REGULAR LOWER BODY CLOTHING: A LITTLE
DRESSING REGULAR UPPER BODY CLOTHING: A LITTLE
TOILETING: A LITTLE
CLIMB 3 TO 5 STEPS WITH RAILING: A LITTLE

## 2024-01-09 ASSESSMENT — PAIN - FUNCTIONAL ASSESSMENT
PAIN_FUNCTIONAL_ASSESSMENT: 0-10
PAIN_FUNCTIONAL_ASSESSMENT: 0-10

## 2024-01-09 ASSESSMENT — PAIN SCALES - GENERAL
PAINLEVEL_OUTOF10: 0 - NO PAIN
PAINLEVEL_OUTOF10: 0 - NO PAIN

## 2024-01-09 NOTE — PROGRESS NOTES
PT/OT skilled patient low intensity. Pt declining C at this time.     PATIENT HAS A SECURE DISCHARGE PLAN TO RETURN HOME NO SKILLED NEEDS.        01/09/24 1112   Current Planned Discharge Disposition   Current Planned Discharge Disposition Home

## 2024-01-09 NOTE — PROGRESS NOTES
"Briseida Owen is a 48 y.o. female on day 5 of admission presenting with abdominal pain, ascites. She has a history of alcohol abuse.     Subjective   She had a paracentesis on 1/4 and had 3 liters of ascitic fluid removed.   She had an EGD today 1/8 and had banding of varices done.   She reported pain in her upper back yesterday, no recent trauma. She has no tingling or paresthesias of the upper extremities. No fever or chills.   Her blackwell cathter was removed yesterday and she has been voiding.        Objective     Physical Exam  General: awake, alert, oriented, responsive  HEENT: scleral icterus present  Cardiovascular: regular, normal S1 and S2  Lungs: good air entry bilaterally, clear to auscultation  Abdomen: soft, nontender, bowel sounds present.   Extremities: no peripheral cyanosis, no pedal edema  Neuro: alert, oriented x 3, no focal weakness  Musculoskeletal: examination of the cervical and thoracic spine revealed no tenderness, swelling, erythema or drainage.       Last Recorded Vitals  Blood pressure 102/56, pulse 87, temperature 36.8 °C (98.2 °F), temperature source Oral, resp. rate 16, height 1.575 m (5' 2.01\"), weight 73.5 kg (162 lb 0.6 oz), SpO2 91 %.  Intake/Output last 3 Shifts:  I/O last 3 completed shifts:  In: 480 (6.5 mL/kg) [P.O.:480]  Out: 800 (10.9 mL/kg) [Urine:800 (0.3 mL/kg/hr)]  Weight: 73.5 kg     Relevant Results  Results for orders placed or performed during the hospital encounter of 01/03/24 (from the past 24 hour(s))   CBC and Auto Differential   Result Value Ref Range    WBC 8.5 4.4 - 11.3 x10*3/uL    nRBC 0.4 (H) 0.0 - 0.0 /100 WBCs    RBC 2.18 (L) 4.00 - 5.20 x10*6/uL    Hemoglobin 7.7 (L) 12.0 - 16.0 g/dL    Hematocrit 22.9 (L) 36.0 - 46.0 %     (H) 80 - 100 fL    MCH 35.3 (H) 26.0 - 34.0 pg    MCHC 33.6 32.0 - 36.0 g/dL    RDW 26.1 (H) 11.5 - 14.5 %    Platelets 92 (L) 150 - 450 x10*3/uL    Neutrophils % 62.7 40.0 - 80.0 %    Immature Granulocytes %, Automated 1.9 (H) " 0.0 - 0.9 %    Lymphocytes % 20.3 13.0 - 44.0 %    Monocytes % 10.6 2.0 - 10.0 %    Eosinophils % 4.0 0.0 - 6.0 %    Basophils % 0.5 0.0 - 2.0 %    Neutrophils Absolute 5.33 1.20 - 7.70 x10*3/uL    Immature Granulocytes Absolute, Automated 0.16 0.00 - 0.70 x10*3/uL    Lymphocytes Absolute 1.72 1.20 - 4.80 x10*3/uL    Monocytes Absolute 0.90 0.10 - 1.00 x10*3/uL    Eosinophils Absolute 0.34 0.00 - 0.70 x10*3/uL    Basophils Absolute 0.04 0.00 - 0.10 x10*3/uL   Comprehensive Metabolic Panel   Result Value Ref Range    Glucose 111 (H) 65 - 99 mg/dL    Sodium 133 133 - 145 mmol/L    Potassium 3.6 3.4 - 5.1 mmol/L    Chloride 98 97 - 107 mmol/L    Bicarbonate 24 24 - 31 mmol/L    Urea Nitrogen 9 8 - 25 mg/dL    Creatinine 0.60 0.40 - 1.60 mg/dL    eGFR >90 >60 mL/min/1.73m*2    Calcium 8.3 (L) 8.5 - 10.4 mg/dL    Albumin 2.9 (L) 3.5 - 5.0 g/dL    Alkaline Phosphatase 117 35 - 125 U/L    Total Protein 5.3 (L) 5.9 - 7.9 g/dL    AST 45 (H) 5 - 40 U/L    Bilirubin, Total 11.7 (H) 0.1 - 1.2 mg/dL    ALT 12 5 - 40 U/L    Anion Gap 11 <=19 mmol/L   Magnesium   Result Value Ref Range    Magnesium 1.90 1.60 - 3.10 mg/dL   Morphology   Result Value Ref Range    RBC Morphology See Below     Polychromasia Mild     Spherocytes Few     Kristina Cells Many     Acanthocytes Many         Scheduled medications  folic acid, 1 mg, oral, Daily  furosemide, 40 mg, oral, BID with meals  gabapentin, 300 mg, oral, q8h VICKY  lactulose, 30 g, oral, BID  levothyroxine, 50 mcg, oral, Daily before breakfast  lidocaine, 0.1 mL, subcutaneous, Once  lidocaine, 1 patch, transdermal, Daily  magnesium oxide, 400 mg, oral, Daily  multivitamin, 1 tablet, oral, Daily  pantoprazole, 40 mg, oral, Daily  rifAXIMin, 550 mg, oral, BID  thiamine, 100 mg, oral, Daily      Continuous medications     PRN medications  PRN medications: LORazepam **OR** LORazepam **OR** LORazepam, oxyCODONE, oxygen        Assessment/Plan   Alcoholic cirrhosis of the liver with  ascites  Paracentesis done by IR on 1/4,  She iwas receiving IV zosyn, discontinued.     Hyponatremia  Possibly secondary to alcohol and fluid overload  improving    Left pleural effusion  Seen by pulmonary: recommend paracentesis and diuresis for suspected hepatic hydrothorax, no thoracentesis    Anemia  Transfused 3 units of PRBCs. Monitor hemoglobin    Alcohol abuse  On thiamine, folate  Discussed alcohol cessation.     Hyperbilirubinemia  Improving, check direct bilirubin    Elevated ammonia  Awake, responsive. On lactulose, rifaximin    Urinary retention  Resolved  Monge catheter placed 1/4, removed 1/8, voiding without difficulty.     She wants to defer imaging of the thoracic and cervical spine and will pursue this if her pain does not resolve in the next few days. She is nontoxic and not ill appearing.   Discharge home. Follow up with PCP and GI. She declined home health care.       Sierra Unger MD

## 2024-01-09 NOTE — PROGRESS NOTES
Physical Therapy                 Therapy Communication Note    Patient Name: Briseida Owen  MRN: [id number]  Today's Date: 1/9/2024     Discipline: Physical Therapy    Missed Visit Reason: Missed Visit Reason: Patient refused    Missed Time: Attempt    Comment: Pt declined participation with therapy due to fatigue.

## 2024-01-09 NOTE — CARE PLAN
The patient's goals for the shift include      The clinical goals for the shift include Maintain patient safety      Problem: Fall/Injury  Goal: Not fall by end of shift  Outcome: Progressing  Goal: Be free from injury by end of the shift  Outcome: Progressing  Goal: Verbalize understanding of personal risk factors for fall in the hospital  Outcome: Progressing  Goal: Verbalize understanding of risk factor reduction measures to prevent injury from fall in the home  Outcome: Progressing  Goal: Use assistive devices by end of the shift  Outcome: Progressing  Goal: Pace activities to prevent fatigue by end of the shift  Outcome: Progressing     Problem: Pain  Goal: My pain/discomfort is manageable  Outcome: Progressing     Problem: Safety  Goal: Patient will be injury free during hospitalization  Outcome: Progressing  Goal: I will remain free of falls  Outcome: Progressing     Problem: Daily Care  Goal: Daily care needs are met  Outcome: Progressing     Problem: Psychosocial Needs  Goal: Demonstrates ability to cope with hospitalization/illness  Outcome: Progressing  Goal: Collaborate with me, my family, and caregiver to identify my specific goals  Outcome: Progressing     Problem: Discharge Barriers  Goal: My discharge needs are met  Outcome: Progressing     Problem: Skin  Goal: Decreased wound size/increased tissue granulation at next dressing change  Outcome: Progressing  Goal: Participates in plan/prevention/treatment measures  Outcome: Progressing  Goal: Prevent/manage excess moisture  Outcome: Progressing  Goal: Prevent/minimize sheer/friction injuries  Outcome: Progressing  Goal: Promote/optimize nutrition  Outcome: Progressing  Goal: Promote skin healing  Outcome: Progressing     Problem: Pain - Adult  Goal: Verbalizes/displays adequate comfort level or baseline comfort level  Outcome: Progressing     Problem: Safety - Adult  Goal: Free from fall injury  Outcome: Progressing     Problem: Discharge  Planning  Goal: Discharge to home or other facility with appropriate resources  Outcome: Progressing     Problem: Chronic Conditions and Co-morbidities  Goal: Patient's chronic conditions and co-morbidity symptoms are monitored and maintained or improved  Outcome: Progressing

## 2024-01-09 NOTE — NURSING NOTE
Spoke with MD this morning. Update on pt's mental sttus/LOC provided and MD informed pt's blackwell was discontinued overnight and she has voided without difficulty.

## 2024-01-09 NOTE — PROGRESS NOTES
"Briseida Owen is a 48 y.o. female on day 5 of admission presenting with Anemia, unspecified type.    Subjective     Patient states she is feeling better, no overt bleeding.     EGD done yesterday   Three medium and moderate grade II varices (no red charlie sign) in the middle third of the esophagus and lower third of the esophagus; no bleeding was identified; placed 7 bands successfully, resulting in complete eradication  Moderate, diffuse and mosaic portal hypertensive gastropathy in the cardia, fundus of the stomach and body of the stomach; no bleeding was identified    Objective     Physical Exam  HENT:      Head: Normocephalic.      Nose: Nose normal.      Mouth/Throat:      Mouth: Mucous membranes are moist.   Eyes:      Pupils: Pupils are equal, round, and reactive to light.   Cardiovascular:      Rate and Rhythm: Normal rate.   Pulmonary:      Effort: Pulmonary effort is normal.   Abdominal:      General: There is distension.      Palpations: Abdomen is soft.   Musculoskeletal:         General: Normal range of motion.      Cervical back: Normal range of motion.   Skin:     General: Skin is warm.   Neurological:      General: No focal deficit present.      Mental Status: She is alert.   Psychiatric:         Mood and Affect: Mood normal.         Last Recorded Vitals  Blood pressure 102/56, pulse 87, temperature 36.8 °C (98.2 °F), temperature source Oral, resp. rate 16, height 1.575 m (5' 2.01\"), weight 73.5 kg (162 lb 0.6 oz), SpO2 91 %.  Intake/Output last 3 Shifts:  I/O last 3 completed shifts:  In: 480 (6.5 mL/kg) [P.O.:480]  Out: 800 (10.9 mL/kg) [Urine:800 (0.3 mL/kg/hr)]  Weight: 73.5 kg     Relevant Results   EGD w Banding    Result Date: 1/8/2024  Table formatting from the original result was not included. Impression Three medium and moderate grade II varices in the middle third of the esophagus and lower third of the esophagus; placed 7 bands successfully, resulting in complete eradication Moderate and " mosaic portal hypertensive gastropathy in the cardia, fundus of the stomach and body of the stomach Food bolus in the cardia, fundus of the stomach and body of the stomach The duodenal bulb, 1st part of the duodenum and 2nd part of the duodenum appeared normal. Findings Three medium and moderate grade II varices (no red charlie sign) in the middle third of the esophagus and lower third of the esophagus; no bleeding was identified; placed 7 bands successfully, resulting in complete eradication Moderate, diffuse and mosaic portal hypertensive gastropathy in the cardia, fundus of the stomach and body of the stomach; no bleeding was identified Food bolus in the cardia, fundus of the stomach and body of the stomach The duodenal bulb, 1st part of the duodenum and 2nd part of the duodenum appeared normal. Recommendation  Repeat EGD in one month. Resume current medications . Soft diet today.  Indication Alcoholic cirrhosis of liver with ascites (CMS/HCC), Esophageal varices without bleeding, unspecified esophageal varices type (CMS/HCC) Staff Staff Role No Staff Documented Medications See Anesthesia Record. Preprocedure A history and physical has been performed, and patient medication allergies have been reviewed. The patient's tolerance of previous anesthesia has been reviewed. The risks and benefits of the procedure and the sedation options and risks were discussed with the patient. All questions were answered and informed consent obtained. Details of the Procedure The patient underwent monitored anesthesia care, which was administered by an anesthesia professional. The patient's level of consciousness, heart rate, ETCO2, ECG, respirations, oxygen and blood pressure were monitored throughout the procedure. The scope was introduced through the mouth and advanced to the second part of the duodenum. Retroflexion was performed in the cardia, fundus and incisura. Prior to the procedure, the patient's H. Pylori status was  negative. The patient's estimated blood loss was minimal (<5 mL). The procedure was not difficult. The patient tolerated the procedure well. There were no apparent adverse events. Events No data recorded Specimens Procedure Location [address] [place] [address] [phone] Referring Provider No referring provider defined for this encounter. Procedure Provider No name on file    US abdomen limited    Result Date: 1/6/2024  Interpreted By:  Bre Nicholson, STUDY: US ABDOMEN LIMITED; 11:32 am   INDICATION: Signs/Symptoms:ascites.   COMPARISON: None.   ACCESSION NUMBER(S): [id number]   ORDERING CLINICIAN: TOMI ALBERTS   TECHNIQUE: Limited abdominal ultrasound of the right upper quadrant was performed utilizing gray scale imaging.   FINDINGS: Trace amount of fluid is noted within the left upper and lower quadrants of the abdomen.       Trace amount of fluid noted within the left upper and lower quadrant the abdomen.   MACRO: None.   Signed by: Bre Nicholson 1/6/2024 11:33 AM Dictation workstation:   [id number]    XR chest 1 view    Result Date: 1/5/2024  Interpreted By:  Tomi Reynoso, STUDY: XR CHEST 1 VIEW; 1/5/2024 10:16 am   INDICATION: pleural effusion.   COMPARISON: January 3, 2024   ACCESSION NUMBER(S): [id number]   ORDERING CLINICIAN: MARICARMEN SANTIAGO   FINDINGS: RESULT:  The cardiac silhouette is within normal limits for size. Mediastinal contours are unremarkable. Left basilar opacity is again noted obscuring the left hemidiaphragm, not significantly changed compared to previous exam. The osseous structures are unremarkable.       Left basilar opacity obscuring the left hemidiaphragm is again noted which may represent pleural fluid and atelectasis. Underlying infiltrate is not excluded.     Signed by: Tomi Reynoso 1/5/2024 10:48 AM Dictation workstation:   [id number]    US guided abdominal paracentesis    Result Date:  1/4/2024  Interpreted By:  Huy Alvarez, STUDY: US GUIDED ABDOMINAL PARACENTESIS; 1/4/2024 9:25 am   INDICATION: Signs/Symptoms:ascites.   COMPARISON: None.   ACCESSION NUMBER(S): [id number]   ORDERING CLINICIAN: MATTHEW MAYORGA   TECHNIQUE: Ultrasound-guided paracentesis   FINDINGS: Informed consent obtained. Patient positioned supine. Left lower quadrant prepped, draped and anesthetized. Under ultrasound guidance, 8 Welsh centesis sheath needle inserted into peritoneal cavity. 3.1of dark yellowfluid aspiratedwith sample sent for analysis. Patient tolerated procedure well       Ultrasound-guided paracentesis.   Signed by: Huy Alvarez 1/4/2024 10:51 AM Dictation workstation:   [id number]    CT abdomen pelvis w IV contrast    Result Date: 1/4/2024  STUDY: CT Abdomen and Pelvis with IV Contrast; 1/4/2024 1:46 AM. INDICATION: Abdominal pain. COMPARISON: US abd 10/27/2023 .  CT AP 10/21/2023 ACCESSION NUMBER(S): [id number] ORDERING CLINICIAN: JAZMINE GILLESPIE TECHNIQUE: CT of the abdomen and pelvis was performed.  Contiguous axial images were obtained at 3 mm slice thickness through the abdomen and pelvis. Coronal and sagittal reconstructions at 3 mm slice thickness were performed.  Omnipaque 350 75 mL was administered intravenously.  FINDINGS: LOWER CHEST: Cardiac size is mildly enlarged.  No pericardial effusion.  Moderate to large left pleural effusion is noted with partial collapse of the visualized portion of the left lower lobe. There is suggestion of a right breast prosthesis partially visualized.   ABDOMEN:  LIVER: No hepatomegaly.  Liver demonstrates a markedly cirrhotic morphology with a nodular hepatic contour and hypertrophy of the left hepatic lobe.  Simple fluid density cyst is seen in the anterior segment of the right hepatic lobe measuring approximately 1 cm in diameter. Normal attenuation.  BILE DUCTS: No intrahepatic or extrahepatic biliary ductal dilatation.  GALLBLADDER: The gallbladder is  distended and contains gallstones in the gallbladder neck. STOMACH: No abnormalities identified.  PANCREAS: No masses or ductal dilatation.  SPLEEN: Spleen is borderline enlarged.  No focal splenic lesion.  ADRENAL GLANDS: No thickening or nodules.  KIDNEYS AND URETERS: Kidneys are normal in size and location.  No renal or ureteral calculi.  PELVIS:  BLADDER: No abnormalities identified.  REPRODUCTIVE ORGANS: No abnormalities identified.  BOWEL: Appendix is not definitively identified.  Terminal ileum is unremarkable.  No abnormalities identified.  VESSELS: No abnormalities identified.  Abdominal aorta is normal in caliber.  PERITONEUM/RETROPERITONEUM/LYMPH NODES: Large volume of abdominal and pelvic ascites is noted.  No pneumoperitoneum. No lymphadenopathy.  ABDOMINAL WALL: No abnormalities identified. SOFT TISSUES: There is a fluid-filled small indirect right inguinal hernia.  BONES: No acute fracture or aggressive osseous lesion.    Cirrhotic morphology of the liver with a large volume of abdominal and pelvic ascites. Cholelithiasis. Fluid-filled small right inguinal hernia. Moderate to large left pleural effusion with partial collapse of the visualized portion of the left lower lobe. Borderline splenomegaly. Signed by Bartloo Torres    XR chest 1 view    Result Date: 1/3/2024  STUDY: Chest Radiograph;  01/03/2024 at 9:13 PM INDICATION: Weakness. COMPARISON: XR chest 10/21/23, 04/25/21. ACCESSION NUMBER(S): [id number] ORDERING CLINICIAN: Nomi Anaya TECHNIQUE:  Frontal chest was obtained at 2113 hours. FINDINGS: There is dense retrocardiac consolidation in the left lower lobe, suggesting pneumonia. There is likely left pleural effusion. Heart is top normal size with central vascular prominence. There is no pneumothorax.    Retrocardiac consolidation left lower lobe suggests pneumonia, with likely left pleural effusion. Follow-up advised. Signed by Michael Lewis MD       Results for orders placed or  performed during the hospital encounter of 01/03/24 (from the past 24 hour(s))   CBC and Auto Differential   Result Value Ref Range    WBC 8.5 4.4 - 11.3 x10*3/uL    nRBC 0.4 (H) 0.0 - 0.0 /100 WBCs    RBC 2.18 (L) 4.00 - 5.20 x10*6/uL    Hemoglobin 7.7 (L) 12.0 - 16.0 g/dL    Hematocrit 22.9 (L) 36.0 - 46.0 %     (H) 80 - 100 fL    MCH 35.3 (H) 26.0 - 34.0 pg    MCHC 33.6 32.0 - 36.0 g/dL    RDW 26.1 (H) 11.5 - 14.5 %    Platelets 92 (L) 150 - 450 x10*3/uL    Neutrophils % 62.7 40.0 - 80.0 %    Immature Granulocytes %, Automated 1.9 (H) 0.0 - 0.9 %    Lymphocytes % 20.3 13.0 - 44.0 %    Monocytes % 10.6 2.0 - 10.0 %    Eosinophils % 4.0 0.0 - 6.0 %    Basophils % 0.5 0.0 - 2.0 %    Neutrophils Absolute 5.33 1.20 - 7.70 x10*3/uL    Immature Granulocytes Absolute, Automated 0.16 0.00 - 0.70 x10*3/uL    Lymphocytes Absolute 1.72 1.20 - 4.80 x10*3/uL    Monocytes Absolute 0.90 0.10 - 1.00 x10*3/uL    Eosinophils Absolute 0.34 0.00 - 0.70 x10*3/uL    Basophils Absolute 0.04 0.00 - 0.10 x10*3/uL   Comprehensive Metabolic Panel   Result Value Ref Range    Glucose 111 (H) 65 - 99 mg/dL    Sodium 133 133 - 145 mmol/L    Potassium 3.6 3.4 - 5.1 mmol/L    Chloride 98 97 - 107 mmol/L    Bicarbonate 24 24 - 31 mmol/L    Urea Nitrogen 9 8 - 25 mg/dL    Creatinine 0.60 0.40 - 1.60 mg/dL    eGFR >90 >60 mL/min/1.73m*2    Calcium 8.3 (L) 8.5 - 10.4 mg/dL    Albumin 2.9 (L) 3.5 - 5.0 g/dL    Alkaline Phosphatase 117 35 - 125 U/L    Total Protein 5.3 (L) 5.9 - 7.9 g/dL    AST 45 (H) 5 - 40 U/L    Bilirubin, Total 11.7 (H) 0.1 - 1.2 mg/dL    ALT 12 5 - 40 U/L    Anion Gap 11 <=19 mmol/L   Magnesium   Result Value Ref Range    Magnesium 1.90 1.60 - 3.10 mg/dL   Morphology   Result Value Ref Range    RBC Morphology See Below     Polychromasia Mild     Spherocytes Few     Kristina Cells Many     Acanthocytes Many                    Assessment/Plan   Principal Problem:    Anemia, unspecified type  Active Problems:    Alcoholic  cirrhosis (CMS/HCC)    Pleural effusion on left    Alcoholic Cirrhosis of Liver (Decomp in setting chronic alcohol abuse, still drinking, high MELD score of 32)   HE: Continue Lactulose, Xifaxin. Goal for 2-3 stools daily. No asterixis. Monitor given low Na          EV: S/P EGD on 1/8/24 7 bands placed. Will need repeat in 1 month   -Once clinically improved with electrolyte stablization, we can consider inpatient repeat EGD  -Tentative plan for Monday vs Tuesday         HCC: AFP up to date         Ascites: S/p paracentesis 3.1 L removed. Neg SBP. May need repeat para for early next week.              -Diuretics per renal              -Low Na diet today      2.    Anemia         Macrocytic anemia. Stable HH of 7.7 today. No overt bleeding          - monitor H/H for a goal >7         - continue PPI     3.    Alcohol Abuse,          -Still drinking. Educated on her high risk of death with decomp cirrhosis and ETOH abuse and hx esophageal varices      Mikaela Price, ULISSES-CNP

## 2024-01-09 NOTE — NURSING NOTE
Patient has order to discharge. Patient to discharge home with boyfriend. Per care coordinator patient has secure discharge plan to return home with no skilled needs. Boyfriend at bedside to transport patient home.  Per previous shift nurse patient educated on discharge paperwork, patient verbalizes understanding, and IV removed. Patient off unit.

## 2024-01-10 NOTE — DISCHARGE SUMMARY
Discharge Diagnosis  Anemia  Alcoholic cirrhosis with ascites  Hyponatremia  Hyperbilirubinemia  Alcohol use  Urinary retention, resolved    Issues Requiring Follow-Up  Outpatient follow-up with gastroenterology    Discharge Meds     Your medication list        START taking these medications        Instructions Last Dose Given Next Dose Due   lidocaine 4 % patch  Start taking on: January 10, 2024      Place 1 patch over 12 hours on the skin once daily. Remove & discard patch within 12 hours or as directed by MD. Do not start before January 10, 2024.              CHANGE how you take these medications        Instructions Last Dose Given Next Dose Due   furosemide 40 mg tablet  Commonly known as: Lasix  What changed: when to take this      Take 1 tablet (40 mg) by mouth 2 times a day with meals.       spironolactone 25 mg tablet  Commonly known as: Aldactone  What changed:   medication strength  how much to take      Take 1 tablet (25 mg) by mouth once daily.              CONTINUE taking these medications        Instructions Last Dose Given Next Dose Due   folic acid 1 mg tablet  Commonly known as: Folvite      Take 1 tablet (1 mg) by mouth once daily.       lactulose 20 gram/30 mL oral solution      Take 45 mL (30 g) by mouth 2 times a day.       levothyroxine 50 mcg tablet  Commonly known as: Synthroid, Levoxyl           magnesium oxide 400 mg tablet  Commonly known as: Mag-Ox      Take 1 tablet (400 mg) by mouth once daily.       MULTIPLE VITAMINS ORAL           ondansetron ODT 4 mg disintegrating tablet  Commonly known as: Zofran-ODT      Take 1 tablet (4 mg) by mouth every 8 hours if needed for nausea or vomiting.       pantoprazole 40 mg EC tablet  Commonly known as: ProtoNix      Take 1 tablet (40 mg) by mouth once daily.       rifAXIMin 550 mg tablet  Commonly known as: Xifaxan      Take 1 tablet (550 mg) by mouth 2 times a day.       thiamine 100 mg tablet  Commonly known as: Vitamin B-1      Take 1 tablet  (100 mg) by mouth once daily.              STOP taking these medications      cefuroxime 500 mg tablet  Commonly known as: Ceftin        potassium chloride CR 20 mEq ER tablet  Commonly known as: Klor-Con M20                  Where to Get Your Medications        These medications were sent to [id number] [id number] [facility]  [id number] [place] [id number]      Phone: [phone]   furosemide 40 mg tablet  lidocaine 4 % patch  spironolactone 25 mg tablet         Test Results Pending At Discharge  Pending Labs       No current pending labs.            Hospital Course   48-year-old female patient with a history of alcohol use and cirrhosis of the liver, presented to the hospital with worsening abdominal distention and pain.  She was started empirically on antibiotic coverage with Zosyn and taken for a paracentesis.  3 L of fluid was removed.  She was anemic, requiring transfusion of packed red cells and received a total of 3 units during her hospital stay.  She underwent an EGD with banding of varices on 1/8.  She was hemodynamically stable thereafter.  She was discharged in stable condition.   She has upper back pain, first reported 1 day prior to discharge.  Pain was treated symptomatically.  She opted to defer x-rays of the thoracic and cervical spine and pursue further testing if the pain did not resolve in the next few days.  She is asked to follow-up with her primary care physician in 1 week and also follow-up with a gastroenterologist.  Abstinence from alcohol was also discussed and advised.  The time spent arranging and completing discharge was 35 minutes    Pertinent Physical Exam At Time of Discharge  Physical Exam    Outpatient Follow-Up  No future appointments.      Sierra Unger MD

## 2024-01-23 ENCOUNTER — HOSPITAL ENCOUNTER (INPATIENT)
Facility: HOSPITAL | Age: 49
LOS: 3 days | Discharge: LEFT BEFORE DISCHARGE | DRG: 371 | End: 2024-01-26
Attending: STUDENT IN AN ORGANIZED HEALTH CARE EDUCATION/TRAINING PROGRAM | Admitting: INTERNAL MEDICINE
Payer: MEDICARE

## 2024-01-23 ENCOUNTER — APPOINTMENT (OUTPATIENT)
Dept: RADIOLOGY | Facility: HOSPITAL | Age: 49
DRG: 371 | End: 2024-01-23
Payer: MEDICARE

## 2024-01-23 DIAGNOSIS — R52 BODY ACHES: ICD-10-CM

## 2024-01-23 DIAGNOSIS — R17 JAUNDICE: ICD-10-CM

## 2024-01-23 DIAGNOSIS — R50.9 FEVER, UNSPECIFIED FEVER CAUSE: Primary | ICD-10-CM

## 2024-01-23 DIAGNOSIS — F10.10 ALCOHOL ABUSE: ICD-10-CM

## 2024-01-23 DIAGNOSIS — K70.31 ASCITES DUE TO ALCOHOLIC CIRRHOSIS (MULTI): ICD-10-CM

## 2024-01-23 DIAGNOSIS — K65.2 SBP (SPONTANEOUS BACTERIAL PERITONITIS) (MULTI): ICD-10-CM

## 2024-01-23 DIAGNOSIS — D72.89 LEFT SHIFT: ICD-10-CM

## 2024-01-23 PROBLEM — E44.0 MODERATE PROTEIN-CALORIE MALNUTRITION (MULTI): Status: ACTIVE | Noted: 2024-01-23

## 2024-01-23 PROBLEM — R79.1 ELEVATED INR: Status: ACTIVE | Noted: 2024-01-23

## 2024-01-23 LAB
ACANTHOCYTES BLD QL SMEAR: ABNORMAL
ALBUMIN SERPL-MCNC: 3.1 G/DL (ref 3.5–5)
ALP BLD-CCNC: 126 U/L (ref 35–125)
ALT SERPL-CCNC: 15 U/L (ref 5–40)
AMMONIA PLAS-SCNC: 86 UMOL/L (ref 12–45)
ANION GAP SERPL CALC-SCNC: 16 MMOL/L
APTT PPP: 55.4 SECONDS (ref 22–32.5)
AST SERPL-CCNC: 44 U/L (ref 5–40)
BASOPHILS # BLD MANUAL: 0.15 X10*3/UL (ref 0–0.1)
BASOPHILS NFR BLD MANUAL: 1 %
BILIRUB SERPL-MCNC: 15.4 MG/DL (ref 0.1–1.2)
BUN SERPL-MCNC: 12 MG/DL (ref 8–25)
BURR CELLS BLD QL SMEAR: ABNORMAL
CALCIUM SERPL-MCNC: 8.3 MG/DL (ref 8.5–10.4)
CHLORIDE SERPL-SCNC: 94 MMOL/L (ref 97–107)
CO2 SERPL-SCNC: 20 MMOL/L (ref 24–31)
CREAT SERPL-MCNC: 0.8 MG/DL (ref 0.4–1.6)
EGFRCR SERPLBLD CKD-EPI 2021: >90 ML/MIN/1.73M*2
EOSINOPHIL # BLD MANUAL: 0 X10*3/UL (ref 0–0.7)
EOSINOPHIL NFR BLD MANUAL: 0 %
ERYTHROCYTE [DISTWIDTH] IN BLOOD BY AUTOMATED COUNT: 24.1 % (ref 11.5–14.5)
ETHANOL SERPL-MCNC: <0.01 G/DL
FLUAV RNA RESP QL NAA+PROBE: NOT DETECTED
FLUBV RNA RESP QL NAA+PROBE: NOT DETECTED
GLUCOSE SERPL-MCNC: 145 MG/DL (ref 65–99)
HCT VFR BLD AUTO: 22.2 % (ref 36–46)
HGB BLD-MCNC: 7.2 G/DL (ref 12–16)
IMM GRANULOCYTES # BLD AUTO: 0.08 X10*3/UL (ref 0–0.7)
IMM GRANULOCYTES NFR BLD AUTO: 0.6 % (ref 0–0.9)
INR PPP: 2.2 (ref 0.9–1.2)
LACTATE BLDV-SCNC: 2.5 MMOL/L (ref 0.4–2)
LACTATE BLDV-SCNC: 3.6 MMOL/L (ref 0.4–2)
LYMPHOCYTES # BLD MANUAL: 2.03 X10*3/UL (ref 1.2–4.8)
LYMPHOCYTES NFR BLD MANUAL: 14 %
MCH RBC QN AUTO: 37.5 PG (ref 26–34)
MCHC RBC AUTO-ENTMCNC: 32.4 G/DL (ref 32–36)
MCV RBC AUTO: 116 FL (ref 80–100)
MONOCYTES # BLD MANUAL: 0.73 X10*3/UL (ref 0.1–1)
MONOCYTES NFR BLD MANUAL: 5 %
NEUTROPHILS # BLD MANUAL: 11.61 X10*3/UL (ref 1.2–7.7)
NEUTS BAND # BLD MANUAL: 1.02 X10*3/UL (ref 0–0.7)
NEUTS BAND NFR BLD MANUAL: 7 %
NEUTS SEG # BLD MANUAL: 10.59 X10*3/UL (ref 1.2–7)
NEUTS SEG NFR BLD MANUAL: 73 %
NRBC BLD-RTO: 0.1 /100 WBCS (ref 0–0)
PLATELET # BLD AUTO: 149 X10*3/UL (ref 150–450)
POLYCHROMASIA BLD QL SMEAR: ABNORMAL
POTASSIUM SERPL-SCNC: 3.2 MMOL/L (ref 3.4–5.1)
PROT SERPL-MCNC: 5.7 G/DL (ref 5.9–7.9)
PROTHROMBIN TIME: 22.2 SECONDS (ref 9.3–12.7)
RBC # BLD AUTO: 1.92 X10*6/UL (ref 4–5.2)
RBC MORPH BLD: ABNORMAL
RSV RNA RESP QL NAA+PROBE: NOT DETECTED
SARS-COV-2 RNA RESP QL NAA+PROBE: NOT DETECTED
SODIUM SERPL-SCNC: 130 MMOL/L (ref 133–145)
TOTAL CELLS COUNTED BLD: 100
WBC # BLD AUTO: 14.5 X10*3/UL (ref 4.4–11.3)

## 2024-01-23 PROCEDURE — 85730 THROMBOPLASTIN TIME PARTIAL: CPT | Performed by: STUDENT IN AN ORGANIZED HEALTH CARE EDUCATION/TRAINING PROGRAM

## 2024-01-23 PROCEDURE — 2500000001 HC RX 250 WO HCPCS SELF ADMINISTERED DRUGS (ALT 637 FOR MEDICARE OP): Performed by: INTERNAL MEDICINE

## 2024-01-23 PROCEDURE — 96375 TX/PRO/DX INJ NEW DRUG ADDON: CPT

## 2024-01-23 PROCEDURE — 36415 COLL VENOUS BLD VENIPUNCTURE: CPT | Performed by: STUDENT IN AN ORGANIZED HEALTH CARE EDUCATION/TRAINING PROGRAM

## 2024-01-23 PROCEDURE — 85027 COMPLETE CBC AUTOMATED: CPT | Performed by: STUDENT IN AN ORGANIZED HEALTH CARE EDUCATION/TRAINING PROGRAM

## 2024-01-23 PROCEDURE — 85007 BL SMEAR W/DIFF WBC COUNT: CPT | Performed by: STUDENT IN AN ORGANIZED HEALTH CARE EDUCATION/TRAINING PROGRAM

## 2024-01-23 PROCEDURE — 96374 THER/PROPH/DIAG INJ IV PUSH: CPT

## 2024-01-23 PROCEDURE — 76705 ECHO EXAM OF ABDOMEN: CPT

## 2024-01-23 PROCEDURE — 76705 ECHO EXAM OF ABDOMEN: CPT | Mod: FOREIGN READ | Performed by: RADIOLOGY

## 2024-01-23 PROCEDURE — 87040 BLOOD CULTURE FOR BACTERIA: CPT | Mod: WESLAB | Performed by: STUDENT IN AN ORGANIZED HEALTH CARE EDUCATION/TRAINING PROGRAM

## 2024-01-23 PROCEDURE — 85060 BLOOD SMEAR INTERPRETATION: CPT | Performed by: STUDENT IN AN ORGANIZED HEALTH CARE EDUCATION/TRAINING PROGRAM

## 2024-01-23 PROCEDURE — 2500000004 HC RX 250 GENERAL PHARMACY W/ HCPCS (ALT 636 FOR OP/ED): Performed by: STUDENT IN AN ORGANIZED HEALTH CARE EDUCATION/TRAINING PROGRAM

## 2024-01-23 PROCEDURE — 87637 SARSCOV2&INF A&B&RSV AMP PRB: CPT | Performed by: STUDENT IN AN ORGANIZED HEALTH CARE EDUCATION/TRAINING PROGRAM

## 2024-01-23 PROCEDURE — 85610 PROTHROMBIN TIME: CPT | Performed by: STUDENT IN AN ORGANIZED HEALTH CARE EDUCATION/TRAINING PROGRAM

## 2024-01-23 PROCEDURE — 83605 ASSAY OF LACTIC ACID: CPT | Performed by: PHYSICIAN ASSISTANT

## 2024-01-23 PROCEDURE — 80053 COMPREHEN METABOLIC PANEL: CPT | Performed by: STUDENT IN AN ORGANIZED HEALTH CARE EDUCATION/TRAINING PROGRAM

## 2024-01-23 PROCEDURE — 99285 EMERGENCY DEPT VISIT HI MDM: CPT | Mod: 25 | Performed by: STUDENT IN AN ORGANIZED HEALTH CARE EDUCATION/TRAINING PROGRAM

## 2024-01-23 PROCEDURE — 71045 X-RAY EXAM CHEST 1 VIEW: CPT

## 2024-01-23 PROCEDURE — 2060000001 HC INTERMEDIATE ICU ROOM DAILY

## 2024-01-23 PROCEDURE — 82140 ASSAY OF AMMONIA: CPT | Performed by: STUDENT IN AN ORGANIZED HEALTH CARE EDUCATION/TRAINING PROGRAM

## 2024-01-23 PROCEDURE — 82077 ASSAY SPEC XCP UR&BREATH IA: CPT | Performed by: INTERNAL MEDICINE

## 2024-01-23 PROCEDURE — 2500000004 HC RX 250 GENERAL PHARMACY W/ HCPCS (ALT 636 FOR OP/ED): Performed by: INTERNAL MEDICINE

## 2024-01-23 PROCEDURE — 2500000001 HC RX 250 WO HCPCS SELF ADMINISTERED DRUGS (ALT 637 FOR MEDICARE OP): Performed by: STUDENT IN AN ORGANIZED HEALTH CARE EDUCATION/TRAINING PROGRAM

## 2024-01-23 PROCEDURE — 84702 CHORIONIC GONADOTROPIN TEST: CPT | Performed by: INTERNAL MEDICINE

## 2024-01-23 PROCEDURE — 0W9G3ZZ DRAINAGE OF PERITONEAL CAVITY, PERCUTANEOUS APPROACH: ICD-10-PCS | Performed by: RADIOLOGY

## 2024-01-23 RX ORDER — GUAIFENESIN/DEXTROMETHORPHAN 100-10MG/5
5 SYRUP ORAL EVERY 4 HOURS PRN
Status: DISCONTINUED | OUTPATIENT
Start: 2024-01-23 | End: 2024-01-26 | Stop reason: HOSPADM

## 2024-01-23 RX ORDER — VANCOMYCIN 1.5 G/300ML
1500 INJECTION, SOLUTION INTRAVENOUS ONCE
Status: COMPLETED | OUTPATIENT
Start: 2024-01-23 | End: 2024-01-24

## 2024-01-23 RX ORDER — DEXTROSE 50 % IN WATER (D50W) INTRAVENOUS SYRINGE
25
Status: DISCONTINUED | OUTPATIENT
Start: 2024-01-23 | End: 2024-01-26 | Stop reason: HOSPADM

## 2024-01-23 RX ORDER — LANOLIN ALCOHOL/MO/W.PET/CERES
100 CREAM (GRAM) TOPICAL DAILY
Status: DISCONTINUED | OUTPATIENT
Start: 2024-01-24 | End: 2024-01-26 | Stop reason: HOSPADM

## 2024-01-23 RX ORDER — ONDANSETRON HYDROCHLORIDE 2 MG/ML
4 INJECTION, SOLUTION INTRAVENOUS EVERY 8 HOURS PRN
Status: DISCONTINUED | OUTPATIENT
Start: 2024-01-23 | End: 2024-01-26 | Stop reason: HOSPADM

## 2024-01-23 RX ORDER — ONDANSETRON HYDROCHLORIDE 2 MG/ML
4 INJECTION, SOLUTION INTRAVENOUS ONCE
Status: COMPLETED | OUTPATIENT
Start: 2024-01-23 | End: 2024-01-23

## 2024-01-23 RX ORDER — BISMUTH SUBSALICYLATE 262 MG
1 TABLET,CHEWABLE ORAL DAILY
Status: DISCONTINUED | OUTPATIENT
Start: 2024-01-24 | End: 2024-01-26 | Stop reason: HOSPADM

## 2024-01-23 RX ORDER — POLYETHYLENE GLYCOL 3350 17 G/17G
17 POWDER, FOR SOLUTION ORAL DAILY PRN
Status: DISCONTINUED | OUTPATIENT
Start: 2024-01-23 | End: 2024-01-26 | Stop reason: HOSPADM

## 2024-01-23 RX ORDER — ONDANSETRON 4 MG/1
4 TABLET, FILM COATED ORAL EVERY 8 HOURS PRN
Status: DISCONTINUED | OUTPATIENT
Start: 2024-01-23 | End: 2024-01-24

## 2024-01-23 RX ORDER — MORPHINE SULFATE 2 MG/ML
2 INJECTION, SOLUTION INTRAMUSCULAR; INTRAVENOUS ONCE
Status: COMPLETED | OUTPATIENT
Start: 2024-01-23 | End: 2024-01-23

## 2024-01-23 RX ORDER — GUAIFENESIN 600 MG/1
600 TABLET, EXTENDED RELEASE ORAL EVERY 12 HOURS PRN
Status: DISCONTINUED | OUTPATIENT
Start: 2024-01-23 | End: 2024-01-26 | Stop reason: HOSPADM

## 2024-01-23 RX ORDER — IBUPROFEN 400 MG/1
400 TABLET ORAL ONCE
Status: COMPLETED | OUTPATIENT
Start: 2024-01-23 | End: 2024-01-23

## 2024-01-23 RX ORDER — LANOLIN ALCOHOL/MO/W.PET/CERES
400 CREAM (GRAM) TOPICAL DAILY
Status: DISCONTINUED | OUTPATIENT
Start: 2024-01-24 | End: 2024-01-26 | Stop reason: HOSPADM

## 2024-01-23 RX ORDER — LEVOTHYROXINE SODIUM 50 UG/1
50 TABLET ORAL DAILY
Status: DISCONTINUED | OUTPATIENT
Start: 2024-01-24 | End: 2024-01-26 | Stop reason: HOSPADM

## 2024-01-23 RX ORDER — FOLIC ACID 1 MG/1
1 TABLET ORAL DAILY
Status: DISCONTINUED | OUTPATIENT
Start: 2024-01-24 | End: 2024-01-26 | Stop reason: HOSPADM

## 2024-01-23 RX ORDER — DEXTROSE MONOHYDRATE 100 MG/ML
0.3 INJECTION, SOLUTION INTRAVENOUS ONCE AS NEEDED
Status: DISCONTINUED | OUTPATIENT
Start: 2024-01-23 | End: 2024-01-26 | Stop reason: HOSPADM

## 2024-01-23 RX ORDER — LACTULOSE 10 G/15ML
30 SOLUTION ORAL 2 TIMES DAILY
Status: DISCONTINUED | OUTPATIENT
Start: 2024-01-23 | End: 2024-01-26 | Stop reason: HOSPADM

## 2024-01-23 RX ORDER — PANTOPRAZOLE SODIUM 40 MG/1
40 TABLET, DELAYED RELEASE ORAL DAILY
Status: DISCONTINUED | OUTPATIENT
Start: 2024-01-24 | End: 2024-01-26 | Stop reason: HOSPADM

## 2024-01-23 RX ORDER — SPIRONOLACTONE 25 MG/1
25 TABLET ORAL DAILY
Status: DISCONTINUED | OUTPATIENT
Start: 2024-01-24 | End: 2024-01-26 | Stop reason: HOSPADM

## 2024-01-23 RX ORDER — FUROSEMIDE 10 MG/ML
20 INJECTION INTRAMUSCULAR; INTRAVENOUS EVERY 12 HOURS
Status: DISPENSED | OUTPATIENT
Start: 2024-01-23 | End: 2024-01-24

## 2024-01-23 RX ADMIN — SODIUM CHLORIDE, SODIUM LACTATE, POTASSIUM CHLORIDE, AND CALCIUM CHLORIDE 500 ML: 600; 310; 30; 20 INJECTION, SOLUTION INTRAVENOUS at 21:05

## 2024-01-23 RX ADMIN — MORPHINE SULFATE 2 MG: 2 INJECTION, SOLUTION INTRAMUSCULAR; INTRAVENOUS at 22:11

## 2024-01-23 RX ADMIN — LACTULOSE 30 G: 20 SOLUTION ORAL at 22:48

## 2024-01-23 RX ADMIN — FUROSEMIDE 20 MG: 10 INJECTION, SOLUTION INTRAMUSCULAR; INTRAVENOUS at 22:41

## 2024-01-23 RX ADMIN — IBUPROFEN 400 MG: 400 TABLET, FILM COATED ORAL at 20:27

## 2024-01-23 RX ADMIN — RIFAXIMIN 550 MG: 550 TABLET ORAL at 22:47

## 2024-01-23 RX ADMIN — PIPERACILLIN SODIUM AND TAZOBACTAM SODIUM 3.38 G: 3; .375 INJECTION, SOLUTION INTRAVENOUS at 21:41

## 2024-01-23 RX ADMIN — VANCOMYCIN 1.5 G: 1.5 INJECTION, SOLUTION INTRAVENOUS at 22:43

## 2024-01-23 RX ADMIN — ONDANSETRON HYDROCHLORIDE 4 MG: 2 INJECTION INTRAMUSCULAR; INTRAVENOUS at 20:10

## 2024-01-23 ASSESSMENT — PAIN - FUNCTIONAL ASSESSMENT
PAIN_FUNCTIONAL_ASSESSMENT: 0-10

## 2024-01-23 ASSESSMENT — LIFESTYLE VARIABLES
HAVE PEOPLE ANNOYED YOU BY CRITICIZING YOUR DRINKING: NO
EVER HAD A DRINK FIRST THING IN THE MORNING TO STEADY YOUR NERVES TO GET RID OF A HANGOVER: NO
HAVE YOU EVER FELT YOU SHOULD CUT DOWN ON YOUR DRINKING: NO
EVER FELT BAD OR GUILTY ABOUT YOUR DRINKING: NO
REASON UNABLE TO ASSESS: NO

## 2024-01-23 ASSESSMENT — PAIN SCALES - GENERAL
PAINLEVEL_OUTOF10: 9
PAINLEVEL_OUTOF10: 9
PAINLEVEL_OUTOF10: 3
PAINLEVEL_OUTOF10: 8

## 2024-01-23 ASSESSMENT — COLUMBIA-SUICIDE SEVERITY RATING SCALE - C-SSRS
2. HAVE YOU ACTUALLY HAD ANY THOUGHTS OF KILLING YOURSELF?: NO
1. IN THE PAST MONTH, HAVE YOU WISHED YOU WERE DEAD OR WISHED YOU COULD GO TO SLEEP AND NOT WAKE UP?: NO
6. HAVE YOU EVER DONE ANYTHING, STARTED TO DO ANYTHING, OR PREPARED TO DO ANYTHING TO END YOUR LIFE?: NO

## 2024-01-23 NOTE — DOCUMENTATION CLARIFICATION NOTE
PATIENT:               ALEXANDRA OWEN  ACCT #:                  [id number]  MRN:                       [id number]  :                       1975  ADMIT DATE:       1/3/2024 9:09 PM  DISCH DATE:        2024 6:50 PM  RESPONDING PROVIDER #:        [id number]          PROVIDER RESPONSE TEXT:    I concur with the pathology report findings and they are clinically significant    CDI QUERY TEXT:    [facility]_Path Results Simple        Instruction:    Based on your assessment of the patient and the clinical information, please provide the requested documentation by clicking on the appropriate radio button and enter any additional information if prompted.    Question: Please document whether you concur or do not concur with the pathology report findings    When answering this query, please exercise your independent professional judgment. The fact that a question is being asked, does not imply that any particular answer is desired or expected.    The patient's clinical indicators include:  Clinical Information:  Alexandra Owen is a 48 y.o. female presenting with abdominal pain.    Woman is a well-established alcoholic who continues to drink.  She has cirrhosis.  She has been hospitalized in the past for ascites and hepatic encephalopathy.  She comes in with worsening abdominal distention and pain.  Cannot provide much else in terms of history.  She denies melena hematemesis hematochezia.      Clinical Indicators and Pathology Findings:    24 A. ASCITIC FLUID , CYTOLOGY AND CELL BLOCK:  No malignant cells identified  Mesothelial cells in a background of many lymphocytes      Treatment: STUDY: US GUIDED ABDOMINAL PARACENTESIS; 2024  CT Abdomen and Pelvis with IV Contrast; 2024    Risk Factors:  Alcoholic liver cirrhosis with ascites.  Options provided:  -- I concur with the pathology report findings and they are clinically significant  -- I do not concur with the pathology report findings  -- Other - I will add my  own diagnosis  -- Refer to Clinical Documentation Reviewer    Query created by: Nancy Hendricks on 1/18/2024 2:46 PM      Electronically signed by:  LUBA FLORES MD 1/23/2024 2:26 PM

## 2024-01-24 ENCOUNTER — APPOINTMENT (OUTPATIENT)
Dept: RADIOLOGY | Facility: HOSPITAL | Age: 49
DRG: 371 | End: 2024-01-24
Payer: MEDICARE

## 2024-01-24 LAB
ABO GROUP (TYPE) IN BLOOD: NORMAL
ACANTHOCYTES BLD QL SMEAR: ABNORMAL
ACANTHOCYTES BLD QL SMEAR: ABNORMAL
ALBUMIN SERPL-MCNC: 3.3 G/DL (ref 3.5–5)
ALP BLD-CCNC: 98 U/L (ref 35–125)
ALT SERPL-CCNC: 11 U/L (ref 5–40)
ANION GAP SERPL CALC-SCNC: 12 MMOL/L
ANTIBODY SCREEN: NORMAL
AST SERPL-CCNC: 36 U/L (ref 5–40)
BASOPHILS # BLD MANUAL: 0 X10*3/UL (ref 0–0.1)
BASOPHILS # BLD MANUAL: 0 X10*3/UL (ref 0–0.1)
BASOPHILS NFR BLD MANUAL: 0 %
BASOPHILS NFR BLD MANUAL: 0 %
BILIRUB DIRECT SERPL-MCNC: 6.7 MG/DL (ref 0–0.2)
BILIRUB SERPL-MCNC: 14.4 MG/DL (ref 0.1–1.2)
BLASTS # BLD MANUAL: 0.15 X10*3/UL
BLASTS NFR BLD MANUAL: 1 %
BLOOD EXPIRATION DATE: NORMAL
BUN SERPL-MCNC: 16 MG/DL (ref 8–25)
BURR CELLS BLD QL SMEAR: ABNORMAL
BURR CELLS BLD QL SMEAR: ABNORMAL
CALCIUM SERPL-MCNC: 8.1 MG/DL (ref 8.5–10.4)
CHLORIDE SERPL-SCNC: 99 MMOL/L (ref 97–107)
CO2 SERPL-SCNC: 23 MMOL/L (ref 24–31)
CREAT SERPL-MCNC: 1.3 MG/DL (ref 0.4–1.6)
DISPENSE STATUS: NORMAL
EGFRCR SERPLBLD CKD-EPI 2021: 51 ML/MIN/1.73M*2
EOSINOPHIL # BLD MANUAL: 0.23 X10*3/UL (ref 0–0.7)
EOSINOPHIL # BLD MANUAL: 0.3 X10*3/UL (ref 0–0.7)
EOSINOPHIL NFR BLD MANUAL: 2 %
EOSINOPHIL NFR BLD MANUAL: 2 %
ERYTHROCYTE [DISTWIDTH] IN BLOOD BY AUTOMATED COUNT: 23.2 % (ref 11.5–14.5)
ERYTHROCYTE [DISTWIDTH] IN BLOOD BY AUTOMATED COUNT: 26.3 % (ref 11.5–14.5)
GLUCOSE BLD MANUAL STRIP-MCNC: 100 MG/DL (ref 74–99)
GLUCOSE BLD MANUAL STRIP-MCNC: 124 MG/DL (ref 74–99)
GLUCOSE BLD MANUAL STRIP-MCNC: 131 MG/DL (ref 74–99)
GLUCOSE SERPL-MCNC: 119 MG/DL (ref 65–99)
HCG SERPL-ACNC: <1 MIU/ML
HCT VFR BLD AUTO: 19.5 % (ref 36–46)
HCT VFR BLD AUTO: 20.9 % (ref 36–46)
HGB BLD-MCNC: 6.4 G/DL (ref 12–16)
HGB BLD-MCNC: 7.3 G/DL (ref 12–16)
IMM GRANULOCYTES # BLD AUTO: 0.07 X10*3/UL (ref 0–0.7)
IMM GRANULOCYTES # BLD AUTO: 0.94 X10*3/UL (ref 0–0.7)
IMM GRANULOCYTES NFR BLD AUTO: 0.6 % (ref 0–0.9)
IMM GRANULOCYTES NFR BLD AUTO: 6.2 % (ref 0–0.9)
INR PPP: 2.4 (ref 0.9–1.2)
LYMPHOCYTES # BLD MANUAL: 1.82 X10*3/UL (ref 1.2–4.8)
LYMPHOCYTES # BLD MANUAL: 2.22 X10*3/UL (ref 1.2–4.8)
LYMPHOCYTES NFR BLD MANUAL: 12 %
LYMPHOCYTES NFR BLD MANUAL: 19 %
MAGNESIUM SERPL-MCNC: 1.8 MG/DL (ref 1.6–3.1)
MCH RBC QN AUTO: 36.5 PG (ref 26–34)
MCH RBC QN AUTO: 38.3 PG (ref 26–34)
MCHC RBC AUTO-ENTMCNC: 32.8 G/DL (ref 32–36)
MCHC RBC AUTO-ENTMCNC: 34.9 G/DL (ref 32–36)
MCV RBC AUTO: 105 FL (ref 80–100)
MCV RBC AUTO: 117 FL (ref 80–100)
METAMYELOCYTES # BLD MANUAL: 0.23 X10*3/UL
METAMYELOCYTES # BLD MANUAL: 0.46 X10*3/UL
METAMYELOCYTES NFR BLD MANUAL: 2 %
METAMYELOCYTES NFR BLD MANUAL: 3 %
MONOCYTES # BLD MANUAL: 0.47 X10*3/UL (ref 0.1–1)
MONOCYTES # BLD MANUAL: 1.37 X10*3/UL (ref 0.1–1)
MONOCYTES NFR BLD MANUAL: 4 %
MONOCYTES NFR BLD MANUAL: 9 %
MYELOCYTES # BLD MANUAL: 0.15 X10*3/UL
MYELOCYTES NFR BLD MANUAL: 1 %
NEUTROPHILS # BLD MANUAL: 10.79 X10*3/UL (ref 1.2–7.7)
NEUTROPHILS # BLD MANUAL: 8.54 X10*3/UL (ref 1.2–7.7)
NEUTS BAND # BLD MANUAL: 0.12 X10*3/UL (ref 0–0.7)
NEUTS BAND # BLD MANUAL: 1.52 X10*3/UL (ref 0–0.7)
NEUTS BAND NFR BLD MANUAL: 1 %
NEUTS BAND NFR BLD MANUAL: 10 %
NEUTS SEG # BLD MANUAL: 8.42 X10*3/UL (ref 1.2–7)
NEUTS SEG # BLD MANUAL: 9.27 X10*3/UL (ref 1.2–7)
NEUTS SEG NFR BLD MANUAL: 61 %
NEUTS SEG NFR BLD MANUAL: 72 %
NEUTS VAC BLD QL SMEAR: PRESENT
NRBC BLD MANUAL-RTO: 1.3 % (ref 0–0)
NRBC BLD-RTO: 0.2 /100 WBCS (ref 0–0)
NRBC BLD-RTO: 1.3 /100 WBCS (ref 0–0)
PATH REVIEW-CBC DIFFERENTIAL: NORMAL
PLATELET # BLD AUTO: 125 X10*3/UL (ref 150–450)
PLATELET # BLD AUTO: 128 X10*3/UL (ref 150–450)
POLYCHROMASIA BLD QL SMEAR: ABNORMAL
POLYCHROMASIA BLD QL SMEAR: ABNORMAL
POTASSIUM SERPL-SCNC: 2.9 MMOL/L (ref 3.4–5.1)
PRODUCT BLOOD TYPE: 5100
PRODUCT CODE: NORMAL
PROMYELOCYTES # BLD MANUAL: 0.15 X10*3/UL
PROMYELOCYTES NFR BLD MANUAL: 1 %
PROT SERPL-MCNC: 5.4 G/DL (ref 5.9–7.9)
PROTHROMBIN TIME: 24 SECONDS (ref 9.3–12.7)
RBC # BLD AUTO: 1.67 X10*6/UL (ref 4–5.2)
RBC # BLD AUTO: 2 X10*6/UL (ref 4–5.2)
RBC MORPH BLD: ABNORMAL
RBC MORPH BLD: ABNORMAL
RH FACTOR (ANTIGEN D): NORMAL
SCHISTOCYTES BLD QL SMEAR: ABNORMAL
SODIUM SERPL-SCNC: 134 MMOL/L (ref 133–145)
TARGETS BLD QL SMEAR: ABNORMAL
TOTAL CELLS COUNTED BLD: 100
TOTAL CELLS COUNTED BLD: 100
UNIT ABO: NORMAL
UNIT NUMBER: NORMAL
UNIT RH: NORMAL
UNIT VOLUME: 400
WBC # BLD AUTO: 11.7 X10*3/UL (ref 4.4–11.3)
WBC # BLD AUTO: 15.2 X10*3/UL (ref 4.4–11.3)
XM INTEP: NORMAL

## 2024-01-24 PROCEDURE — 2500000001 HC RX 250 WO HCPCS SELF ADMINISTERED DRUGS (ALT 637 FOR MEDICARE OP): Performed by: INTERNAL MEDICINE

## 2024-01-24 PROCEDURE — 86901 BLOOD TYPING SEROLOGIC RH(D): CPT | Performed by: INTERNAL MEDICINE

## 2024-01-24 PROCEDURE — 85027 COMPLETE CBC AUTOMATED: CPT | Performed by: INTERNAL MEDICINE

## 2024-01-24 PROCEDURE — 86920 COMPATIBILITY TEST SPIN: CPT

## 2024-01-24 PROCEDURE — P9047 ALBUMIN (HUMAN), 25%, 50ML: HCPCS | Mod: JZ | Performed by: INTERNAL MEDICINE

## 2024-01-24 PROCEDURE — 82947 ASSAY GLUCOSE BLOOD QUANT: CPT

## 2024-01-24 PROCEDURE — 2720000007 HC OR 272 NO HCPCS

## 2024-01-24 PROCEDURE — 2500000004 HC RX 250 GENERAL PHARMACY W/ HCPCS (ALT 636 FOR OP/ED): Performed by: EMERGENCY MEDICINE

## 2024-01-24 PROCEDURE — 2060000001 HC INTERMEDIATE ICU ROOM DAILY

## 2024-01-24 PROCEDURE — 2500000004 HC RX 250 GENERAL PHARMACY W/ HCPCS (ALT 636 FOR OP/ED): Performed by: INTERNAL MEDICINE

## 2024-01-24 PROCEDURE — 80053 COMPREHEN METABOLIC PANEL: CPT | Performed by: INTERNAL MEDICINE

## 2024-01-24 PROCEDURE — 36430 TRANSFUSION BLD/BLD COMPNT: CPT

## 2024-01-24 PROCEDURE — 82248 BILIRUBIN DIRECT: CPT | Performed by: INTERNAL MEDICINE

## 2024-01-24 PROCEDURE — 83735 ASSAY OF MAGNESIUM: CPT | Performed by: INTERNAL MEDICINE

## 2024-01-24 PROCEDURE — 2500000005 HC RX 250 GENERAL PHARMACY W/O HCPCS: Performed by: RADIOLOGY

## 2024-01-24 PROCEDURE — 49083 ABD PARACENTESIS W/IMAGING: CPT

## 2024-01-24 PROCEDURE — 36415 COLL VENOUS BLD VENIPUNCTURE: CPT | Performed by: INTERNAL MEDICINE

## 2024-01-24 PROCEDURE — P9016 RBC LEUKOCYTES REDUCED: HCPCS

## 2024-01-24 PROCEDURE — 85610 PROTHROMBIN TIME: CPT | Performed by: INTERNAL MEDICINE

## 2024-01-24 PROCEDURE — 85007 BL SMEAR W/DIFF WBC COUNT: CPT | Performed by: INTERNAL MEDICINE

## 2024-01-24 RX ORDER — POTASSIUM CHLORIDE 1.5 G/1.58G
40 POWDER, FOR SOLUTION ORAL 3 TIMES DAILY
Status: DISCONTINUED | OUTPATIENT
Start: 2024-01-24 | End: 2024-01-24

## 2024-01-24 RX ORDER — PHYTONADIONE 5 MG/1
5 TABLET ORAL ONCE
Status: COMPLETED | OUTPATIENT
Start: 2024-01-24 | End: 2024-01-24

## 2024-01-24 RX ORDER — MIDODRINE HYDROCHLORIDE 5 MG/1
5 TABLET ORAL
Status: DISCONTINUED | OUTPATIENT
Start: 2024-01-24 | End: 2024-01-24

## 2024-01-24 RX ORDER — ONDANSETRON 4 MG/1
4 TABLET, ORALLY DISINTEGRATING ORAL EVERY 8 HOURS PRN
Status: DISCONTINUED | OUTPATIENT
Start: 2024-01-24 | End: 2024-01-26 | Stop reason: HOSPADM

## 2024-01-24 RX ORDER — MIDODRINE HYDROCHLORIDE 5 MG/1
5 TABLET ORAL
Status: DISCONTINUED | OUTPATIENT
Start: 2024-01-25 | End: 2024-01-25

## 2024-01-24 RX ORDER — OXYCODONE HYDROCHLORIDE 5 MG/1
5 TABLET ORAL EVERY 6 HOURS PRN
Status: DISCONTINUED | OUTPATIENT
Start: 2024-01-24 | End: 2024-01-26 | Stop reason: HOSPADM

## 2024-01-24 RX ORDER — LIDOCAINE HYDROCHLORIDE 20 MG/ML
INJECTION, SOLUTION EPIDURAL; INFILTRATION; INTRACAUDAL; PERINEURAL
Status: COMPLETED | OUTPATIENT
Start: 2024-01-24 | End: 2024-01-24

## 2024-01-24 RX ORDER — MIDODRINE HYDROCHLORIDE 5 MG/1
5 TABLET ORAL ONCE
Status: COMPLETED | OUTPATIENT
Start: 2024-01-24 | End: 2024-01-24

## 2024-01-24 RX ORDER — POTASSIUM CHLORIDE 20 MEQ/1
40 TABLET, EXTENDED RELEASE ORAL 3 TIMES DAILY
Status: DISCONTINUED | OUTPATIENT
Start: 2024-01-24 | End: 2024-03-04 | Stop reason: HOSPADM

## 2024-01-24 RX ORDER — MIDODRINE HYDROCHLORIDE 5 MG/1
5 TABLET ORAL EVERY 8 HOURS
Status: DISCONTINUED | OUTPATIENT
Start: 2024-01-24 | End: 2024-01-24

## 2024-01-24 RX ORDER — POTASSIUM CHLORIDE 20 MEQ/1
40 TABLET, EXTENDED RELEASE ORAL
Status: DISCONTINUED | OUTPATIENT
Start: 2024-01-24 | End: 2024-01-25

## 2024-01-24 RX ORDER — ACETAMINOPHEN 325 MG/1
650 TABLET ORAL EVERY 6 HOURS PRN
Status: DISCONTINUED | OUTPATIENT
Start: 2024-01-24 | End: 2024-01-26 | Stop reason: HOSPADM

## 2024-01-24 RX ORDER — ALBUMIN HUMAN 250 G/1000ML
1 SOLUTION INTRAVENOUS ONCE
Status: COMPLETED | OUTPATIENT
Start: 2024-01-24 | End: 2024-01-24

## 2024-01-24 RX ADMIN — LIDOCAINE HYDROCHLORIDE 7 ML: 20 INJECTION, SOLUTION EPIDURAL; INFILTRATION; INTRACAUDAL; PERINEURAL at 10:57

## 2024-01-24 RX ADMIN — RIFAXIMIN 550 MG: 550 TABLET ORAL at 22:14

## 2024-01-24 RX ADMIN — MULTIVITAMIN TABLET 1 TABLET: TABLET at 09:20

## 2024-01-24 RX ADMIN — POTASSIUM CHLORIDE 40 MEQ: 1.5 FOR SOLUTION ORAL at 14:36

## 2024-01-24 RX ADMIN — POTASSIUM CHLORIDE 40 MEQ: 1500 TABLET, EXTENDED RELEASE ORAL at 22:14

## 2024-01-24 RX ADMIN — MIDODRINE HYDROCHLORIDE 5 MG: 5 TABLET ORAL at 17:00

## 2024-01-24 RX ADMIN — ONDANSETRON 4 MG: 2 INJECTION INTRAMUSCULAR; INTRAVENOUS at 22:18

## 2024-01-24 RX ADMIN — POTASSIUM CHLORIDE 40 MEQ: 1.5 FOR SOLUTION ORAL at 09:19

## 2024-01-24 RX ADMIN — MIDODRINE HYDROCHLORIDE 5 MG: 5 TABLET ORAL at 03:02

## 2024-01-24 RX ADMIN — MIDODRINE HYDROCHLORIDE 5 MG: 5 TABLET ORAL at 09:21

## 2024-01-24 RX ADMIN — LACTULOSE 30 G: 20 SOLUTION ORAL at 09:19

## 2024-01-24 RX ADMIN — ALBUMIN (HUMAN) 75 G: 12.5 SOLUTION INTRAVENOUS at 06:27

## 2024-01-24 RX ADMIN — PANTOPRAZOLE SODIUM 40 MG: 40 TABLET, DELAYED RELEASE ORAL at 09:20

## 2024-01-24 RX ADMIN — FOLIC ACID 1 MG: 1 TABLET ORAL at 09:20

## 2024-01-24 RX ADMIN — RIFAXIMIN 550 MG: 550 TABLET ORAL at 09:20

## 2024-01-24 RX ADMIN — ACETAMINOPHEN 650 MG: 325 TABLET ORAL at 14:36

## 2024-01-24 RX ADMIN — SODIUM CHLORIDE 250 ML: 900 INJECTION, SOLUTION INTRAVENOUS at 02:04

## 2024-01-24 RX ADMIN — Medication 100 MG: at 09:20

## 2024-01-24 RX ADMIN — MIDODRINE HYDROCHLORIDE 5 MG: 5 TABLET ORAL at 01:18

## 2024-01-24 RX ADMIN — PHYTONADIONE 5 MG: 5 TABLET ORAL at 09:20

## 2024-01-24 RX ADMIN — LEVOTHYROXINE SODIUM 50 MCG: 0.05 TABLET ORAL at 05:34

## 2024-01-24 RX ADMIN — Medication 400 MG: at 09:20

## 2024-01-24 RX ADMIN — SODIUM CHLORIDE 250 ML: 900 INJECTION, SOLUTION INTRAVENOUS at 02:58

## 2024-01-24 RX ADMIN — PIPERACILLIN SODIUM AND TAZOBACTAM SODIUM 3.38 G: 3; .375 INJECTION, SOLUTION INTRAVENOUS at 17:00

## 2024-01-24 RX ADMIN — PIPERACILLIN SODIUM AND TAZOBACTAM SODIUM 3.38 G: 3; .375 INJECTION, SOLUTION INTRAVENOUS at 05:35

## 2024-01-24 RX ADMIN — PIPERACILLIN SODIUM AND TAZOBACTAM SODIUM 3.38 G: 3; .375 INJECTION, SOLUTION INTRAVENOUS at 11:54

## 2024-01-24 SDOH — ECONOMIC STABILITY: INCOME INSECURITY: IN THE LAST 12 MONTHS, WAS THERE A TIME WHEN YOU WERE NOT ABLE TO PAY THE MORTGAGE OR RENT ON TIME?: NO

## 2024-01-24 SDOH — HEALTH STABILITY: MENTAL HEALTH: HOW MANY STANDARD DRINKS CONTAINING ALCOHOL DO YOU HAVE ON A TYPICAL DAY?: 3 OR 4

## 2024-01-24 SDOH — SOCIAL STABILITY: SOCIAL INSECURITY: WERE YOU ABLE TO COMPLETE ALL THE BEHAVIORAL HEALTH SCREENINGS?: YES

## 2024-01-24 SDOH — SOCIAL STABILITY: SOCIAL INSECURITY: ABUSE: ADULT

## 2024-01-24 SDOH — ECONOMIC STABILITY: HOUSING INSECURITY: IN THE LAST 12 MONTHS, HOW MANY PLACES HAVE YOU LIVED?: 1

## 2024-01-24 SDOH — SOCIAL STABILITY: SOCIAL INSECURITY: WITHIN THE LAST YEAR, HAVE YOU BEEN AFRAID OF YOUR PARTNER OR EX-PARTNER?: NO

## 2024-01-24 SDOH — HEALTH STABILITY: MENTAL HEALTH: HOW OFTEN DO YOU HAVE A DRINK CONTAINING ALCOHOL?: 4 OR MORE TIMES A WEEK

## 2024-01-24 SDOH — SOCIAL STABILITY: SOCIAL INSECURITY: DO YOU FEEL ANYONE HAS EXPLOITED OR TAKEN ADVANTAGE OF YOU FINANCIALLY OR OF YOUR PERSONAL PROPERTY?: NO

## 2024-01-24 SDOH — HEALTH STABILITY: PHYSICAL HEALTH: ON AVERAGE, HOW MANY MINUTES DO YOU ENGAGE IN EXERCISE AT THIS LEVEL?: 0 MIN

## 2024-01-24 SDOH — SOCIAL STABILITY: SOCIAL NETWORK: ARE YOU MARRIED, WIDOWED, DIVORCED, SEPARATED, NEVER MARRIED, OR LIVING WITH A PARTNER?: LIVING WITH PARTNER

## 2024-01-24 SDOH — SOCIAL STABILITY: SOCIAL NETWORK: HOW OFTEN DO YOU ATTEND CHURCH OR RELIGIOUS SERVICES?: NEVER

## 2024-01-24 SDOH — SOCIAL STABILITY: SOCIAL INSECURITY: HAS ANYONE EVER THREATENED TO HURT YOUR FAMILY OR YOUR PETS?: NO

## 2024-01-24 SDOH — SOCIAL STABILITY: SOCIAL INSECURITY
WITHIN THE LAST YEAR, HAVE TO BEEN RAPED OR FORCED TO HAVE ANY KIND OF SEXUAL ACTIVITY BY YOUR PARTNER OR EX-PARTNER?: NO

## 2024-01-24 SDOH — SOCIAL STABILITY: SOCIAL INSECURITY: WITHIN THE LAST YEAR, HAVE YOU BEEN HUMILIATED OR EMOTIONALLY ABUSED IN OTHER WAYS BY YOUR PARTNER OR EX-PARTNER?: NO

## 2024-01-24 SDOH — ECONOMIC STABILITY: FOOD INSECURITY: WITHIN THE PAST 12 MONTHS, THE FOOD YOU BOUGHT JUST DIDN'T LAST AND YOU DIDN'T HAVE MONEY TO GET MORE.: NEVER TRUE

## 2024-01-24 SDOH — SOCIAL STABILITY: SOCIAL NETWORK: HOW OFTEN DO YOU GET TOGETHER WITH FRIENDS OR RELATIVES?: NEVER

## 2024-01-24 SDOH — SOCIAL STABILITY: SOCIAL NETWORK
DO YOU BELONG TO ANY CLUBS OR ORGANIZATIONS SUCH AS CHURCH GROUPS UNIONS, FRATERNAL OR ATHLETIC GROUPS, OR SCHOOL GROUPS?: NO

## 2024-01-24 SDOH — SOCIAL STABILITY: SOCIAL INSECURITY: ARE THERE ANY APPARENT SIGNS OF INJURIES/BEHAVIORS THAT COULD BE RELATED TO ABUSE/NEGLECT?: NO

## 2024-01-24 SDOH — SOCIAL STABILITY: SOCIAL INSECURITY
WITHIN THE LAST YEAR, HAVE YOU BEEN KICKED, HIT, SLAPPED, OR OTHERWISE PHYSICALLY HURT BY YOUR PARTNER OR EX-PARTNER?: NO

## 2024-01-24 SDOH — HEALTH STABILITY: MENTAL HEALTH
STRESS IS WHEN SOMEONE FEELS TENSE, NERVOUS, ANXIOUS, OR CAN'T SLEEP AT NIGHT BECAUSE THEIR MIND IS TROUBLED. HOW STRESSED ARE YOU?: NOT AT ALL

## 2024-01-24 SDOH — HEALTH STABILITY: MENTAL HEALTH: HOW OFTEN DO YOU HAVE 6 OR MORE DRINKS ON ONE OCCASION?: NEVER

## 2024-01-24 SDOH — ECONOMIC STABILITY: INCOME INSECURITY: IN THE PAST 12 MONTHS, HAS THE ELECTRIC, GAS, OIL, OR WATER COMPANY THREATENED TO SHUT OFF SERVICE IN YOUR HOME?: NO

## 2024-01-24 SDOH — SOCIAL STABILITY: SOCIAL INSECURITY: ARE YOU OR HAVE YOU BEEN THREATENED OR ABUSED PHYSICALLY, EMOTIONALLY, OR SEXUALLY BY ANYONE?: NO

## 2024-01-24 SDOH — ECONOMIC STABILITY: INCOME INSECURITY: HOW HARD IS IT FOR YOU TO PAY FOR THE VERY BASICS LIKE FOOD, HOUSING, MEDICAL CARE, AND HEATING?: NOT HARD AT ALL

## 2024-01-24 SDOH — SOCIAL STABILITY: SOCIAL NETWORK
IN A TYPICAL WEEK, HOW MANY TIMES DO YOU TALK ON THE PHONE WITH FAMILY, FRIENDS, OR NEIGHBORS?: MORE THAN THREE TIMES A WEEK

## 2024-01-24 SDOH — ECONOMIC STABILITY: FOOD INSECURITY: WITHIN THE PAST 12 MONTHS, YOU WORRIED THAT YOUR FOOD WOULD RUN OUT BEFORE YOU GOT MONEY TO BUY MORE.: NEVER TRUE

## 2024-01-24 SDOH — SOCIAL STABILITY: SOCIAL INSECURITY: DOES ANYONE TRY TO KEEP YOU FROM HAVING/CONTACTING OTHER FRIENDS OR DOING THINGS OUTSIDE YOUR HOME?: NO

## 2024-01-24 SDOH — SOCIAL STABILITY: SOCIAL NETWORK: HOW OFTEN DO YOU ATTENT MEETINGS OF THE CLUB OR ORGANIZATION YOU BELONG TO?: NEVER

## 2024-01-24 SDOH — SOCIAL STABILITY: SOCIAL INSECURITY: DO YOU FEEL UNSAFE GOING BACK TO THE PLACE WHERE YOU ARE LIVING?: NO

## 2024-01-24 SDOH — HEALTH STABILITY: PHYSICAL HEALTH: ON AVERAGE, HOW MANY DAYS PER WEEK DO YOU ENGAGE IN MODERATE TO STRENUOUS EXERCISE (LIKE A BRISK WALK)?: 0 DAYS

## 2024-01-24 SDOH — SOCIAL STABILITY: SOCIAL INSECURITY: HAVE YOU HAD THOUGHTS OF HARMING ANYONE ELSE?: NO

## 2024-01-24 ASSESSMENT — COGNITIVE AND FUNCTIONAL STATUS - GENERAL
TOILETING: TOTAL
DAILY ACTIVITIY SCORE: 13
STANDING UP FROM CHAIR USING ARMS: TOTAL
HELP NEEDED FOR BATHING: TOTAL
MOBILITY SCORE: 12
DRESSING REGULAR UPPER BODY CLOTHING: TOTAL
MOVING FROM LYING ON BACK TO SITTING ON SIDE OF FLAT BED WITH BEDRAILS: A LOT
MOVING TO AND FROM BED TO CHAIR: TOTAL
PATIENT BASELINE BEDBOUND: YES
WALKING IN HOSPITAL ROOM: TOTAL
TURNING FROM BACK TO SIDE WHILE IN FLAT BAD: A LOT
DRESSING REGULAR LOWER BODY CLOTHING: A LOT
HELP NEEDED FOR BATHING: TOTAL
PERSONAL GROOMING: A LOT
MOVING FROM LYING ON BACK TO SITTING ON SIDE OF FLAT BED WITH BEDRAILS: A LITTLE
STANDING UP FROM CHAIR USING ARMS: A LOT
PERSONAL GROOMING: A LOT
MOVING TO AND FROM BED TO CHAIR: A LOT
TURNING FROM BACK TO SIDE WHILE IN FLAT BAD: A LITTLE
DRESSING REGULAR LOWER BODY CLOTHING: TOTAL
WALKING IN HOSPITAL ROOM: TOTAL
DRESSING REGULAR UPPER BODY CLOTHING: A LITTLE
TOILETING: TOTAL
CLIMB 3 TO 5 STEPS WITH RAILING: TOTAL
PATIENT BASELINE BEDBOUND: YES
CLIMB 3 TO 5 STEPS WITH RAILING: TOTAL

## 2024-01-24 ASSESSMENT — ACTIVITIES OF DAILY LIVING (ADL)
BATHING: DEPENDENT
DRESSING YOURSELF: DEPENDENT
GROOMING: DEPENDENT
DRESSING YOURSELF: DEPENDENT
JUDGMENT_ADEQUATE_SAFELY_COMPLETE_DAILY_ACTIVITIES: YES
FEEDING YOURSELF: INDEPENDENT
WALKS IN HOME: DEPENDENT
FEEDING YOURSELF: INDEPENDENT
ADEQUATE_TO_COMPLETE_ADL: YES
JUDGMENT_ADEQUATE_SAFELY_COMPLETE_DAILY_ACTIVITIES: YES
BATHING: DEPENDENT
LACK_OF_TRANSPORTATION: NO
WALKS IN HOME: DEPENDENT
PATIENT'S MEMORY ADEQUATE TO SAFELY COMPLETE DAILY ACTIVITIES?: YES
GROOMING: DEPENDENT
TOILETING: DEPENDENT
ADEQUATE_TO_COMPLETE_ADL: YES
LACK_OF_TRANSPORTATION: NO
HEARING - RIGHT EAR: FUNCTIONAL
TOILETING: DEPENDENT
PATIENT'S MEMORY ADEQUATE TO SAFELY COMPLETE DAILY ACTIVITIES?: YES
HEARING - LEFT EAR: FUNCTIONAL
HEARING - RIGHT EAR: FUNCTIONAL
HEARING - LEFT EAR: FUNCTIONAL

## 2024-01-24 ASSESSMENT — ENCOUNTER SYMPTOMS
WEIGHT LOSS: 1
BLOATING: 1
NAUSEA: 1
VOMITING: 1
FEVER: 1
JAUNDICE: 1

## 2024-01-24 ASSESSMENT — PATIENT HEALTH QUESTIONNAIRE - PHQ9
SUM OF ALL RESPONSES TO PHQ9 QUESTIONS 1 & 2: 0
1. LITTLE INTEREST OR PLEASURE IN DOING THINGS: NOT AT ALL
2. FEELING DOWN, DEPRESSED OR HOPELESS: NOT AT ALL

## 2024-01-24 ASSESSMENT — LIFESTYLE VARIABLES
HOW MANY STANDARD DRINKS CONTAINING ALCOHOL DO YOU HAVE ON A TYPICAL DAY: PATIENT DECLINED
SKIP TO QUESTIONS 9-10: 0
PRESCIPTION_ABUSE_PAST_12_MONTHS: NO
AUDIT-C TOTAL SCORE: -1
SKIP TO QUESTIONS 9-10: 0
HOW OFTEN DO YOU HAVE A DRINK CONTAINING ALCOHOL: PATIENT DECLINED
AUDIT-C TOTAL SCORE: 5
SUBSTANCE_ABUSE_PAST_12_MONTHS: NO
HOW OFTEN DO YOU HAVE 6 OR MORE DRINKS ON ONE OCCASION: PATIENT DECLINED
AUDIT-C TOTAL SCORE: -1

## 2024-01-24 ASSESSMENT — PAIN SCALES - GENERAL
PAINLEVEL_OUTOF10: 0 - NO PAIN

## 2024-01-24 ASSESSMENT — PAIN - FUNCTIONAL ASSESSMENT: PAIN_FUNCTIONAL_ASSESSMENT: 0-10

## 2024-01-24 NOTE — PROGRESS NOTES
01/24/24 1847   [facility] Disability Status   Are you deaf or do you have serious difficulty hearing? N   Are you blind or do you have serious difficulty seeing, even when wearing glasses? N   Because of a physical, mental, or emotional condition, do you have serious difficulty concentrating, remembering, or making decisions? (5 years old or older) Y   Do you have serious difficulty walking or climbing stairs? Y   Do you have serious difficulty dressing or bathing? Y   Because of a physical, mental, or emotional condition, do you have serious difficulty doing errands alone such as visiting the doctor? Y

## 2024-01-24 NOTE — ED NOTES
This nurse took pt upstairs, pt stated tht her brief did not need to be changed. Pt is in gown. When I got pt upstairs, pt stated she needs to be changed. This rn told floor nurse.      Phyllis Mcqueen, GONZÁLEZ  01/24/24 8886

## 2024-01-24 NOTE — ED NOTES
"This RN went to talk to patient after patient requested to speak with charge RN, pt states that she doesn't like the physician in the ED, states that he was \"questioning her too much\" and \"had an attitude\", pt asked if she could provide specific details to which she said she could not, pt states that she wants it noted in her chart that she does not like Dr. Meehan and doesn't want him caring for her, pt informed that she is being admitted and is no longer under his care, pt also told by this RN that nursing also looks over all orders and will advocate for what's best for her. Pt states that she would like to transport to [facility], pt states that she doesn't know which facility, pt states that she is primarily a patient, pt told that we will make a note of this but that they may not be accepting transfer patients based off of preference at this time. Pt states understanding at this time. Zoran made aware.     Paris Villafana RN  01/23/24 8697    "

## 2024-01-24 NOTE — NURSING NOTE
Doctor Yepez in to see patient at bedside. Doctor Yepez stated that is is okay to order patient tylenol for pain.

## 2024-01-24 NOTE — CARE PLAN
Problem: Pain  Goal: My pain/discomfort is manageable  Outcome: Progressing     Problem: Safety  Goal: Patient will be injury free during hospitalization  Outcome: Progressing  Goal: I will remain free of falls  Outcome: Progressing     Problem: Daily Care  Goal: Daily care needs are met  Outcome: Progressing     Problem: Psychosocial Needs  Goal: Demonstrates ability to cope with hospitalization/illness  Outcome: Progressing  Goal: Collaborate with me, my family, and caregiver to identify my specific goals  Outcome: Progressing     Problem: Discharge Barriers  Goal: My discharge needs are met  Outcome: Progressing   The patient's goals for the shift include      The clinical goals for the shift include

## 2024-01-24 NOTE — PROGRESS NOTES
01/24/24 1844   Physical Activity   On average, how many days per week do you engage in moderate to strenuous exercise (like a brisk walk)? 0 days   On average, how many minutes do you engage in exercise at this level? 0 min   Financial Resource Strain   How hard is it for you to pay for the very basics like food, housing, medical care, and heating? Not hard   Housing Stability   In the last 12 months, was there a time when you were not able to pay the mortgage or rent on time? N   In the last 12 months, how many places have you lived? 1   In the last 12 months, was there a time when you did not have a steady place to sleep or slept in a shelter (including now)? N   Transportation Needs   In the past 12 months, has lack of transportation kept you from medical appointments or from getting medications? no   In the past 12 months, has lack of transportation kept you from meetings, work, or from getting things needed for daily living? No   Food Insecurity   Within the past 12 months, you worried that your food would run out before you got the money to buy more. Never true   Within the past 12 months, the food you bought just didn't last and you didn't have money to get more. Never true   Stress   Do you feel stress - tense, restless, nervous, or anxious, or unable to sleep at night because your mind is troubled all the time - these days? Not at all   Social Connections   In a typical week, how many times do you talk on the phone with family, friends, or neighbors? More than 3   How often do you get together with friends or relatives? Never   How often do you attend Judaism or Baptism services? Never   Do you belong to any clubs or organizations such as Judaism groups, unions, fraternal or athletic groups, or school groups? No   How often do you attend meetings of the clubs or organizations you belong to? Never   Are you , , , , never , or living with a partner? Living with    Intimate Partner Violence   Within the last year, have you been afraid of your partner or ex-partner? No   Within the last year, have you been humiliated or emotionally abused in other ways by your partner or ex-partner? No   Within the last year, have you been kicked, hit, slapped, or otherwise physically hurt by your partner or ex-partner? No   Within the last year, have you been raped or forced to have any kind of sexual activity by your partner or ex-partner? No   Alcohol Use   Q1: How often do you have a drink containing alcohol? 4 or more ti   Q2: How many drinks containing alcohol do you have on a typical day when you are drinking? 3 or 4   Q3: How often do you have six or more drinks on one occasion? Never   Utilities   In the past 12 months has the electric, gas, oil, or water company threatened to shut off services in your home? No

## 2024-01-24 NOTE — NURSING NOTE
Sepsis rounding completed on patient. Day 1 admission under hospitalist group for fever, unspecified cause.     WBC 15.2  Lac 1.5  38.8C TMAX  BC pending, negative     Received IVABX vanco. On zosyn. Pending paracentesis. GI following.     Escalation not performed at this time.

## 2024-01-24 NOTE — CONSULTS
"Nutrition Assessement Note Pt seen per MD consult. Pt states she has \"meal plans\" at home, thinks they are called \"Hello Fresh\", states she \"may \" eat one/day. Has had multiple paracentesis, was getting ready to be transported for one while being interviewed. Unsure of wt loss or gain d/t fluid shifts, is mildly jaundiced    Nutrition Assessment    Reason for Assessment: Provider consult order    Reason for Hospital Admission:  Briseida Owen is a 48 y.o. female who is admitted for fever, unspecified fever cause    Nutrition History:        Food Allergies/Intolerances:  None  GI Symptoms: None  Oral Problems: None    Anthropometrics:  Ht: 157.5 cm (5' 2\"), Wt: 80.5 kg (177 lb 7.5 oz), BMI: 32.45  IBW/kg (Dietitian Calculated): 50 kg  Percent of IBW: 161 %  Adjusted Body Weight (kg): 58 kg    Weight Change: fluid gains/losses              Nutrition Focused Physical Exam Findings:   Subcutaneous Fat Loss  Orbital Fat Pads: Well nourshed (slightly bulging fat pads)  Buccal Fat Pads: Well nourished (full, rounded cheeks)  Ribs: Defer    Muscle Wasting  Temporalis: Well nourished (well-defined muscle)  Pectoralis (Clavicular Region): Well nourished (clavicle not visible)  Deltoid/Trapezius: Well nourished (rounded appearance at arm, shoulder, neck)  Interosseous: Mild-Moderate (slightly depressed area between thumb and forefinger)  Quadriceps: Defer  Gastrocnemius: Defer    Edema  Edema Location: abdominal    Physical Findings (Nutrition Deficiency/Toxicity)  Skin: Positive    Nutrition Significant Labs:  Lab Results   Component Value Date    WBC 11.7 (H) 01/24/2024    HGB 6.4 (LL) 01/24/2024    HCT 19.5 (L) 01/24/2024     (L) 01/24/2024    CHOL 226 (H) 04/04/2019    TRIG 90 04/04/2019    HDL 93 04/04/2019    ALT 11 01/24/2024    AST 36 01/24/2024     01/24/2024    K 2.9 (LL) 01/24/2024    CL 99 01/24/2024    CREATININE 1.30 01/24/2024    BUN 16 01/24/2024    CO2 23 (L) 01/24/2024    TSH 7.37 (H) " 05/29/2023    INR 2.4 (H) 01/24/2024    HGBA1C 5.1 04/04/2019       Current Facility-Administered Medications:     benzocaine-menthol (Cepastat Sore Throat) 15-3.6 mg lozenge 1 lozenge, 1 lozenge, Mouth/Throat, q2h PRN, Corona Jolly MD    dextromethorphan-guaifenesin (Robitussin DM)  mg/5 mL oral liquid 5 mL, 5 mL, oral, q4h PRN, Corona Jolly MD    dextrose 10 % in water (D10W) infusion, 0.3 g/kg/hr, intravenous, Once PRN, Corona Jolly MD    dextrose 50 % injection 25 g, 25 g, intravenous, q15 min PRN, Corona Jolly MD    folic acid (Folvite) tablet 1 mg, 1 mg, oral, Daily, Corona Jolly MD, 1 mg at 01/24/24 0920    furosemide (Lasix) injection 20 mg, 20 mg, intravenous, q12h, Corona Jolly MD, 20 mg at 01/23/24 2241    glucagon (Glucagen) injection 1 mg, 1 mg, intramuscular, q15 min PRN, Corona Jolly MD    guaiFENesin (Mucinex) 12 hr tablet 600 mg, 600 mg, oral, q12h PRN, Corona Jolly MD    lactulose 20 gram/30 mL oral solution 30 g, 30 g, oral, BID, Corona Jolly MD, 30 g at 01/24/24 0919    levothyroxine (Synthroid, Levoxyl) tablet 50 mcg, 50 mcg, oral, Daily, Corona Jolly MD, 50 mcg at 01/24/24 0534    magnesium oxide (Mag-Ox) tablet 400 mg, 400 mg, oral, Daily, Corona Jolly MD, 400 mg at 01/24/24 0920    midodrine (Proamatine) tablet 5 mg, 5 mg, oral, q8h, Alma Alexander MD, 5 mg at 01/24/24 0921    multivitamin 1 tablet, 1 tablet, oral, Daily, Corona Jolly MD, 1 tablet at 01/24/24 0920    [DISCONTINUED] ondansetron (Zofran) tablet 4 mg, 4 mg, oral, q8h PRN **OR** ondansetron (Zofran) injection 4 mg, 4 mg, intravenous, q8h PRN, Corona Jolly MD    ondansetron ODT (Zofran-ODT) disintegrating tablet 4 mg, 4 mg, oral, q8h PRN, Giovanni Dial, PharmD    pantoprazole (ProtoNix) EC tablet 40 mg, 40 mg, oral, Daily, Corona Jolly MD, 40 mg at 01/24/24 0920    piperacillin-tazobactam-dextrose (Zosyn) IV 3.375 g, 3.375 g, intravenous, q6h, Corona Jolly MD, Stopped at  01/24/24 0605    polyethylene glycol (Glycolax, Miralax) packet 17 g, 17 g, oral, Daily PRN, Corona Jolly MD    potassium chloride (Klor-Con) packet 40 mEq, 40 mEq, oral, TID, Alma Alexander MD, 40 mEq at 01/24/24 0919    rifAXIMin (Xifaxan) tablet 550 mg, 550 mg, oral, BID, Corona Jolly MD, 550 mg at 01/24/24 0920    spironolactone (Aldactone) tablet 25 mg, 25 mg, oral, Daily, Corona Jolly MD    thiamine (Vitamin B-1) tablet 100 mg, 100 mg, oral, Daily, Corona Jolly MD, 100 mg at 01/24/24 0920    Dietary Orders (From admission, onward)       Start     Ordered    01/24/24 0500  NPO Diet; Effective 0500 (breakfast)  Diet effective 0500         01/23/24 2347                     Estimated Needs:   Estimated Energy Needs  Total Energy Estimated Needs (kCal): 1740 kCal  Total Estimated Energy Need per Day (kCal/kg): 30 kCal/kg  Method for Estimating Needs: adj wt    Estimated Protein Needs  Total Protein Estimated Needs (g): 70 g  Total Protein Estimated Needs (g/kg): 1.2 g/kg  Method for Estimating Needs: adj wt    Estimated Fluid Needs  Total Fluid Estimated Needs (mL): 1740 mL  Total Fluid Estimated Needs (mL/kg): 1 mL/kg      Nutrition Diagnosis   Nutrition Diagnosis:  Malnutrition Diagnosis  Patient has Malnutrition Diagnosis: No    Nutrition Diagnosis  Patient has Nutrition Diagnosis: Yes  Diagnosis Status (1): New  Nutrition Diagnosis 1: Increased nutrient needs  Related to (1): Increased demand for nutrients  As Evidenced by (1): liver disease     Nutrition Interventions/Recommendations   Nutrition Interventions and Recommendations:    Nutrition Prescription:  Individualized Nutrition Prescription Provided for : 1740 calories, 70gm protein low Na diet    Nutrition Interventions:   Food and/or Nutrient Delivery Interventions  Interventions: Medical food supplement  Medical Food Supplement: Commercial beverage  Goal: Ensure Compact  w/ meals, each provides 220 calories, 9gm protein    Education  Documentation  No documentation found.       Nutrition Monitoring and Evaluation   Monitoring/Evaluation:   Food/Nutrient Related History Monitoring  Monitoring and Evaluation Plan: Energy intake  Energy Intake: Estimated energy intake  Criteria: Goal po intake >/=75% estimated energy needs                 Time Spent/Follow-up:   Follow Up  Time Spent (min): 35 minutes  Last Date of Nutrition Visit: 01/24/24  Nutrition Follow-Up Needed?: 3-8 days  Follow up Comment: 1/29/24

## 2024-01-24 NOTE — ED PROVIDER NOTES
HPI   Chief Complaint   Patient presents with    Generalized Body Aches    Fever    Ascites       48-year-old female presented emergency department with a chief complaint of generalized body aches and pains.  She states they are worse in her hands and legs.  She chronically has cramping when she is on gabapentin for.  History of alcoholic cirrhosis as well.  Denies any abdominal pain or discomfort.  She denies vomiting.  She denies diarrhea or dysuria.  She has had paracentesis in the past.  He is febrile on arrival.  Denies any sick contacts.  She is nontoxic-appearing.                          Dhruv Coma Scale Score: 15                  Patient History   Past Medical History:   Diagnosis Date    Alcoholic hepatitis without ascites 08/13/2021    Alcoholic hepatitis    Hepatic encephalopathy (CMS/HCC) 06/09/2021    Hepatic encephalopathy     Past Surgical History:   Procedure Laterality Date    OTHER SURGICAL HISTORY  06/09/2021    Breast augmentation    US GUIDED ABDOMINAL PARACENTESIS  4/28/2021    US GUIDED ABDOMINAL PARACENTESIS [facility]    US GUIDED ABDOMINAL PARACENTESIS  4/23/2021    US GUIDED ABDOMINAL PARACENTESIS [facility]    US GUIDED ABDOMINAL PARACENTESIS  5/14/2021    US GUIDED ABDOMINAL PARACENTESIS [facility]    US GUIDED ABDOMINAL PARACENTESIS  8/17/2023    US GUIDED ABDOMINAL PARACENTESIS [facility]    US GUIDED ABDOMINAL PARACENTESIS  8/25/2023    US GUIDED ABDOMINAL PARACENTESIS [facility]    US GUIDED ABDOMINAL PARACENTESIS  10/23/2023    US GUIDED ABDOMINAL PARACENTESIS 10/23/2023 Huy Alvarez MD [facility] US    US GUIDED ABDOMINAL PARACENTESIS  10/27/2023    US GUIDED ABDOMINAL PARACENTESIS 10/27/2023 Huy Alvarez MD [facility] US    US GUIDED ABDOMINAL PARACENTESIS  10/26/2023    US GUIDED ABDOMINAL PARACENTESIS 10/26/2023 [facility]    US GUIDED ABDOMINAL PARACENTESIS  1/4/2024    US GUIDED ABDOMINAL PARACENTESIS 1/4/2024 [facility]     Family History    Problem Relation Name Age of Onset    Diabetes Mother      Uterine cancer Mother      Heart attack Father      Other (CHEMICAL DEPENDENCY) Father      Hypertension Father       Social History     Tobacco Use    Smoking status: Never    Smokeless tobacco: Never   Substance Use Topics    Alcohol use: Yes     Alcohol/week: 1.0 standard drink of alcohol     Types: 1 Glasses of wine per week     Comment: daily drinking    Drug use: Never       Physical Exam   ED Triage Vitals [01/23/24 1918]   Temperature Heart Rate Respirations BP   (!) 38.8 °C (101.8 °F) (!) 113 20 (!) 125/97      Pulse Ox Temp Source Heart Rate Source Patient Position   96 % Oral Monitor --      BP Location FiO2 (%)     -- --       Physical Exam  Vitals and nursing note reviewed.   Constitutional:       Appearance: Normal appearance.   HENT:      Head: Normocephalic.      Mouth/Throat:      Mouth: Mucous membranes are moist.   Eyes:      Comments: Slight jaundice   Cardiovascular:      Rate and Rhythm: Normal rate and regular rhythm.   Pulmonary:      Effort: Pulmonary effort is normal.      Breath sounds: Normal breath sounds.   Abdominal:      Palpations: Abdomen is soft.   Musculoskeletal:         General: Normal range of motion.   Skin:     General: Skin is warm and dry.   Neurological:      General: No focal deficit present.      Mental Status: She is alert and oriented to person, place, and time.   Psychiatric:         Mood and Affect: Mood normal.         ED Course & MDM   Diagnoses as of 01/23/24 2223   Fever, unspecified fever cause   SBP (spontaneous bacterial peritonitis) (CMS/HCC)   Body aches   Jaundice   Left shift       Medical Decision Making  I have seen and evaluated this patient.  The attending physician has also seen and evaluated this patient.  Vital signs, laboratory testing and diagnostic images if applicable have been reviewed.  All laboratory and imaging is interpreted by myself unless otherwise stated.  Radiology studies  are also formally interpreted by radiologist.    Patient with 14,000 leukocytosis, be at 7.2.  No evidence of breanna renal impairment or significant electrolyte abnormality.  Lactate 3 6.  Antibiotics initiated.  Blood cultures obtained.  Viral testing negative.  Admitted for further treatment management.    Labs Reviewed  CBC WITH AUTO DIFFERENTIAL - Abnormal     WBC                           14.5 (*)               nRBC                          0.1 (*)                RBC                           1.92 (*)               Hemoglobin                    7.2 (*)                Hematocrit                    22.2 (*)               MCV                           116 (*)                MCH                           37.5 (*)               MCHC                          32.4                   RDW                           24.1 (*)               Platelets                     149 (*)                Immature Granulocytes %, Automated   0.6                    Immature Granulocytes Absolute, Au*   0.08                COMPREHENSIVE METABOLIC PANEL - Abnormal     Glucose                       145 (*)                Sodium                        130 (*)                Potassium                     3.2 (*)                Chloride                      94 (*)                 Bicarbonate                   20 (*)                 Urea Nitrogen                 12                     Creatinine                    0.80                   eGFR                          >90                    Calcium                       8.3 (*)                Albumin                       3.1 (*)                Alkaline Phosphatase          126 (*)                Total Protein                 5.7 (*)                AST                           44 (*)                 Bilirubin, Total              15.4 (*)               ALT                           15                     Anion Gap                     16                  PROTIME-INR - Abnormal     Protime                        22.2 (*)               INR                           2.2 (*)                  Narrative: INR Therapeutic Range: 2.0-3.5  APTT - Abnormal     aPTT                          55.4 (*)            BLOOD GAS LACTIC ACID, VENOUS - Abnormal     POCT Lactate, Venous          3.6 (*)             MANUAL DIFFERENTIAL - Abnormal     Neutrophils %, Manual         73.0                   Bands %, Manual               7.0                    Lymphocytes %, Manual         14.0                   Monocytes %, Manual           5.0                    Eosinophils %, Manual         0.0                    Basophils %, Manual           1.0                    Seg Neutrophils Absolute, Manual   10.59 (*)               Bands Absolute, Manual        1.02 (*)               Lymphocytes Absolute, Manual   2.03                   Monocytes Absolute, Manual    0.73                   Eosinophils Absolute, Manual   0.00                   Basophils Absolute, Manual    0.15 (*)               Total Cells Counted           100                    Neutrophils Absolute, Manual   11.61 (*)               RBC Morphology                See Below                Polychromasia                 Mild                   Kristina Cells                    Many                   Acanthocytes                  Many                RSV PCR - Normal     RSV PCR                                                Narrative: This assay is an FDA-cleared, in vitro diagnostic nucleic acid amplification test for the detection of RSV from nasopharyngeal specimens, and has been validated for use at [facility]. Negative results do not preclude RSV infections, and should not be used as the sole basis for diagnosis, treatment, or other management decisions. If Influenza A/B and RSV PCR results are negative, testing for Parainfluenza virus, Adenovirus and Metapneumovirus is routinely performed for pediatric oncology and intensive care inpatients at [facility] and is  available on other patients by placing an add-on request.                                  SARS-COV-2 AND INFLUENZA A/B PCR - Normal     Flu A Result                                         Flu B Result                                         Coronavirus 2019, PCR                                  Narrative: This assay has received FDA Emergency Use Authorization (EUA) and  is only authorized for the duration of time that circumstances exist to justify the authorization of the emergency use of in vitro diagnostic tests for the detection of SARS-CoV-2 virus and/or diagnosis of COVID-19 infection under section 564(b)(1) of the Act, 21 U.S.C. 360bbb-3(b)(1). Testing for SARS-CoV-2 is only recommended for patients who meet current clinical and/or epidemiological criteria as defined by federal, state, or local public health directives. This assay is an in vitro diagnostic nucleic acid amplification test for the qualitative detection of SARS-CoV-2, Influenza A, and Influenza B from nasopharyngeal specimens and has been validated for use at [facility]. Negative results do not preclude COVID-19 infections or Influenza A/B infections, and should not be used as the sole basis for diagnosis, treatment, or other management decisions. If Influenza A/B and RSV PCR results are negative, testing for Parainfluenza virus, Adenovirus and Metapneumovirus is routinely performed for [facility] pediatric oncology and intensive care inpatients, and is available on other patients by placing an add-on request.   BLOOD CULTURE  BLOOD CULTURE  URINALYSIS WITH REFLEX CULTURE AND MICROSCOPIC       Narrative: The following orders were created for panel order Urinalysis with Reflex Culture and Microscopic.                Procedure                               Abnormality         Status                                   ---------                               -----------         ------                                   Urinalysis  with Reflex C...[[id number]]                                                               Extra Urine Gray Tube[[id number]]                                                                                     Please view results for these tests on the individual orders.  URINALYSIS WITH REFLEX CULTURE AND MICROSCOPIC  EXTRA URINE GRAY TUBE  AMMONIA  DRUG SCREEN,URINE  ALCOHOL  PROTIME-INR  COMPREHENSIVE METABOLIC PANEL  CBC WITH AUTO DIFFERENTIAL  HCG, URINE, QUALITATIVE  MAGNESIUM  BILIRUBIN, DIRECT  SODIUM, URINE RANDOM  OSMOLALITY, URINE  BLOOD GAS LACTIC ACID, VENOUS  POCT GLUCOSE METER  PATH REVIEW-CBC DIFFERENTIAL  XR chest 1 view   Final Result    No acute cardiopulmonary process.                      Signed by: Bre Nicholson 1/23/2024 7:50 PM    Dictation workstation:   [id number]     Consult to Interventional Radiology    (Results Pending)  US abdomen limited    (Results Pending)  Medications  lactated Ringer's bolus 500 mL (500 mL intravenous New Bag 1/23/24 2105)  vancomycin 1.5 mg in 300 mL (Xellia) IVPB 1.5 g (has no administration in time range)  thiamine (Vitamin B-1) tablet 100 mg (has no administration in time range)  spironolactone (Aldactone) tablet 25 mg (has no administration in time range)  rifAXIMin (Xifaxan) tablet 550 mg (has no administration in time range)  pantoprazole (ProtoNix) EC tablet 40 mg (has no administration in time range)  multivitamin 1 tablet (has no administration in time range)  magnesium oxide (Mag-Ox) tablet 400 mg (has no administration in time range)   levothyroxine (Synthroid, Levoxyl) tablet 50 mcg (has no administration in time range)  lactulose 20 gram/30 mL oral solution 30 g (has no administration in time range)  folic acid (Folvite) tablet 1 mg (has no administration in time range)  ondansetron (Zofran) tablet 4 mg (has no administration in time range)    Or  ondansetron (Zofran) injection 4 mg (has no administration in time range)  polyethylene glycol  (Glycolax, Miralax) packet 17 g (has no administration in time range)  benzocaine-menthol (Cepastat Sore Throat) 15-3.6 mg lozenge 1 lozenge (has no administration in time range)  dextromethorphan-guaifenesin (Robitussin DM)  mg/5 mL oral liquid 5 mL (has no administration in time range)  guaiFENesin (Mucinex) 12 hr tablet 600 mg (has no administration in time range)  furosemide (Lasix) injection 20 mg (has no administration in time range)  piperacillin-tazobactam-dextrose (Zosyn) IV 3.375 g (has no administration in time range)  dextrose 50 % injection 25 g (has no administration in time range)  glucagon (Glucagen) injection 1 mg (has no administration in time range)  dextrose 10 % in water (D10W) infusion (has no administration in time range)   ibuprofen tablet 400 mg (400 mg oral Given 1/23/24 2027)  ondansetron (Zofran) injection 4 mg (4 mg intravenous Given 1/23/24 2010)  piperacillin-tazobactam-dextrose (Zosyn) IV 3.375 g (0 g intravenous Stopped 1/23/24 2205)  morphine injection 2 mg (2 mg intravenous Given 1/23/24 2211)  New Prescriptions  No medications on file            Procedure  Procedures     Crispin Hahn PA-C  01/23/24 2227

## 2024-01-24 NOTE — NURSING NOTE
Patient going down to do US guided paracentesis. IR doctor and nurse aware of HGB of 6.4, stated okay for patient to do procedure.

## 2024-01-24 NOTE — PROGRESS NOTES
Occupational Therapy                 Therapy Communication Note    Patient Name: Briseida Owen  MRN: [id number]  Today's Date: 1/24/2024     Discipline: Occupational Therapy    Missed Visit Reason: Missed Visit Reason: Patient placed on medical hold (Pt receiving blood transfusion for low H&H 6.4, also with low BP 84/42)    Missed Time: Cancel    Comment:

## 2024-01-24 NOTE — PROGRESS NOTES
Physical Therapy                 Therapy Communication Note    Patient Name: Briseida Owen  MRN: [id number]  Today's Date: 1/24/2024     Discipline: Physical Therapy    Missed Visit Reason: Cancel (Order received and chart reviewed. Pt with low K+ (2.9) and H/H (6.4/19.5) this date. Will hold eval at this time given lab values. Of note, pt scheduled for paracentesis today at 11:00 for ascites.)    Missed Time: Cancel

## 2024-01-24 NOTE — PROGRESS NOTES
01/24/24 1845   Discharge Planning   Living Arrangements Spouse/significant other   Support Systems Spouse/significant other   Type of Residence Private residence   Number of Stairs to Enter Residence 3   Number of Stairs Within Residence 0   Do you have animals or pets at home? No   Who is requesting discharge planning? Provider   Home or Post Acute Services None   Patient expects to be discharged to: Home   Does the patient need discharge transport arranged? Yes   RoundTrip coordination needed? Yes   Has discharge transport been arranged? No   Financial Resource Strain   How hard is it for you to pay for the very basics like food, housing, medical care, and heating? Not hard   Housing Stability   In the last 12 months, was there a time when you were not able to pay the mortgage or rent on time? N   In the last 12 months, how many places have you lived? 1   In the last 12 months, was there a time when you did not have a steady place to sleep or slept in a shelter (including now)? N   Transportation Needs   In the past 12 months, has lack of transportation kept you from medical appointments or from getting medications? no   In the past 12 months, has lack of transportation kept you from meetings, work, or from getting things needed for daily living? No   Patient Choice   Patient / Family choosing to utilize agency / facility established prior to hospitalization No

## 2024-01-24 NOTE — NURSING NOTE
Assume care of patient. Patient arrived to the floor, A&Ox4 and on 4LNC. Patient has not complaint of pain or SOB at this time. Patient oriented to room. Call light in reach. Bed in low and locked position. Patient aware of paracentesis schedules at 1100.

## 2024-01-24 NOTE — PROGRESS NOTES
01/24/24 1847   Current Planned Discharge Disposition   Current Planned Discharge Disposition Home

## 2024-01-24 NOTE — CONSULTS
"Reason For Consult  Cirrhosis    History Of Present Illness  Briseida Owen is a 48 y.o. female presenting with fevers and abdominal distention. Patient is very well known by our group for many admissions related to alcoholic cirrhosis. She continues to drink alcohol. MELD very high. She came to the ER this time for increasing abdominal distention \"my boyfriend made me come.\" WBC 14 on arrival. Denies dark, tarry, bloody stools. Macrocytic anemia with hgb 6.4. Had EGD last admission with EV banding      Past Medical History  She has a past medical history of Alcoholic hepatitis without ascites (08/13/2021) and Hepatic encephalopathy (CMS/HCC) (06/09/2021).    Surgical History  She has a past surgical history that includes Other surgical history (06/09/2021); US guided abdominal paracentesis (4/28/2021); US guided abdominal paracentesis (4/23/2021); US guided abdominal paracentesis (5/14/2021); US guided abdominal paracentesis (8/17/2023); US guided abdominal paracentesis (8/25/2023); US guided abdominal paracentesis (10/23/2023); US guided abdominal paracentesis (10/27/2023); US guided abdominal paracentesis (10/26/2023); and US guided abdominal paracentesis (1/4/2024).     Social History  She reports that she has never smoked. She has never used smokeless tobacco. She reports current alcohol use of about 1.0 standard drink of alcohol per week. She reports that she does not use drugs.    Family History  Family History   Problem Relation Name Age of Onset    Diabetes Mother      Uterine cancer Mother      Heart attack Father      Other (CHEMICAL DEPENDENCY) Father      Hypertension Father          Allergies  Sulfamethoxazole-trimethoprim    Review of Systems  Review of Systems   Constitutional: Positive for fever and weight loss.   Gastrointestinal:  Positive for bloating, jaundice, nausea and vomiting.         Physical Exam  Physical Exam  HENT:      Head: Normocephalic and atraumatic.   Pulmonary:      Effort: " "Pulmonary effort is normal.   Abdominal:      General: There is distension.      Palpations: Abdomen is soft.      Tenderness: There is no abdominal tenderness. There is no guarding.   Skin:     Coloration: Skin is jaundiced.   Neurological:      General: No focal deficit present.      Mental Status: She is alert. Mental status is at baseline.           Last Recorded Vitals  Blood pressure (!) 86/46, pulse 70, temperature 36.4 °C (97.5 °F), temperature source Oral, resp. rate 18, height 1.575 m (5' 2\"), weight 80.5 kg (177 lb 7.5 oz), SpO2 96 %.    Relevant Results  Results for orders placed or performed during the hospital encounter of 01/23/24 (from the past 24 hour(s))   CBC and Auto Differential   Result Value Ref Range    WBC 14.5 (H) 4.4 - 11.3 x10*3/uL    nRBC 0.1 (H) 0.0 - 0.0 /100 WBCs    RBC 1.92 (L) 4.00 - 5.20 x10*6/uL    Hemoglobin 7.2 (L) 12.0 - 16.0 g/dL    Hematocrit 22.2 (L) 36.0 - 46.0 %     (H) 80 - 100 fL    MCH 37.5 (H) 26.0 - 34.0 pg    MCHC 32.4 32.0 - 36.0 g/dL    RDW 24.1 (H) 11.5 - 14.5 %    Platelets 149 (L) 150 - 450 x10*3/uL    Immature Granulocytes %, Automated 0.6 0.0 - 0.9 %    Immature Granulocytes Absolute, Automated 0.08 0.00 - 0.70 x10*3/uL   Comprehensive metabolic panel   Result Value Ref Range    Glucose 145 (H) 65 - 99 mg/dL    Sodium 130 (L) 133 - 145 mmol/L    Potassium 3.2 (L) 3.4 - 5.1 mmol/L    Chloride 94 (L) 97 - 107 mmol/L    Bicarbonate 20 (L) 24 - 31 mmol/L    Urea Nitrogen 12 8 - 25 mg/dL    Creatinine 0.80 0.40 - 1.60 mg/dL    eGFR >90 >60 mL/min/1.73m*2    Calcium 8.3 (L) 8.5 - 10.4 mg/dL    Albumin 3.1 (L) 3.5 - 5.0 g/dL    Alkaline Phosphatase 126 (H) 35 - 125 U/L    Total Protein 5.7 (L) 5.9 - 7.9 g/dL    AST 44 (H) 5 - 40 U/L    Bilirubin, Total 15.4 (H) 0.1 - 1.2 mg/dL    ALT 15 5 - 40 U/L    Anion Gap 16 <=19 mmol/L   Protime-INR   Result Value Ref Range    Protime 22.2 (H) 9.3 - 12.7 seconds    INR 2.2 (H) 0.9 - 1.2   APTT   Result Value Ref Range    " aPTT 55.4 (H) 22.0 - 32.5 seconds   Manual Differential   Result Value Ref Range    Neutrophils %, Manual 73.0 40.0 - 80.0 %    Bands %, Manual 7.0 0.0 - 5.0 %    Lymphocytes %, Manual 14.0 13.0 - 44.0 %    Monocytes %, Manual 5.0 2.0 - 10.0 %    Eosinophils %, Manual 0.0 0.0 - 6.0 %    Basophils %, Manual 1.0 0.0 - 2.0 %    Seg Neutrophils Absolute, Manual 10.59 (H) 1.20 - 7.00 x10*3/uL    Bands Absolute, Manual 1.02 (H) 0.00 - 0.70 x10*3/uL    Lymphocytes Absolute, Manual 2.03 1.20 - 4.80 x10*3/uL    Monocytes Absolute, Manual 0.73 0.10 - 1.00 x10*3/uL    Eosinophils Absolute, Manual 0.00 0.00 - 0.70 x10*3/uL    Basophils Absolute, Manual 0.15 (H) 0.00 - 0.10 x10*3/uL    Total Cells Counted 100     Neutrophils Absolute, Manual 11.61 (H) 1.20 - 7.70 x10*3/uL    RBC Morphology See Below     Polychromasia Mild     Kristina Cells Many     Acanthocytes Many    Pathologist Review-CBC Differential   Result Value Ref Range    Pathologist Review-CBC Differential       The peripheral blood smear and CBC data are reviewed. Remarkable findings include leukocytosis and macrocytic anemia. There is variation in RBC cell size and shape, including polychromasia and occasional RBC fragments. Correlation with folate and B12 levels is recommended.   RSV PCR   Result Value Ref Range    RSV PCR Not Detected Not Detected   Sars-CoV-2 and Influenza A/B PCR   Result Value Ref Range    Flu A Result Not Detected Not Detected    Flu B Result Not Detected Not Detected    Coronavirus 2019, PCR Not Detected Not Detected   Blood Culture    Specimen: Peripheral Venipuncture; Blood culture   Result Value Ref Range    Blood Culture Loaded on Instrument - Culture in progress    Blood Culture    Specimen: Peripheral Venipuncture; Blood culture   Result Value Ref Range    Blood Culture Loaded on Instrument - Culture in progress    Blood Gas Lactic Acid, Venous   Result Value Ref Range    POCT Lactate, Venous 3.6 (H) 0.4 - 2.0 mmol/L   Ammonia   Result  Value Ref Range    Ammonia 86 (H) 12 - 45 umol/L   Alcohol   Result Value Ref Range    Alcohol <0.010 0.000 - 0.010 g/dL   hCG, quantitative, pregnancy   Result Value Ref Range    HCG, Beta-Quantitative <1 SEE COMMENT BELOW mIU/mL   Blood Gas Lactic Acid, Venous   Result Value Ref Range    POCT Lactate, Venous 2.5 (H) 0.4 - 2.0 mmol/L   POCT GLUCOSE   Result Value Ref Range    POCT Glucose 131 (H) 74 - 99 mg/dL   Protime-INR   Result Value Ref Range    Protime 24.0 (H) 9.3 - 12.7 seconds    INR 2.4 (H) 0.9 - 1.2   Comprehensive metabolic panel   Result Value Ref Range    Glucose 119 (H) 65 - 99 mg/dL    Sodium 134 133 - 145 mmol/L    Potassium 2.9 (LL) 3.4 - 5.1 mmol/L    Chloride 99 97 - 107 mmol/L    Bicarbonate 23 (L) 24 - 31 mmol/L    Urea Nitrogen 16 8 - 25 mg/dL    Creatinine 1.30 0.40 - 1.60 mg/dL    eGFR 51 (L) >60 mL/min/1.73m*2    Calcium 8.1 (L) 8.5 - 10.4 mg/dL    Albumin 3.3 (L) 3.5 - 5.0 g/dL    Alkaline Phosphatase 98 35 - 125 U/L    Total Protein 5.4 (L) 5.9 - 7.9 g/dL    AST 36 5 - 40 U/L    Bilirubin, Total 14.4 (H) 0.1 - 1.2 mg/dL    ALT 11 5 - 40 U/L    Anion Gap 12 <=19 mmol/L   CBC and Auto Differential   Result Value Ref Range    WBC 11.7 (H) 4.4 - 11.3 x10*3/uL    nRBC 0.2 (H) 0.0 - 0.0 /100 WBCs    RBC 1.67 (L) 4.00 - 5.20 x10*6/uL    Hemoglobin 6.4 (LL) 12.0 - 16.0 g/dL    Hematocrit 19.5 (L) 36.0 - 46.0 %     (H) 80 - 100 fL    MCH 38.3 (H) 26.0 - 34.0 pg    MCHC 32.8 32.0 - 36.0 g/dL    RDW 23.2 (H) 11.5 - 14.5 %    Platelets 125 (L) 150 - 450 x10*3/uL    Immature Granulocytes %, Automated 0.6 0.0 - 0.9 %    Immature Granulocytes Absolute, Automated 0.07 0.00 - 0.70 x10*3/uL   Magnesium   Result Value Ref Range    Magnesium 1.80 1.60 - 3.10 mg/dL   Bilirubin, Direct   Result Value Ref Range    Bilirubin, Direct 6.7 (H) 0.0 - 0.2 mg/dL   Manual Differential   Result Value Ref Range    Neutrophils %, Manual 72.0 40.0 - 80.0 %    Bands %, Manual 1.0 0.0 - 5.0 %    Lymphocytes %,  Manual 19.0 13.0 - 44.0 %    Monocytes %, Manual 4.0 2.0 - 10.0 %    Eosinophils %, Manual 2.0 0.0 - 6.0 %    Basophils %, Manual 0.0 0.0 - 2.0 %    Metamyelocytes %, Manual 2.0 0.0 - 0.0 %    Seg Neutrophils Absolute, Manual 8.42 (H) 1.20 - 7.00 x10*3/uL    Bands Absolute, Manual 0.12 0.00 - 0.70 x10*3/uL    Lymphocytes Absolute, Manual 2.22 1.20 - 4.80 x10*3/uL    Monocytes Absolute, Manual 0.47 0.10 - 1.00 x10*3/uL    Eosinophils Absolute, Manual 0.23 0.00 - 0.70 x10*3/uL    Basophils Absolute, Manual 0.00 0.00 - 0.10 x10*3/uL    Metamyelocytes Absolute, Manual 0.23 0.00 - 0.00 x10*3/uL    Total Cells Counted 100     Neutrophils Absolute, Manual 8.54 (H) 1.20 - 7.70 x10*3/uL    RBC Morphology See Below     Polychromasia Mild     RBC Fragments Few     Kristina Cells Many     Acanthocytes Many    Prepare RBC: 1 Units   Result Value Ref Range    PRODUCT CODE Q8409Q52     Unit Number [id number]     Unit ABO O     Unit RH POS     XM INTEP COMP     Dispense Status XM     Blood Expiration Date February 08, 2024 23:59 EST     PRODUCT BLOOD TYPE 5100     UNIT VOLUME 400    Type and screen   Result Value Ref Range    ABO TYPE O     Rh TYPE POS     ANTIBODY SCREEN NEG    POCT GLUCOSE   Result Value Ref Range    POCT Glucose 100 (H) 74 - 99 mg/dL     US abdomen limited    Result Date: 1/23/2024  STUDY: Abdomen limited Ultrasound; Completed Time: 1/23/2024 10:48 PM INDICATION: Ascites. COMPARISON: US abdomen limited 1/6/2024. ACCESSION NUMBER(S): [id number] ORDERING CLINICIAN: SALINAS JUAREZ FINDINGS: Ascites is present.  The largest pocket is visualized within the left upper quadrant.      Ascites is present with the largest pocket located within the left upper quadrant.  The quantity of free fluid in the abdomen appear significantly increased as compared to 1/6/2024 prior study. Signed by Dylan Townsend MD    XR chest 1 view    Result Date: 1/23/2024  Interpreted By:  Bre Nicholson, STUDY: XR CHEST 1 VIEW; 1/23/2024  7:48 pm   INDICATION: Signs/Symptoms:fever   COMPARISON: 01/05/2025   ACCESSION NUMBER(S): [id number]   ORDERING CLINICIAN: MATTHEW THOMPSON   TECHNIQUE: Frontal  chest radiographs.   FINDINGS: The cardiomediastinal silhouette is unremarkable. The lungs are clear. No pleural effusion is identified.   The osseous structures are intact.       No acute cardiopulmonary process.       Signed by: Bre Nicholson 1/23/2024 7:50 PM Dictation workstation:   [id number]    EGD w Banding    Result Date: 1/8/2024  Table formatting from the original result was not included. Impression Three medium and moderate grade II varices in the middle third of the esophagus and lower third of the esophagus; placed 7 bands successfully, resulting in complete eradication Moderate and mosaic portal hypertensive gastropathy in the cardia, fundus of the stomach and body of the stomach Food bolus in the cardia, fundus of the stomach and body of the stomach The duodenal bulb, 1st part of the duodenum and 2nd part of the duodenum appeared normal. Findings Three medium and moderate grade II varices (no red charlie sign) in the middle third of the esophagus and lower third of the esophagus; no bleeding was identified; placed 7 bands successfully, resulting in complete eradication Moderate, diffuse and mosaic portal hypertensive gastropathy in the cardia, fundus of the stomach and body of the stomach; no bleeding was identified Food bolus in the cardia, fundus of the stomach and body of the stomach The duodenal bulb, 1st part of the duodenum and 2nd part of the duodenum appeared normal. Recommendation  Repeat EGD in one month. Resume current medications . Soft diet today.  Indication Alcoholic cirrhosis of liver with ascites (CMS/HCC), Esophageal varices without bleeding, unspecified esophageal varices type (CMS/HCC) Staff Staff Role No Staff Documented Medications See Anesthesia Record. Preprocedure A history and physical has been performed, and patient  medication allergies have been reviewed. The patient's tolerance of previous anesthesia has been reviewed. The risks and benefits of the procedure and the sedation options and risks were discussed with the patient. All questions were answered and informed consent obtained. Details of the Procedure The patient underwent monitored anesthesia care, which was administered by an anesthesia professional. The patient's level of consciousness, heart rate, ETCO2, ECG, respirations, oxygen and blood pressure were monitored throughout the procedure. The scope was introduced through the mouth and advanced to the second part of the duodenum. Retroflexion was performed in the cardia, fundus and incisura. Prior to the procedure, the patient's H. Pylori status was negative. The patient's estimated blood loss was minimal (<5 mL). The procedure was not difficult. The patient tolerated the procedure well. There were no apparent adverse events. Events No data recorded Specimens Procedure Location [address] [address] [phone] Referring Provider No referring provider defined for this encounter. Procedure Provider No name on file    US abdomen limited    Result Date: 1/6/2024  Interpreted By:  Bre Nicholson, STUDY: US ABDOMEN LIMITED; 11:32 am   INDICATION: Signs/Symptoms:ascites.   COMPARISON: None.   ACCESSION NUMBER(S): [id number]   ORDERING CLINICIAN: BERKLEY ALBERTS   TECHNIQUE: Limited abdominal ultrasound of the right upper quadrant was performed utilizing gray scale imaging.   FINDINGS: Trace amount of fluid is noted within the left upper and lower quadrants of the abdomen.       Trace amount of fluid noted within the left upper and lower quadrant the abdomen.   MACRO: None.   Signed by: Bre Nicholson 1/6/2024 11:33 AM Dictation workstation:   [id number]    XR chest 1 view    Result Date: 1/5/2024  Interpreted By:  Berkley Reynoso  STUDY: XR CHEST 1 VIEW; 1/5/2024 10:16 am   INDICATION: pleural effusion.   COMPARISON: January 3, 2024   ACCESSION NUMBER(S): [id number]   ORDERING CLINICIAN: MARICARMEN SANTIAGO   FINDINGS: RESULT:  The cardiac silhouette is within normal limits for size. Mediastinal contours are unremarkable. Left basilar opacity is again noted obscuring the left hemidiaphragm, not significantly changed compared to previous exam. The osseous structures are unremarkable.       Left basilar opacity obscuring the left hemidiaphragm is again noted which may represent pleural fluid and atelectasis. Underlying infiltrate is not excluded.     Signed by: Tomi Reynoso 1/5/2024 10:48 AM Dictation workstation:   [id number]    US guided abdominal paracentesis    Result Date: 1/4/2024  Interpreted By:  Huy Alvarez, STUDY: US GUIDED ABDOMINAL PARACENTESIS; 1/4/2024 9:25 am   INDICATION: Signs/Symptoms:ascites.   COMPARISON: None.   ACCESSION NUMBER(S): [id number]   ORDERING CLINICIAN: MATTHEW MAYORGA   TECHNIQUE: Ultrasound-guided paracentesis   FINDINGS: Informed consent obtained. Patient positioned supine. Left lower quadrant prepped, draped and anesthetized. Under ultrasound guidance, 8 German centesis sheath needle inserted into peritoneal cavity. 3.1of dark yellowfluid aspiratedwith sample sent for analysis. Patient tolerated procedure well       Ultrasound-guided paracentesis.   Signed by: Huy Alvarez 1/4/2024 10:51 AM Dictation workstation:   [id number]    CT abdomen pelvis w IV contrast    Result Date: 1/4/2024  STUDY: CT Abdomen and Pelvis with IV Contrast; 1/4/2024 1:46 AM. INDICATION: Abdominal pain. COMPARISON: US abd 10/27/2023 .  CT AP 10/21/2023 ACCESSION NUMBER(S): [id number] ORDERING CLINICIAN: JAZMINE GILLESPIE TECHNIQUE: CT of the abdomen and pelvis was performed.  Contiguous axial images were obtained at 3 mm slice thickness through the abdomen and pelvis. Coronal and sagittal reconstructions at 3 mm slice thickness  were performed.  Omnipaque 350 75 mL was administered intravenously.  FINDINGS: LOWER CHEST: Cardiac size is mildly enlarged.  No pericardial effusion.  Moderate to large left pleural effusion is noted with partial collapse of the visualized portion of the left lower lobe. There is suggestion of a right breast prosthesis partially visualized.   ABDOMEN:  LIVER: No hepatomegaly.  Liver demonstrates a markedly cirrhotic morphology with a nodular hepatic contour and hypertrophy of the left hepatic lobe.  Simple fluid density cyst is seen in the anterior segment of the right hepatic lobe measuring approximately 1 cm in diameter. Normal attenuation.  BILE DUCTS: No intrahepatic or extrahepatic biliary ductal dilatation.  GALLBLADDER: The gallbladder is distended and contains gallstones in the gallbladder neck. STOMACH: No abnormalities identified.  PANCREAS: No masses or ductal dilatation.  SPLEEN: Spleen is borderline enlarged.  No focal splenic lesion.  ADRENAL GLANDS: No thickening or nodules.  KIDNEYS AND URETERS: Kidneys are normal in size and location.  No renal or ureteral calculi.  PELVIS:  BLADDER: No abnormalities identified.  REPRODUCTIVE ORGANS: No abnormalities identified.  BOWEL: Appendix is not definitively identified.  Terminal ileum is unremarkable.  No abnormalities identified.  VESSELS: No abnormalities identified.  Abdominal aorta is normal in caliber.  PERITONEUM/RETROPERITONEUM/LYMPH NODES: Large volume of abdominal and pelvic ascites is noted.  No pneumoperitoneum. No lymphadenopathy.  ABDOMINAL WALL: No abnormalities identified. SOFT TISSUES: There is a fluid-filled small indirect right inguinal hernia.  BONES: No acute fracture or aggressive osseous lesion.    Cirrhotic morphology of the liver with a large volume of abdominal and pelvic ascites. Cholelithiasis. Fluid-filled small right inguinal hernia. Moderate to large left pleural effusion with partial collapse of the visualized portion of the  "left lower lobe. Borderline splenomegaly. Signed by Bartolo Torres    XR chest 1 view    Result Date: 1/3/2024  STUDY: Chest Radiograph;  01/03/2024 at 9:13 PM INDICATION: Weakness. COMPARISON: XR chest 10/21/23, 04/25/21. ACCESSION NUMBER(S): [id number] ORDERING CLINICIAN: Nomi Anaya TECHNIQUE:  Frontal chest was obtained at 2113 hours. FINDINGS: There is dense retrocardiac consolidation in the left lower lobe, suggesting pneumonia. There is likely left pleural effusion. Heart is top normal size with central vascular prominence. There is no pneumothorax.    Retrocardiac consolidation left lower lobe suggests pneumonia, with likely left pleural effusion. Follow-up advised. Signed by Michael Lewis MD        Assessment/Plan      Cirrhosis of Liver (Decomp in setting chronic alcohol abuse, still drinking, high MELD score of 32)   HE: Continue Lactulose, Xifaxin. Goal for 2-3 stools daily. Monitor given low Na          EV: Jan 2023 grade II EV banded x 7, recommend repeat EGD next month         HCC: AFP up to date         Ascites: Chronic recurrent paracenteses, pending repeat paracentesis    -Send fluid r/o SBP    -Low Na diet    -Continue diuretics      2.    Anemia         Macrocytic anemia. Stable HH. No overt bleeding          - monitor H/H q 6 for a goal >7         - continue PPI     3.    Alcohol Abuse,          -Still drinking. Educated on her high risk of death with decomp cirrhosis and ETOH abuse. She continues to drink. Lays on her couch all day. Her boyfriend \"cleans me up.\"     I spent 30 minutes in the professional and overall care of this patient.      Melissa Jerez, APRN-CNP    "

## 2024-01-24 NOTE — NURSING NOTE
Two critical lab values on patient. HGB 6.4. Potassium 2.9. Hospitalist paged, waiting for call back.

## 2024-01-24 NOTE — CARE PLAN
Pt has a POA and Living Will not on file; pt said that she will have her sig other Fran bring in documents or her sister    ADOD: 2 days    Pt lives at home with her boyfriend Fran [phone]; they live in a one story house with 3 steps to enter.  No home 02, no cpap/bipap; no nebulizer. She is not a diabetic. She is bedbound at this time; her boyfriend cares for her at home.  She said that her sister theresa is POA; Theresa's number is [phone]  Pt does not wear glasses or hearing aids.  She had a pericentesis this morning; Pt said that she has had the same procedure in the past, she has a hx of alcoholic cirrhosis. She said that she drinks almost every day and she drinks 3-4 drinks each time.  She is here with dk, tarry, bloody stools and she is anemic.  Discussed discharge planning. She said that she does not want PT OT because she is bedbound; she said that her boyfriend cares for her at home    DISCHARGE PLAN: AT THIS TIME THE PLAN IS HOME WITH BOYFRIEND; CARE COORDINATION TO FOLLOW

## 2024-01-24 NOTE — PROGRESS NOTES
Briseida Owen is a 48 y.o. female on day 1 of admission presenting with Fever, unspecified fever cause.      Subjective   Denies abdominal pain. Denies melena, bloody BM's.       Objective     Last Recorded Vitals  BP 83/69 (BP Location: Left arm, Patient Position: Lying)   Pulse 71   Temp 36.4 °C (97.5 °F) (Oral)   Resp 16   Wt 80.5 kg (177 lb 7.5 oz)   SpO2 99%   Intake/Output last 3 Shifts:    Intake/Output Summary (Last 24 hours) at 1/24/2024 0901  Last data filed at 1/24/2024 0605  Gross per 24 hour   Intake 1400 ml   Output --   Net 1400 ml       Admission Weight  Weight: 80.5 kg (177 lb 7.5 oz) (01/23/24 1918)    Daily Weight  01/23/24 : 80.5 kg (177 lb 7.5 oz)    Image Results  US abdomen limited  Narrative: STUDY:  Abdomen limited Ultrasound; Completed Time: 1/23/2024 10:48 PM   INDICATION:  Ascites.  COMPARISON:  US abdomen limited 1/6/2024.  ACCESSION NUMBER(S):  [id number]  ORDERING CLINICIAN:  SALINAS JUAREZ  FINDINGS:   Ascites is present.  The largest pocket is visualized within the left  upper quadrant.    Impression: Ascites is present with the largest pocket located within the left  upper quadrant.  The quantity of free fluid in the abdomen appear  significantly increased as compared to 1/6/2024 prior study.  Signed by Dylan Townsend MD  XR chest 1 view  Narrative: Interpreted By:  Bre Nicholson,   STUDY:  XR CHEST 1 VIEW; 1/23/2024 7:48 pm      INDICATION:  Signs/Symptoms:fever      COMPARISON:  01/05/2025      ACCESSION NUMBER(S):  [id number]      ORDERING CLINICIAN:  MATTHEW THOMPSON      TECHNIQUE:  Frontal  chest radiographs.      FINDINGS:  The cardiomediastinal silhouette is unremarkable. The lungs are  clear. No pleural effusion is identified.      The osseous structures are intact.      Impression: No acute cardiopulmonary process.              Signed by: Bre Nicholson 1/23/2024 7:50 PM  Dictation workstation:   [id number]      Physical Exam  Pt is NAD.  Cooperative with  exam.  In no distress at rest.  A, Ox3.  Face is symmetrical.  Skin - no lesions.  Lungs: clear to auscultations B/L. No wheezes, rales, rhonchi. Diminished at bases  Heart: regular S1S2.  Abdomen: soft, NT, distended with ascites. BS positive.  Extr.: no edema, cords, cyanosis.  Moves all extr.   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 IV Zosyn, Diagnostic and therapeutic paracentesis today. GI consult.  Liver cirrhosis due to ETOH with ascites, portal HTN, coagulopathy. INR is high. Vit K 5  mg PO, monitor PT/INR  Anemia, chronic. No active bleeding? Transfuse 1 unit today. Obtained the informed consent for Pt.  Hypokalemia, replace, monitor  Low blood pressure.A typical for liver cirrhosis, Start Midodrine. Hold diuretics.  Overall prognosis guarded. Pt has an advanced liver cirrhosis.     CCT 35 minutes.         Alma Alexander MD

## 2024-01-24 NOTE — NURSING NOTE
Patient is sitting up in bed 2LNC on and tolerating well. Patient's Boyfriend is at bedside. Patient can make needs known and needs are met. Bed is in low and locked position. Call light and belongings in reach.

## 2024-01-24 NOTE — H&P
History Of Present Illness        Briseida Owen is a 48 y.o. female presenting with generalized body aches, fever, and worsening ascites.       Patient's ED diagnostic workup was noted for leukocytosis of 14.5.  H&H was low at 7.2/22.2.  The patient's platelet count was 149.  Patient's coagulation profile noted for an elevated INR of 2.2.  Her PTT was elevated 22.2.  PTT elevated 55.4.  Patient's blood chemistry noted for a sodium of 130.  Elevated glucose 145.  Bicarbonate was low at 20.  The anion gap was within normal limits.  Albumin level was 3.1.  Alkaline phosphatase slightly elevated 126.  Total bilirubin is 15.4.      Patient's vital signs were noted for Tmax 38.8, pulse rate elevated 113, respiratory rate 20, BP normal 125/97.  Saturating 96% on room air.      Last hospitalization she presented with decompensated ascites.  She was discharged on January 9, 2024.  At that time she had 3 L of fluid removed from her abdomen in the form of ascitic fluid.  She received broad-spectrum IV antibiotics.  She underwent EGD with banding of varices on January 8, 2024.  Abstinence from alcohol was encouraged.      I have requested an ammonia level.  I have requested a urine pregnancy test.  I have requested a urine toxicology screen.  I have requested an alcohol blood level.      She is actually pretty lucid and awake during my conversation.  She is AAO x4.  She admits to still drinking alcohol.  She states she is cutting down though.  She admits that she does not like taking her home medications as well.  She tells me that she is still living with her boyfriend.  He does not drink alcohol.  He does smoke cigarettes.  She is worried about taking lactulose because she will have diarrhea and she does not want any skin ulcer formation.  Informed her that she can request for a bedpan instead.  She tells me that there is no way she can be pregnant.      Past Medical History      Past Medical History:   Diagnosis Date     Alcoholic hepatitis without ascites 08/13/2021    Alcoholic hepatitis    Hepatic encephalopathy (CMS/HCC) 06/09/2021    Hepatic encephalopathy         Surgical History        Past Surgical History:   Procedure Laterality Date    OTHER SURGICAL HISTORY  06/09/2021    Breast augmentation    US GUIDED ABDOMINAL PARACENTESIS  4/28/2021    US GUIDED ABDOMINAL PARACENTESIS [facility]    US GUIDED ABDOMINAL PARACENTESIS  4/23/2021    US GUIDED ABDOMINAL PARACENTESIS [facility]    US GUIDED ABDOMINAL PARACENTESIS  5/14/2021    US GUIDED ABDOMINAL PARACENTESIS [facility]    US GUIDED ABDOMINAL PARACENTESIS  8/17/2023    US GUIDED ABDOMINAL PARACENTESIS [facility]    US GUIDED ABDOMINAL PARACENTESIS  8/25/2023    US GUIDED ABDOMINAL PARACENTESIS [facility]    US GUIDED ABDOMINAL PARACENTESIS  10/23/2023    US GUIDED ABDOMINAL PARACENTESIS 10/23/2023 Huy Alvarez MD [facility]    US GUIDED ABDOMINAL PARACENTESIS  10/27/2023    US GUIDED ABDOMINAL PARACENTESIS 10/27/2023 Huy Alvarez MD [facility]    US GUIDED ABDOMINAL PARACENTESIS  10/26/2023    US GUIDED ABDOMINAL PARACENTESIS 10/26/2023 [facility]    US GUIDED ABDOMINAL PARACENTESIS  1/4/2024    US GUIDED ABDOMINAL PARACENTESIS 1/4/2024 [facility]          Social History    She reports that she has never smoked. She has never used smokeless tobacco. She reports current alcohol use of about 1.0 standard drink of alcohol per week. She reports that she does not use drugs.      Family History      Family History   Problem Relation Name Age of Onset    Diabetes Mother      Uterine cancer Mother      Heart attack Father      Other (CHEMICAL DEPENDENCY) Father      Hypertension Father            Allergies      Sulfamethoxazole-trimethoprim      Review of Systems    14-point ROS otherwise negative, as per HPI/Interval History.    General: No change in weight. No weakenss. No Fevers/Chills/Night Sweats   Skin: No skin/hair/nail changes. No  "rashes or sores.  Head:  No trauma. No Headache/nasuea/vomitting.   Eyes: No visual changes. No tearing. No itching.   Ears: No hearing loss. No tinnitus. No vertigo. No discharge.  Nose, Sinuses: No rhinorrhea, No nasal congestion. No epistaxis.  Mouth, Throat, Neck: No bleeding gums, hoarseness, sore throat or swollen neck  Cardiac: No palpitations. No HIGGINS. No PND. No Orthopnea.   Respiratory: No Shortness of Breath. No wheezing. No cough. No hemoptysis.   GI: No nausea/vomiting. No indigestion. No diarrhea. No constipation.   Extremities: No numbness or tingling. No paresthesias.   Urinary: No change in urinary frequency. No change in hesitancy. No hematuria. No incontinence.       Physical Exam        Constitutional:  Pleasant.  Minimally jaundiced  Eyes: PERRL, EOMI,   ENMT: mucous membranes moist  Head/Neck: Neck supple, No JVD,   Respiratory/Thorax: Patent airways, CTAB,   Cardiovascular: Regular, rate and rhythm, no murmurs  Gastrointestinal: Soft, non-distended, +BS.  Slight distention  Musculoskeletal: ROM intact, no joint swelling, normal strength  Extremities: peripheral pulses intact; no edema  Neurological: Alert and Oriented x 3; no focal deficits; gross motor and sensation intact; CN II-XII intact. No asterixis.  Psychological: Appropriate mood and behavior  Skin: No lesions, No rashes.         Last Recorded Vitals  Blood pressure (!) 101/44, pulse 95, temperature 37.6 °C (99.7 °F), temperature source Oral, resp. rate 18, height 1.575 m (5' 2\"), weight 80.5 kg (177 lb 7.5 oz), SpO2 94 %.    Relevant Results    Lab Results   Component Value Date    WBC 14.5 (H) 01/23/2024    HGB 7.2 (L) 01/23/2024    HCT 22.2 (L) 01/23/2024     (H) 01/23/2024     (L) 01/23/2024       Lab Results   Component Value Date    GLUCOSE 145 (H) 01/23/2024    CALCIUM 8.3 (L) 01/23/2024     (L) 01/23/2024    K 3.2 (L) 01/23/2024    CO2 20 (L) 01/23/2024    CL 94 (L) 01/23/2024    BUN 12 01/23/2024    " CREATININE 0.80 01/23/2024       Lab Results   Component Value Date    HGBA1C 5.1 04/04/2019         CT head wo IV contrast    Result Date: 10/21/2023  Interpreted By:  Bre Nicholson, STUDY: CT HEAD WO IV CONTRAST;  10/21/2023 5:46 pm   INDICATION: Signs/Symptoms:confusion.   COMPARISON: 04/13/2021.   ACCESSION NUMBER(S): [id number]   ORDERING CLINICIAN: LELO WAY   TECHNIQUE: CT axial images through the Brain were obtained without contrast. Examination is limited due to a portion of the right skull lying outside the field of view.   FINDINGS: There is no mass effect, hemorrhage, or infarct. The ventricles appear normal. Gray-white differentiation is maintained. The visualized paranasal sinuses appear clear.       No acute intracranial abnormality.   MACRO: None.   Signed by: Bre Nicholson 10/21/2023 5:50 PM Dictation workstation:   [id number]       Scheduled medications  [START ON 1/24/2024] folic acid, 1 mg, oral, Daily  furosemide, 20 mg, intravenous, q12h  lactated Ringer's, 500 mL, intravenous, Once  lactulose, 30 g, oral, BID  [START ON 1/24/2024] levothyroxine, 50 mcg, oral, Daily  [START ON 1/24/2024] magnesium oxide, 400 mg, oral, Daily  [START ON 1/24/2024] multivitamin, 1 tablet, oral, Daily  [START ON 1/24/2024] pantoprazole, 40 mg, oral, Daily  rifAXIMin, 550 mg, oral, BID  [START ON 1/24/2024] spironolactone, 25 mg, oral, Daily  [START ON 1/24/2024] thiamine, 100 mg, oral, Daily  vancomycin, 1,500 mg, intravenous, Once      Continuous medications     PRN medications          Assessment/Plan   Principal Problem:    Fever, unspecified fever cause  Active Problems:    Advanced hepatic cirrhosis (CMS/HCC)    Alcohol abuse with unspecified alcohol-induced disorder (CMS/HCC)    Alcohol abuse    Toxic metabolic encephalopathy    Wernicke's encephalopathy    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 female presenting with generalized body aches, fever, and worsening ascites.  Patient admitted for further evaluation and management.        Decompensated Ascites      Patient presents with abdominal distention and generalized weakness  Earlier this month she had 3 L of ascitic fluid removed by IR  Patient will be started on empiric IV antibiotics  Patient will continue rifaximin as well  Fall/aspiration precautions  Continuous cardiac monitoring  Continue IV diuretics  Holding  oral Lasix at this time.      Spontaneous Bacterial Peritonitis    Will start patient on empiric IV antibiotics  Gastroenterology consultation placed  Interventional radiology consult placed for paracentesis  Ascitic fluid to be sent for cell differential, albumin level, culture  Calculate SAAG      Hepatic Encephalopathy    Continue Lactulose, Xifaxin. Goal for 2-3 stools daily   Monitor Ammonia level   She is AAO x 4 currently      History of Esophageal Varices     (01/08/2024) EGD Date      Three medium and moderate grade II varices (no red charlie sign) in the middle third of the esophagus and lower third of the esophagus; no bleeding was identified; placed 7 bands successfully, resulting in complete eradication  Moderate, diffuse and mosaic portal hypertensive gastropathy in the cardia, fundus of the stomach and body of the stomach; no bleeding was identified        Febrile episode    Presumed secondary to above  Cooling [place]     Leukocytosis    Presumed secondary to above    Hyponatremia    Follow-up urine sodium and urine osmolarity  Consider nephrology consultation if sodium worsens  Fluid restriction      Hyperbilirubinemia    In the setting of decompensated alcoholic liver disease  Follow-up morning direct bilirubin level    Coagulopathy of Liver Disease    Avoid hepatotoxic agents  Gastroenterology to consider vitamin K      Chronic Alcoholic Cirrhosis of  Liver    Chronic alcohol abuse, still drinking, high MELD score of 32       Moderate Protein Calorie Malnutrition    Inpatient consult to dietitian  Follow-up prealbumin level in a.m.    Alcohol Dependence/Abuse    Ethanol cessation counseling  Monitor for alcohol withdrawal  Provide nutritional supplements  Alcohol level is undetectable      GI + DVT PPX    Home PPI Oral Dose  Bilateral Sequential Compression Devices             This Dictation was Transcribed using a Nuance Dragon Voice Recognition System Device (with Compatible Computer + Software) and as such may contain Grammatical Errors and Unintentional Typing Misprints.      I spent 45 minutes in the professional and overall care of this patient.      Corona Jolly MD

## 2024-01-25 LAB
ANION GAP SERPL CALC-SCNC: 15 MMOL/L
APTT PPP: 68 SECONDS (ref 22–32.5)
BUN SERPL-MCNC: 24 MG/DL (ref 8–25)
CALCIUM SERPL-MCNC: 8.4 MG/DL (ref 8.5–10.4)
CHLORIDE SERPL-SCNC: 102 MMOL/L (ref 97–107)
CO2 SERPL-SCNC: 21 MMOL/L (ref 24–31)
CREAT SERPL-MCNC: 2.1 MG/DL (ref 0.4–1.6)
EGFRCR SERPLBLD CKD-EPI 2021: 29 ML/MIN/1.73M*2
ERYTHROCYTE [DISTWIDTH] IN BLOOD BY AUTOMATED COUNT: ABNORMAL %
GLUCOSE BLD MANUAL STRIP-MCNC: 100 MG/DL (ref 74–99)
GLUCOSE BLD MANUAL STRIP-MCNC: 101 MG/DL (ref 74–99)
GLUCOSE SERPL-MCNC: 107 MG/DL (ref 65–99)
HCT VFR BLD AUTO: 22.5 % (ref 36–46)
HGB BLD-MCNC: 7.3 G/DL (ref 12–16)
MCH RBC QN AUTO: 36 PG (ref 26–34)
MCHC RBC AUTO-ENTMCNC: 32.4 G/DL (ref 32–36)
MCV RBC AUTO: 111 FL (ref 80–100)
NRBC BLD-RTO: 0.3 /100 WBCS (ref 0–0)
PLATELET # BLD AUTO: 128 X10*3/UL (ref 150–450)
POTASSIUM SERPL-SCNC: 4 MMOL/L (ref 3.4–5.1)
RBC # BLD AUTO: 2.03 X10*6/UL (ref 4–5.2)
SODIUM SERPL-SCNC: 138 MMOL/L (ref 133–145)
WBC # BLD AUTO: 8.6 X10*3/UL (ref 4.4–11.3)

## 2024-01-25 PROCEDURE — 36415 COLL VENOUS BLD VENIPUNCTURE: CPT | Performed by: INTERNAL MEDICINE

## 2024-01-25 PROCEDURE — 80048 BASIC METABOLIC PNL TOTAL CA: CPT | Performed by: INTERNAL MEDICINE

## 2024-01-25 PROCEDURE — 97530 THERAPEUTIC ACTIVITIES: CPT | Mod: GO

## 2024-01-25 PROCEDURE — 82947 ASSAY GLUCOSE BLOOD QUANT: CPT

## 2024-01-25 PROCEDURE — 97161 PT EVAL LOW COMPLEX 20 MIN: CPT | Mod: GP | Performed by: PHYSICAL THERAPIST

## 2024-01-25 PROCEDURE — 2500000004 HC RX 250 GENERAL PHARMACY W/ HCPCS (ALT 636 FOR OP/ED): Performed by: INTERNAL MEDICINE

## 2024-01-25 PROCEDURE — 99223 1ST HOSP IP/OBS HIGH 75: CPT | Performed by: NURSE PRACTITIONER

## 2024-01-25 PROCEDURE — 85730 THROMBOPLASTIN TIME PARTIAL: CPT | Performed by: INTERNAL MEDICINE

## 2024-01-25 PROCEDURE — 2500000004 HC RX 250 GENERAL PHARMACY W/ HCPCS (ALT 636 FOR OP/ED): Performed by: EMERGENCY MEDICINE

## 2024-01-25 PROCEDURE — 2500000001 HC RX 250 WO HCPCS SELF ADMINISTERED DRUGS (ALT 637 FOR MEDICARE OP): Performed by: INTERNAL MEDICINE

## 2024-01-25 PROCEDURE — 97166 OT EVAL MOD COMPLEX 45 MIN: CPT | Mod: GO

## 2024-01-25 PROCEDURE — 85027 COMPLETE CBC AUTOMATED: CPT | Performed by: INTERNAL MEDICINE

## 2024-01-25 PROCEDURE — 2060000001 HC INTERMEDIATE ICU ROOM DAILY

## 2024-01-25 PROCEDURE — 99497 ADVNCD CARE PLAN 30 MIN: CPT | Performed by: NURSE PRACTITIONER

## 2024-01-25 RX ORDER — MIDODRINE HYDROCHLORIDE 10 MG/1
10 TABLET ORAL
Status: DISCONTINUED | OUTPATIENT
Start: 2024-01-25 | End: 2024-01-26 | Stop reason: HOSPADM

## 2024-01-25 RX ORDER — SODIUM CHLORIDE 9 MG/ML
75 INJECTION, SOLUTION INTRAVENOUS CONTINUOUS
Status: DISCONTINUED | OUTPATIENT
Start: 2024-01-25 | End: 2024-01-26 | Stop reason: HOSPADM

## 2024-01-25 RX ADMIN — MIDODRINE HYDROCHLORIDE 10 MG: 10 TABLET ORAL at 17:10

## 2024-01-25 RX ADMIN — PIPERACILLIN SODIUM AND TAZOBACTAM SODIUM 2.25 G: 2; .25 INJECTION, SOLUTION INTRAVENOUS at 21:11

## 2024-01-25 RX ADMIN — LEVOTHYROXINE SODIUM 50 MCG: 0.05 TABLET ORAL at 06:08

## 2024-01-25 RX ADMIN — RIFAXIMIN 550 MG: 550 TABLET ORAL at 21:10

## 2024-01-25 RX ADMIN — PIPERACILLIN SODIUM AND TAZOBACTAM SODIUM 3.38 G: 3; .375 INJECTION, SOLUTION INTRAVENOUS at 06:07

## 2024-01-25 RX ADMIN — MULTIVITAMIN TABLET 1 TABLET: TABLET at 09:00

## 2024-01-25 RX ADMIN — Medication 100 MG: at 09:00

## 2024-01-25 RX ADMIN — FOLIC ACID 1 MG: 1 TABLET ORAL at 09:00

## 2024-01-25 RX ADMIN — MIDODRINE HYDROCHLORIDE 10 MG: 10 TABLET ORAL at 09:00

## 2024-01-25 RX ADMIN — MIDODRINE HYDROCHLORIDE 10 MG: 10 TABLET ORAL at 13:47

## 2024-01-25 RX ADMIN — SODIUM CHLORIDE 75 ML/HR: 900 INJECTION, SOLUTION INTRAVENOUS at 13:46

## 2024-01-25 RX ADMIN — RIFAXIMIN 550 MG: 550 TABLET ORAL at 09:15

## 2024-01-25 RX ADMIN — PIPERACILLIN SODIUM AND TAZOBACTAM SODIUM 2.25 G: 2; .25 INJECTION, SOLUTION INTRAVENOUS at 13:47

## 2024-01-25 RX ADMIN — PANTOPRAZOLE SODIUM 40 MG: 40 TABLET, DELAYED RELEASE ORAL at 09:00

## 2024-01-25 RX ADMIN — PIPERACILLIN SODIUM AND TAZOBACTAM SODIUM 3.38 G: 3; .375 INJECTION, SOLUTION INTRAVENOUS at 00:10

## 2024-01-25 RX ADMIN — POTASSIUM CHLORIDE 40 MEQ: 1500 TABLET, EXTENDED RELEASE ORAL at 09:00

## 2024-01-25 ASSESSMENT — COGNITIVE AND FUNCTIONAL STATUS - GENERAL
WALKING IN HOSPITAL ROOM: TOTAL
MOVING FROM LYING ON BACK TO SITTING ON SIDE OF FLAT BED WITH BEDRAILS: A LITTLE
TOILETING: TOTAL
TOILETING: A LOT
MOVING TO AND FROM BED TO CHAIR: TOTAL
HELP NEEDED FOR BATHING: TOTAL
TURNING FROM BACK TO SIDE WHILE IN FLAT BAD: A LITTLE
CLIMB 3 TO 5 STEPS WITH RAILING: TOTAL
DRESSING REGULAR LOWER BODY CLOTHING: A LOT
DAILY ACTIVITIY SCORE: 16
PERSONAL GROOMING: A LITTLE
HELP NEEDED FOR BATHING: A LOT
CLIMB 3 TO 5 STEPS WITH RAILING: TOTAL
MOVING FROM LYING ON BACK TO SITTING ON SIDE OF FLAT BED WITH BEDRAILS: A LITTLE
TURNING FROM BACK TO SIDE WHILE IN FLAT BAD: A LOT
DRESSING REGULAR UPPER BODY CLOTHING: A LITTLE
DAILY ACTIVITIY SCORE: 13
DAILY ACTIVITIY SCORE: 16
MOVING TO AND FROM BED TO CHAIR: A LOT
DRESSING REGULAR LOWER BODY CLOTHING: A LOT
MOVING FROM LYING ON BACK TO SITTING ON SIDE OF FLAT BED WITH BEDRAILS: A LITTLE
MOBILITY SCORE: 10
CLIMB 3 TO 5 STEPS WITH RAILING: TOTAL
MOBILITY SCORE: 12
DRESSING REGULAR UPPER BODY CLOTHING: A LITTLE
MOVING TO AND FROM BED TO CHAIR: TOTAL
PERSONAL GROOMING: A LITTLE
PERSONAL GROOMING: A LOT
MOBILITY SCORE: 10
HELP NEEDED FOR BATHING: A LOT
TURNING FROM BACK TO SIDE WHILE IN FLAT BAD: A LOT
WALKING IN HOSPITAL ROOM: TOTAL
DRESSING REGULAR LOWER BODY CLOTHING: A LOT
TOILETING: A LOT
STANDING UP FROM CHAIR USING ARMS: A LOT
STANDING UP FROM CHAIR USING ARMS: A LOT
WALKING IN HOSPITAL ROOM: TOTAL
STANDING UP FROM CHAIR USING ARMS: A LOT
DRESSING REGULAR UPPER BODY CLOTHING: A LITTLE

## 2024-01-25 ASSESSMENT — ENCOUNTER SYMPTOMS
SORE THROAT: 0
POLYPHAGIA: 0
SEIZURES: 0
NAUSEA: 0
CHILLS: 0
COLOR CHANGE: 0
POLYDIPSIA: 0
SINUS PAIN: 0
SHORTNESS OF BREATH: 0
NERVOUS/ANXIOUS: 1
CONFUSION: 0
HEMATURIA: 0
COUGH: 0
MYALGIAS: 0
ACTIVITY CHANGE: 0
APPETITE CHANGE: 0
CONSTIPATION: 0
TREMORS: 0
DIFFICULTY URINATING: 0
ARTHRALGIAS: 0
DIARRHEA: 1
ABDOMINAL DISTENTION: 1
WOUND: 0
HEADACHES: 0
FATIGUE: 1
PALPITATIONS: 0
DIZZINESS: 0
ABDOMINAL PAIN: 1
FEVER: 0
WEAKNESS: 1
SLEEP DISTURBANCE: 1
EYE PAIN: 0

## 2024-01-25 ASSESSMENT — PAIN - FUNCTIONAL ASSESSMENT
PAIN_FUNCTIONAL_ASSESSMENT: 0-10
PAIN_FUNCTIONAL_ASSESSMENT: 0-10
PAIN_FUNCTIONAL_ASSESSMENT: FLACC (FACE, LEGS, ACTIVITY, CRY, CONSOLABILITY)
PAIN_FUNCTIONAL_ASSESSMENT: 0-10
PAIN_FUNCTIONAL_ASSESSMENT: 0-10
PAIN_FUNCTIONAL_ASSESSMENT: FLACC (FACE, LEGS, ACTIVITY, CRY, CONSOLABILITY)

## 2024-01-25 ASSESSMENT — PAIN SCALES - GENERAL
PAINLEVEL_OUTOF10: 0 - NO PAIN
PAINLEVEL_OUTOF10: 3
PAINLEVEL_OUTOF10: 3
PAINLEVEL_OUTOF10: 0 - NO PAIN
PAINLEVEL_OUTOF10: 0 - NO PAIN
PAINLEVEL_OUTOF10: 3

## 2024-01-25 ASSESSMENT — ACTIVITIES OF DAILY LIVING (ADL)
BATHING_ASSISTANCE: MODERATE
ADL_ASSISTANCE: INDEPENDENT
ADL_ASSISTANCE: INDEPENDENT

## 2024-01-25 NOTE — PROGRESS NOTES
Occupational Therapy    Evaluation/Treatment    Patient Name: Briseida Owen  MRN: [id number]  : 1975  Today's Date: 24  Time Calculation  Start Time: 1310  Stop Time: 1340  Time Calculation (min): 30 min       Assessment:  Prognosis: Fair  Barriers to Discharge: Decreased caregiver support, Inaccessible home environment  Evaluation/Treatment Tolerance: Patient limited by fatigue  End of Session Patient Position: Bed, 2 rail up, Alarm off, not on at start of session  Prognosis: Fair  Barriers to Discharge: Decreased caregiver support, Inaccessible home environment  Evaluation/Treatment Tolerance: Patient limited by fatigue  Strengths: Coping skills  Barriers to Participation: Comorbidities, Insight into problems  Plan:  Treatment Interventions: ADL retraining, Functional transfer training, UE strengthening/ROM, Endurance training, Patient/family training  OT Frequency: 4 times per week  OT Discharge Recommendations: Moderate intensity level of continued care  OT Recommended Transfer Status: Assist of 2  OT - OK to Discharge: Yes  Treatment Interventions: ADL retraining, Functional transfer training, UE strengthening/ROM, Endurance training, Patient/family training    Subjective   Current Problem:  1. Fever, unspecified fever cause  potassium chloride CR (Klor-Con M20) ER tablet 40 mEq      2. SBP (spontaneous bacterial peritonitis) (CMS/HCC)        3. Body aches        4. Jaundice        5. Left shift        6. Alcohol abuse          General:   OT Received On: 24  General  Reason for Referral: impaired functional mobility. This 48 year old female presented to the ED from home with x/o generealized body aches, fever, and abdominal distention.She was admitted for fever (unspecifiec cause), advanced hepatic cirrhosis, toxic metabolic encephalopathy, Wernicke's encephalopathy, chronic infalmmatory demyelinating polyradiculoneuropathy (CIDP), moderate protein calorie malnutirion, and decompensated  ascites. She underwent paracentesisi for ascites on 1/24/24 with 1.8 L removed.  Past Medical History Relevant to Rehab: alocholic cirrhosis and hepatitis, anemia, CHF, HTN, GI bleed, hepatic encephalopathy, hypothyroid, jaundice, liver cell carcinoma, aascies, hepatic failure, BLE lymphedema, moderate major depression, Lyme disease, paresthesia, BLE paraparesis, sepsis  Co-Treatment: PT  Co-Treatment Reason: to optimize Pt performance  Prior to Session Communication: Bedside nurse  Patient Position Received: Bed, 2 rail up  General Comment: Pt agreed to session and participated as able. She c/o dizziness upon sitting EOB and declined any out of bed activities.  Precautions:  Medical Precautions: Fall precautions, Oxygen therapy device and L/min    Pain:  Pain Assessment  Pain Assessment: 0-10  Pain Score: 3  Pain Type: Acute pain  Pain Location: Hand  Pain Orientation: Left  Pain Interventions: Therapeutic presence    Objective   Cognition:  Overall Cognitive Status: Within Functional Limits    Home Living:  Type of Home: House  Lives With: Significant other  Home Adaptive Equipment: Wheelchair-manual  Home Layout: One level, Full bath main level  Home Access: Stairs to enter without rails  Entrance Stairs-Rails: None  Entrance Stairs-Number of Steps:  (3 total)  Bathroom Shower/Tub: Tub/shower unit  Bathroom Toilet: Standard  Bathroom Equipment: Shower chair with back  Home Living Comments:  (pt sleeps on couch)  Prior Function:  Level of [place]: Independent with ADLs and functional transfers, Needs assistance with homemaking  ADL Assistance: Independent  Homemaking Assistance: Needs assistance  Driving/Transportation: Family/Friend  Ambulatory Assistance: Independent  Prior Function Comments: pt questionable historian    ADL:  Eating Assistance: Independent  Grooming Assistance: Minimal  Bathing Assistance: Moderate  UE Dressing Assistance: Stand by  LE Dressing Assistance: Minimal    Activity  Tolerance:  Endurance: Decreased tolerance for upright activites    Bed Mobility/Transfers: Bed Mobility  Bed Mobility: Yes  Bed Mobility 1  Bed Mobility 1: Supine to sitting  Level of Assistance 1: Close supervision  Bed Mobility Comments 1:  (HOB elevated and used L bed rail)  Bed Mobility 2  Bed Mobility  2: Sitting to supine  Level of Assistance 2: Close supervision  Bed Mobility Comments 2: HOB elevated; toward L side    Transfers  Transfer:  (refused)    Therapy/Activity: Therapeutic Activity  Therapeutic Activity Performed:  (Pt educated on various levels of therapy post DC.)    Sensation:  Light Touch: Partial deficits in the RUE, Partial deficits in the LUE (numbness/tingling)  Strength:  Strength Comments:  (B UE 3-/5 proximal, 3+/5 distal)    Coordination:  Movements are Fluid and Coordinated: No   Hand Function:  Hand Function  Gross Grasp: Functional  Coordination: Functional    Outcome Measures: [facility] Daily Activity  Putting on and taking off regular lower body clothing: A lot  Bathing (including washing, rinsing, drying): A lot  Putting on and taking off regular upper body clothing: A little  Toileting, which includes using toilet, bedpan or urinal: A lot  Taking care of personal grooming such as brushing teeth: A little  Eating Meals: None  Daily Activity - Total Score: 16    Education Documentation  ADL Training, taught by Tricia Samaniego OT at 1/25/2024  2:30 PM.  Learner: Patient  Readiness: Acceptance  Method: Explanation  Response: Verbalizes Understanding    Education Comments  No comments found.    OP EDUCATION:     Goals:  Problem: ADL  Goal: Goal 1  Description: Patient will demonstrate improved ADL skills:  Bathing with Min assist with adaptive equipment/DME    Grooming with SBA assist.   UE Dressing with SBA assist       LE Dressing with Min assist with adaptive equipment       Toileting with Mod assist with adaptive equipment       Outcome: Progressing

## 2024-01-25 NOTE — CONSULTS
.Reason For Consult  Acute kidney injury    History Of Present Illness  Briseida Owen is a 48 y.o. female is [id number] she is known to us from last admission to the hospital we saw her for hyponatremia associated with cirrhosis of the liver and ascites however she had normal kidney function at that time patient is known to have alcoholic cirrhosis of the liver she was brought into the hospital because of severe ascites she underwent paracentesis I was asked see the patient in consultation because of acute kidney injury with high creatinine level the patient is awake and responsive she looks very jaundiced no abdominal pain at this time and no vomiting she does have some nausea     Review of Systems  10 points review of system was done all negative except was positive for the history of present illness  Past Medical History  She has a past medical history of Alcoholic hepatitis without ascites (08/13/2021) and Hepatic encephalopathy (CMS/HCC) (06/09/2021).    Surgical History  She has a past surgical history that includes Other surgical history (06/09/2021); US guided abdominal paracentesis (4/28/2021); US guided abdominal paracentesis (4/23/2021); US guided abdominal paracentesis (5/14/2021); US guided abdominal paracentesis (8/17/2023); US guided abdominal paracentesis (8/25/2023); US guided abdominal paracentesis (10/23/2023); US guided abdominal paracentesis (10/27/2023); US guided abdominal paracentesis (10/26/2023); and US guided abdominal paracentesis (1/4/2024).     Social History  She reports that she has never smoked. She has never used smokeless tobacco. She reports current alcohol use of about 1.0 standard drink of alcohol per week. She reports that she does not use drugs.    Family History  Family History   Problem Relation Name Age of Onset    Diabetes Mother      Uterine cancer Mother      Heart attack Father      Other (CHEMICAL DEPENDENCY) Father      Hypertension Father          Current  Facility-Administered Medications:     acetaminophen (Tylenol) tablet 650 mg, 650 mg, oral, q6h PRN, Alma Alexander MD, 650 mg at 01/24/24 1436    benzocaine-menthol (Cepastat Sore Throat) 15-3.6 mg lozenge 1 lozenge, 1 lozenge, Mouth/Throat, q2h PRN, Corona Jolly MD    dextromethorphan-guaifenesin (Robitussin DM)  mg/5 mL oral liquid 5 mL, 5 mL, oral, q4h PRN, Corona Jolly MD    dextrose 10 % in water (D10W) infusion, 0.3 g/kg/hr, intravenous, Once PRN, Corona Jolly MD    dextrose 50 % injection 25 g, 25 g, intravenous, q15 min PRN, Corona Jolly MD    folic acid (Folvite) tablet 1 mg, 1 mg, oral, Daily, Corona Jolly MD, 1 mg at 01/25/24 0900    glucagon (Glucagen) injection 1 mg, 1 mg, intramuscular, q15 min PRN, Corona Jolly MD    guaiFENesin (Mucinex) 12 hr tablet 600 mg, 600 mg, oral, q12h PRN, Corona Jolly MD    lactulose 20 gram/30 mL oral solution 30 g, 30 g, oral, BID, Corona Jolly MD, 30 g at 01/24/24 0919    levothyroxine (Synthroid, Levoxyl) tablet 50 mcg, 50 mcg, oral, Daily, Corona Jolly MD, 50 mcg at 01/25/24 0608    magnesium oxide (Mag-Ox) tablet 400 mg, 400 mg, oral, Daily, Corona Jolly MD, 400 mg at 01/24/24 0920    midodrine (Proamatine) tablet 10 mg, 10 mg, oral, TID with meals, Alma Alexander MD, 10 mg at 01/25/24 0900    multivitamin 1 tablet, 1 tablet, oral, Daily, Corona Jolly MD, 1 tablet at 01/25/24 0900    [DISCONTINUED] ondansetron (Zofran) tablet 4 mg, 4 mg, oral, q8h PRN **OR** ondansetron (Zofran) injection 4 mg, 4 mg, intravenous, q8h PRN, Corona Jolly MD, 4 mg at 01/24/24 2218    ondansetron ODT (Zofran-ODT) disintegrating tablet 4 mg, 4 mg, oral, q8h PRN, Giovanni Dial PharmD    oxyCODONE (Roxicodone) immediate release tablet 5 mg, 5 mg, oral, q6h PRN, Alma Alexander MD    pantoprazole (ProtoNix) EC tablet 40 mg, 40 mg, oral, Daily, Corona Jolly MD, 40 mg at 01/25/24 0900    piperacillin-tazobactam-dextrose (Zosyn) IV  2.25 g, 2.25 g, intravenous, q6h, Alma Alexander MD    polyethylene glycol (Glycolax, Miralax) packet 17 g, 17 g, oral, Daily PRN, Corona Jolly MD    potassium chloride CR (Klor-Con M20) ER tablet 40 mEq, 40 mEq, oral, TID with meals, Teodoro PORTILLO Lejeune, , 40 mEq at 01/25/24 0900    rifAXIMin (Xifaxan) tablet 550 mg, 550 mg, oral, BID, Corona Jolly MD, 550 mg at 01/25/24 0915    [Held by provider] spironolactone (Aldactone) tablet 25 mg, 25 mg, oral, Daily, Corona Jolly MD    thiamine (Vitamin B-1) tablet 100 mg, 100 mg, oral, Daily, Corona Jolly MD, 100 mg at 01/25/24 0900   Allergies  Sulfamethoxazole-trimethoprim         Physical Exam  Physical Exam  Constitutional:       General: She is not in acute distress.     Appearance: She is ill-appearing.   HENT:      Head: Normocephalic and atraumatic.   Eyes:      Extraocular Movements: Extraocular movements intact.      Pupils: Pupils are equal, round, and reactive to light.      Comments: Sclera is extremely icteric   Neck:      Vascular: No carotid bruit.   Cardiovascular:      Rate and Rhythm: Normal rate and regular rhythm.   Pulmonary:      Effort: No respiratory distress.      Breath sounds: No stridor. No wheezing, rhonchi or rales.   Chest:      Chest wall: No tenderness.   Abdominal:      General: There is no distension.      Palpations: There is no mass.      Tenderness: There is no abdominal tenderness. There is no right CVA tenderness, left CVA tenderness or guarding.      Hernia: No hernia is present.      Comments: Still mild ascites   Musculoskeletal:         General: No swelling or tenderness.      Cervical back: No rigidity.      Right lower leg: No edema.      Left lower leg: No edema.   Lymphadenopathy:      Cervical: No cervical adenopathy.   Skin:     General: Skin is warm and dry.      Coloration: Skin is not jaundiced or pale.      Findings: No bruising or erythema.   Neurological:      General: No focal deficit present.       Mental Status: She is alert and oriented to person, place, and time.              I&O 24HR    Intake/Output Summary (Last 24 hours) at 1/25/2024 1225  Last data filed at 1/25/2024 0801  Gross per 24 hour   Intake 1092 ml   Output 0 ml   Net 1092 ml       Vitals 24HR  Heart Rate:  [69-76]   Temp:  [36.3 °C (97.3 °F)-36.9 °C (98.4 °F)]   Resp:  [14-18]   BP: (79-90)/(38-46)   SpO2:  [95 %-100 %]     Relevant Results        Results for orders placed or performed during the hospital encounter of 01/23/24 (from the past 96 hour(s))   CBC and Auto Differential   Result Value Ref Range    WBC 14.5 (H) 4.4 - 11.3 x10*3/uL    nRBC 0.1 (H) 0.0 - 0.0 /100 WBCs    RBC 1.92 (L) 4.00 - 5.20 x10*6/uL    Hemoglobin 7.2 (L) 12.0 - 16.0 g/dL    Hematocrit 22.2 (L) 36.0 - 46.0 %     (H) 80 - 100 fL    MCH 37.5 (H) 26.0 - 34.0 pg    MCHC 32.4 32.0 - 36.0 g/dL    RDW 24.1 (H) 11.5 - 14.5 %    Platelets 149 (L) 150 - 450 x10*3/uL    Immature Granulocytes %, Automated 0.6 0.0 - 0.9 %    Immature Granulocytes Absolute, Automated 0.08 0.00 - 0.70 x10*3/uL   Comprehensive metabolic panel   Result Value Ref Range    Glucose 145 (H) 65 - 99 mg/dL    Sodium 130 (L) 133 - 145 mmol/L    Potassium 3.2 (L) 3.4 - 5.1 mmol/L    Chloride 94 (L) 97 - 107 mmol/L    Bicarbonate 20 (L) 24 - 31 mmol/L    Urea Nitrogen 12 8 - 25 mg/dL    Creatinine 0.80 0.40 - 1.60 mg/dL    eGFR >90 >60 mL/min/1.73m*2    Calcium 8.3 (L) 8.5 - 10.4 mg/dL    Albumin 3.1 (L) 3.5 - 5.0 g/dL    Alkaline Phosphatase 126 (H) 35 - 125 U/L    Total Protein 5.7 (L) 5.9 - 7.9 g/dL    AST 44 (H) 5 - 40 U/L    Bilirubin, Total 15.4 (H) 0.1 - 1.2 mg/dL    ALT 15 5 - 40 U/L    Anion Gap 16 <=19 mmol/L   Protime-INR   Result Value Ref Range    Protime 22.2 (H) 9.3 - 12.7 seconds    INR 2.2 (H) 0.9 - 1.2   APTT   Result Value Ref Range    aPTT 55.4 (H) 22.0 - 32.5 seconds   Manual Differential   Result Value Ref Range    Neutrophils %, Manual 73.0 40.0 - 80.0 %    Bands %, Manual 7.0  0.0 - 5.0 %    Lymphocytes %, Manual 14.0 13.0 - 44.0 %    Monocytes %, Manual 5.0 2.0 - 10.0 %    Eosinophils %, Manual 0.0 0.0 - 6.0 %    Basophils %, Manual 1.0 0.0 - 2.0 %    Seg Neutrophils Absolute, Manual 10.59 (H) 1.20 - 7.00 x10*3/uL    Bands Absolute, Manual 1.02 (H) 0.00 - 0.70 x10*3/uL    Lymphocytes Absolute, Manual 2.03 1.20 - 4.80 x10*3/uL    Monocytes Absolute, Manual 0.73 0.10 - 1.00 x10*3/uL    Eosinophils Absolute, Manual 0.00 0.00 - 0.70 x10*3/uL    Basophils Absolute, Manual 0.15 (H) 0.00 - 0.10 x10*3/uL    Total Cells Counted 100     Neutrophils Absolute, Manual 11.61 (H) 1.20 - 7.70 x10*3/uL    RBC Morphology See Below     Polychromasia Mild     [place] Cells Many     Acanthocytes Many    Pathologist Review-CBC Differential   Result Value Ref Range    Pathologist Review-CBC Differential       The peripheral blood smear and CBC data are reviewed. Remarkable findings include leukocytosis and macrocytic anemia. There is variation in RBC cell size and shape, including polychromasia and occasional RBC fragments. Correlation with folate and B12 levels is recommended.   RSV PCR   Result Value Ref Range    RSV PCR Not Detected Not Detected   Sars-CoV-2 and Influenza A/B PCR   Result Value Ref Range    Flu A Result Not Detected Not Detected    Flu B Result Not Detected Not Detected    Coronavirus 2019, PCR Not Detected Not Detected   Blood Culture    Specimen: Peripheral Venipuncture; Blood culture   Result Value Ref Range    Blood Culture No growth at 1 day    Blood Culture    Specimen: Peripheral Venipuncture; Blood culture   Result Value Ref Range    Blood Culture No growth at 1 day    Blood Gas Lactic Acid, Venous   Result Value Ref Range    POCT Lactate, Venous 3.6 (H) 0.4 - 2.0 mmol/L   Ammonia   Result Value Ref Range    Ammonia 86 (H) 12 - 45 umol/L   Alcohol   Result Value Ref Range    Alcohol <0.010 0.000 - 0.010 g/dL   hCG, quantitative, pregnancy   Result Value Ref Range    HCG,  Beta-Quantitative <1 SEE COMMENT BELOW mIU/mL   Blood Gas Lactic Acid, Venous   Result Value Ref Range    POCT Lactate, Venous 2.5 (H) 0.4 - 2.0 mmol/L   POCT GLUCOSE   Result Value Ref Range    POCT Glucose 131 (H) 74 - 99 mg/dL   Protime-INR   Result Value Ref Range    Protime 24.0 (H) 9.3 - 12.7 seconds    INR 2.4 (H) 0.9 - 1.2   Comprehensive metabolic panel   Result Value Ref Range    Glucose 119 (H) 65 - 99 mg/dL    Sodium 134 133 - 145 mmol/L    Potassium 2.9 (LL) 3.4 - 5.1 mmol/L    Chloride 99 97 - 107 mmol/L    Bicarbonate 23 (L) 24 - 31 mmol/L    Urea Nitrogen 16 8 - 25 mg/dL    Creatinine 1.30 0.40 - 1.60 mg/dL    eGFR 51 (L) >60 mL/min/1.73m*2    Calcium 8.1 (L) 8.5 - 10.4 mg/dL    Albumin 3.3 (L) 3.5 - 5.0 g/dL    Alkaline Phosphatase 98 35 - 125 U/L    Total Protein 5.4 (L) 5.9 - 7.9 g/dL    AST 36 5 - 40 U/L    Bilirubin, Total 14.4 (H) 0.1 - 1.2 mg/dL    ALT 11 5 - 40 U/L    Anion Gap 12 <=19 mmol/L   CBC and Auto Differential   Result Value Ref Range    WBC 11.7 (H) 4.4 - 11.3 x10*3/uL    nRBC 0.2 (H) 0.0 - 0.0 /100 WBCs    RBC 1.67 (L) 4.00 - 5.20 x10*6/uL    Hemoglobin 6.4 (LL) 12.0 - 16.0 g/dL    Hematocrit 19.5 (L) 36.0 - 46.0 %     (H) 80 - 100 fL    MCH 38.3 (H) 26.0 - 34.0 pg    MCHC 32.8 32.0 - 36.0 g/dL    RDW 23.2 (H) 11.5 - 14.5 %    Platelets 125 (L) 150 - 450 x10*3/uL    Immature Granulocytes %, Automated 0.6 0.0 - 0.9 %    Immature Granulocytes Absolute, Automated 0.07 0.00 - 0.70 x10*3/uL   Magnesium   Result Value Ref Range    Magnesium 1.80 1.60 - 3.10 mg/dL   Bilirubin, Direct   Result Value Ref Range    Bilirubin, Direct 6.7 (H) 0.0 - 0.2 mg/dL   Manual Differential   Result Value Ref Range    Neutrophils %, Manual 72.0 40.0 - 80.0 %    Bands %, Manual 1.0 0.0 - 5.0 %    Lymphocytes %, Manual 19.0 13.0 - 44.0 %    Monocytes %, Manual 4.0 2.0 - 10.0 %    Eosinophils %, Manual 2.0 0.0 - 6.0 %    Basophils %, Manual 0.0 0.0 - 2.0 %    Metamyelocytes %, Manual 2.0 0.0 - 0.0 %     Seg Neutrophils Absolute, Manual 8.42 (H) 1.20 - 7.00 x10*3/uL    Bands Absolute, Manual 0.12 0.00 - 0.70 x10*3/uL    Lymphocytes Absolute, Manual 2.22 1.20 - 4.80 x10*3/uL    Monocytes Absolute, Manual 0.47 0.10 - 1.00 x10*3/uL    Eosinophils Absolute, Manual 0.23 0.00 - 0.70 x10*3/uL    Basophils Absolute, Manual 0.00 0.00 - 0.10 x10*3/uL    Metamyelocytes Absolute, Manual 0.23 0.00 - 0.00 x10*3/uL    Total Cells Counted 100     Neutrophils Absolute, Manual 8.54 (H) 1.20 - 7.70 x10*3/uL    RBC Morphology See Below     Polychromasia Mild     RBC Fragments Few     [place] Cells Many     Acanthocytes Many    Prepare RBC: 1 Units   Result Value Ref Range    PRODUCT CODE T8895P27     Unit Number [id number]     Unit ABO O     Unit RH POS     XM INTEP COMP     Dispense Status TR     Blood Expiration Date February 08, 2024 23:59 EST     PRODUCT BLOOD TYPE 5100     UNIT VOLUME 400    Type and screen   Result Value Ref Range    ABO TYPE O     Rh TYPE POS     ANTIBODY SCREEN NEG    POCT GLUCOSE   Result Value Ref Range    POCT Glucose 100 (H) 74 - 99 mg/dL   POCT GLUCOSE   Result Value Ref Range    POCT Glucose 124 (H) 74 - 99 mg/dL   CBC   Result Value Ref Range    WBC 8.6 4.4 - 11.3 x10*3/uL    nRBC 0.3 (H) 0.0 - 0.0 /100 WBCs    RBC 2.03 (L) 4.00 - 5.20 x10*6/uL    Hemoglobin 7.3 (L) 12.0 - 16.0 g/dL    Hematocrit 22.5 (L) 36.0 - 46.0 %     (H) 80 - 100 fL    MCH 36.0 (H) 26.0 - 34.0 pg    MCHC 32.4 32.0 - 36.0 g/dL    RDW      Platelets 128 (L) 150 - 450 x10*3/uL   Basic Metabolic Panel   Result Value Ref Range    Glucose 107 (H) 65 - 99 mg/dL    Sodium 138 133 - 145 mmol/L    Potassium 4.0 3.4 - 5.1 mmol/L    Chloride 102 97 - 107 mmol/L    Bicarbonate 21 (L) 24 - 31 mmol/L    Urea Nitrogen 24 8 - 25 mg/dL    Creatinine 2.10 (H) 0.40 - 1.60 mg/dL    eGFR 29 (L) >60 mL/min/1.73m*2    Calcium 8.4 (L) 8.5 - 10.4 mg/dL    Anion Gap 15 <=19 mmol/L   aPTT   Result Value Ref Range    aPTT 68.0 (H) 22.0 - 32.5  seconds   POCT GLUCOSE   Result Value Ref Range    POCT Glucose 100 (H) 74 - 99 mg/dL          Assessment/Plan     US guided abdominal paracentesis    Result Date: 1/24/2024  Interpreted By:  Huy Alvarez, STUDY: US GUIDED ABDOMINAL PARACENTESIS; 1/24/2024 11:16 am   INDICATION: Signs/Symptoms:Abdominal distention.   COMPARISON: None.   ACCESSION NUMBER(S): [id number]   ORDERING CLINICIAN: SALINAS JUAREZ   TECHNIQUE: Ultrasound-guided paracentesis   FINDINGS: Informed consent obtained. Patient positioned supine. Right lower quadrant prepped, draped and anesthetized. Under ultrasound guidance, 8 Setswana centesis sheath needle inserted into peritoneal cavity. 1.8 Lof serosanguineousfluid aspirated. Patient tolerated procedure well       Ultrasound-guided paracentesis.   Signed by: Huy Alvarez 1/24/2024 1:03 PM Dictation workstation:   [id number]    US abdomen limited    Result Date: 1/23/2024  STUDY: Abdomen limited Ultrasound; Completed Time: 1/23/2024 10:48 PM INDICATION: Ascites. COMPARISON: US abdomen limited 1/6/2024. ACCESSION NUMBER(S): [id number] ORDERING CLINICIAN: SALINAS JUAREZ FINDINGS: Ascites is present.  The largest pocket is visualized within the left upper quadrant.      Ascites is present with the largest pocket located within the left upper quadrant.  The quantity of free fluid in the abdomen appear significantly increased as compared to 1/6/2024 prior study. Signed by Dylan Townsend MD    XR chest 1 view    Result Date: 1/23/2024  Interpreted By:  Bre Nicholson, STUDY: XR CHEST 1 VIEW; 1/23/2024 7:48 pm   INDICATION: Signs/Symptoms:fever   COMPARISON: 01/05/2025   ACCESSION NUMBER(S): [id number]   ORDERING CLINICIAN: MATTHEW THOMPSON   TECHNIQUE: Frontal  chest radiographs.   FINDINGS: The cardiomediastinal silhouette is unremarkable. The lungs are clear. No pleural effusion is identified.   The osseous structures are intact.       No acute cardiopulmonary process.       Signed by: Bre  Rajdev 1/23/2024 7:50 PM Dictation workstation:   [id number]    Impression;  Acute kidney injury this could be very well intravascular volume depletion and prerenal azotemia however cannot rule out hepatorenal syndrome  Alcoholic cirrhosis of the liver  Severe jaundice  Recurrent ascites    Recommendations:  Urine sodium  Urine osmolarity  Gentle IV hydration with isotonic normal saline at 75 mL an hour  Avoid nephrotoxic medications no indication for dialysis  Continue to monitor renal function very closely  Thank you for your consultation      Onur Wilkerson MDInpatient consult to Nephrology  Consult performed by: Onur Wilkerson MD  Consult ordered by: Alma Alexander MD

## 2024-01-25 NOTE — CARE PLAN
Problem: Pain  Goal: My pain/discomfort is manageable  Outcome: Progressing     Problem: Safety  Goal: Patient will be injury free during hospitalization  Outcome: Progressing  Goal: I will remain free of falls  Outcome: Progressing     Problem: Daily Care  Goal: Daily care needs are met  Outcome: Progressing     Problem: Psychosocial Needs  Goal: Demonstrates ability to cope with hospitalization/illness  Outcome: Progressing  Goal: Collaborate with me, my family, and caregiver to identify my specific goals  Outcome: Progressing     Problem: Discharge Barriers  Goal: My discharge needs are met  Outcome: Progressing   The patient's goals for the shift include      The clinical goals for the shift include replace K+, no falls    Over the shift, the patient did make progress toward the goals.

## 2024-01-25 NOTE — PROGRESS NOTES
Physical Therapy    Physical Therapy Evaluation    Patient Name: Briseida Owen  MRN: [id number]  Today's Date: 1/25/2024   Time Calculation  Start Time: 1309  Stop Time: 1328  Time Calculation (min): 19 min    Assessment/Plan   PT Assessment  PT Assessment Results: Decreased strength, Decreased range of motion, Decreased endurance, Impaired balance, Decreased mobility, Decreased safety awareness, Pain  Rehab Prognosis: Good  Strengths: Support and attitude of living partners, Access to adaptive/assistive products  Barriers to Participation: Comorbidities  Assessment Comment: Pt presented with decreased muscular strength/endurance, impaired BLE AROM, and increased assist required for functional mobility. She required close S for supine<>sit at hospital bed today. She would benefit from continued skilled PT services prior to and after discharge for maximizing independence and safety with functional mobility.  End of Session Patient Position: Bed, 2 rail up, Alarm off, not on at start of session (call light and needs within reach)  IP OR SWING BED PT PLAN  Inpatient or Swing Bed: Inpatient  PT Plan  Treatment/Interventions: Bed mobility, Transfer training, Gait training, Stair training, Balance training, Strengthening, Endurance training, Therapeutic exercise, Therapeutic activity  PT Plan: Skilled PT  PT Frequency: 4 times per week  PT Discharge Recommendations: Moderate intensity level of continued care  PT Recommended Transfer Status:  (Assist x1-2 with FWW)  PT - OK to Discharge: Yes (with continued skilled PT services at next level of care)      Subjective   General Visit Information:  General  Reason for Referral: impaired functional mobility. 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 She underwent paracentesisi for ascites on 1/24/24 with 1.8 L removed.  Past Medical History Relevant to Rehab: alocholic cirrhosis and hepatitis, anemia, CHF, HTN, GI bleed, hepatic encephalopathy, hypothyroid, jaundice, liver cell carcinoma, aascies, hepatic failure, BLE lymphedema, moderate major depression, Lyme disease, paresthesia, BLE paraparesis, sepsis  Missed Visit: Yes  Missed Visit Reason: Cancel (Order received and chart reviewed. Pt with low K+ (2.9) and H/H (6.4/19.5) this date. Will hold eval at this time given lab values. Of note, pt scheduled for paracentesis today at 11:00 for ascites.)  Co-Treatment: OT  Co-Treatment Reason: for maximal safety with transfers however pt declined sit<>stand this date  Prior to Session Communication: Bedside nurse (RN cleared pt for participation.)  Patient Position Received: Bed, 2 rail up (HOB elevated; alarm off)  General Comment: Pt agreed to session and participated as able. She c/o dizziness upon sitting EOB and declined any out of bed activities.    Home Living:  Home Living  Type of Home: House  Lives With: Significant other (boyfriend who works full time)  Home Adaptive Equipment: Wheelchair-manual (FWW)  Home Layout: One level, Full bath main level (bathroom being renovated, 2nd bathroom in basement (1 flight with unilateral rail))  Home Access: Stairs to enter without rails  Entrance Stairs-Rails: None  Entrance Stairs-Number of Steps: 1 + 2 (1 threshold step then 2 stairs with unilateral rail)  Bathroom Shower/Tub: Tub/shower unit (upstairs. Walk-in shower in basement.)  Bathroom Toilet: Standard  Bathroom Equipment: Shower chair with back  Home Living Comments: pt sleeps on couch    Prior Level of Function:  Prior Function Per Pt/Caregiver Report  Level of [place]: Independent with ADLs and functional transfers, Needs  assistance with homemaking (indep with amb within home)  ADL Assistance: Independent  Homemaking Assistance: Needs assistance  Driving/Transportation: Family/Friend (boyfriend provided transportation)  Ambulatory Assistance: Independent (reported furniture walking within home & used WC in community. Stairs with supervision.)  Prior Function Comments: pt questionable historian    Precautions:  Precautions  Medical Precautions: Fall precautions, Oxygen therapy device and L/min (3 L/min O2 via NC)    Objective   Pain:  Pain Assessment  Pain Assessment: FLACC (Face, Legs, Activity, Cry, Consolability)  Pain Score: 3  Pain Type: Acute pain  Pain Location: Hand  Pain Orientation: Left  Pain Interventions: Repositioned    Cognition:  Cognition  Overall Cognitive Status: Within Functional Limits    General Assessments:  Activity Tolerance  Endurance: Decreased tolerance for upright activites (c/o dizziness upon sitting EOB and returned to supine after <5 minutes sitting)    Sensation  Light Touch:  (reported paresthesias in bilateral hands which she attributed to CIDP)    Strength  Strength Comments: BLE grossly 3-/5 based on observation of mobility    Perception  Motor Planning: Appears intact    Postural Control  Postural Control: Within Functional Limits    Static Sitting Balance  Static Sitting-Balance Support: Feet unsupported (variable UE support from none to BUE on EOB)  Static Sitting-Level of Assistance: Close supervision  Static Sitting-Comment/Number of Minutes: <5 minutes    Functional Assessments:  Bed Mobility  Bed Mobility: Yes  Bed Mobility 1  Bed Mobility 1: Supine to sitting  Level of Assistance 1: Close supervision  Bed Mobility Comments 1: HOB elevated and used L bed rail  Bed Mobility 2  Bed Mobility  2: Sitting to supine  Level of Assistance 2: Close supervision  Bed Mobility Comments 2: HOB elevated; toward L side    Transfers  Transfer: No (Pt declined practice this date due to dizziness upon sitting  EOB.)    Extremity/Trunk Assessments:  RLE   RLE : Within Functional Limits (except DF to neutral and shortended hamstrings)  LLE   LLE : Within Functional Limits (except DF to neutral and shortended hamstrings)    Outcome Measures:  [facility] Basic Mobility  Turning from your back to your side while in a flat bed without using bedrails: A little  Moving from lying on your back to sitting on the side of a flat bed without using bedrails: A lot  Moving to and from bed to chair (including a wheelchair): Total  Standing up from a chair using your arms (e.g. wheelchair or bedside chair): A lot  To walk in hospital room: Total  Climbing 3-5 steps with railing: Total  Basic Mobility - Total Score: 10    Encounter Problems       Encounter Problems (Active)       Mobility       STG - Patient will ambulate >/=50 ft with FWW at mod I level.       Start:  01/25/24    Expected End:  02/15/24            STG - Patient will ambulate up and down a platform step with FWW at close S.       Start:  01/25/24    Expected End:  02/15/24            STG - Patient will ascend and descend 2 stairs with unilateral rail with close S.       Start:  01/25/24    Expected End:  02/15/24               Transfers       STG - Patient to transfer supine<>sit at mod I level.       Start:  01/25/24    Expected End:  02/15/24            STG - Patient will transfer sit<>stand with FWW at mod I level.       Start:  01/25/24    Expected End:  02/15/24                   Education Documentation  Mobility Training, taught by Latisha Gama PT at 1/25/2024  1:57 PM.  Learner: Patient  Readiness: Eager  Method: Explanation  Response: Needs Reinforcement    Education Comments  No comments found.

## 2024-01-25 NOTE — NURSING NOTE
Rounded on pt. Pt laying in bed, alert, aox3-4. Pt denies pain and has no needs or complaints at this time. Pt oriented to call bell and it and belongings are placed in reach. Telemetry leads connected and reading on monitor. Bed alarm set. NC O2 in place and connected to flowmeter. Continuing to monitor.

## 2024-01-25 NOTE — NURSING NOTE
Spoke to Dr. Machuca (spelling?) and informed him pt was having a hard time tolerating the liquid potassium that she still had a mouthful or two of her previous dose to finish. I asked if we could try the potassium tablets. He ok'ed the request and stated I could place the order. Continuing to monitor.   02-May-2018

## 2024-01-25 NOTE — PROGRESS NOTES
Briseida Owen is a 48 y.o. female on day 2 of admission presenting with Fever, unspecified fever cause.      Subjective   Patient is upset about having diarrhea after lactulose.  She denies abdominal pain, denies fever.  Patient had paracentesis yesterday, he had 1.8 L of fluid removed.  Received blood transfusion.  No melena, no blood in the stool.       Objective     Last Recorded Vitals  BP (!) 79/41 (BP Location: Left arm, Patient Position: Lying)   Pulse 75   Temp 36.4 °C (97.5 °F) (Oral)   Resp 15   Wt 80.5 kg (177 lb 7.5 oz)   SpO2 95%   Intake/Output last 3 Shifts:    Intake/Output Summary (Last 24 hours) at 1/25/2024 0946  Last data filed at 1/25/2024 0801  Gross per 24 hour   Intake 1382 ml   Output 0 ml   Net 1382 ml         Admission Weight  Weight: 80.5 kg (177 lb 7.5 oz) (01/23/24 1918)    Daily Weight  01/23/24 : 80.5 kg (177 lb 7.5 oz)    Image Results  US guided abdominal paracentesis  Narrative: Interpreted By:  Huy Alvarez,   STUDY:  US GUIDED ABDOMINAL PARACENTESIS; 1/24/2024 11:16 am      INDICATION:  Signs/Symptoms:Abdominal distention.      COMPARISON:  None.      ACCESSION NUMBER(S):  [id number]      ORDERING CLINICIAN:  SALINAS JUAREZ      TECHNIQUE:  Ultrasound-guided paracentesis      FINDINGS:  Informed consent obtained. Patient positioned supine. Right lower  quadrant prepped, draped and anesthetized. Under ultrasound guidance,  8 [place] centesis sheath needle inserted into peritoneal cavity. 1.8  Lof serosanguineousfluid aspirated. Patient tolerated procedure well      Impression: Ultrasound-guided paracentesis.      Signed by: Huy Alvarez 1/24/2024 1:03 PM  Dictation workstation:   [id number]      Physical Exam  Pt is NAD.  Looks depressed  Cooperative with exam.  In no distress at rest.  A, Ox3.  Face is symmetrical.  Skin - no lesions.  Lungs: clear to auscultations B/L. No wheezes, rales, rhonchi. Diminished at bases  Heart: regular S1S2.  Abdomen: soft, NT, distended  with ascites. BS positive.  Extr.: no edema, cords, cyanosis.  Moves all extr.   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 IV Zosyn, Diagnostic and therapeutic paracentesis today. GI consult.  Liver cirrhosis due to ETOH with ascites, portal HTN, coagulopathy. INR is high. Vit K 5  mg PO, monitor PT/INR  Anemia, chronic. No active bleeding? Transfuse 1 unit today. Obtained the informed consent for Pt.  Hypokalemia, replace, monitor  Low blood pressure.A typical for liver cirrhosis, Start Midodrine. Hold diuretics.  Overall prognosis guarded. Pt has an advanced liver cirrhosis.    1/25: Status post paracentesis yesterday.  Fluid analysis pending.  Adjust dose of Zosyn according to renal function  Acute renal failure on chronic.,  Likely due to combination of hypertension and possible hepatorenal syndrome.  Consult nephrologist.  Continue holding diuresis, hold Aldactone as well  Low blood pressure.  Increase midodrine to 10 mg 3 times a day  Hemoglobin is stable after transfusion.  No evidence of active bleeding.  Coagulopathy.  Patient had vitamin K yesterday  Consult palliative care to discuss CODE STATUS and possible hospice option.         Alma Alexander MD

## 2024-01-25 NOTE — PROGRESS NOTES
"Briseida Owen is a 48 y.o. female on day 2 of admission presenting with Fever, unspecified fever cause.    Subjective   No acute events overnight.  No bleeding tolerating diet.       Objective     Physical Exam  Vitals reviewed.   Constitutional:       General: She is not in acute distress.  HENT:      Head: Normocephalic.   Eyes:      Pupils: Pupils are equal, round, and reactive to light.   Cardiovascular:      Rate and Rhythm: Normal rate and regular rhythm.      Heart sounds: No murmur heard.     No gallop.   Pulmonary:      Breath sounds: Normal breath sounds. No stridor. No wheezing, rhonchi or rales.   Abdominal:      General: Bowel sounds are normal. There is no distension.      Palpations: Abdomen is soft.      Tenderness: There is no abdominal tenderness. There is no guarding or rebound.   Skin:     General: Skin is warm.      Capillary Refill: Capillary refill takes less than 2 seconds.      Coloration: Skin is jaundiced. Skin is not pale.   Neurological:      Mental Status: She is alert and oriented to person, place, and time.         Last Recorded Vitals  Blood pressure (!) 89/40, pulse 76, temperature 36.9 °C (98.4 °F), temperature source Axillary, resp. rate 17, height 1.575 m (5' 2\"), weight 80.5 kg (177 lb 7.5 oz), SpO2 96 %.  Intake/Output last 3 Shifts:  I/O last 3 completed shifts:  In: 2860 (35.5 mL/kg) [P.O.:660; Blood:300; IV Piggyback:1900]  Out: - (0 mL/kg)   Weight: 80.5 kg     Relevant Results  Results for orders placed or performed during the hospital encounter of 01/23/24 (from the past 24 hour(s))   POCT GLUCOSE   Result Value Ref Range    POCT Glucose 124 (H) 74 - 99 mg/dL   CBC   Result Value Ref Range    WBC 8.6 4.4 - 11.3 x10*3/uL    nRBC 0.3 (H) 0.0 - 0.0 /100 WBCs    RBC 2.03 (L) 4.00 - 5.20 x10*6/uL    Hemoglobin 7.3 (L) 12.0 - 16.0 g/dL    Hematocrit 22.5 (L) 36.0 - 46.0 %     (H) 80 - 100 fL    MCH 36.0 (H) 26.0 - 34.0 pg    MCHC 32.4 32.0 - 36.0 g/dL    RDW      " Platelets 128 (L) 150 - 450 x10*3/uL   Basic Metabolic Panel   Result Value Ref Range    Glucose 107 (H) 65 - 99 mg/dL    Sodium 138 133 - 145 mmol/L    Potassium 4.0 3.4 - 5.1 mmol/L    Chloride 102 97 - 107 mmol/L    Bicarbonate 21 (L) 24 - 31 mmol/L    Urea Nitrogen 24 8 - 25 mg/dL    Creatinine 2.10 (H) 0.40 - 1.60 mg/dL    eGFR 29 (L) >60 mL/min/1.73m*2    Calcium 8.4 (L) 8.5 - 10.4 mg/dL    Anion Gap 15 <=19 mmol/L   aPTT   Result Value Ref Range    aPTT 68.0 (H) 22.0 - 32.5 seconds   POCT GLUCOSE   Result Value Ref Range    POCT Glucose 100 (H) 74 - 99 mg/dL        Result Date: 1/24/2024  Interpreted By:  Huy Alvarez, STUDY: US GUIDED ABDOMINAL PARACENTESIS; 1/24/2024 11:16 am   INDICATION: Signs/Symptoms:Abdominal distention.   COMPARISON: None.   ACCESSION NUMBER(S): [id number]   ORDERING CLINICIAN: SALINAS JUAREZ   TECHNIQUE: Ultrasound-guided paracentesis   FINDINGS: Informed consent obtained. Patient positioned supine. Right lower quadrant prepped, draped and anesthetized. Under ultrasound guidance, 8 Fijian centesis sheath needle inserted into peritoneal cavity. 1.8 Lof serosanguineousfluid aspirated. Patient tolerated procedure well       Ultrasound-guided paracentesis.   Signed by: Huy Alvarez 1/24/2024 1:03 PM Dictation workstation:   [id number]    US abdomen limited    Result Date: 1/23/2024  STUDY: Abdomen limited Ultrasound; Completed Time: 1/23/2024 10:48 PM INDICATION: Ascites. COMPARISON: US abdomen limited 1/6/2024. ACCESSION NUMBER(S): [id number] ORDERING CLINICIAN: SALINAS JUAREZ FINDINGS: Ascites is present.  The largest pocket is visualized within the left upper quadrant.      Ascites is present with the largest pocket located within the left upper quadrant.  The quantity of free fluid in the abdomen appear significantly increased as compared to 1/6/2024 prior study. Signed by Dylan Townsend MD    XR chest 1 view    Result Date: 1/23/2024  Interpreted By:  Bre Nicholson, STUDY:  XR CHEST 1 VIEW; 1/23/2024 7:48 pm   INDICATION: Signs/Symptoms:fever   COMPARISON: 01/05/2025   ACCESSION NUMBER(S): [id number]   ORDERING CLINICIAN: MATTHEW THOMPSON   TECHNIQUE: Frontal  chest radiographs.   FINDINGS: The cardiomediastinal silhouette is unremarkable. The lungs are clear. No pleural effusion is identified.   The osseous structures are intact.       No acute cardiopulmonary process.       Signed by: Bre Nicholson 1/23/2024 7:50 PM Dictation workstation:   [id number]    EGD w Banding    Result Date: 1/8/2024  Table formatting from the original result was not included. Impression Three medium and moderate grade II varices in the middle third of the esophagus and lower third of the esophagus; placed 7 bands successfully, resulting in complete eradication Moderate and mosaic portal hypertensive gastropathy in the cardia, fundus of the stomach and body of the stomach Food bolus in the cardia, fundus of the stomach and body of the stomach The duodenal bulb, 1st part of the duodenum and 2nd part of the duodenum appeared normal. Findings Three medium and moderate grade II varices (no red charlie sign) in the middle third of the esophagus and lower third of the esophagus; no bleeding was identified; placed 7 bands successfully, resulting in complete eradication Moderate, diffuse and mosaic portal hypertensive gastropathy in the cardia, fundus of the stomach and body of the stomach; no bleeding was identified Food bolus in the cardia, fundus of the stomach and body of the stomach The duodenal bulb, 1st part of the duodenum and 2nd part of the duodenum appeared normal. Recommendation  Repeat EGD in one month. Resume current medications . Soft diet today.  Indication Alcoholic cirrhosis of liver with ascites (CMS/HCC), Esophageal varices without bleeding, unspecified esophageal varices type (CMS/HCC) Staff Staff Role No Staff Documented Medications See Anesthesia Record. Preprocedure A history and physical has  been performed, and patient medication allergies have been reviewed. The patient's tolerance of previous anesthesia has been reviewed. The risks and benefits of the procedure and the sedation options and risks were discussed with the patient. All questions were answered and informed consent obtained. Details of the Procedure The patient underwent monitored anesthesia care, which was administered by an anesthesia professional. The patient's level of consciousness, heart rate, ETCO2, ECG, respirations, oxygen and blood pressure were monitored throughout the procedure. The scope was introduced through the mouth and advanced to the second part of the duodenum. Retroflexion was performed in the cardia, fundus and incisura. Prior to the procedure, the patient's H. Pylori status was negative. The patient's estimated blood loss was minimal (<5 mL). The procedure was not difficult. The patient tolerated the procedure well. There were no apparent adverse events. Events No data recorded Specimens Procedure Location [address] [id number] [phone] Referring Provider No referring provider defined for this encounter. Procedure Provider No name on file    US abdomen limited    Result Date: 1/6/2024  Interpreted By:  Bre Nicholson, STUDY: US ABDOMEN LIMITED; 11:32 am   INDICATION: Signs/Symptoms:ascites.   COMPARISON: None.   ACCESSION NUMBER(S): [id number]   ORDERING CLINICIAN: BERKLEY ALBERTS   TECHNIQUE: Limited abdominal ultrasound of the right upper quadrant was performed utilizing gray scale imaging.   FINDINGS: Trace amount of fluid is noted within the left upper and lower quadrants of the abdomen.       Trace amount of fluid noted within the left upper and lower quadrant the abdomen.   MACRO: None.   Signed by: Bre Nicholson 1/6/2024 11:33 AM Dictation workstation:   [id number]    XR chest 1 view    Result Date: 1/5/2024  Interpreted  By:  Tomi Reynoso, STUDY: XR CHEST 1 VIEW; 1/5/2024 10:16 am   INDICATION: pleural effusion.   COMPARISON: January 3, 2024   ACCESSION NUMBER(S): [id number]   ORDERING CLINICIAN: MARICARMEN SANTIAGO   FINDINGS: RESULT:  The cardiac silhouette is within normal limits for size. Mediastinal contours are unremarkable. Left basilar opacity is again noted obscuring the left hemidiaphragm, not significantly changed compared to previous exam. The osseous structures are unremarkable.       Left basilar opacity obscuring the left hemidiaphragm is again noted which may represent pleural fluid and atelectasis. Underlying infiltrate is not excluded.     Signed by: Tomi Reynoso 1/5/2024 10:48 AM Dictation workstation:   [id number]    US guided abdominal paracentesis    Result Date: 1/4/2024  Interpreted By:  Huy Alvarez, STUDY: US GUIDED ABDOMINAL PARACENTESIS; 1/4/2024 9:25 am   INDICATION: Signs/Symptoms:ascites.   COMPARISON: None.   ACCESSION NUMBER(S): [id number]   ORDERING CLINICIAN: MATTHEW MAYORGA   TECHNIQUE: Ultrasound-guided paracentesis   FINDINGS: Informed consent obtained. Patient positioned supine. Left lower quadrant prepped, draped and anesthetized. Under ultrasound guidance, 8 Taiwanese centesis sheath needle inserted into peritoneal cavity. 3.1of dark yellowfluid aspiratedwith sample sent for analysis. Patient tolerated procedure well       Ultrasound-guided paracentesis.   Signed by: Huy Alvarez 1/4/2024 10:51 AM Dictation workstation:   [id number]    CT abdomen pelvis w IV contrast    Result Date: 1/4/2024  STUDY: CT Abdomen and Pelvis with IV Contrast; 1/4/2024 1:46 AM. INDICATION: Abdominal pain. COMPARISON: US abd 10/27/2023 .  CT AP 10/21/2023 ACCESSION NUMBER(S): [id number] ORDERING CLINICIAN: JAZMINE GILLESPIE TECHNIQUE: CT of the abdomen and pelvis was performed.  Contiguous axial images were obtained at 3 mm slice thickness through the abdomen and pelvis. Coronal and sagittal reconstructions at  3 mm slice thickness were performed.  Omnipaque 350 75 mL was administered intravenously.  FINDINGS: LOWER CHEST: Cardiac size is mildly enlarged.  No pericardial effusion.  Moderate to large left pleural effusion is noted with partial collapse of the visualized portion of the left lower lobe. There is suggestion of a right breast prosthesis partially visualized.   ABDOMEN:  LIVER: No hepatomegaly.  Liver demonstrates a markedly cirrhotic morphology with a nodular hepatic contour and hypertrophy of the left hepatic lobe.  Simple fluid density cyst is seen in the anterior segment of the right hepatic lobe measuring approximately 1 cm in diameter. Normal attenuation.  BILE DUCTS: No intrahepatic or extrahepatic biliary ductal dilatation.  GALLBLADDER: The gallbladder is distended and contains gallstones in the gallbladder neck. STOMACH: No abnormalities identified.  PANCREAS: No masses or ductal dilatation.  SPLEEN: Spleen is borderline enlarged.  No focal splenic lesion.  ADRENAL GLANDS: No thickening or nodules.  KIDNEYS AND URETERS: Kidneys are normal in size and location.  No renal or ureteral calculi.  PELVIS:  BLADDER: No abnormalities identified.  REPRODUCTIVE ORGANS: No abnormalities identified.  BOWEL: Appendix is not definitively identified.  Terminal ileum is unremarkable.  No abnormalities identified.  VESSELS: No abnormalities identified.  Abdominal aorta is normal in caliber.  PERITONEUM/RETROPERITONEUM/LYMPH NODES: Large volume of abdominal and pelvic ascites is noted.  No pneumoperitoneum. No lymphadenopathy.  ABDOMINAL WALL: No abnormalities identified. SOFT TISSUES: There is a fluid-filled small indirect right inguinal hernia.  BONES: No acute fracture or aggressive osseous lesion.    Cirrhotic morphology of the liver with a large volume of abdominal and pelvic ascites. Cholelithiasis. Fluid-filled small right inguinal hernia. Moderate to large left pleural effusion with partial collapse of the  visualized portion of the left lower lobe. Borderline splenomegaly. Signed by Bartolo Torres    XR chest 1 view    Result Date: 1/3/2024  STUDY: Chest Radiograph;  01/03/2024 at 9:13 PM INDICATION: Weakness. COMPARISON: XR chest 10/21/23, 04/25/21. ACCESSION NUMBER(S): [id number] ORDERING CLINICIAN: Nomi Anaya TECHNIQUE:  Frontal chest was obtained at 2113 hours. FINDINGS: There is dense retrocardiac consolidation in the left lower lobe, suggesting pneumonia. There is likely left pleural effusion. Heart is top normal size with central vascular prominence. There is no pneumothorax.    Retrocardiac consolidation left lower lobe suggests pneumonia, with likely left pleural effusion. Follow-up advised. Signed by Michael Lewis MD    * Cannot find OR log *  Last relevant procedure:                          Assessment/Plan   Principal Problem:    Fever, unspecified fever cause  Active Problems:    Advanced hepatic cirrhosis (CMS/HCC)    Alcohol abuse with unspecified alcohol-induced disorder (CMS/HCC)    Alcohol abuse    Toxic metabolic encephalopathy    Wernicke's encephalopathy    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 Goal for 2-3 stools daily. Monitor given low Na          EV: Jan 2023 grade II EV banded x 7, recommend repeat EGD next month         HCC: AFP up to date         Ascites: Chronic recurrent paracenteses, paracentesis on 1/24 with 1.8 L removed -Fluid analysis negative for SBP              -Low Na diet               -Continue diuretics      2.    Anemia         Macrocytic anemia. Stable HH. No overt bleeding          - monitor H/H q 6 for a goal >7         - continue PPI     3.    Alcohol Abuse,          -Still drinking. Educated on her  high risk of death with decomp cirrhosis and ETOH abuse.        Syd Moreno, APRN-CNP

## 2024-01-25 NOTE — CONSULTS
Inpatient consult to Palliative Care  Consult performed by: Marisa Clement, ULISSES-CNP  Consult ordered by: Alma Alexander MD  Reason for consult: Goals of Care          Reason For Consult  Reason for Consult: communication / medical decision making     History Of Present Illness  Briseida Owen is a 48 y.o. female with past medical history of Current ETOH abuse,  Liver disease is presenting with Abdominal pain and distention. Patient presented from home with complaints of generalized aches, abdominal pain and distention, fever. Workup in er remarkable for WBC 14.5, low hgb at 7.2, INR 2.2, Na 130, albumin 3.1, Ttl bilirubin 15.4. Tmax 38.8. Previously hospitalized early January for decompensated cirrhosis with ascites, paracentesis with 3L out at that time, underwent EGD with banding of 7 varices, noted to have gastropathy. Patient was admitted, started on antibiotics, underwent paracentesis 1/24 with 1.8L out, no indications on fluid analysis of SBP but continued on treatment. Noted to have DONTRELL, started on IVF per nephrology. Currently she is wearing 3L NC. Previous imaging indicating mod to large L pleural effusion. No cough or dyspnea. She denies abd pain currently or fever. Her only complaint is neuropathic pain to her legs. She is not confused.      Symptoms (0 - 10, Best to Worst)  [place] Symptom Assessment System  Pain Score: 3    BM in last 48 hours? yes    Emotional/Psychological/Spiritual Needs  Over the past two weeks, how often has the patient been bothered by having little interest or pleasure in doing things?  occurrence (last two weeks): not at all    Over the past two weeks, how often has the patient been bothered by feeling down, depressed, or hopeless?  occurrence (last two weeks): not at all     Personal/Social History    She reports that she has never smoked. She has never used smokeless tobacco. She reports current alcohol use of about 1.0 standard drink of alcohol per week. She  reports that she does not use drugs.    Functional Status        Caregiving/Caregiver Support  Does the patient require assistance in some or all components of his care, including coordination of medical care? Yes  If Yes, which person serves that role?  caregiver   Caregiver emotional or practical needs:      Past Medical History  She has a past medical history of Alcoholic hepatitis without ascites (08/13/2021) and Hepatic encephalopathy (CMS/HCC) (06/09/2021).    Surgical History  She has a past surgical history that includes Other surgical history (06/09/2021); US guided abdominal paracentesis (4/28/2021); US guided abdominal paracentesis (4/23/2021); US guided abdominal paracentesis (5/14/2021); US guided abdominal paracentesis (8/17/2023); US guided abdominal paracentesis (8/25/2023); US guided abdominal paracentesis (10/23/2023); US guided abdominal paracentesis (10/27/2023); US guided abdominal paracentesis (10/26/2023); and US guided abdominal paracentesis (1/4/2024).     Family History  Family History   Problem Relation Name Age of Onset    Diabetes Mother      Uterine cancer Mother      Heart attack Father      Other (CHEMICAL DEPENDENCY) Father      Hypertension Father       Allergies  Sulfamethoxazole-trimethoprim    Review of Systems   Constitutional:  Positive for fatigue. Negative for activity change, appetite change, chills and fever.   HENT:  Negative for mouth sores, sinus pain and sore throat.    Eyes:  Negative for pain.   Respiratory:  Negative for cough and shortness of breath.    Cardiovascular:  Negative for chest pain, palpitations and leg swelling.   Gastrointestinal:  Positive for abdominal distention, abdominal pain and diarrhea. Negative for constipation and nausea.   Endocrine: Negative for polydipsia, polyphagia and polyuria.   Genitourinary:  Negative for difficulty urinating and hematuria.   Musculoskeletal:  Negative for arthralgias, gait problem and myalgias.   Skin:  Negative for  color change, rash and wound.   Neurological:  Positive for weakness. Negative for dizziness, tremors, seizures and headaches.   Psychiatric/Behavioral:  Positive for sleep disturbance. Negative for confusion. The patient is nervous/anxious.         Physical Exam  Vitals and nursing note reviewed.   Constitutional:       General: She is not in acute distress.     Appearance: She is ill-appearing.   HENT:      Head: Normocephalic and atraumatic.      Nose: Nose normal.      Mouth/Throat:      Mouth: Mucous membranes are moist.      Pharynx: Oropharynx is clear.   Eyes:      General: Scleral icterus present.      Extraocular Movements: Extraocular movements intact.      Pupils: Pupils are equal, round, and reactive to light.   Cardiovascular:      Rate and Rhythm: Normal rate and regular rhythm.      Pulses: Normal pulses.      Heart sounds: No murmur heard.  Pulmonary:      Effort: Pulmonary effort is normal. No respiratory distress.      Breath sounds: Normal breath sounds.   Abdominal:      General: Abdomen is flat. Bowel sounds are normal. There is no distension.      Palpations: Abdomen is soft.      Tenderness: There is no abdominal tenderness.      Hernia: A hernia is present.   Musculoskeletal:         General: No swelling, tenderness, deformity or signs of injury. Normal range of motion.      Cervical back: Normal range of motion and neck supple.      Right lower leg: No edema.      Left lower leg: No edema.   Skin:     General: Skin is warm and dry.      Capillary Refill: Capillary refill takes 2 to 3 seconds.      Coloration: Skin is jaundiced.      Findings: No rash.   Neurological:      General: No focal deficit present.      Mental Status: She is alert and oriented to person, place, and time.   Psychiatric:         Mood and Affect: Mood normal.      Comments: Anxious at times         Last Recorded Vitals  Blood pressure (!) 89/40, pulse 77, temperature 37 °C (98.6 °F), temperature source Oral, resp. rate  "18, height 1.575 m (5' 2\"), weight 80.5 kg (177 lb 7.5 oz), SpO2 97 %.    Relevant Results  Results for orders placed or performed during the hospital encounter of 01/23/24 (from the past 24 hour(s))   CBC   Result Value Ref Range    WBC 8.6 4.4 - 11.3 x10*3/uL    nRBC 0.3 (H) 0.0 - 0.0 /100 WBCs    RBC 2.03 (L) 4.00 - 5.20 x10*6/uL    Hemoglobin 7.3 (L) 12.0 - 16.0 g/dL    Hematocrit 22.5 (L) 36.0 - 46.0 %     (H) 80 - 100 fL    MCH 36.0 (H) 26.0 - 34.0 pg    MCHC 32.4 32.0 - 36.0 g/dL    RDW      Platelets 128 (L) 150 - 450 x10*3/uL   Basic Metabolic Panel   Result Value Ref Range    Glucose 107 (H) 65 - 99 mg/dL    Sodium 138 133 - 145 mmol/L    Potassium 4.0 3.4 - 5.1 mmol/L    Chloride 102 97 - 107 mmol/L    Bicarbonate 21 (L) 24 - 31 mmol/L    Urea Nitrogen 24 8 - 25 mg/dL    Creatinine 2.10 (H) 0.40 - 1.60 mg/dL    eGFR 29 (L) >60 mL/min/1.73m*2    Calcium 8.4 (L) 8.5 - 10.4 mg/dL    Anion Gap 15 <=19 mmol/L   aPTT   Result Value Ref Range    aPTT 68.0 (H) 22.0 - 32.5 seconds   POCT GLUCOSE   Result Value Ref Range    POCT Glucose 100 (H) 74 - 99 mg/dL   POCT GLUCOSE   Result Value Ref Range    POCT Glucose 101 (H) 74 - 99 mg/dL    US guided abdominal paracentesis    Result Date: 1/24/2024  Interpreted By:  Huy Alvarez, STUDY: US GUIDED ABDOMINAL PARACENTESIS; 1/24/2024 11:16 am   INDICATION: Signs/Symptoms:Abdominal distention.   COMPARISON: None.   ACCESSION NUMBER(S): [id number]   ORDERING CLINICIAN: SALINAS JUAREZ   TECHNIQUE: Ultrasound-guided paracentesis   FINDINGS: Informed consent obtained. Patient positioned supine. Right lower quadrant prepped, draped and anesthetized. Under ultrasound guidance, 8 Romansh centesis sheath needle inserted into peritoneal cavity. 1.8 Lof serosanguineousfluid aspirated. Patient tolerated procedure well       Ultrasound-guided paracentesis.   Signed by: Huy Alvarez 1/24/2024 1:03 PM Dictation workstation:   [id number]    US abdomen limited    Result Date: " 1/23/2024  STUDY: Abdomen limited Ultrasound; Completed Time: 1/23/2024 10:48 PM INDICATION: Ascites. COMPARISON: US abdomen limited 1/6/2024. ACCESSION NUMBER(S): [id number] ORDERING CLINICIAN: SALINAS JUAREZ FINDINGS: Ascites is present.  The largest pocket is visualized within the left upper quadrant.      Ascites is present with the largest pocket located within the left upper quadrant.  The quantity of free fluid in the abdomen appear significantly increased as compared to 1/6/2024 prior study. Signed by Dylan Townsend MD    XR chest 1 view    Result Date: 1/23/2024  Interpreted By:  Bre Nicholson, STUDY: XR CHEST 1 VIEW; 1/23/2024 7:48 pm   INDICATION: Signs/Symptoms:fever   COMPARISON: 01/05/2025   ACCESSION NUMBER(S): [id number]   ORDERING CLINICIAN: MATTHEW THOMPSON   TECHNIQUE: Frontal  chest radiographs.   FINDINGS: The cardiomediastinal silhouette is unremarkable. The lungs are clear. No pleural effusion is identified.   The osseous structures are intact.       No acute cardiopulmonary process.       Signed by: Bre Nicholson 1/23/2024 7:50 PM Dictation workstation:   [id number]       Assessment/Plan   IMP:    Liver Cirrhosis-MELD 32  Esophageal Varices-7bands 1/8  Hepatic Encephalopathy-controlled  Anxiety-uncontrolled  SBP-on antbiotics  Hypoxia-Pleural effusion previously, last CXR negative, but requiring 3L oxygen.   Neuropathic pain-uncontrolled, underlying CIDP, not taking gabapentin as ordered. [facility] reviewed gabapentin 400mg, #90, last fill 12/23. Gabapentin on hold d/t DONTRELL.   Palliative Care    DNRCCA/DNI  Capable  States she has HPOA and LW. No copies available, but states her SO Fran can obtain copies from home. Her stated HPOA is her sister Sussy [phone]    49yo heavy wine drinker presenting with 3rd liver related hospitalization in <6months. Decompensated cirrhosis, now with DONTRELL. Suspect underlying mental health disorder with anxiety elements. Patient freely admits that she drinks to  control her anxiety, aid with sleep and help with her neuropathic pain to her hands and feet. Her SO does much of her IADLS and assists with some ADLs. She does not shower herself, she is fearful of going down the stairs and limits stairs only to when SO is present. She is fearful of going to sleep without presence of SO in case something happens to her or she needs assistance.     She fully understands that her cirrhosis is progressive and has no wish for CPR or life support as this will not contribute to her goal to remain at home, and would be ineffective with her advanced cirrhosis. Signed copy of DNRCCA/DNI placed in chart. She dislikes hospitals and only goes to hospital when essentially forced by her SO. She acknowledges she has underlying anxiety/fear but has no desire to utilize behavioral health services to get control of her symptoms. She has had bad experiences with psych previously. I anticipate that she will not be able to stop drinking unless she gets assistance with her mental health. Recommend behavioral health consultation at this time to assist with anxiety, insomnia.     No desire to seek hospice services at this time, but no desire to stop drinking either. Patient's only goal at this point is to receive treatment and go home. She is willing to see GI outpatient in order to prevent hospitalizations in the future.     Her neuropathic pain is uncontrolled, gabapentin has been ineffective. I offered lyrica, but she was not agreeable. She refused topical agents.     Symptom Management  Pain: mild  Medications recommended for pain?  Yes  Tiredness: mils  Nausea: none  Depression: mild  Anxiety: severe  Drowsiness: none  Appetite: adequate  Wellbeing: poor  Dyspnea: with exertion  Intervention recommended for dyspnea?  yes  Other: na  Intervention recommended for constipation?  No    I spent 90 minutes in the professional and overall care of this patient.      Marisa Clement, APRN-CNP

## 2024-01-25 NOTE — NURSING NOTE
Took over care of pt at this time. Pt laying in bed, alert, aox3-4. Pt denies pain and has no needs or complaints at this time. Pt oriented to call bell and it and belongings are placed in reach. Telemetry leads connected and reading on monitor. Bed alarm set. NC O2 in place and connected to flowmeter. Continuing to monitor.

## 2024-01-25 NOTE — NURSING NOTE
Spoke with hospitalist again to inform him I had difficulties placing order for potassium chloride tablets. He placed the order as we spoke. Continuing to monitor.

## 2024-01-25 NOTE — NURSING NOTE
Rounded on pt. Pt laying in bed, eyes closed, respirations even and unlabored. Pt appears to be sleeping and in no apparent pain or distress. Uneventful night with no changes since prior assessment. Call bell and belongings are placed in reach. Telemetry leads connected and reading on monitor. NC O2 in place and connected to flowmeter. Bed alarm set. Continuing to monitor.

## 2024-01-26 VITALS
TEMPERATURE: 98.2 F | OXYGEN SATURATION: 97 % | HEART RATE: 78 BPM | HEIGHT: 62 IN | SYSTOLIC BLOOD PRESSURE: 103 MMHG | BODY MASS INDEX: 29.9 KG/M2 | RESPIRATION RATE: 18 BRPM | DIASTOLIC BLOOD PRESSURE: 57 MMHG | WEIGHT: 162.48 LBS

## 2024-01-26 LAB
ANION GAP SERPL CALC-SCNC: 12 MMOL/L
APTT PPP: 44 SECONDS (ref 22–32.5)
BUN SERPL-MCNC: 24 MG/DL (ref 8–25)
CALCIUM SERPL-MCNC: 8.6 MG/DL (ref 8.5–10.4)
CHLORIDE SERPL-SCNC: 104 MMOL/L (ref 97–107)
CO2 SERPL-SCNC: 20 MMOL/L (ref 24–31)
CREAT SERPL-MCNC: 1.9 MG/DL (ref 0.4–1.6)
EGFRCR SERPLBLD CKD-EPI 2021: 33 ML/MIN/1.73M*2
GLUCOSE BLD MANUAL STRIP-MCNC: 101 MG/DL (ref 74–99)
GLUCOSE SERPL-MCNC: 123 MG/DL (ref 65–99)
HGB BLD-MCNC: 7.7 G/DL (ref 12–16)
POTASSIUM SERPL-SCNC: 4.5 MMOL/L (ref 3.4–5.1)
SODIUM SERPL-SCNC: 136 MMOL/L (ref 133–145)

## 2024-01-26 PROCEDURE — 82947 ASSAY GLUCOSE BLOOD QUANT: CPT

## 2024-01-26 PROCEDURE — 2500000001 HC RX 250 WO HCPCS SELF ADMINISTERED DRUGS (ALT 637 FOR MEDICARE OP): Performed by: INTERNAL MEDICINE

## 2024-01-26 PROCEDURE — 99233 SBSQ HOSP IP/OBS HIGH 50: CPT

## 2024-01-26 PROCEDURE — 2500000004 HC RX 250 GENERAL PHARMACY W/ HCPCS (ALT 636 FOR OP/ED): Performed by: INTERNAL MEDICINE

## 2024-01-26 PROCEDURE — 85018 HEMOGLOBIN: CPT | Performed by: INTERNAL MEDICINE

## 2024-01-26 PROCEDURE — 85730 THROMBOPLASTIN TIME PARTIAL: CPT | Performed by: INTERNAL MEDICINE

## 2024-01-26 PROCEDURE — 99222 1ST HOSP IP/OBS MODERATE 55: CPT

## 2024-01-26 PROCEDURE — 80048 BASIC METABOLIC PNL TOTAL CA: CPT | Performed by: INTERNAL MEDICINE

## 2024-01-26 PROCEDURE — 36415 COLL VENOUS BLD VENIPUNCTURE: CPT | Performed by: INTERNAL MEDICINE

## 2024-01-26 RX ORDER — GUAIFENESIN 600 MG/1
600 TABLET, EXTENDED RELEASE ORAL EVERY 12 HOURS PRN
Start: 2024-01-26 | End: 2024-03-04 | Stop reason: HOSPADM

## 2024-01-26 RX ORDER — CIPROFLOXACIN 250 MG/1
250 TABLET, FILM COATED ORAL 2 TIMES DAILY
Qty: 14 TABLET | Refills: 0 | Status: SHIPPED | OUTPATIENT
Start: 2024-01-26 | End: 2024-02-02

## 2024-01-26 RX ORDER — MIDODRINE HYDROCHLORIDE 10 MG/1
10 TABLET ORAL
Qty: 90 TABLET | Refills: 0 | Status: SHIPPED | OUTPATIENT
Start: 2024-01-26 | End: 2024-03-04 | Stop reason: HOSPADM

## 2024-01-26 RX ORDER — ACETAMINOPHEN 325 MG/1
650 TABLET ORAL EVERY 6 HOURS PRN
Qty: 30 TABLET | Refills: 0
Start: 2024-01-26 | End: 2024-03-04 | Stop reason: HOSPADM

## 2024-01-26 RX ADMIN — RIFAXIMIN 550 MG: 550 TABLET ORAL at 08:56

## 2024-01-26 RX ADMIN — PANTOPRAZOLE SODIUM 40 MG: 40 TABLET, DELAYED RELEASE ORAL at 08:55

## 2024-01-26 RX ADMIN — PIPERACILLIN SODIUM AND TAZOBACTAM SODIUM 2.25 G: 2; .25 INJECTION, SOLUTION INTRAVENOUS at 02:49

## 2024-01-26 RX ADMIN — MULTIVITAMIN TABLET 1 TABLET: TABLET at 08:55

## 2024-01-26 RX ADMIN — FOLIC ACID 1 MG: 1 TABLET ORAL at 08:55

## 2024-01-26 RX ADMIN — LEVOTHYROXINE SODIUM 50 MCG: 0.05 TABLET ORAL at 05:18

## 2024-01-26 RX ADMIN — Medication 100 MG: at 08:55

## 2024-01-26 RX ADMIN — MIDODRINE HYDROCHLORIDE 10 MG: 10 TABLET ORAL at 08:55

## 2024-01-26 ASSESSMENT — PAIN SCALES - GENERAL
PAINLEVEL_OUTOF10: 0 - NO PAIN
PAINLEVEL_OUTOF10: 4
PAINLEVEL_OUTOF10: 0 - NO PAIN

## 2024-01-26 ASSESSMENT — PAIN - FUNCTIONAL ASSESSMENT
PAIN_FUNCTIONAL_ASSESSMENT: 0-10
PAIN_FUNCTIONAL_ASSESSMENT: FLACC (FACE, LEGS, ACTIVITY, CRY, CONSOLABILITY)
PAIN_FUNCTIONAL_ASSESSMENT: FLACC (FACE, LEGS, ACTIVITY, CRY, CONSOLABILITY)

## 2024-01-26 ASSESSMENT — COGNITIVE AND FUNCTIONAL STATUS - GENERAL
STANDING UP FROM CHAIR USING ARMS: A LOT
PERSONAL GROOMING: A LITTLE
MOBILITY SCORE: 10
HELP NEEDED FOR BATHING: A LOT
CLIMB 3 TO 5 STEPS WITH RAILING: TOTAL
DAILY ACTIVITIY SCORE: 16
TOILETING: A LOT
WALKING IN HOSPITAL ROOM: TOTAL
TURNING FROM BACK TO SIDE WHILE IN FLAT BAD: A LOT
MOVING FROM LYING ON BACK TO SITTING ON SIDE OF FLAT BED WITH BEDRAILS: A LITTLE
MOVING TO AND FROM BED TO CHAIR: TOTAL
DRESSING REGULAR UPPER BODY CLOTHING: A LITTLE
DRESSING REGULAR LOWER BODY CLOTHING: A LOT

## 2024-01-26 NOTE — DISCHARGE SUMMARY
Discharge Diagnosis  Spontaneous bacterial peritonitis, probable  Liver cirrhosis due to alcohol abuse, advanced  Portal hypertension, ascites status post paracentesis  Coagulopathy  Acute renal failure, probable hepatorenal syndrome  Hypertension  Toxic metabolic encephalopathy  History of noncompliance with medications  Active alcohol use    Issues Requiring Follow-Up  See above    Discharge Meds     Your medication list        START taking these medications        Instructions Last Dose Given Next Dose Due   acetaminophen 325 mg tablet  Commonly known as: Tylenol      Take 2 tablets (650 mg) by mouth every 6 hours if needed for mild pain (1 - 3).       benzocaine-menthol 15-3.6 mg lozenge  Commonly known as: Cepastat Sore Throat      Dissolve 1 lozenge in the mouth every 2 hours if needed for sore throat.       guaiFENesin 600 mg 12 hr tablet  Commonly known as: Mucinex      Take 1 tablet (600 mg) by mouth every 12 hours if needed for congestion. Do not crush, chew, or split.       midodrine 10 mg tablet  Commonly known as: Proamatine      Take 1 tablet (10 mg) by mouth 3 times a day with meals.              CONTINUE taking these medications        Instructions Last Dose Given Next Dose Due   folic acid 1 mg tablet  Commonly known as: Folvite      Take 1 tablet (1 mg) by mouth once daily.       lactulose 20 gram/30 mL oral solution      Take 45 mL (30 g) by mouth 2 times a day.       levothyroxine 50 mcg tablet  Commonly known as: Synthroid, Levoxyl           lidocaine 4 % patch      Place 1 patch over 12 hours on the skin once daily. Remove & discard patch within 12 hours or as directed by MD. Do not start before January 10, 2024.       magnesium oxide 400 mg tablet  Commonly known as: Mag-Ox      Take 1 tablet (400 mg) by mouth once daily.       MULTIPLE VITAMINS ORAL           ondansetron ODT 4 mg disintegrating tablet  Commonly known as: Zofran-ODT      Take 1 tablet (4 mg) by mouth every 8 hours if needed  for nausea or vomiting.       pantoprazole 40 mg EC tablet  Commonly known as: ProtoNix      Take 1 tablet (40 mg) by mouth once daily.       rifAXIMin 550 mg tablet  Commonly known as: Xifaxan      Take 1 tablet (550 mg) by mouth 2 times a day.       thiamine 100 mg tablet  Commonly known as: Vitamin B-1      Take 1 tablet (100 mg) by mouth once daily.              STOP taking these medications      furosemide 40 mg tablet  Commonly known as: Lasix        spironolactone 25 mg tablet  Commonly known as: Aldactone                  Where to Get Your Medications        These medications were sent to [id number] [address] [address]      Phone: [phone]   midodrine 10 mg tablet       Information about where to get these medications is not yet available    Ask your nurse or doctor about these medications  acetaminophen 325 mg tablet  benzocaine-menthol 15-3.6 mg lozenge  guaiFENesin 600 mg 12 hr tablet         Test Results Pending At Discharge  Pending Labs       Order Current Status    Blood Culture Preliminary result    Blood Culture Preliminary result            Hospital Course   History:Briseida Owen is a 48 y.o. female presenting with generalized body aches, fever, and worsening ascites.        Patient's ED diagnostic workup was noted for leukocytosis of 14.5.  H&H was low at 7.2/22.2.  The patient's platelet count was 149.  Patient's coagulation profile noted for an elevated INR of 2.2.  Her PTT was elevated 22.2.  PTT elevated 55.4.  Patient's blood chemistry noted for a sodium of 130.  Elevated glucose 145.  Bicarbonate was low at 20.  The anion gap was within normal limits.  Albumin level was 3.1.  Alkaline phosphatase slightly elevated 126.  Total bilirubin is 15.4.        Patient's vital signs were noted for Tmax 38.8, pulse rate elevated 113, respiratory rate 20, BP normal 125/97.  Saturating 96% on room air.        Last hospitalization  she presented with decompensated ascites.  She was discharged on January 9, 2024.  At that time she had 3 L of fluid removed from her abdomen in the form of ascitic fluid.  She received broad-spectrum IV antibiotics.  She underwent EGD with banding of varices on January 8, 2024.  Abstinence from alcohol was encouraged.  Diagnostic workup:  Chest x-ray: No acute cardiopulmonary process  Ultrasound of the abdomen:Ascites is present with the largest pocket located within the left  upper quadrant.  The quantity of free fluid in the abdomen appear  significantly increased as compared to 1/6/2024 prior study.  Patient had ultrasound-guided paracentesis performed on January 24:Informed consent obtained. Patient positioned supine. Right lower  quadrant prepped, draped and anesthetized. Under ultrasound guidance,  8 Telugu centesis sheath needle inserted into peritoneal cavity. 1.8  Lof serosanguineousfluid aspirated. Patient tolerated procedure well  Patient developed acute renal failure with creatinine 2.1 on January 25, improved to 1.9 with hydration.  Patient was consulted by nephrologist.  Aldactone and Lasix for held.  Patient was Consulted by gastroenterology.  She was treated with IV Zosyn for possible spontaneous bacterial peritonitis.  Patient was consulted by palliative care:She fully understands that her cirrhosis is progressive and has no wish for CPR or life support as this will not contribute to her goal to remain at home, and would be ineffective with her advanced cirrhosis. Signed copy of DNRCCA/DNI placed in chart. She dislikes hospitals and only goes to hospital when essentially forced by her SO. She acknowledges she has underlying anxiety/fear but has no desire to utilize behavioral health services to get control of her symptoms. She has had bad experiences with psych previously. I anticipate that she will not be able to stop drinking unless she gets assistance with her mental health. Recommend behavioral  health consultation at this time to assist with anxiety, insomnia.      No desire to seek hospice services at this time, but no desire to stop drinking either. Patient's only goal at this point is to receive treatment and go home. She is willing to see GI outpatient in order to prevent hospitalizations in the future.      Her neuropathic pain is uncontrolled, gabapentin has been ineffective. I offered lyrica, but she was not agreeable. She refused topical agents.   Patient pulled IVs and she is refusing all services.  She does not want to stay in hospital 1 more day.  She does not want any IV treatments.  I had a discussion with her in presence of her bedside nurse where we discussed the severity of her condition including advanced liver cirrhosis acute renal failure.  Patient verbalizes that she understands that she may die from this condition if it is untreated she still insists on leaving.  Patient signed AMA papers.  The severity of your condition I feel she still needs to continue home medications.  I gave him prescription for midodrine 10 mg 3 times a day and ciprofloxacin 250 mg twice a day for 1 week for presumptive spontaneous bacterial peritonitis.  Patient would benefit from follow-up with her primary care physician and gastroenterologist.  Total time spent with patient today 42 minutes.    Pertinent Physical Exam At Time of Discharge  Physical Exam    Outpatient Follow-Up  No future appointments.  PCP, gastroenterologist in 1 week.    Alma Alexander MD

## 2024-01-26 NOTE — PROGRESS NOTES
"Briseida Owen is a 48 y.o. female on day 3 of admission presenting with Fever, unspecified fever cause.    Subjective   Patient was seen yesterday for acute kidney injury status post paracentesis she is known to have cirrhosis of the liver secondary to alcohol patient feels okay with responsive no new complaints       Objective     Physical Exam  Constitutional:       Comments: Patient is fairly jaundiced   Neck:      Vascular: No carotid bruit.   Cardiovascular:      Rate and Rhythm: Normal rate and regular rhythm.      Heart sounds: No murmur heard.     No friction rub. No gallop.   Pulmonary:      Breath sounds: No wheezing, rhonchi or rales.   Chest:      Chest wall: No tenderness.   Abdominal:      General: There is no distension.      Tenderness: There is no abdominal tenderness. There is no guarding or rebound.   Musculoskeletal:         General: No swelling or tenderness.      Cervical back: Neck supple.      Right lower leg: No edema.      Left lower leg: No edema.   Lymphadenopathy:      Cervical: No cervical adenopathy.         Last Recorded Vitals  Blood pressure (!) 93/44, pulse 78, temperature 36.7 °C (98.1 °F), temperature source Axillary, resp. rate 16, height 1.575 m (5' 2\"), weight 73.7 kg (162 lb 7.7 oz), SpO2 95 %.    Intake/Output last 3 Shifts:  I/O last 3 completed shifts:  In: 1012 (13.7 mL/kg) [P.O.:762; IV Piggyback:250]  Out: 0 (0 mL/kg)   Weight: 73.7 kg     Current Facility-Administered Medications:     acetaminophen (Tylenol) tablet 650 mg, 650 mg, oral, q6h PRN, Alma Alexander MD, 650 mg at 01/24/24 1436    benzocaine-menthol (Cepastat Sore Throat) 15-3.6 mg lozenge 1 lozenge, 1 lozenge, Mouth/Throat, q2h PRN, Corona Jolly MD    dextromethorphan-guaifenesin (Robitussin DM)  mg/5 mL oral liquid 5 mL, 5 mL, oral, q4h PRN, Corona Jolly MD    dextrose 10 % in water (D10W) infusion, 0.3 g/kg/hr, intravenous, Once PRN, Corona Jolly MD    dextrose 50 % injection 25 g, " 25 g, intravenous, q15 min PRN, Corona Jolly MD    folic acid (Folvite) tablet 1 mg, 1 mg, oral, Daily, Corona Jolly MD, 1 mg at 01/25/24 0900    glucagon (Glucagen) injection 1 mg, 1 mg, intramuscular, q15 min PRN, Corona Jolly MD    guaiFENesin (Mucinex) 12 hr tablet 600 mg, 600 mg, oral, q12h PRN, Corona Jolly MD    lactulose 20 gram/30 mL oral solution 30 g, 30 g, oral, BID, Corona Jolly MD, 30 g at 01/24/24 0919    levothyroxine (Synthroid, Levoxyl) tablet 50 mcg, 50 mcg, oral, Daily, Corona Jolly MD, 50 mcg at 01/26/24 0518    magnesium oxide (Mag-Ox) tablet 400 mg, 400 mg, oral, Daily, Corona Jolly MD, 400 mg at 01/24/24 0920    midodrine (Proamatine) tablet 10 mg, 10 mg, oral, TID with meals, Alma Alexander MD, 10 mg at 01/25/24 1710    multivitamin 1 tablet, 1 tablet, oral, Daily, Corona Jolly MD, 1 tablet at 01/25/24 0900    [DISCONTINUED] ondansetron (Zofran) tablet 4 mg, 4 mg, oral, q8h PRN **OR** ondansetron (Zofran) injection 4 mg, 4 mg, intravenous, q8h PRN, Corona Jolly MD, 4 mg at 01/24/24 2218    ondansetron ODT (Zofran-ODT) disintegrating tablet 4 mg, 4 mg, oral, q8h PRN, Giovanni Dial, PharmD    oxyCODONE (Roxicodone) immediate release tablet 5 mg, 5 mg, oral, q6h PRN, Alma Alexander MD    pantoprazole (ProtoNix) EC tablet 40 mg, 40 mg, oral, Daily, Corona Jolly MD, 40 mg at 01/25/24 0900    piperacillin-tazobactam-dextrose (Zosyn) IV 2.25 g, 2.25 g, intravenous, q6h, Alma Alexander MD, Stopped at 01/26/24 0319    polyethylene glycol (Glycolax, Miralax) packet 17 g, 17 g, oral, Daily PRN, Corona Jolly MD    rifAXIMin (Xifaxan) tablet 550 mg, 550 mg, oral, BID, Corona Jolly MD, 550 mg at 01/25/24 2110    sodium chloride 0.9% infusion, 75 mL/hr, intravenous, Continuous, Onur Wilkerson MD, Last Rate: 75 mL/hr at 01/25/24 1346, 75 mL/hr at 01/25/24 1346    [Held by provider] spironolactone (Aldactone) tablet 25 mg, 25 mg, oral, Daily, Corona OCHOA  MD Rik    thiamine (Vitamin B-1) tablet 100 mg, 100 mg, oral, Daily, Corona OCHOA MD Rik, 100 mg at 01/25/24 0900   Relevant Results    Results for orders placed or performed during the hospital encounter of 01/23/24 (from the past 96 hour(s))   CBC and Auto Differential   Result Value Ref Range    WBC 14.5 (H) 4.4 - 11.3 x10*3/uL    nRBC 0.1 (H) 0.0 - 0.0 /100 WBCs    RBC 1.92 (L) 4.00 - 5.20 x10*6/uL    Hemoglobin 7.2 (L) 12.0 - 16.0 g/dL    Hematocrit 22.2 (L) 36.0 - 46.0 %     (H) 80 - 100 fL    MCH 37.5 (H) 26.0 - 34.0 pg    MCHC 32.4 32.0 - 36.0 g/dL    RDW 24.1 (H) 11.5 - 14.5 %    Platelets 149 (L) 150 - 450 x10*3/uL    Immature Granulocytes %, Automated 0.6 0.0 - 0.9 %    Immature Granulocytes Absolute, Automated 0.08 0.00 - 0.70 x10*3/uL   Comprehensive metabolic panel   Result Value Ref Range    Glucose 145 (H) 65 - 99 mg/dL    Sodium 130 (L) 133 - 145 mmol/L    Potassium 3.2 (L) 3.4 - 5.1 mmol/L    Chloride 94 (L) 97 - 107 mmol/L    Bicarbonate 20 (L) 24 - 31 mmol/L    Urea Nitrogen 12 8 - 25 mg/dL    Creatinine 0.80 0.40 - 1.60 mg/dL    eGFR >90 >60 mL/min/1.73m*2    Calcium 8.3 (L) 8.5 - 10.4 mg/dL    Albumin 3.1 (L) 3.5 - 5.0 g/dL    Alkaline Phosphatase 126 (H) 35 - 125 U/L    Total Protein 5.7 (L) 5.9 - 7.9 g/dL    AST 44 (H) 5 - 40 U/L    Bilirubin, Total 15.4 (H) 0.1 - 1.2 mg/dL    ALT 15 5 - 40 U/L    Anion Gap 16 <=19 mmol/L   Protime-INR   Result Value Ref Range    Protime 22.2 (H) 9.3 - 12.7 seconds    INR 2.2 (H) 0.9 - 1.2   APTT   Result Value Ref Range    aPTT 55.4 (H) 22.0 - 32.5 seconds   Manual Differential   Result Value Ref Range    Neutrophils %, Manual 73.0 40.0 - 80.0 %    Bands %, Manual 7.0 0.0 - 5.0 %    Lymphocytes %, Manual 14.0 13.0 - 44.0 %    Monocytes %, Manual 5.0 2.0 - 10.0 %    Eosinophils %, Manual 0.0 0.0 - 6.0 %    Basophils %, Manual 1.0 0.0 - 2.0 %    Seg Neutrophils Absolute, Manual 10.59 (H) 1.20 - 7.00 x10*3/uL    Bands Absolute, Manual 1.02 (H) 0.00 -  0.70 x10*3/uL    Lymphocytes Absolute, Manual 2.03 1.20 - 4.80 x10*3/uL    Monocytes Absolute, Manual 0.73 0.10 - 1.00 x10*3/uL    Eosinophils Absolute, Manual 0.00 0.00 - 0.70 x10*3/uL    Basophils Absolute, Manual 0.15 (H) 0.00 - 0.10 x10*3/uL    Total Cells Counted 100     Neutrophils Absolute, Manual 11.61 (H) 1.20 - 7.70 x10*3/uL    RBC Morphology See Below     Polychromasia Mild     [place] Cells Many     Acanthocytes Many    Pathologist Review-CBC Differential   Result Value Ref Range    Pathologist Review-CBC Differential       The peripheral blood smear and CBC data are reviewed. Remarkable findings include leukocytosis and macrocytic anemia. There is variation in RBC cell size and shape, including polychromasia and occasional RBC fragments. Correlation with folate and B12 levels is recommended.   RSV PCR   Result Value Ref Range    RSV PCR Not Detected Not Detected   Sars-CoV-2 and Influenza A/B PCR   Result Value Ref Range    Flu A Result Not Detected Not Detected    Flu B Result Not Detected Not Detected    Coronavirus 2019, PCR Not Detected Not Detected   Blood Culture    Specimen: Peripheral Venipuncture; Blood culture   Result Value Ref Range    Blood Culture No growth at 2 days    Blood Culture    Specimen: Peripheral Venipuncture; Blood culture   Result Value Ref Range    Blood Culture No growth at 2 days    Blood Gas Lactic Acid, Venous   Result Value Ref Range    POCT Lactate, Venous 3.6 (H) 0.4 - 2.0 mmol/L   Ammonia   Result Value Ref Range    Ammonia 86 (H) 12 - 45 umol/L   Alcohol   Result Value Ref Range    Alcohol <0.010 0.000 - 0.010 g/dL   hCG, quantitative, pregnancy   Result Value Ref Range    HCG, Beta-Quantitative <1 SEE COMMENT BELOW mIU/mL   Blood Gas Lactic Acid, Venous   Result Value Ref Range    POCT Lactate, Venous 2.5 (H) 0.4 - 2.0 mmol/L   POCT GLUCOSE   Result Value Ref Range    POCT Glucose 131 (H) 74 - 99 mg/dL   Protime-INR   Result Value Ref Range    Protime 24.0 (H) 9.3 -  12.7 seconds    INR 2.4 (H) 0.9 - 1.2   Comprehensive metabolic panel   Result Value Ref Range    Glucose 119 (H) 65 - 99 mg/dL    Sodium 134 133 - 145 mmol/L    Potassium 2.9 (LL) 3.4 - 5.1 mmol/L    Chloride 99 97 - 107 mmol/L    Bicarbonate 23 (L) 24 - 31 mmol/L    Urea Nitrogen 16 8 - 25 mg/dL    Creatinine 1.30 0.40 - 1.60 mg/dL    eGFR 51 (L) >60 mL/min/1.73m*2    Calcium 8.1 (L) 8.5 - 10.4 mg/dL    Albumin 3.3 (L) 3.5 - 5.0 g/dL    Alkaline Phosphatase 98 35 - 125 U/L    Total Protein 5.4 (L) 5.9 - 7.9 g/dL    AST 36 5 - 40 U/L    Bilirubin, Total 14.4 (H) 0.1 - 1.2 mg/dL    ALT 11 5 - 40 U/L    Anion Gap 12 <=19 mmol/L   CBC and Auto Differential   Result Value Ref Range    WBC 11.7 (H) 4.4 - 11.3 x10*3/uL    nRBC 0.2 (H) 0.0 - 0.0 /100 WBCs    RBC 1.67 (L) 4.00 - 5.20 x10*6/uL    Hemoglobin 6.4 (LL) 12.0 - 16.0 g/dL    Hematocrit 19.5 (L) 36.0 - 46.0 %     (H) 80 - 100 fL    MCH 38.3 (H) 26.0 - 34.0 pg    MCHC 32.8 32.0 - 36.0 g/dL    RDW 23.2 (H) 11.5 - 14.5 %    Platelets 125 (L) 150 - 450 x10*3/uL    Immature Granulocytes %, Automated 0.6 0.0 - 0.9 %    Immature Granulocytes Absolute, Automated 0.07 0.00 - 0.70 x10*3/uL   Magnesium   Result Value Ref Range    Magnesium 1.80 1.60 - 3.10 mg/dL   Bilirubin, Direct   Result Value Ref Range    Bilirubin, Direct 6.7 (H) 0.0 - 0.2 mg/dL   Manual Differential   Result Value Ref Range    Neutrophils %, Manual 72.0 40.0 - 80.0 %    Bands %, Manual 1.0 0.0 - 5.0 %    Lymphocytes %, Manual 19.0 13.0 - 44.0 %    Monocytes %, Manual 4.0 2.0 - 10.0 %    Eosinophils %, Manual 2.0 0.0 - 6.0 %    Basophils %, Manual 0.0 0.0 - 2.0 %    Metamyelocytes %, Manual 2.0 0.0 - 0.0 %    Seg Neutrophils Absolute, Manual 8.42 (H) 1.20 - 7.00 x10*3/uL    Bands Absolute, Manual 0.12 0.00 - 0.70 x10*3/uL    Lymphocytes Absolute, Manual 2.22 1.20 - 4.80 x10*3/uL    Monocytes Absolute, Manual 0.47 0.10 - 1.00 x10*3/uL    Eosinophils Absolute, Manual 0.23 0.00 - 0.70 x10*3/uL     Basophils Absolute, Manual 0.00 0.00 - 0.10 x10*3/uL    Metamyelocytes Absolute, Manual 0.23 0.00 - 0.00 x10*3/uL    Total Cells Counted 100     Neutrophils Absolute, Manual 8.54 (H) 1.20 - 7.70 x10*3/uL    RBC Morphology See Below     Polychromasia Mild     RBC Fragments Few     [place]ristina Cells Many     Acanthocytes Many    Prepare RBC: 1 Units   Result Value Ref Range    PRODUCT CODE G4094T03     Unit Number [id number]     Unit ABO O     Unit RH POS     XM INTEP COMP     Dispense Status TR     Blood Expiration Date February 08, 2024 23:59 EST     PRODUCT BLOOD TYPE 5100     UNIT VOLUME 400    Type and screen   Result Value Ref Range    ABO TYPE O     Rh TYPE POS     ANTIBODY SCREEN NEG    POCT GLUCOSE   Result Value Ref Range    POCT Glucose 100 (H) 74 - 99 mg/dL   POCT GLUCOSE   Result Value Ref Range    POCT Glucose 124 (H) 74 - 99 mg/dL   CBC   Result Value Ref Range    WBC 8.6 4.4 - 11.3 x10*3/uL    nRBC 0.3 (H) 0.0 - 0.0 /100 WBCs    RBC 2.03 (L) 4.00 - 5.20 x10*6/uL    Hemoglobin 7.3 (L) 12.0 - 16.0 g/dL    Hematocrit 22.5 (L) 36.0 - 46.0 %     (H) 80 - 100 fL    MCH 36.0 (H) 26.0 - 34.0 pg    MCHC 32.4 32.0 - 36.0 g/dL    RDW      Platelets 128 (L) 150 - 450 x10*3/uL   Basic Metabolic Panel   Result Value Ref Range    Glucose 107 (H) 65 - 99 mg/dL    Sodium 138 133 - 145 mmol/L    Potassium 4.0 3.4 - 5.1 mmol/L    Chloride 102 97 - 107 mmol/L    Bicarbonate 21 (L) 24 - 31 mmol/L    Urea Nitrogen 24 8 - 25 mg/dL    Creatinine 2.10 (H) 0.40 - 1.60 mg/dL    eGFR 29 (L) >60 mL/min/1.73m*2    Calcium 8.4 (L) 8.5 - 10.4 mg/dL    Anion Gap 15 <=19 mmol/L   aPTT   Result Value Ref Range    aPTT 68.0 (H) 22.0 - 32.5 seconds   POCT GLUCOSE   Result Value Ref Range    POCT Glucose 100 (H) 74 - 99 mg/dL   POCT GLUCOSE   Result Value Ref Range    POCT Glucose 101 (H) 74 - 99 mg/dL   aPTT   Result Value Ref Range    aPTT 44.0 (H) 22.0 - 32.5 seconds   Basic Metabolic Panel   Result Value Ref Range    Glucose 123  (H) 65 - 99 mg/dL    Sodium 136 133 - 145 mmol/L    Potassium 4.5 3.4 - 5.1 mmol/L    Chloride 104 97 - 107 mmol/L    Bicarbonate 20 (L) 24 - 31 mmol/L    Urea Nitrogen 24 8 - 25 mg/dL    Creatinine 1.90 (H) 0.40 - 1.60 mg/dL    eGFR 33 (L) >60 mL/min/1.73m*2    Calcium 8.6 8.5 - 10.4 mg/dL    Anion Gap 12 <=19 mmol/L   Hemoglobin   Result Value Ref Range    Hemoglobin 7.7 (L) 12.0 - 16.0 g/dL   POCT GLUCOSE   Result Value Ref Range    POCT Glucose 101 (H) 74 - 99 mg/dL       Assessment/Plan     Acute kidney injury this could be very well intravascular volume depletion and prerenal azotemia however cannot rule out hepatorenal syndrome however at this time I will continue gentle IV hydration with normal saline  Alcoholic cirrhosis of the liver  Severe jaundice  Recurrent ascites             Onur Wilkerson MD

## 2024-01-26 NOTE — CONSULTS
Inpatient consult to Psychiatry  Consult performed by: ULISSES Vicente-SUMEET  Consult ordered by: Alma Alexander MD  Reason for consult: Depression          History Of Present Illness  Briseida Owen is a 48 y.o. female presenting with Fever, unspecified fever cause.     During the face-to-face visit with the patient, the provider reviewed the patient's charts and documentation beforehand. However, the patient declined to be interviewed, stating that she does not have any psychiatric disorder, particularly depression. The registered nurse assigned to the patient was present at the bedside during this interaction. The patient was pleasant and mentioned that her boyfriend is on his way to pick her up. She stated that she does not have any concerns or issues that need to be addressed at this time. We asked the patient if she would require any outside outpatient resources, but she declined.  This provider discussed the case with the registered nurse, who mentioned that the patient has already signed against medical advice papers and is planning to leave soon. Based on our brief interaction, the patient appeared to be alert and oriented. She maintained proper eye contact alert and oriented and did not exhibit any signs of harm to herself or others.    Past Medical History  She has a past medical history of Alcoholic hepatitis without ascites (08/13/2021) and Hepatic encephalopathy (CMS/HCC) (06/09/2021).    Surgical History  She has a past surgical history that includes Other surgical history (06/09/2021); US guided abdominal paracentesis (4/28/2021); US guided abdominal paracentesis (4/23/2021); US guided abdominal paracentesis (5/14/2021); US guided abdominal paracentesis (8/17/2023); US guided abdominal paracentesis (8/25/2023); US guided abdominal paracentesis (10/23/2023); US guided abdominal paracentesis (10/27/2023); US guided abdominal paracentesis (10/26/2023); and US guided abdominal paracentesis  (1/4/2024).     Social History  She reports that she has never smoked. She has never used smokeless tobacco. She reports current alcohol use of about 1.0 standard drink of alcohol per week. She reports that she does not use drugs.    Abuse/Trauma  Unable to assess     Past Treatment   Inpatient: Unable to assess    Outpatient: Unable to assess       Family Psychiatric History  There has been no family history of psychological problems    Past Medications  None    Substance Abuse  Refused to answer       Access to Fire Arms and/or Weapons: Denies    Legal Issues: Denies    Allergies  Sulfamethoxazole-trimethoprim    Review of Systems   Unable to perform ROS: Other   Patient refused      Mental Status Exam  General: alert   Appearance: Well groomed, appropriate eye contact.  Attitude/Behavior:Superficially cooperative. and Suspicious.   Motor Activity: No psychomotor agitation or retardation, no tremor or other abnormal movements.  Speech: normal rate, tone and rhythm  Mood: euthymic  Affect: normal  Thought Process: linear, goal directed  Thought Content: Within normal limits  Thought Perception: No perceptual abnormalities noted  Cognition: Cognitively intact to conversational testing with respect to attention, orientation, fund of knowledge, recent and remote memory, and language.  Insight: intact  Judgement: Intact    Psychiatric Risk Assessment  Violence Risk Assessment: Unable to assess   Acute Risk of Harm to Others is Considered: Unable to assess    Suicide Risk Assessment: Unable to assess   Protective Factors against Suicide: Unable to assess   Acute Risk of Harm to Self is Considered: Unable to assess     Current Medications    Scheduled medications  folic acid, 1 mg, oral, Daily  lactulose, 30 g, oral, BID  levothyroxine, 50 mcg, oral, Daily  magnesium oxide, 400 mg, oral, Daily  midodrine, 10 mg, oral, TID with meals  multivitamin, 1 tablet, oral, Daily  pantoprazole, 40 mg, oral,  Daily  piperacillin-tazobactam, 2.25 g, intravenous, q6h  rifAXIMin, 550 mg, oral, BID  [Held by provider] spironolactone, 25 mg, oral, Daily  thiamine, 100 mg, oral, Daily      Continuous medications  sodium chloride 0.9%, 75 mL/hr, Last Rate: 75 mL/hr (01/25/24 1346)      PRN medications  PRN medications: acetaminophen, benzocaine-menthol, dextromethorphan-guaifenesin, dextrose 10 % in water (D10W), dextrose, glucagon, guaiFENesin, [DISCONTINUED] ondansetron **OR** ondansetron, ondansetron ODT, oxyCODONE, polyethylene glycol         Relevant Results        @[facility]@    Relevant Results  Results for orders placed or performed during the hospital encounter of 01/23/24 (from the past 24 hour(s))   POCT GLUCOSE   Result Value Ref Range    POCT Glucose 101 (H) 74 - 99 mg/dL   aPTT   Result Value Ref Range    aPTT 44.0 (H) 22.0 - 32.5 seconds   Basic Metabolic Panel   Result Value Ref Range    Glucose 123 (H) 65 - 99 mg/dL    Sodium 136 133 - 145 mmol/L    Potassium 4.5 3.4 - 5.1 mmol/L    Chloride 104 97 - 107 mmol/L    Bicarbonate 20 (L) 24 - 31 mmol/L    Urea Nitrogen 24 8 - 25 mg/dL    Creatinine 1.90 (H) 0.40 - 1.60 mg/dL    eGFR 33 (L) >60 mL/min/1.73m*2    Calcium 8.6 8.5 - 10.4 mg/dL    Anion Gap 12 <=19 mmol/L   Hemoglobin   Result Value Ref Range    Hemoglobin 7.7 (L) 12.0 - 16.0 g/dL   POCT GLUCOSE   Result Value Ref Range    POCT Glucose 101 (H) 74 - 99 mg/dL      Reviewed     Assessment/Plan   Principal Problem:    Fever, unspecified fever cause  Active Problems:    Advanced hepatic cirrhosis (CMS/HCC)    Alcohol abuse with unspecified alcohol-induced disorder (CMS/HCC)    Alcohol abuse    Toxic metabolic encephalopathy    Wernicke's encephalopathy    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        Plan/Recommendations  The patient does not meet the criteria for the inpatient psychiatric treatment. Appreciate Discharge Dispo or next steps from the Care Coordinator although the patient has signed AMA and awaits her ride. Thank you for allowing us to participate in the care of this patient      Medication Consent  Medication Consent: no medication changes necessary for review    ULISSES Vicente-CNP

## 2024-01-26 NOTE — SIGNIFICANT EVENT
I was notified by patient's nurse that patient wants to go home.  Patient pulled IV earlier today.  She refuses all intravenous medications.  I went and discussed with patient in presence of her bedside nurse, Mellisa Jacobs, patient's condition and options.  Patient verbalizes understanding of severity of her condition: End-stage liver cirrhosis, acute renal failure.  She tells me that she understands that she may die from that condition if it is left untreated without proper in-hospital treatment.  She insists on going home.  She tells me that she will take oral medications at home but she does not want to stay in the hospital for 1 more day.  She is agreeable to sign AMA papers.

## 2024-01-26 NOTE — NURSING NOTE
Rounded on pt. I heard pt whimpering slightly, a little whimper at the end of an exhale. Pt laying in bed, alert, aox3-4. I had asked pt if she was in pain, and she did acknowledge pain but didn't specify where and was dismissive of it. I informed her that I did have pain medication available for her if she felt it would help. Pt denied need. I also offered warm or cold packs which she denied also. I checked pt's IV,because she had been looking at it and c/o of it feeling tight. I loosened the coban further and noticed in the process the tape I had adhered the end of the wrap with was off. I added tape to the end again when I finished wrapping site. Pt oriented to call bell and it and belongings are placed in reach. Telemetry leads connected and reading on monitor. Bed alarm set. Continuing to monitor.

## 2024-01-26 NOTE — NURSING NOTE
"Rounded on pt. Saw pt had her arm up in the air looking at IV site then saw the coban on the bed and the dressing and IV off. Pt was blotting at the diminishing bleeding with kleenex. I said, \"You pulled it out?!\" Pt says,\"yes.\" I asked why? Pt says because the needle was hurting. I show her on the catheter she pulled out that there is no needle, only a flexible straw. The needle is only used to get it in. She denies that I'm showing her a flexible catheter that I'm flexing with my finger tip, so I stroke it back and forth over her forearm and state again there is no needle that stays in an IV. I tell her it was working perfectly fine and asked if she understood that when she pulls an IV out we have to stick her with a needle to get another one back in if she wants treatment to continue. Pt says yes. I tell her we won't bother with another IV tonight, she can discuss her wishes with the [facility] medical team. Pt in agreement. RAC dressed with 2x2 and tape. Bed alarm set. Continuing to monitor.  "

## 2024-01-26 NOTE — CARE PLAN
Problem: Pain  Goal: My pain/discomfort is manageable  Outcome: Progressing     Problem: Safety  Goal: Patient will be injury free during hospitalization  Outcome: Progressing  Goal: I will remain free of falls  Outcome: Progressing     Problem: Daily Care  Goal: Daily care needs are met  Outcome: Progressing     Problem: Psychosocial Needs  Goal: Demonstrates ability to cope with hospitalization/illness  Outcome: Progressing  Goal: Collaborate with me, my family, and caregiver to identify my specific goals  Outcome: Progressing     Problem: Discharge Barriers  Goal: My discharge needs are met  Outcome: Progressing     Problem: Skin  Goal: Decreased wound size/increased tissue granulation at next dressing change  Outcome: Progressing  Goal: Participates in plan/prevention/treatment measures  Outcome: Progressing  Goal: Prevent/manage excess moisture  Outcome: Progressing  Goal: Prevent/minimize sheer/friction injuries  Outcome: Progressing  Flowsheets (Taken 1/26/2024 0652)  Prevent/minimize sheer/friction injuries: Use pull sheet  Goal: Promote/optimize nutrition  Outcome: Progressing  Goal: Promote skin healing  Outcome: Progressing   The patient's goals for the shift include      The clinical goals for the shift include no falls, unimpeded IVF/ABX infusion    Over the shift, the patient did not make progress toward the goals. Pt pulled out two IV s.

## 2024-01-26 NOTE — PROGRESS NOTES
"Briseida Owen is a 48 y.o. female on day 3 of admission presenting with Fever, unspecified fever cause.    Subjective      Patient states that she will be signing out AMA.     Objective     Physical Exam  HENT:      Head: Normocephalic.      Nose: Nose normal.   Eyes:      Pupils: Pupils are equal, round, and reactive to light.   Cardiovascular:      Rate and Rhythm: Normal rate.   Pulmonary:      Effort: Pulmonary effort is normal.   Abdominal:      Palpations: Abdomen is soft.   Musculoskeletal:         General: Normal range of motion.      Cervical back: Normal range of motion.   Skin:     General: Skin is warm.   Neurological:      General: No focal deficit present.      Mental Status: She is alert.   Psychiatric:         Mood and Affect: Mood normal.         Last Recorded Vitals  Blood pressure 103/57, pulse 78, temperature 36.8 °C (98.2 °F), temperature source Axillary, resp. rate 18, height 1.575 m (5' 2\"), weight 73.7 kg (162 lb 7.7 oz), SpO2 97 %.  Intake/Output last 3 Shifts:  I/O last 3 completed shifts:  In: 1012 (13.7 mL/kg) [P.O.:762; IV Piggyback:250]  Out: 0 (0 mL/kg)   Weight: 73.7 kg     Relevant Results      Scheduled medications  folic acid, 1 mg, oral, Daily  lactulose, 30 g, oral, BID  levothyroxine, 50 mcg, oral, Daily  magnesium oxide, 400 mg, oral, Daily  midodrine, 10 mg, oral, TID with meals  multivitamin, 1 tablet, oral, Daily  pantoprazole, 40 mg, oral, Daily  piperacillin-tazobactam, 2.25 g, intravenous, q6h  rifAXIMin, 550 mg, oral, BID  [Held by provider] spironolactone, 25 mg, oral, Daily  thiamine, 100 mg, oral, Daily      Continuous medications  sodium chloride 0.9%, 75 mL/hr, Last Rate: 75 mL/hr (01/25/24 1346)      PRN medications  PRN medications: acetaminophen, benzocaine-menthol, dextromethorphan-guaifenesin, dextrose 10 % in water (D10W), dextrose, glucagon, guaiFENesin, [DISCONTINUED] ondansetron **OR** ondansetron, ondansetron ODT, oxyCODONE, polyethylene glycol  Results " for orders placed or performed during the hospital encounter of 01/23/24 (from the past 24 hour(s))   POCT GLUCOSE   Result Value Ref Range    POCT Glucose 101 (H) 74 - 99 mg/dL   aPTT   Result Value Ref Range    aPTT 44.0 (H) 22.0 - 32.5 seconds   Basic Metabolic Panel   Result Value Ref Range    Glucose 123 (H) 65 - 99 mg/dL    Sodium 136 133 - 145 mmol/L    Potassium 4.5 3.4 - 5.1 mmol/L    Chloride 104 97 - 107 mmol/L    Bicarbonate 20 (L) 24 - 31 mmol/L    Urea Nitrogen 24 8 - 25 mg/dL    Creatinine 1.90 (H) 0.40 - 1.60 mg/dL    eGFR 33 (L) >60 mL/min/1.73m*2    Calcium 8.6 8.5 - 10.4 mg/dL    Anion Gap 12 <=19 mmol/L   Hemoglobin   Result Value Ref Range    Hemoglobin 7.7 (L) 12.0 - 16.0 g/dL   POCT GLUCOSE   Result Value Ref Range    POCT Glucose 101 (H) 74 - 99 mg/dL           Assessment/Plan   Principal Problem:    Fever, unspecified fever cause  Active Problems:    Advanced hepatic cirrhosis (CMS/HCC)    Alcohol abuse with unspecified alcohol-induced disorder (CMS/HCC)    Alcohol abuse    Toxic metabolic encephalopathy    Wernicke's encephalopathy    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 Goal for 2-3 stools daily. Monitor given low Na   EV: Jan 2023 grade II EV banded x 7, recommend repeat EGD next month   HCC: AFP up to date   Ascites: Chronic recurrent paracenteses, paracentesis on 1/24 with 1.8 L removed -Fluid analysis negative for SBP   -Low Na diet  -Continue diuretics      Anemia   Macrocytic anemia. Stable HH. No overt bleeding           Alcohol Abuse,          -Still drinking. Educated on her high risk of death with decomp cirrhosis and ETOH abuse.    She will be signing out AMA, she informed me that she will be taking all of her  prescribed medicines at home and will follow-up as an outpatient    Mikaela Price, APRN-CNP

## 2024-01-26 NOTE — PROGRESS NOTES
"Briseida Owen is a 48 y.o. female on day 3 of admission presenting with Fever, unspecified fever cause.    Subjective   Patient seen and examined today. Patient sitting up at bedside. Denies pain, chest pain, or shortness of breath. Is stating that she is leaving soon. Healthcare power of  filled out at bedside.     Objective     Physical Exam  Vitals and nursing note reviewed.   Constitutional:       Appearance: She is ill-appearing.   HENT:      Head: Normocephalic and atraumatic.      Mouth/Throat:      Mouth: Mucous membranes are moist.   Eyes:      General: Scleral icterus present.      Extraocular Movements: Extraocular movements intact.      Pupils: Pupils are equal, round, and reactive to light.   Cardiovascular:      Rate and Rhythm: Normal rate and regular rhythm.      Pulses: Normal pulses.   Pulmonary:      Effort: Pulmonary effort is normal. No respiratory distress.      Breath sounds: No stridor. No wheezing, rhonchi or rales.   Chest:      Chest wall: No tenderness.   Abdominal:      General: Bowel sounds are normal.      Palpations: Abdomen is soft.      Tenderness: There is no abdominal tenderness. There is no guarding or rebound.   Musculoskeletal:         General: No swelling, deformity or signs of injury.   Skin:     General: Skin is warm and dry.      Coloration: Skin is jaundiced.   Neurological:      Mental Status: She is oriented to person, place, and time.         Last Recorded Vitals  Blood pressure 103/57, pulse 78, temperature 36.8 °C (98.2 °F), temperature source Axillary, resp. rate 18, height 1.575 m (5' 2\"), weight 73.7 kg (162 lb 7.7 oz), SpO2 97 %.  Intake/Output last 3 Shifts:  I/O last 3 completed shifts:  In: 1012 (13.7 mL/kg) [P.O.:762; IV Piggyback:250]  Out: 0 (0 mL/kg)   Weight: 73.7 kg     Relevant Results  Results for orders placed or performed during the hospital encounter of 01/23/24 (from the past 24 hour(s))   POCT GLUCOSE   Result Value Ref Range    POCT " Glucose 101 (H) 74 - 99 mg/dL   aPTT   Result Value Ref Range    aPTT 44.0 (H) 22.0 - 32.5 seconds   Basic Metabolic Panel   Result Value Ref Range    Glucose 123 (H) 65 - 99 mg/dL    Sodium 136 133 - 145 mmol/L    Potassium 4.5 3.4 - 5.1 mmol/L    Chloride 104 97 - 107 mmol/L    Bicarbonate 20 (L) 24 - 31 mmol/L    Urea Nitrogen 24 8 - 25 mg/dL    Creatinine 1.90 (H) 0.40 - 1.60 mg/dL    eGFR 33 (L) >60 mL/min/1.73m*2    Calcium 8.6 8.5 - 10.4 mg/dL    Anion Gap 12 <=19 mmol/L   Hemoglobin   Result Value Ref Range    Hemoglobin 7.7 (L) 12.0 - 16.0 g/dL   POCT GLUCOSE   Result Value Ref Range    POCT Glucose 101 (H) 74 - 99 mg/dL      Scheduled medications  folic acid, 1 mg, oral, Daily  lactulose, 30 g, oral, BID  levothyroxine, 50 mcg, oral, Daily  magnesium oxide, 400 mg, oral, Daily  midodrine, 10 mg, oral, TID with meals  multivitamin, 1 tablet, oral, Daily  pantoprazole, 40 mg, oral, Daily  piperacillin-tazobactam, 2.25 g, intravenous, q6h  rifAXIMin, 550 mg, oral, BID  [Held by provider] spironolactone, 25 mg, oral, Daily  thiamine, 100 mg, oral, Daily      Continuous medications  sodium chloride 0.9%, 75 mL/hr, Last Rate: 75 mL/hr (01/25/24 1346)      PRN medications  PRN medications: acetaminophen, benzocaine-menthol, dextromethorphan-guaifenesin, dextrose 10 % in water (D10W), dextrose, glucagon, guaiFENesin, [DISCONTINUED] ondansetron **OR** ondansetron, ondansetron ODT, oxyCODONE, polyethylene glycol       Assessment/Plan   Principal Problem:    Fever, unspecified fever cause  Active Problems:    Advanced hepatic cirrhosis (CMS/HCC)    Alcohol abuse with unspecified alcohol-induced disorder (CMS/HCC)    Alcohol abuse    Toxic metabolic encephalopathy    Wernicke's encephalopathy    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)    Hyponatremia    Leukocytosis    Elevated INR    Moderate protein-calorie malnutrition (CMS/HCC)  Liver Cirrhosis-MELD 32  Esophageal Varices-7bands  1/8  Hepatic Encephalopathy-controlled  Anxiety-uncontrolled  SBP-on antbiotics  Hypoxia-Pleural effusion previously, last CXR negative, but requiring 3L oxygen.   Neuropathic pain-uncontrolled, underlying CIDP, not taking gabapentin as ordered. [facility] reviewed gabapentin 400mg, #90, last fill 12/23. Gabapentin on hold d/t DONTRELL.   Palliative Care   DNR CCA DNI  The patient is a capable decision maker    Healthcare power of  filled out at bedside. Her sister Sussy Owen [phone] is her surrogate. Patient is telling me she does not want any alternate power of  for healthcare.     This information was filled out in [facility] under patient contacts.    Previous palliative care encounter.  47yo heavy wine drinker presenting with 3rd liver related hospitalization in <6months. Decompensated cirrhosis, now with DONTRELL. Suspect underlying mental health disorder with anxiety elements. Patient freely admits that she drinks to control her anxiety, aid with sleep and help with her neuropathic pain to her hands and feet. Her SO does much of her IADLS and assists with some ADLs. She does not shower herself, she is fearful of going down the stairs and limits stairs only to when SO is present. She is fearful of going to sleep without presence of SO in case something happens to her or she needs assistance.      She fully understands that her cirrhosis is progressive and has no wish for CPR or life support as this will not contribute to her goal to remain at home, and would be ineffective with her advanced cirrhosis. Signed copy of DNRCCA/DNI placed in chart. She dislikes hospitals and only goes to hospital when essentially forced by her SO. She acknowledges she has underlying anxiety/fear but has no desire to utilize behavioral health services to get control of her symptoms. She has had bad experiences with psych previously. I anticipate that she will not be able to stop drinking unless she gets assistance with her mental  health. Recommend behavioral health consultation at this time to assist with anxiety, insomnia.      No desire to seek hospice services at this time, but no desire to stop drinking either. Patient's only goal at this point is to receive treatment and go home. She is willing to see GI outpatient in order to prevent hospitalizations in the future.      Her neuropathic pain is uncontrolled, gabapentin has been ineffective. I offered lyrica, but she was not agreeable. She refused topical agents.       I spent 30 minutes in the professional and overall care of this patient.      Fadia Castle, APRN-CNP

## 2024-01-26 NOTE — SIGNIFICANT EVENT
Patient's sister called.  She is very upset about patient's decision to go home AGAINST MEDICAL ADVICE.  He is very worried that patient is depressed and may not be making great choices so she is questioning patient's ability to make those choices.  Will consult behavioral health and ask them to evaluate patient for depression and need for competency evaluation if appropriate.

## 2024-01-26 NOTE — PROGRESS NOTES
[facility] attempted to meet with the pt x1 and found her to be with NP, this worker will follow up with the pt in the morning.      01/25/24 2034   Discharge Planning   Patient expects to be discharged to: Home with [facility] possibly

## 2024-01-26 NOTE — PROGRESS NOTES
LPCC met with pt this morning, discussed dc plan. Pt declines SNF and HHC, reaources for outpatient tx offered, pt teluctantly accepted, attending made aware.    Pt to leave AMA.     01/26/24 0932   Discharge Planning   Patient expects to be discharged to: Home refuses [facility]

## 2024-01-26 NOTE — NURSING NOTE
Rounded on pt. Found pt awake, room dark. I reassessed IV site and found the IV was pulled out. Site not bleeding. Pt agreed she pulled it out. I told her she shouldn't have done that that was how she was getting her hydration and antibiotics. I told her I would have to replace it. Pt stated no in a distressed tone. I asked if she did not want the antibiotics. She reluctantly agreed she did. I attempted once with no success. I look at right arm and note the RAC IV she had Thursday morning after I had redressed it Wednesday night is gone and open to air. Pt acknowledges that she took care of that one, too. Rapid response tried twice with success on second attempt. Pt was tearful during procedure. Labs collected. IV site wrapped with coban. I reminded her she does have a choice to refuse, so if she pulls it out again, she can reconsider if she still wants treatment, and the medical team review alternatives, risks and benefits. Pt acknowledges information. Bed alarm set. Continuing to monitor.

## 2024-01-28 LAB
BACTERIA BLD CULT: NORMAL
BACTERIA BLD CULT: NORMAL

## 2024-02-19 LAB
ATRIAL RATE: 87 BPM
P AXIS: 5 DEGREES
P OFFSET: 195 MS
P ONSET: 135 MS
PR INTERVAL: 168 MS
Q ONSET: 219 MS
QRS COUNT: 14 BEATS
QRS DURATION: 90 MS
QT INTERVAL: 390 MS
QTC CALCULATION(BAZETT): 469 MS
QTC FREDERICIA: 441 MS
R AXIS: 25 DEGREES
T AXIS: -23 DEGREES
T OFFSET: 414 MS
VENTRICULAR RATE: 87 BPM

## 2024-02-28 ENCOUNTER — APPOINTMENT (OUTPATIENT)
Dept: CARDIOLOGY | Facility: HOSPITAL | Age: 49
DRG: 065 | End: 2024-02-28
Payer: MEDICARE

## 2024-02-28 ENCOUNTER — APPOINTMENT (OUTPATIENT)
Dept: RADIOLOGY | Facility: HOSPITAL | Age: 49
DRG: 065 | End: 2024-02-28
Payer: MEDICARE

## 2024-02-28 ENCOUNTER — HOSPITAL ENCOUNTER (INPATIENT)
Facility: HOSPITAL | Age: 49
LOS: 3 days | Discharge: HOSPICE | DRG: 065 | End: 2024-03-02
Attending: STUDENT IN AN ORGANIZED HEALTH CARE EDUCATION/TRAINING PROGRAM | Admitting: FAMILY MEDICINE
Payer: MEDICARE

## 2024-02-28 DIAGNOSIS — I60.9 SUBARACHNOID HEMORRHAGE (MULTI): ICD-10-CM

## 2024-02-28 DIAGNOSIS — K70.30 ALCOHOLIC CIRRHOSIS, UNSPECIFIED WHETHER ASCITES PRESENT (MULTI): ICD-10-CM

## 2024-02-28 DIAGNOSIS — R56.9 SEIZURE (MULTI): Primary | ICD-10-CM

## 2024-02-28 LAB
ALBUMIN SERPL-MCNC: 3.7 G/DL (ref 3.5–5)
ALP BLD-CCNC: 200 U/L (ref 35–125)
ALT SERPL-CCNC: 26 U/L (ref 5–40)
AMMONIA PLAS-SCNC: 86 UMOL/L (ref 12–45)
ANION GAP SERPL CALC-SCNC: 17 MMOL/L
APPEARANCE UR: ABNORMAL
APTT PPP: 49.7 SECONDS (ref 22–32.5)
AST SERPL-CCNC: 94 U/L (ref 5–40)
BACTERIA #/AREA URNS AUTO: ABNORMAL /HPF
BASOPHILS # BLD AUTO: 0.03 X10*3/UL (ref 0–0.1)
BASOPHILS NFR BLD AUTO: 0.2 %
BILIRUB DIRECT SERPL-MCNC: 7.5 MG/DL (ref 0–0.2)
BILIRUB SERPL-MCNC: 18.6 MG/DL (ref 0.1–1.2)
BILIRUB UR STRIP.AUTO-MCNC: ABNORMAL MG/DL
BUN SERPL-MCNC: 7 MG/DL (ref 8–25)
CALCIUM SERPL-MCNC: 9 MG/DL (ref 8.5–10.4)
CHLORIDE SERPL-SCNC: 90 MMOL/L (ref 97–107)
CO2 SERPL-SCNC: 26 MMOL/L (ref 24–31)
COLOR UR: ABNORMAL
CREAT SERPL-MCNC: 0.5 MG/DL (ref 0.4–1.6)
EGFRCR SERPLBLD CKD-EPI 2021: >90 ML/MIN/1.73M*2
EOSINOPHIL # BLD AUTO: 0.01 X10*3/UL (ref 0–0.7)
EOSINOPHIL NFR BLD AUTO: 0.1 %
ERYTHROCYTE [DISTWIDTH] IN BLOOD BY AUTOMATED COUNT: 17.9 % (ref 11.5–14.5)
ETHANOL SERPL-MCNC: <0.01 G/DL
FLUAV RNA RESP QL NAA+PROBE: NOT DETECTED
FLUBV RNA RESP QL NAA+PROBE: NOT DETECTED
GLUCOSE SERPL-MCNC: 168 MG/DL (ref 65–99)
GLUCOSE UR STRIP.AUTO-MCNC: NORMAL MG/DL
HCG UR QL IA.RAPID: NEGATIVE
HCT VFR BLD AUTO: 23.9 % (ref 36–46)
HGB BLD-MCNC: 8 G/DL (ref 12–16)
HOLD SPECIMEN: NORMAL
IMM GRANULOCYTES # BLD AUTO: 0.16 X10*3/UL (ref 0–0.7)
IMM GRANULOCYTES NFR BLD AUTO: 1.1 % (ref 0–0.9)
INR PPP: 2.8 (ref 0.9–1.2)
KETONES UR STRIP.AUTO-MCNC: NEGATIVE MG/DL
LACTATE BLDV-SCNC: 4 MMOL/L (ref 0.4–2)
LACTATE BLDV-SCNC: 4.5 MMOL/L (ref 0.4–2)
LEUKOCYTE ESTERASE UR QL STRIP.AUTO: NEGATIVE
LIPASE SERPL-CCNC: <16 U/L (ref 16–63)
LYMPHOCYTES # BLD AUTO: 1.51 X10*3/UL (ref 1.2–4.8)
LYMPHOCYTES NFR BLD AUTO: 10.4 %
MCH RBC QN AUTO: 39.8 PG (ref 26–34)
MCHC RBC AUTO-ENTMCNC: 33.5 G/DL (ref 32–36)
MCV RBC AUTO: 119 FL (ref 80–100)
MONOCYTES # BLD AUTO: 1.32 X10*3/UL (ref 0.1–1)
MONOCYTES NFR BLD AUTO: 9.1 %
NEUTROPHILS # BLD AUTO: 11.5 X10*3/UL (ref 1.2–7.7)
NEUTROPHILS NFR BLD AUTO: 79.1 %
NITRITE UR QL STRIP.AUTO: NEGATIVE
NRBC BLD-RTO: 0.2 /100 WBCS (ref 0–0)
PH UR STRIP.AUTO: 5.5 [PH]
PLATELET # BLD AUTO: 144 X10*3/UL (ref 150–450)
POTASSIUM SERPL-SCNC: 2.7 MMOL/L (ref 3.4–5.1)
PROT SERPL-MCNC: 7.3 G/DL (ref 5.9–7.9)
PROT UR STRIP.AUTO-MCNC: ABNORMAL MG/DL
PROTHROMBIN TIME: 27.8 SECONDS (ref 9.3–12.7)
RBC # BLD AUTO: 2.01 X10*6/UL (ref 4–5.2)
RBC # UR STRIP.AUTO: NEGATIVE /UL
RBC #/AREA URNS AUTO: ABNORMAL /HPF
SARS-COV-2 RNA RESP QL NAA+PROBE: NOT DETECTED
SODIUM SERPL-SCNC: 133 MMOL/L (ref 133–145)
SP GR UR STRIP.AUTO: 1.02
UROBILINOGEN UR STRIP.AUTO-MCNC: ABNORMAL MG/DL
WBC # BLD AUTO: 14.5 X10*3/UL (ref 4.4–11.3)
WBC #/AREA URNS AUTO: ABNORMAL /HPF
YEAST BUDDING #/AREA UR COMP ASSIST: PRESENT /HPF

## 2024-02-28 PROCEDURE — 83690 ASSAY OF LIPASE: CPT | Performed by: STUDENT IN AN ORGANIZED HEALTH CARE EDUCATION/TRAINING PROGRAM

## 2024-02-28 PROCEDURE — 99285 EMERGENCY DEPT VISIT HI MDM: CPT | Mod: 25

## 2024-02-28 PROCEDURE — 87636 SARSCOV2 & INF A&B AMP PRB: CPT | Performed by: STUDENT IN AN ORGANIZED HEALTH CARE EDUCATION/TRAINING PROGRAM

## 2024-02-28 PROCEDURE — 71045 X-RAY EXAM CHEST 1 VIEW: CPT

## 2024-02-28 PROCEDURE — 83605 ASSAY OF LACTIC ACID: CPT | Performed by: STUDENT IN AN ORGANIZED HEALTH CARE EDUCATION/TRAINING PROGRAM

## 2024-02-28 PROCEDURE — 85025 COMPLETE CBC W/AUTO DIFF WBC: CPT | Performed by: STUDENT IN AN ORGANIZED HEALTH CARE EDUCATION/TRAINING PROGRAM

## 2024-02-28 PROCEDURE — 71045 X-RAY EXAM CHEST 1 VIEW: CPT | Mod: FOREIGN READ | Performed by: RADIOLOGY

## 2024-02-28 PROCEDURE — 1210000001 HC SEMI-PRIVATE ROOM DAILY

## 2024-02-28 PROCEDURE — 96375 TX/PRO/DX INJ NEW DRUG ADDON: CPT

## 2024-02-28 PROCEDURE — 82077 ASSAY SPEC XCP UR&BREATH IA: CPT | Performed by: STUDENT IN AN ORGANIZED HEALTH CARE EDUCATION/TRAINING PROGRAM

## 2024-02-28 PROCEDURE — 2500000004 HC RX 250 GENERAL PHARMACY W/ HCPCS (ALT 636 FOR OP/ED): Performed by: NURSE PRACTITIONER

## 2024-02-28 PROCEDURE — 2500000004 HC RX 250 GENERAL PHARMACY W/ HCPCS (ALT 636 FOR OP/ED): Performed by: STUDENT IN AN ORGANIZED HEALTH CARE EDUCATION/TRAINING PROGRAM

## 2024-02-28 PROCEDURE — 2500000004 HC RX 250 GENERAL PHARMACY W/ HCPCS (ALT 636 FOR OP/ED): Performed by: FAMILY MEDICINE

## 2024-02-28 PROCEDURE — 36415 COLL VENOUS BLD VENIPUNCTURE: CPT | Performed by: STUDENT IN AN ORGANIZED HEALTH CARE EDUCATION/TRAINING PROGRAM

## 2024-02-28 PROCEDURE — 82140 ASSAY OF AMMONIA: CPT | Performed by: STUDENT IN AN ORGANIZED HEALTH CARE EDUCATION/TRAINING PROGRAM

## 2024-02-28 PROCEDURE — 85730 THROMBOPLASTIN TIME PARTIAL: CPT | Performed by: STUDENT IN AN ORGANIZED HEALTH CARE EDUCATION/TRAINING PROGRAM

## 2024-02-28 PROCEDURE — 85610 PROTHROMBIN TIME: CPT | Performed by: STUDENT IN AN ORGANIZED HEALTH CARE EDUCATION/TRAINING PROGRAM

## 2024-02-28 PROCEDURE — 99291 CRITICAL CARE FIRST HOUR: CPT

## 2024-02-28 PROCEDURE — 70450 CT HEAD/BRAIN W/O DYE: CPT

## 2024-02-28 PROCEDURE — 99223 1ST HOSP IP/OBS HIGH 75: CPT | Performed by: NURSE PRACTITIONER

## 2024-02-28 PROCEDURE — 93005 ELECTROCARDIOGRAM TRACING: CPT

## 2024-02-28 PROCEDURE — 96365 THER/PROPH/DIAG IV INF INIT: CPT

## 2024-02-28 PROCEDURE — 82248 BILIRUBIN DIRECT: CPT | Performed by: STUDENT IN AN ORGANIZED HEALTH CARE EDUCATION/TRAINING PROGRAM

## 2024-02-28 PROCEDURE — 70450 CT HEAD/BRAIN W/O DYE: CPT | Mod: FOREIGN READ | Performed by: RADIOLOGY

## 2024-02-28 PROCEDURE — 81001 URINALYSIS AUTO W/SCOPE: CPT | Performed by: STUDENT IN AN ORGANIZED HEALTH CARE EDUCATION/TRAINING PROGRAM

## 2024-02-28 PROCEDURE — 81025 URINE PREGNANCY TEST: CPT | Performed by: STUDENT IN AN ORGANIZED HEALTH CARE EDUCATION/TRAINING PROGRAM

## 2024-02-28 RX ORDER — LORAZEPAM 2 MG/ML
1 INJECTION INTRAMUSCULAR EVERY 2 HOUR PRN
Status: DISCONTINUED | OUTPATIENT
Start: 2024-02-28 | End: 2024-03-02

## 2024-02-28 RX ORDER — DEXTROSE MONOHYDRATE, SODIUM CHLORIDE, AND POTASSIUM CHLORIDE 50; .745; 4.5 G/1000ML; G/1000ML; G/1000ML
75 INJECTION, SOLUTION INTRAVENOUS CONTINUOUS
Status: DISCONTINUED | OUTPATIENT
Start: 2024-02-28 | End: 2024-03-02 | Stop reason: HOSPADM

## 2024-02-28 RX ORDER — MORPHINE SULFATE 2 MG/ML
2 INJECTION, SOLUTION INTRAMUSCULAR; INTRAVENOUS ONCE
Status: COMPLETED | OUTPATIENT
Start: 2024-02-28 | End: 2024-02-28

## 2024-02-28 RX ORDER — LORAZEPAM 2 MG/ML
0.5 INJECTION INTRAMUSCULAR EVERY 6 HOURS SCHEDULED
Status: DISCONTINUED | OUTPATIENT
Start: 2024-02-28 | End: 2024-03-02

## 2024-02-28 RX ORDER — LORAZEPAM 2 MG/ML
0.5 INJECTION INTRAMUSCULAR ONCE
Status: COMPLETED | OUTPATIENT
Start: 2024-02-28 | End: 2024-02-28

## 2024-02-28 RX ORDER — LORAZEPAM 2 MG/ML
1 INJECTION INTRAMUSCULAR EVERY 4 HOURS PRN
Status: DISCONTINUED | OUTPATIENT
Start: 2024-02-28 | End: 2024-02-28

## 2024-02-28 RX ORDER — MORPHINE SULFATE 2 MG/ML
2 INJECTION, SOLUTION INTRAMUSCULAR; INTRAVENOUS
Status: DISCONTINUED | OUTPATIENT
Start: 2024-02-28 | End: 2024-03-02 | Stop reason: HOSPADM

## 2024-02-28 RX ORDER — MORPHINE SULFATE 2 MG/ML
2 INJECTION, SOLUTION INTRAMUSCULAR; INTRAVENOUS EVERY 6 HOURS SCHEDULED
Status: DISCONTINUED | OUTPATIENT
Start: 2024-02-28 | End: 2024-03-02 | Stop reason: HOSPADM

## 2024-02-28 RX ORDER — LEVETIRACETAM 10 MG/ML
1000 INJECTION INTRAVASCULAR ONCE
Status: COMPLETED | OUTPATIENT
Start: 2024-02-28 | End: 2024-02-28

## 2024-02-28 RX ADMIN — LORAZEPAM 0.5 MG: 2 INJECTION, SOLUTION INTRAMUSCULAR; INTRAVENOUS at 07:44

## 2024-02-28 RX ADMIN — LEVETIRACETAM 1000 MG: 10 INJECTION INTRAVENOUS at 07:27

## 2024-02-28 RX ADMIN — MORPHINE SULFATE 2 MG: 2 INJECTION, SOLUTION INTRAMUSCULAR; INTRAVENOUS at 09:32

## 2024-02-28 RX ADMIN — LORAZEPAM 1 MG: 2 INJECTION, SOLUTION INTRAMUSCULAR; INTRAVENOUS at 11:38

## 2024-02-28 RX ADMIN — LORAZEPAM 1 MG: 2 INJECTION, SOLUTION INTRAMUSCULAR; INTRAVENOUS at 15:30

## 2024-02-28 RX ADMIN — LORAZEPAM 0.5 MG: 2 INJECTION, SOLUTION INTRAMUSCULAR; INTRAVENOUS at 18:42

## 2024-02-28 RX ADMIN — MORPHINE SULFATE 2 MG: 2 INJECTION, SOLUTION INTRAMUSCULAR; INTRAVENOUS at 18:42

## 2024-02-28 SDOH — SOCIAL STABILITY: SOCIAL INSECURITY: HAS ANYONE EVER THREATENED TO HURT YOUR FAMILY OR YOUR PETS?: UNABLE TO ASSESS

## 2024-02-28 SDOH — SOCIAL STABILITY: SOCIAL INSECURITY: DO YOU FEEL ANYONE HAS EXPLOITED OR TAKEN ADVANTAGE OF YOU FINANCIALLY OR OF YOUR PERSONAL PROPERTY?: UNABLE TO ASSESS

## 2024-02-28 SDOH — SOCIAL STABILITY: SOCIAL INSECURITY: ARE YOU OR HAVE YOU BEEN THREATENED OR ABUSED PHYSICALLY, EMOTIONALLY, OR SEXUALLY BY ANYONE?: UNABLE TO ASSESS

## 2024-02-28 SDOH — SOCIAL STABILITY: SOCIAL INSECURITY: HAVE YOU HAD THOUGHTS OF HARMING ANYONE ELSE?: UNABLE TO ASSESS

## 2024-02-28 SDOH — SOCIAL STABILITY: SOCIAL INSECURITY: WERE YOU ABLE TO COMPLETE ALL THE BEHAVIORAL HEALTH SCREENINGS?: NO

## 2024-02-28 SDOH — SOCIAL STABILITY: SOCIAL INSECURITY: DO YOU FEEL UNSAFE GOING BACK TO THE PLACE WHERE YOU ARE LIVING?: UNABLE TO ASSESS

## 2024-02-28 SDOH — SOCIAL STABILITY: SOCIAL INSECURITY: ARE THERE ANY APPARENT SIGNS OF INJURIES/BEHAVIORS THAT COULD BE RELATED TO ABUSE/NEGLECT?: UNABLE TO ASSESS

## 2024-02-28 SDOH — SOCIAL STABILITY: SOCIAL INSECURITY: ABUSE: ADULT

## 2024-02-28 SDOH — SOCIAL STABILITY: SOCIAL INSECURITY: DOES ANYONE TRY TO KEEP YOU FROM HAVING/CONTACTING OTHER FRIENDS OR DOING THINGS OUTSIDE YOUR HOME?: UNABLE TO ASSESS

## 2024-02-28 ASSESSMENT — COGNITIVE AND FUNCTIONAL STATUS - GENERAL
CLIMB 3 TO 5 STEPS WITH RAILING: TOTAL
TOILETING: TOTAL
DRESSING REGULAR LOWER BODY CLOTHING: TOTAL
EATING MEALS: TOTAL
MOVING FROM LYING ON BACK TO SITTING ON SIDE OF FLAT BED WITH BEDRAILS: TOTAL
TURNING FROM BACK TO SIDE WHILE IN FLAT BAD: TOTAL
DRESSING REGULAR UPPER BODY CLOTHING: TOTAL
MOBILITY SCORE: 6
STANDING UP FROM CHAIR USING ARMS: TOTAL
PERSONAL GROOMING: TOTAL
DAILY ACTIVITIY SCORE: 6
WALKING IN HOSPITAL ROOM: TOTAL
PATIENT BASELINE BEDBOUND: UNABLE TO ASSESS AT THIS TIME
MOVING TO AND FROM BED TO CHAIR: TOTAL
HELP NEEDED FOR BATHING: TOTAL

## 2024-02-28 ASSESSMENT — ACTIVITIES OF DAILY LIVING (ADL)
ADEQUATE_TO_COMPLETE_ADL: NO
BATHING: UNABLE TO ASSESS
JUDGMENT_ADEQUATE_SAFELY_COMPLETE_DAILY_ACTIVITIES: NO
GROOMING: UNABLE TO ASSESS
PATIENT'S MEMORY ADEQUATE TO SAFELY COMPLETE DAILY ACTIVITIES?: NO
LACK_OF_TRANSPORTATION: PATIENT UNABLE TO ANSWER
TOILETING: UNABLE TO ASSESS
HEARING - LEFT EAR: UNABLE TO ASSESS
WALKS IN HOME: UNABLE TO ASSESS
FEEDING YOURSELF: UNABLE TO ASSESS
HEARING - RIGHT EAR: UNABLE TO ASSESS
DRESSING YOURSELF: UNABLE TO ASSESS

## 2024-02-28 ASSESSMENT — LIFESTYLE VARIABLES
AUDIT-C TOTAL SCORE: -1
EVER HAD A DRINK FIRST THING IN THE MORNING TO STEADY YOUR NERVES TO GET RID OF A HANGOVER: NO
HOW OFTEN DO YOU HAVE 6 OR MORE DRINKS ON ONE OCCASION: PATIENT UNABLE TO ANSWER
HOW MANY STANDARD DRINKS CONTAINING ALCOHOL DO YOU HAVE ON A TYPICAL DAY: PATIENT UNABLE TO ANSWER
EVER FELT BAD OR GUILTY ABOUT YOUR DRINKING: NO
SKIP TO QUESTIONS 9-10: 0
AUDIT-C TOTAL SCORE: -1
HAVE PEOPLE ANNOYED YOU BY CRITICIZING YOUR DRINKING: NO
HAVE YOU EVER FELT YOU SHOULD CUT DOWN ON YOUR DRINKING: NO
HOW OFTEN DO YOU HAVE A DRINK CONTAINING ALCOHOL: PATIENT UNABLE TO ANSWER

## 2024-02-28 ASSESSMENT — PAIN SCALES - GENERAL
PAINLEVEL_OUTOF10: 0 - NO PAIN

## 2024-02-28 ASSESSMENT — PAIN - FUNCTIONAL ASSESSMENT: PAIN_FUNCTIONAL_ASSESSMENT: 0-10

## 2024-02-28 NOTE — H&P
History Of Present Illness  Briseida Owen is a 48 y.o. female who presents emergency department for evaluation of altered mental status, generalized pain and seizure. Patient has a known history of cirrhosis that significant other called EMS today as patient was acting more confused and lethargic this morning. She was complaining of generalized pain and was awake however sluggish to respond when EMS first arrived. They note that when they got into the ambulance patient did have a witnessed seizure lasting roughly 1 minute however resolved on its own prior to any medication being given. Uncertain whether patient has a history of seizures. Patient very confused at this time and unable provide any further history.     Past Medical History  She has a past medical history of Alcoholic hepatitis without ascites (08/13/2021) and Hepatic encephalopathy (CMS/HCC) (06/09/2021).    Surgical History  She has a past surgical history that includes Other surgical history (06/09/2021); US guided abdominal paracentesis (4/28/2021); US guided abdominal paracentesis (4/23/2021); US guided abdominal paracentesis (5/14/2021); US guided abdominal paracentesis (8/17/2023); US guided abdominal paracentesis (8/25/2023); US guided abdominal paracentesis (10/23/2023); US guided abdominal paracentesis (10/27/2023); US guided abdominal paracentesis (10/26/2023); and US guided abdominal paracentesis (1/4/2024).     Social History  She reports that she has never smoked. She has never used smokeless tobacco. She reports current alcohol use of about 1.0 standard drink of alcohol per week. She reports that she does not use drugs.    Family History  Family History   Problem Relation Name Age of Onset    Diabetes Mother      Uterine cancer Mother      Heart attack Father      Other (CHEMICAL DEPENDENCY) Father      Hypertension Father          Allergies  Sulfamethoxazole-trimethoprim    Review of Systems  Unable to obtain due to the patient's  change in mental status    Physical Exam   Constitutional:       General: She is not in acute distress.     Appearance: She is ill-appearing.   HENT:      Head: Normocephalic and atraumatic.      Mouth/Throat:      Mouth: Mucous membranes are moist.   Eyes:      Pupils: Pupils are equal, round, and reactive to light.      Comments: Significant scleral icterus bilaterally   Cardiovascular:      Rate and Rhythm: Normal rate and regular rhythm.   Pulmonary:      Effort: Pulmonary effort is normal. No respiratory distress.      Breath sounds: Normal breath sounds. No wheezing or rhonchi.   Abdominal:      General: Abdomen is flat. There is distension.      Tenderness: There is no abdominal tenderness. There is no guarding or rebound.   Musculoskeletal:         General: No swelling.   Skin:     General: Skin is dry.      Coloration: Skin is jaundiced.   Neurological:      Mental Status: She is lethargic.      GCS: GCS eye subscore is 4. GCS verbal subscore is 3. GCS motor subscore is 5.     Last Recorded Vitals  /80   Pulse 60   Temp 36.3 °C (97.4 °F) (Oral)   Resp 16   Wt 73.9 kg (163 lb)   SpO2 96%     Relevant Results  Results for orders placed or performed during the hospital encounter of 02/28/24 (from the past 24 hour(s))   CBC and Auto Differential   Result Value Ref Range    WBC 14.5 (H) 4.4 - 11.3 x10*3/uL    nRBC 0.2 (H) 0.0 - 0.0 /100 WBCs    RBC 2.01 (L) 4.00 - 5.20 x10*6/uL    Hemoglobin 8.0 (L) 12.0 - 16.0 g/dL    Hematocrit 23.9 (L) 36.0 - 46.0 %     (H) 80 - 100 fL    MCH 39.8 (H) 26.0 - 34.0 pg    MCHC 33.5 32.0 - 36.0 g/dL    RDW 17.9 (H) 11.5 - 14.5 %    Platelets 144 (L) 150 - 450 x10*3/uL    Neutrophils % 79.1 40.0 - 80.0 %    Immature Granulocytes %, Automated 1.1 (H) 0.0 - 0.9 %    Lymphocytes % 10.4 13.0 - 44.0 %    Monocytes % 9.1 2.0 - 10.0 %    Eosinophils % 0.1 0.0 - 6.0 %    Basophils % 0.2 0.0 - 2.0 %    Neutrophils Absolute 11.50 (H) 1.20 - 7.70 x10*3/uL    Immature  Granulocytes Absolute, Automated 0.16 0.00 - 0.70 x10*3/uL    Lymphocytes Absolute 1.51 1.20 - 4.80 x10*3/uL    Monocytes Absolute 1.32 (H) 0.10 - 1.00 x10*3/uL    Eosinophils Absolute 0.01 0.00 - 0.70 x10*3/uL    Basophils Absolute 0.03 0.00 - 0.10 x10*3/uL   Lipase   Result Value Ref Range    Lipase <16 (L) 16 - 63 U/L   Basic metabolic panel   Result Value Ref Range    Glucose 168 (H) 65 - 99 mg/dL    Sodium 133 133 - 145 mmol/L    Potassium 2.7 (LL) 3.4 - 5.1 mmol/L    Chloride 90 (L) 97 - 107 mmol/L    Bicarbonate 26 24 - 31 mmol/L    Urea Nitrogen 7 (L) 8 - 25 mg/dL    Creatinine 0.50 0.40 - 1.60 mg/dL    eGFR >90 >60 mL/min/1.73m*2    Calcium 9.0 8.5 - 10.4 mg/dL    Anion Gap 17 <=19 mmol/L   Hepatic function panel   Result Value Ref Range    AST 94 (H) 5 - 40 U/L    ALT 26 5 - 40 U/L    Alkaline Phosphatase 200 (H) 35 - 125 U/L    Bilirubin, Total 18.6 (H) 0.1 - 1.2 mg/dL    Bilirubin, Direct 7.5 (H) 0.0 - 0.2 mg/dL    Total Protein 7.3 5.9 - 7.9 g/dL    Albumin 3.7 3.5 - 5.0 g/dL   Protime-INR   Result Value Ref Range    Protime 27.8 (H) 9.3 - 12.7 seconds    INR 2.8 (H) 0.9 - 1.2   APTT   Result Value Ref Range    aPTT 49.7 (H) 22.0 - 32.5 seconds   BLOOD GAS LACTIC ACID, VENOUS   Result Value Ref Range    POCT Lactate, Venous 4.0 (HH) 0.4 - 2.0 mmol/L   Ammonia   Result Value Ref Range    Ammonia 86 (H) 12 - 45 umol/L   Alcohol   Result Value Ref Range    Alcohol <0.010 0.000 - 0.010 g/dL   Sars-CoV-2 and Influenza A/B PCR   Result Value Ref Range    Flu A Result Not Detected Not Detected    Flu B Result Not Detected Not Detected    Coronavirus 2019, PCR Not Detected Not Detected   ECG 12 lead   Result Value Ref Range    Ventricular Rate 55 BPM    Atrial Rate 55 BPM    KY Interval 124 ms    QRS Duration 86 ms    QT Interval 538 ms    QTC Calculation(Bazett) 514 ms    P Axis 0 degrees    R Axis 5 degrees    T Axis 35 degrees    QRS Count 10 beats    Q Onset 221 ms    P Onset 159 ms    P Offset 197 ms    T  Offset 490 ms    QTC Fredericia 522 ms   hCG, Urine, Qualitative   Result Value Ref Range    HCG, Urine NEGATIVE NEGATIVE   Urinalysis with Reflex Culture and Microscopic   Result Value Ref Range    Color, Urine Dark-Orange (N) Light-Yellow, Yellow, Dark-Yellow    Appearance, Urine Turbid (N) Clear    Specific Gravity, Urine 1.021 1.005 - 1.035    pH, Urine 5.5 5.0, 5.5, 6.0, 6.5, 7.0, 7.5, 8.0    Protein, Urine 50 (1+) (A) NEGATIVE, 10 (TRACE), 20 (TRACE) mg/dL    Glucose, Urine Normal Normal mg/dL    Blood, Urine NEGATIVE NEGATIVE    Ketones, Urine NEGATIVE NEGATIVE mg/dL    Bilirubin, Urine 3 (2+) (A) NEGATIVE    Urobilinogen, Urine 6 (2+) (A) Normal mg/dL    Nitrite, Urine NEGATIVE NEGATIVE    Leukocyte Esterase, Urine NEGATIVE NEGATIVE   Urinalysis Microscopic   Result Value Ref Range    WBC, Urine NONE 1-5, NONE /HPF    RBC, Urine NONE NONE, 1-2, 3-5 /HPF    Bacteria, Urine 1+ (A) NONE SEEN /HPF    Budding Yeast, Urine PRESENT (A) NONE /HPF   Blood Gas Lactic Acid, Venous   Result Value Ref Range    POCT Lactate, Venous 4.5 (HH) 0.4 - 2.0 mmol/L       Assessment/Plan   Subarachnoid hemorrhage  Seizure  Acute encephalopathy  Alcoholic liver cirrhosis  CODE STATUS DNR    Plan:  Will consult palliative care  [facility]urosurgery was contacted from the ER[facility]  [facility]s for DVT prophylaxis       Kaylee Lee MD

## 2024-02-28 NOTE — CONSULTS
Inpatient consult to Palliative Care  Consult performed by: ULISSES Benson-CNP  Consult ordered by: Kaylee Lee MD  Reason for consult: palliative care, massive subarachnoid hemorrhage             History Of Present Illness  Briseida Owen is a 48 y.o. female with past medical history of liver cirrhosis with ascites, prior history of alcoholic hepatitis neuropathy.  I met with her family her boyfriend is present tells me that EMS was called because of generalized pain she however seem to be pretty with it this morning apparently had a seizure while she was in with EMS was able to respond prior to this.  According to the record seizure was roughly about a minute resolved on its own.   Got brought into the emergency department CT scanning of the brain showed she had moderate subarachnoid hemorrhage bilateral parietal lobe hemorrhages and intraventricular hemorrhage lateral ventricle hold greater than right and subarachnoid hemorrhage from a ruptured aneurysm.    Currently CODE STATUS is DNR comfort care    POA documents reviewed her Sister Sussy is listed as her healthcare POA.    Patient could not not provide any HPI or review of systems.    Lab work reviewed sodium 133 potassium 2.7 chloride 90 CO2 26 BUN 7 creatinine 0.5 gap 17 glucose 168    INR 2.8    White count 14.5 hemoglobin 8 hematocrit 23 platelets 144,000  Alkaline phosphatase 200 ALT 26 AST 94  Total bilirubin is 18.6  Ammonia 86 lipase less than 16    Lengthy medicine consulted to assist in goals of care symptom control     Symptoms (0 - 10, Best to Worst)  [place] Symptom Assessment System  Pain Score: 0 - No pain    Does appear to have pain when she is flailing both arms on her head she is moaning constantly grimacing and brow furrowing.  She is nonverbal  She is not communicative at all.    BM in last 48 hours? ?    Personal/Social History  According to the chart unable to corroborate with the patient because she is severely encephalopathic  She reports that she has never smoked. She has never used smokeless tobacco. She reports current alcohol use of about 1.0 standard drink of alcohol per week. She reports that she does not use drugs.    Caregiving/Caregiver Support  Does the patient require assistance in some or all components of his care, including coordination of medical care? ?  If Yes, which person serves that role?  ?  Caregiver emotional or practical needs:      Past Medical History  She has a past medical history of Alcoholic hepatitis without ascites (08/13/2021) and Hepatic encephalopathy (CMS/HCC) (06/09/2021).    Surgical History  She has a past surgical history that includes Other surgical history (06/09/2021); US guided abdominal paracentesis (4/28/2021); US guided abdominal paracentesis (4/23/2021); US guided abdominal paracentesis (5/14/2021); US guided abdominal paracentesis (8/17/2023); US guided abdominal paracentesis (8/25/2023); US guided abdominal paracentesis (10/23/2023); US guided abdominal paracentesis (10/27/2023); US guided abdominal paracentesis (10/26/2023); and US guided abdominal paracentesis (1/4/2024).     Family History  Family History   Problem Relation Name Age of Onset    Diabetes Mother      Uterine cancer Mother      Heart attack Father      Other (CHEMICAL DEPENDENCY) Father      Hypertension Father       Allergies  Sulfamethoxazole-trimethoprim    Review of Systems  Clearly unobtainable from the patient at this point she is severely encephalopathic.  Reportedly  From her boyfriend she was in pain this morning which is why EMS was called but she was coherent apparently had a seizure and route has been cephalopathic since.  Unable to corroborate anything from the patient.     Physical Exam  Nurses notes and vitals reviewed  I saw her as she was brought up to the room by transport from the ED  Very encephalopathic very jaundiced  She is not verbally communicating but she is moaning constantly looks to be of  "distressing pain flailing her arms on her head for all furrowing and grimacing.  Follows no commands.  Is spontaneously moving upper extremities minimal movement of the lower extremity.    Normocephalic the anterior of the skull appears to be free of trauma unable to get a good exam posteriorly  There is no eye drainage or discharge her pupils do react to light she has significant scleral icterus.  She has outward gaze deviation of both eyes no obvious nystagmus  Mucous membranes are dry  Nose was grossly clear no rhinorrhea or epistaxis  Neck was supple no JVD trachea seems to be midline  Anterior breath sounds seem to be clear  Rhythm and rate: Bradycardic slightly irregular PMI was not palpable  Distended abdomen with ascites hypoactive bowel sounds she moans and groans with any sort of light palpation.  She has a Monge catheter with icteric urine  Lower extremities no significant edema diminished pedal pulses  Weak radial pulses, appears to have Alisa's nails, ridges as well  Perineal area buttocks back and breasts were not examined  Skin is very jaundiced poor skin turgor positive tenting  Neurologically she is very encephalopathic  Pupils are reactive but both eyes have an outward gaze deviation  No obvious nystagmus, he follows no commands there is no verbal communication.  She is moving her upper extremities spontaneously flailing these over her head onto her head grimacing grimacing brow furrowing moaning and groaning constantly.  Currently does not appear to have any twitching or myoclonus.      Last Recorded Vitals  Blood pressure 131/62, pulse 67, temperature 36.4 °C (97.5 °F), temperature source Axillary, resp. rate 17, height 1.575 m (5' 2\"), weight 73.9 kg (163 lb), SpO2 100 %.    Relevant Results  ECG 12 lead    Result Date: 2/28/2024  Sinus bradycardia with sinus arrhythmia Otherwise normal ECG When compared with ECG of 03-JAN-2024 21:01, Vent. rate has decreased BY  32 BPM    CT head wo IV " contrast    Result Date: 2/28/2024  STUDY: CT Head without IV Contrast; 02/28/2024 7:00 AM INDICATION: Altered mental status. COMPARISON: CT head 10/21/2023, MR brain 04/15/2021, CT head 04/13/2021. ACCESSION NUMBER(S): [id number] ORDERING CLINICIAN: LELO OMALLEY TECHNIQUE: Noncontrast axial CT scan of head was performed.  Angled reformats in brain and bone windows were generated.  The images were reviewed in bone, brain, blood and soft tissue windows. Automated mA/kV exposure control was utilized and patient examination was performed in strict accordance with principles of ALARA. FINDINGS: CSF Spaces:  The sulci and basal cisterns are within normal limits. There is no extraaxial fluid collection.  Parenchyma:  There is moderate subarachnoid hemorrhage along the bilateral parietal apices.  Intraventricular hemorrhage is seen in the bilateral lateral ventricles, left greater than right along with a small amount of hemorrhage seen in the third ventricle.  Small amount of subarachnoid hemorrhage is seen along the tentorium.  There is no significant midline shift or mass effect.  Calvarium:  The calvarium is unremarkable.  Paranasal sinuses and mastoids:  Visualized paranasal sinuses and mastoids are clear.    Moderate subarachnoid hemorrhage along the bilateral parietal lobes along with intraventricular hemorrhage in the lateral ventricles, left greater than right, and subarachnoid hemorrhage along the tentorium. Findings most likely represent ruptured aneurysm, CTA is recommended for further evaluation. NOTIFICATION:  The critical results of the study were discussed with, and acknowledged by Dr. Lelo Omalley by telephone on 2/28/2024 at 0712. Signed by Bartolo Torres    XR chest 1 view    Result Date: 2/28/2024  STUDY: Chest Radiograph;  02/28/2024, 07:11AM INDICATION: Altered mental status. COMPARISON: 01/23/2024, 01/05/2024 XR chest ACCESSION NUMBER(S): [id number] ORDERING CLINICIAN: LELO OMALLEY TECHNIQUE:   Frontal chest was obtained at 07:11 hours. FINDINGS: CARDIOMEDIASTINAL SILHOUETTE: Cardiomediastinal silhouette is normal in size and configuration.  LUNGS: Lungs are clear.  ABDOMEN: No remarkable upper abdominal findings.  BONES: No acute osseous changes.    No acute cardiopulmonary disease. Signed by Bartolo Torres     Results for orders placed or performed during the hospital encounter of 02/28/24 (from the past 24 hour(s))   CBC and Auto Differential   Result Value Ref Range    WBC 14.5 (H) 4.4 - 11.3 x10*3/uL    nRBC 0.2 (H) 0.0 - 0.0 /100 WBCs    RBC 2.01 (L) 4.00 - 5.20 x10*6/uL    Hemoglobin 8.0 (L) 12.0 - 16.0 g/dL    Hematocrit 23.9 (L) 36.0 - 46.0 %     (H) 80 - 100 fL    MCH 39.8 (H) 26.0 - 34.0 pg    MCHC 33.5 32.0 - 36.0 g/dL    RDW 17.9 (H) 11.5 - 14.5 %    Platelets 144 (L) 150 - 450 x10*3/uL    Neutrophils % 79.1 40.0 - 80.0 %    Immature Granulocytes %, Automated 1.1 (H) 0.0 - 0.9 %    Lymphocytes % 10.4 13.0 - 44.0 %    Monocytes % 9.1 2.0 - 10.0 %    Eosinophils % 0.1 0.0 - 6.0 %    Basophils % 0.2 0.0 - 2.0 %    Neutrophils Absolute 11.50 (H) 1.20 - 7.70 x10*3/uL    Immature Granulocytes Absolute, Automated 0.16 0.00 - 0.70 x10*3/uL    Lymphocytes Absolute 1.51 1.20 - 4.80 x10*3/uL    Monocytes Absolute 1.32 (H) 0.10 - 1.00 x10*3/uL    Eosinophils Absolute 0.01 0.00 - 0.70 x10*3/uL    Basophils Absolute 0.03 0.00 - 0.10 x10*3/uL   Lipase   Result Value Ref Range    Lipase <16 (L) 16 - 63 U/L   Basic metabolic panel   Result Value Ref Range    Glucose 168 (H) 65 - 99 mg/dL    Sodium 133 133 - 145 mmol/L    Potassium 2.7 (LL) 3.4 - 5.1 mmol/L    Chloride 90 (L) 97 - 107 mmol/L    Bicarbonate 26 24 - 31 mmol/L    Urea Nitrogen 7 (L) 8 - 25 mg/dL    Creatinine 0.50 0.40 - 1.60 mg/dL    eGFR >90 >60 mL/min/1.73m*2    Calcium 9.0 8.5 - 10.4 mg/dL    Anion Gap 17 <=19 mmol/L   Hepatic function panel   Result Value Ref Range    AST 94 (H) 5 - 40 U/L    ALT 26 5 - 40 U/L    Alkaline Phosphatase  200 (H) 35 - 125 U/L    Bilirubin, Total 18.6 (H) 0.1 - 1.2 mg/dL    Bilirubin, Direct 7.5 (H) 0.0 - 0.2 mg/dL    Total Protein 7.3 5.9 - 7.9 g/dL    Albumin 3.7 3.5 - 5.0 g/dL   Protime-INR   Result Value Ref Range    Protime 27.8 (H) 9.3 - 12.7 seconds    INR 2.8 (H) 0.9 - 1.2   APTT   Result Value Ref Range    aPTT 49.7 (H) 22.0 - 32.5 seconds   BLOOD GAS LACTIC ACID, VENOUS   Result Value Ref Range    POCT Lactate, Venous 4.0 (HH) 0.4 - 2.0 mmol/L   Ammonia   Result Value Ref Range    Ammonia 86 (H) 12 - 45 umol/L   Alcohol   Result Value Ref Range    Alcohol <0.010 0.000 - 0.010 g/dL   Sars-CoV-2 and Influenza A/B PCR   Result Value Ref Range    Flu A Result Not Detected Not Detected    Flu B Result Not Detected Not Detected    Coronavirus 2019, PCR Not Detected Not Detected   ECG 12 lead   Result Value Ref Range    Ventricular Rate 55 BPM    Atrial Rate 55 BPM    IA Interval 124 ms    QRS Duration 86 ms    QT Interval 538 ms    QTC Calculation(Bazett) 514 ms    P Axis 0 degrees    R Axis 5 degrees    T Axis 35 degrees    QRS Count 10 beats    Q Onset 221 ms    P Onset 159 ms    P Offset 197 ms    T Offset 490 ms    QTC Fredericia 522 ms   hCG, Urine, Qualitative   Result Value Ref Range    HCG, Urine NEGATIVE NEGATIVE   Urinalysis with Reflex Culture and Microscopic   Result Value Ref Range    Color, Urine Dark-Orange (N) Light-Yellow, Yellow, Dark-Yellow    Appearance, Urine Turbid (N) Clear    Specific Gravity, Urine 1.021 1.005 - 1.035    pH, Urine 5.5 5.0, 5.5, 6.0, 6.5, 7.0, 7.5, 8.0    Protein, Urine 50 (1+) (A) NEGATIVE, 10 (TRACE), 20 (TRACE) mg/dL    Glucose, Urine Normal Normal mg/dL    Blood, Urine NEGATIVE NEGATIVE    Ketones, Urine NEGATIVE NEGATIVE mg/dL    Bilirubin, Urine 3 (2+) (A) NEGATIVE    Urobilinogen, Urine 6 (2+) (A) Normal mg/dL    Nitrite, Urine NEGATIVE NEGATIVE    Leukocyte Esterase, Urine NEGATIVE NEGATIVE   Urinalysis Microscopic   Result Value Ref Range    WBC, Urine NONE 1-5,  NONE /HPF    RBC, Urine NONE NONE, 1-2, 3-5 /HPF    Bacteria, Urine 1+ (A) NONE SEEN /HPF    Budding Yeast, Urine PRESENT (A) NONE /HPF   Blood Gas Lactic Acid, Venous   Result Value Ref Range    POCT Lactate, Venous 4.5 (HH) 0.4 - 2.0 mmol/L       Current Facility-Administered Medications:     dextrose 5 % and sodium chloride 0.45 % with KCl 10 mEq/L infusion, 75 mL/hr, intravenous, Continuous, Kaylee Lee MD    LORazepam (Ativan) injection 0.5 mg, 0.5 mg, intravenous, q6h VICKY, ERIKA Benson    LORazepam (Ativan) injection 1 mg, 1 mg, intravenous, q2h PRN, ERIKA Benson    morphine injection 2 mg, 2 mg, intravenous, q6h VICKY, ERIKA Benson    morphine injection 2 mg, 2 mg, intravenous, q1h PRN, ERIKA Benson    morphine injection 2 mg, 2 mg, intravenous, Once, ERIKA Benson       Assessment/Plan   Subarachnoid hemorrhage  Seizure  Acute encephalopathy  Alcoholic liver cirrhosis with ascites  Coagulopathy  Palliative care/goals of care    CODE STATUS DNR comfort care only   Sizable subarachnoid hemorrhage in a young woman with alcoholic liver cirrhosis she was auto anticoagulated likely from ruptured aneurysm  Significant acute encephalopathy, she is not capable of POA documents are in the chart, has not been reviewed POA is her Sister Sussy Owen  Met with the patient and his family including patient's mother and a friend and Sister Sussy [facility]  Everybody is on board at this point positioning to a comfort focused model of care  No Further lab work no further testing    IV morphine and IV Ativan staggering these doses  As needed IV morphine as needed IV Ativan  Hospice consult for the morning she makes that the night  I have asked the nurses to order a courtesy tray for the family  Updated Dr. Lee.  Discussed with nurse Sheldon.    Total time over 80 minutes much of this time was on communication of care and counseling.    ERIKA Benson

## 2024-02-29 LAB
ATRIAL RATE: 55 BPM
P AXIS: 0 DEGREES
P OFFSET: 197 MS
P ONSET: 159 MS
PR INTERVAL: 124 MS
Q ONSET: 221 MS
QRS COUNT: 10 BEATS
QRS DURATION: 86 MS
QT INTERVAL: 538 MS
QTC CALCULATION(BAZETT): 514 MS
QTC FREDERICIA: 522 MS
R AXIS: 5 DEGREES
T AXIS: 35 DEGREES
T OFFSET: 490 MS
VENTRICULAR RATE: 55 BPM

## 2024-02-29 PROCEDURE — 99232 SBSQ HOSP IP/OBS MODERATE 35: CPT | Performed by: NURSE PRACTITIONER

## 2024-02-29 PROCEDURE — 2500000004 HC RX 250 GENERAL PHARMACY W/ HCPCS (ALT 636 FOR OP/ED): Performed by: NURSE PRACTITIONER

## 2024-02-29 PROCEDURE — 2500000002 HC RX 250 W HCPCS SELF ADMINISTERED DRUGS (ALT 637 FOR MEDICARE OP, ALT 636 FOR OP/ED): Performed by: NURSE PRACTITIONER

## 2024-02-29 PROCEDURE — 1210000001 HC SEMI-PRIVATE ROOM DAILY

## 2024-02-29 RX ORDER — OLANZAPINE 5 MG/1
5 TABLET, ORALLY DISINTEGRATING ORAL ONCE
Status: COMPLETED | OUTPATIENT
Start: 2024-02-29 | End: 2024-02-29

## 2024-02-29 RX ORDER — OLANZAPINE 5 MG/1
2.5 TABLET, ORALLY DISINTEGRATING ORAL 2 TIMES DAILY PRN
Status: DISCONTINUED | OUTPATIENT
Start: 2024-02-29 | End: 2024-03-02 | Stop reason: HOSPADM

## 2024-02-29 RX ADMIN — MORPHINE SULFATE 2 MG: 2 INJECTION, SOLUTION INTRAMUSCULAR; INTRAVENOUS at 09:55

## 2024-02-29 RX ADMIN — LORAZEPAM 1 MG: 2 INJECTION, SOLUTION INTRAMUSCULAR; INTRAVENOUS at 20:55

## 2024-02-29 RX ADMIN — LORAZEPAM 1 MG: 2 INJECTION, SOLUTION INTRAMUSCULAR; INTRAVENOUS at 09:55

## 2024-02-29 RX ADMIN — OLANZAPINE 5 MG: 5 TABLET, ORALLY DISINTEGRATING ORAL at 12:39

## 2024-02-29 RX ADMIN — LORAZEPAM 0.5 MG: 2 INJECTION, SOLUTION INTRAMUSCULAR; INTRAVENOUS at 23:43

## 2024-02-29 RX ADMIN — MORPHINE SULFATE 2 MG: 2 INJECTION, SOLUTION INTRAMUSCULAR; INTRAVENOUS at 23:43

## 2024-02-29 ASSESSMENT — RESPIRATORY DISTRESS OBSERVATION SCALE (RDOS)
HEART RATE PER MINUTE: 0 - <90 BEATS
LOOK OF FEAR: 0 - NONE
LOOK OF FEAR: 0 - NONE
ACCESSORY MUSCLE RISE IN CLAVICLE DURING INSPIRATION: 0 - NONE
RESTLESS NONPURPOSEFUL MOVEMENTS: 1 - OCCASIONAL, SLIGHT MOVEMENTS
RESPIRATORY RATE PER MINUTE: 0 - <19 BREATHS
INVOLUNTARY NASAL FLARING: 0 - NONE
RDOS TOTAL SCORE: 5
HEART RATE PER MINUTE: 1 - 90-109 BEATS
RESPIRATORY RATE PER MINUTE: 0 - <19 BREATHS
GRUNTING AT END OF EXPIRATION: 2 - PRESENT
GRUNTING AT END OF EXPIRATION: 0 - NONE
RESPIRATORY RATE PER MINUTE: 0 - <19 BREATHS
RDOS TOTAL SCORE: 1
HEART RATE PER MINUTE: 1 - 90-109 BEATS
GRUNTING AT END OF EXPIRATION: 0 - NONE
PARADOXICAL BREATHING PATTERN: 0 - NONE
RESTLESS NONPURPOSEFUL MOVEMENTS: 2 - FREQUENT MOVEMENTS
ACCESSORY MUSCLE RISE IN CLAVICLE DURING INSPIRATION: 0 - NONE
LOOK OF FEAR: 0 - NONE
ACCESSORY MUSCLE RISE IN CLAVICLE DURING INSPIRATION: 0 - NONE
PARADOXICAL BREATHING PATTERN: 0 - NONE
INVOLUNTARY NASAL FLARING: 0 - NONE
INVOLUNTARY NASAL FLARING: 0 - NONE
PARADOXICAL BREATHING PATTERN: 0 - NONE
RDOS TOTAL SCORE: 3
RESTLESS NONPURPOSEFUL MOVEMENTS: 2 - FREQUENT MOVEMENTS

## 2024-02-29 ASSESSMENT — COGNITIVE AND FUNCTIONAL STATUS - GENERAL
EATING MEALS: TOTAL
EATING MEALS: TOTAL
CLIMB 3 TO 5 STEPS WITH RAILING: TOTAL
TURNING FROM BACK TO SIDE WHILE IN FLAT BAD: TOTAL
DRESSING REGULAR LOWER BODY CLOTHING: TOTAL
TOILETING: TOTAL
WALKING IN HOSPITAL ROOM: TOTAL
MOVING FROM LYING ON BACK TO SITTING ON SIDE OF FLAT BED WITH BEDRAILS: TOTAL
PERSONAL GROOMING: TOTAL
DRESSING REGULAR LOWER BODY CLOTHING: TOTAL
TOILETING: TOTAL
MOVING FROM LYING ON BACK TO SITTING ON SIDE OF FLAT BED WITH BEDRAILS: TOTAL
STANDING UP FROM CHAIR USING ARMS: TOTAL
MOVING TO AND FROM BED TO CHAIR: TOTAL
DAILY ACTIVITIY SCORE: 6
DRESSING REGULAR UPPER BODY CLOTHING: TOTAL
MOBILITY SCORE: 6
MOVING TO AND FROM BED TO CHAIR: TOTAL
DRESSING REGULAR UPPER BODY CLOTHING: TOTAL
CLIMB 3 TO 5 STEPS WITH RAILING: TOTAL
HELP NEEDED FOR BATHING: TOTAL
STANDING UP FROM CHAIR USING ARMS: TOTAL
PERSONAL GROOMING: TOTAL
WALKING IN HOSPITAL ROOM: TOTAL
HELP NEEDED FOR BATHING: TOTAL
TURNING FROM BACK TO SIDE WHILE IN FLAT BAD: TOTAL
DAILY ACTIVITIY SCORE: 6
MOBILITY SCORE: 6

## 2024-02-29 ASSESSMENT — PAIN SCALES - GENERAL
PAINLEVEL_OUTOF10: 0 - NO PAIN

## 2024-02-29 ASSESSMENT — PAIN SCALES - WONG BAKER: WONGBAKER_NUMERICALRESPONSE: NO HURT

## 2024-02-29 NOTE — PROGRESS NOTES
Briseida Owen is a 48 y.o. female on day 1 of admission presenting with Seizure (CMS/HCC).      Subjective   Looks agitated       Objective     Last Recorded Vitals  /60 (BP Location: Right arm, Patient Position: Lying)   Pulse 72   Temp 36.4 °C (97.5 °F) (Axillary)   Resp 17   Wt 73.9 kg (163 lb)   SpO2 93%   Intake/Output last 3 Shifts:    Intake/Output Summary (Last 24 hours) at 2/29/2024 1052  Last data filed at 2/29/2024 0922  Gross per 24 hour   Intake 0 ml   Output 250 ml   Net -250 ml       Admission Weight  Weight: 73.9 kg (163 lb) (02/28/24 0635)    Daily Weight  02/28/24 : 73.9 kg (163 lb)    Image Results  ECG 12 lead  Sinus bradycardia with sinus arrhythmia  Otherwise normal ECG  When compared with ECG of 03-JAN-2024 21:01,  Vent. rate has decreased BY  32 BPM  Confirmed by Rui Jolly ([id number]) on 2/29/2024 9:05:13 AM      Physical Exam   Constitutional:       General: She is not in acute distress.     Appearance: She is ill-appearing.   HENT:      Head: Normocephalic and atraumatic.      Mouth/Throat:      Mouth: Mucous membranes are moist.   Eyes:      Pupils: Pupils are equal, round, and reactive to light.      Comments: Significant scleral icterus bilaterally   Cardiovascular:      Rate and Rhythm: Normal rate and regular rhythm.   Pulmonary:      Effort: Pulmonary effort is normal. No respiratory distress.      Breath sounds: Normal breath sounds. No wheezing or rhonchi.   Abdominal:      General: Abdomen is flat. There is distension.      Tenderness: There is no abdominal tenderness. There is no guarding or rebound.   Musculoskeletal:         General: No swelling.   Skin:     General: Skin is dry.      Coloration: Skin is jaundiced.   Neurological:      Mental Status: She is lethargic.      GCS: GCS eye subscore is 4. GCS verbal subscore is 3. GCS motor subscore is 5.       Assessment/Plan   Subarachnoid hemorrhage  Seizure  Acute encephalopathy  Alcoholic liver cirrhosis  CODE  STATUS DNR     Plan:  Appreciate palliative care's input, the family will opt for hospice if patient does not improve by tomorrow, I explained to the family that it is is very unlikely that she will recover from this  Neurosurgery was contacted from the ER, nonsurgical management  [facility] for [facility]  SCDs for DVT prophylaxis      Kaylee Lee MD

## 2024-02-29 NOTE — CARE PLAN
The patient's goals for the shift include      The clinical goals for the shift include comfort

## 2024-02-29 NOTE — CARE PLAN
The patient's goals for the shift include      The clinical goals for the shift include na      Problem: Pain  Goal: My pain/discomfort is manageable  Outcome: Progressing

## 2024-02-29 NOTE — INDIVIDUALIZED OVERALL PLAN OF CARE NOTE
"Discussed with nurse Monalisa patient was restless and agitated earlier today  Receiving Ativan as well as scheduled morphine  Comfort care    Patient currently looks somewhat restless flailing her upper extremity groaning  Met with family including mother boyfriend and Sister Sussy POA  Agreeable at this point to have a hospice meeting for [facility].  Sussy cell phone is [phone]  Ordered a dose of Zyprexa to see if that will help calm her down a little bit    /60 (BP Location: Right arm, Patient Position: Lying)   Pulse 72   Temp 36.4 °C (97.5 °F) (Axillary)   Resp 17   Ht 1.575 m (5' 2\")   Wt 73.9 kg (163 lb)   SpO2 93%   BMI 29.81 kg/m²       Brief examination  Very encephalopathic very jaundiced no verbal communication she is moaning at times  Dry mucous membranes  Sluggishly reactive pupils she has anisocoria left pupil is now greater than right pupil outward gaze deviation  Icteric sclera, yellowish eye drainage  No obvious nystagmus  Supple neck.  No obvious JVD.  Trachea midline.  Anterior breath sounds seem to be clear  Rhythm and rate seems to be mostly regular she has a systolic murmur now  Stented abdomen but soft positive bowel sounds no leaning towards hypoactive  Fully catheter with very dark orange hued urine  Significant edema of the lower extremities there is some bruising to the extremities as well  Trunk of the body is warm extremities are cool very jaundiced  Encephalopathic nonverbal follows no commands.  Restless and at times agitated.    A/P  SAH/IVH  Alcoholic liver cirrhosis  Encephalopathy, acute  Coagulopathy  Pain  Seizure  Palliative care    Met with the family including Sister Sussy and her healthcare POA being that patient is very obviously not a capable decision maker after suffering a sizable subarachnoid intraventricular hemorrhage having acute encephalopathy related to this on a background of alcoholic liver cirrhosis and innate coagulopathy.  Family is very " agreeable to hospice meeting will place referral to [facility] their preferred choice and have them call the Sister Sussy.  Telephone is [phone].

## 2024-02-29 NOTE — INDIVIDUALIZED OVERALL PLAN OF CARE NOTE
Hospice referral called in by me to [facility].  They will call POA sister Sussy and get RN visit set up w plans for possible hospice house.    2:41 PM  Hospice meeting [facility] 3/1/2024 @ 130-2 pm  Cleo Stuart RN to be here

## 2024-02-29 NOTE — PROGRESS NOTES
Nutrition Progress Note    Screened patient for MST 2, pt is comfort measures only. Possible hospice meeting this morning. Nutrition services will remain available. Will follow-up per protocol.

## 2024-02-29 NOTE — PROGRESS NOTES
02/29/24 1215   Discharge Planning   Who is requesting discharge planning? Provider   Home or Post Acute Services Other (Comment)   Patient expects to be discharged to: Hospice meeting today- Hospice house vs Hospice at home     MSW messaged [facility] to make sure we are on the same page.     DC plan for pt is hospice. Meeting to happen this morning?

## 2024-03-01 PROCEDURE — 99232 SBSQ HOSP IP/OBS MODERATE 35: CPT | Performed by: NURSE PRACTITIONER

## 2024-03-01 PROCEDURE — 1210000001 HC SEMI-PRIVATE ROOM DAILY

## 2024-03-01 PROCEDURE — 2500000002 HC RX 250 W HCPCS SELF ADMINISTERED DRUGS (ALT 637 FOR MEDICARE OP, ALT 636 FOR OP/ED): Performed by: NURSE PRACTITIONER

## 2024-03-01 PROCEDURE — 2500000004 HC RX 250 GENERAL PHARMACY W/ HCPCS (ALT 636 FOR OP/ED): Performed by: NURSE PRACTITIONER

## 2024-03-01 RX ADMIN — OLANZAPINE 2.5 MG: 5 TABLET, ORALLY DISINTEGRATING ORAL at 08:58

## 2024-03-01 RX ADMIN — MORPHINE SULFATE 2 MG: 2 INJECTION, SOLUTION INTRAMUSCULAR; INTRAVENOUS at 05:44

## 2024-03-01 RX ADMIN — MORPHINE SULFATE 2 MG: 2 INJECTION, SOLUTION INTRAMUSCULAR; INTRAVENOUS at 15:51

## 2024-03-01 RX ADMIN — LORAZEPAM 0.5 MG: 2 INJECTION, SOLUTION INTRAMUSCULAR; INTRAVENOUS at 05:44

## 2024-03-01 RX ADMIN — LORAZEPAM 1 MG: 2 INJECTION, SOLUTION INTRAMUSCULAR; INTRAVENOUS at 15:20

## 2024-03-01 ASSESSMENT — COGNITIVE AND FUNCTIONAL STATUS - GENERAL
EATING MEALS: TOTAL
CLIMB 3 TO 5 STEPS WITH RAILING: TOTAL
DRESSING REGULAR UPPER BODY CLOTHING: TOTAL
PERSONAL GROOMING: TOTAL
MOVING TO AND FROM BED TO CHAIR: TOTAL
HELP NEEDED FOR BATHING: TOTAL
WALKING IN HOSPITAL ROOM: TOTAL
TOILETING: TOTAL
MOVING FROM LYING ON BACK TO SITTING ON SIDE OF FLAT BED WITH BEDRAILS: TOTAL
DAILY ACTIVITIY SCORE: 6
MOBILITY SCORE: 6
TURNING FROM BACK TO SIDE WHILE IN FLAT BAD: TOTAL
DRESSING REGULAR LOWER BODY CLOTHING: TOTAL
STANDING UP FROM CHAIR USING ARMS: TOTAL

## 2024-03-01 ASSESSMENT — RESPIRATORY DISTRESS OBSERVATION SCALE (RDOS)
HEART RATE PER MINUTE: 0 - <90 BEATS
GRUNTING AT END OF EXPIRATION: 2 - PRESENT
LOOK OF FEAR: 0 - NONE
PARADOXICAL BREATHING PATTERN: 0 - NONE
INVOLUNTARY NASAL FLARING: 0 - NONE
RESTLESS NONPURPOSEFUL MOVEMENTS: 1 - OCCASIONAL, SLIGHT MOVEMENTS
ACCESSORY MUSCLE RISE IN CLAVICLE DURING INSPIRATION: 0 - NONE
RESPIRATORY RATE PER MINUTE: 0 - <19 BREATHS
RDOS TOTAL SCORE: 3

## 2024-03-01 ASSESSMENT — PAIN SCALES - GENERAL: PAINLEVEL_OUTOF10: 0 - NO PAIN

## 2024-03-01 NOTE — PROGRESS NOTES
Briseida Owen is a 48 y.o. female on day 2 of admission presenting with Seizure (CMS/HCC).     Informational hospice meeting scheduled today for 1330 with [facility].  Will continue to follow.        UPDATE:  Will convert to [facility]. Hospice will reassess tomorrow for possible transport to [facility].         Najma Edwards RN

## 2024-03-01 NOTE — NURSING NOTE
RN Hospice Note    Briseida Owen is a Hospice Patient.   Hospice terminal diagnosis: Subarachnoid Hemorrhage  Physician: Kaylee Lee MD  Visit type: Admission - GIP    Comments/recommendations: Met with family to discuss patient condition and how hospice can support them.  Sussy, Radha and Yessica, Sisters, Theresa, mother and Fran, significant other present.  Discussed hospice inpatient at [facility] for symptoms versus hospice house.  Family prefers to keep patient here today and reevaluate plan for moving tomorrow.  They are really satisfied with the care she is receiving.  Fran is very tearful about the loss of his girlfriend.  Discussed bereavement support. Plan for conversion to [facility]IP.  Some Epic issues encountered and they were called to correct the issue.  Hospice team to follow up tomorrow regarding confirmed conversion to GIP.  Bedside support provided to family and discussed medication use and effectiveness.      Discharge Planning:  Patient to be discharged to  to be determined, possible to [facility]    The following is to be completed:  Discharge order:    State DNR signed by MD:    Nursing facility referral/transfer form:    Medication reconciliation:    PAS/RR or convalescent stay form:    Prescriptions for al narcotics/new medications:    Transportation:    Other:      Plan of care reviewed with patient/family members yaw Peters, sister Hi, Radha, sister, Yessica, sister, Theresa, mother   Plan of care reviewed with hospital staff members: Monalisa, bedside nurse, Kaylee Lee MD, Chris Sanabria, palliative care NP     Please notify [facility] of any changes in condition. Thank you.  Office: [phone] (8 am-6:30 pm M-F and 8 am-4:30 pm weekends and holidays)   [phone] (6:30 pm-8 am M-F and 4:30 pm-8 am weekends and holidays)    Cleo Stuart RN

## 2024-03-01 NOTE — CARE PLAN
The patient's goals for the shift include      The clinical goals for the shift include Pt will remain visibly comfortable throughout the shift

## 2024-03-01 NOTE — INDIVIDUALIZED OVERALL PLAN OF CARE NOTE
Lethargic intermittent restlessness agitation and sx of pain family including mother at bedside and boyfriend + other family members.    Hospice meeting around 130 pm today.  Awaiting eval by hospice RN for possible transfer to [facility].    Results for orders placed or performed during the hospital encounter of 02/28/24 (from the past 96 hour(s))   CBC and Auto Differential   Result Value Ref Range    WBC 14.5 (H) 4.4 - 11.3 x10*3/uL    nRBC 0.2 (H) 0.0 - 0.0 /100 WBCs    RBC 2.01 (L) 4.00 - 5.20 x10*6/uL    Hemoglobin 8.0 (L) 12.0 - 16.0 g/dL    Hematocrit 23.9 (L) 36.0 - 46.0 %     (H) 80 - 100 fL    MCH 39.8 (H) 26.0 - 34.0 pg    MCHC 33.5 32.0 - 36.0 g/dL    RDW 17.9 (H) 11.5 - 14.5 %    Platelets 144 (L) 150 - 450 x10*3/uL    Neutrophils % 79.1 40.0 - 80.0 %    Immature Granulocytes %, Automated 1.1 (H) 0.0 - 0.9 %    Lymphocytes % 10.4 13.0 - 44.0 %    Monocytes % 9.1 2.0 - 10.0 %    Eosinophils % 0.1 0.0 - 6.0 %    Basophils % 0.2 0.0 - 2.0 %    Neutrophils Absolute 11.50 (H) 1.20 - 7.70 x10*3/uL    Immature Granulocytes Absolute, Automated 0.16 0.00 - 0.70 x10*3/uL    Lymphocytes Absolute 1.51 1.20 - 4.80 x10*3/uL    Monocytes Absolute 1.32 (H) 0.10 - 1.00 x10*3/uL    Eosinophils Absolute 0.01 0.00 - 0.70 x10*3/uL    Basophils Absolute 0.03 0.00 - 0.10 x10*3/uL   Lipase   Result Value Ref Range    Lipase <16 (L) 16 - 63 U/L   Basic metabolic panel   Result Value Ref Range    Glucose 168 (H) 65 - 99 mg/dL    Sodium 133 133 - 145 mmol/L    Potassium 2.7 (LL) 3.4 - 5.1 mmol/L    Chloride 90 (L) 97 - 107 mmol/L    Bicarbonate 26 24 - 31 mmol/L    Urea Nitrogen 7 (L) 8 - 25 mg/dL    Creatinine 0.50 0.40 - 1.60 mg/dL    eGFR >90 >60 mL/min/1.73m*2    Calcium 9.0 8.5 - 10.4 mg/dL    Anion Gap 17 <=19 mmol/L   Hepatic function panel   Result Value Ref Range    AST 94 (H) 5 - 40 U/L    ALT 26 5 - 40 U/L    Alkaline Phosphatase 200 (H) 35 - 125 U/L    Bilirubin, Total 18.6 (H) 0.1 - 1.2 mg/dL    Bilirubin, Direct  7.5 (H) 0.0 - 0.2 mg/dL    Total Protein 7.3 5.9 - 7.9 g/dL    Albumin 3.7 3.5 - 5.0 g/dL   Protime-INR   Result Value Ref Range    Protime 27.8 (H) 9.3 - 12.7 seconds    INR 2.8 (H) 0.9 - 1.2   APTT   Result Value Ref Range    aPTT 49.7 (H) 22.0 - 32.5 seconds   BLOOD GAS LACTIC ACID, VENOUS   Result Value Ref Range    POCT Lactate, Venous 4.0 (HH) 0.4 - 2.0 mmol/L   Ammonia   Result Value Ref Range    Ammonia 86 (H) 12 - 45 umol/L   Alcohol   Result Value Ref Range    Alcohol <0.010 0.000 - 0.010 g/dL   Sars-CoV-2 and Influenza A/B PCR   Result Value Ref Range    Flu A Result Not Detected Not Detected    Flu B Result Not Detected Not Detected    Coronavirus 2019, PCR Not Detected Not Detected   ECG 12 lead   Result Value Ref Range    Ventricular Rate 55 BPM    Atrial Rate 55 BPM    IN Interval 124 ms    QRS Duration 86 ms    QT Interval 538 ms    QTC Calculation(Bazett) 514 ms    P Axis 0 degrees    R Axis 5 degrees    T Axis 35 degrees    QRS Count 10 beats    Q Onset 221 ms    P Onset 159 ms    P Offset 197 ms    T Offset 490 ms    QTC Fredericia 522 ms   hCG, Urine, Qualitative   Result Value Ref Range    HCG, Urine NEGATIVE NEGATIVE   Urinalysis with Reflex Culture and Microscopic   Result Value Ref Range    Color, Urine Dark-Orange (N) Light-Yellow, Yellow, Dark-Yellow    Appearance, Urine Turbid (N) Clear    Specific Gravity, Urine 1.021 1.005 - 1.035    pH, Urine 5.5 5.0, 5.5, 6.0, 6.5, 7.0, 7.5, 8.0    Protein, Urine 50 (1+) (A) NEGATIVE, 10 (TRACE), 20 (TRACE) mg/dL    Glucose, Urine Normal Normal mg/dL    Blood, Urine NEGATIVE NEGATIVE    Ketones, Urine NEGATIVE NEGATIVE mg/dL    Bilirubin, Urine 3 (2+) (A) NEGATIVE    Urobilinogen, Urine 6 (2+) (A) Normal mg/dL    Nitrite, Urine NEGATIVE NEGATIVE    Leukocyte Esterase, Urine NEGATIVE NEGATIVE   Extra Urine Gray Tube   Result Value Ref Range    Extra Tube Hold for add-ons.    Urinalysis Microscopic   Result Value Ref Range    WBC, Urine NONE 1-5, NONE  /HPF    RBC, Urine NONE NONE, 1-2, 3-5 /HPF    Bacteria, Urine 1+ (A) NONE SEEN /HPF    Budding Yeast, Urine PRESENT (A) NONE /HPF   Blood Gas Lactic Acid, Venous   Result Value Ref Range    POCT Lactate, Venous 4.5 (HH) 0.4 - 2.0 mmol/L        Heart Rate:  [69-84]   Temp:  [36.7 °C (98.1 °F)-37.2 °C (99 °F)]   Resp:  [18]   BP: (107-115)/(53-55)   SpO2:  [93 %-98 %]        Brief exam:   Lethargic very jaundiced family at bedside  Sluggish pupils icteric sclera + eyelid/conjunctival/scleral edema R/L  Lungs CTA dim bases even unlabored resp   RRR + murmur  Obese abd hypo bs no grimace  Ext warm no mottling  Some ecchymoses.  Very lethargic no verbal communication not able to follow commands  Occasional moaning and grimacing bring arm sup to her head.   blackwell with icteric urine.       Code status : DNRCC  A/P  SAH/IVH  Alcoholic liver cirrhosis  Encephalopathy, acute  Coagulopathy  Pain  Seizure  Palliative care      I discussed in detail with nurse Monalisa  Few doses of ativan/morphine were held as she looked to be comfortable per RN  Zyprexa seems to be helping as well re her restlessness/agitation    Hospice meeting [facility] 3/1/2024 @ 130-2 pm  Cleo Stuart RN to be here    Continue in comfort model of care.  Family and mother had many questions  I reviewed labs imaging and meds with family mother and boyfriend and other family members.    I suggested to family if she qualifies they consider transfer to hospice house   Home w hospice and facility LTC w hospice are not viable options from what family tells me.  Currently VS stable (reviewed w all family)    Await outcome of hospice meeting later today ~130 pm    Total time ~40 min in total today     Sister Sussy (POA).  Telephone is [phone].

## 2024-03-01 NOTE — PROGRESS NOTES
Briseida Owen is a 48 y.o. female on day 2 of admission presenting with Seizure (CMS/HCC).      Subjective   Looks somnolent       Objective     Last Recorded Vitals  /55 (BP Location: Left arm, Patient Position: Lying)   Pulse 78   Temp 36.7 °C (98.1 °F) (Axillary)   Resp 18   Wt 73.9 kg (163 lb)   SpO2 93%   Intake/Output last 3 Shifts:  No intake or output data in the 24 hours ending 03/01/24 0959      Admission Weight  Weight: 73.9 kg (163 lb) (02/28/24 0635)    Daily Weight  02/28/24 : 73.9 kg (163 lb)    Image Results  ECG 12 lead  Sinus bradycardia with sinus arrhythmia  Otherwise normal ECG  When compared with ECG of 03-JAN-2024 21:01,  Vent. rate has decreased BY  32 BPM  Confirmed by Rui Jolly ([id number]) on 2/29/2024 9:05:13 AM      Physical Exam   Constitutional:       General: She is not in acute distress.     Appearance: She is ill-appearing.   HENT:      Head: Normocephalic and atraumatic.      Mouth/Throat:      Mouth: Mucous membranes are moist.   Eyes:      Pupils: Pupils are equal, round, and reactive to light.      Comments: Significant scleral icterus bilaterally   Cardiovascular:      Rate and Rhythm: Normal rate and regular rhythm.   Pulmonary:      Effort: Pulmonary effort is normal. No respiratory distress.      Breath sounds: Normal breath sounds. No wheezing or rhonchi.   Abdominal:      General: Abdomen is flat. There is distension.      Tenderness: There is no abdominal tenderness. There is no guarding or rebound.   Musculoskeletal:         General: No swelling.   Skin:     General: Skin is dry.      Coloration: Skin is jaundiced.   Neurological:      Mental Status: She is lethargic.      GCS: GCS eye subscore is 4. GCS verbal subscore is 3. GCS motor subscore is 5.       Assessment/Plan   Subarachnoid hemorrhage  Seizure  Acute encephalopathy  Alcoholic liver cirrhosis  CODE STATUS DNR     Plan:  Appreciate palliative care's input, the family will meet with hospice  today  Neurosurgery was contacted from the ER, nonsurgical management  Ativan for agitation.  Morphine for pain  SCDs for DVT prophylaxis      Kaylee Lee MD

## 2024-03-02 ENCOUNTER — HOSPITAL ENCOUNTER (INPATIENT)
Facility: HOSPITAL | Age: 49
LOS: 2 days | Discharge: HOSPICE | DRG: 065 | End: 2024-03-04
Attending: FAMILY MEDICINE | Admitting: FAMILY MEDICINE
Payer: MEDICARE

## 2024-03-02 VITALS
WEIGHT: 163 LBS | SYSTOLIC BLOOD PRESSURE: 131 MMHG | TEMPERATURE: 97.7 F | DIASTOLIC BLOOD PRESSURE: 60 MMHG | HEIGHT: 62 IN | OXYGEN SATURATION: 91 % | RESPIRATION RATE: 18 BRPM | BODY MASS INDEX: 30 KG/M2 | HEART RATE: 89 BPM

## 2024-03-02 DIAGNOSIS — S06.6X9A: Primary | ICD-10-CM

## 2024-03-02 PROCEDURE — 2500000004 HC RX 250 GENERAL PHARMACY W/ HCPCS (ALT 636 FOR OP/ED): Performed by: NURSE PRACTITIONER

## 2024-03-02 PROCEDURE — 99497 ADVNCD CARE PLAN 30 MIN: CPT | Performed by: NURSE PRACTITIONER

## 2024-03-02 PROCEDURE — 99223 1ST HOSP IP/OBS HIGH 75: CPT | Performed by: NURSE PRACTITIONER

## 2024-03-02 PROCEDURE — 1250000001 HC HOSPICE SEMI-PRIVATE ROOM DAILY

## 2024-03-02 RX ORDER — DIAZEPAM 5 MG/ML
2.5 INJECTION, SOLUTION INTRAMUSCULAR; INTRAVENOUS EVERY 6 HOURS
Status: DISCONTINUED | OUTPATIENT
Start: 2024-03-02 | End: 2024-03-04 | Stop reason: HOSPADM

## 2024-03-02 RX ORDER — BISACODYL 10 MG/1
10 SUPPOSITORY RECTAL DAILY PRN
Status: DISCONTINUED | OUTPATIENT
Start: 2024-03-02 | End: 2024-03-02 | Stop reason: HOSPADM

## 2024-03-02 RX ORDER — ACETAMINOPHEN 650 MG/1
650 SUPPOSITORY RECTAL EVERY 4 HOURS PRN
Status: DISCONTINUED | OUTPATIENT
Start: 2024-03-02 | End: 2024-03-04 | Stop reason: HOSPADM

## 2024-03-02 RX ORDER — DIAZEPAM 5 MG/ML
5 INJECTION, SOLUTION INTRAMUSCULAR; INTRAVENOUS EVERY 6 HOURS
Status: DISCONTINUED | OUTPATIENT
Start: 2024-03-02 | End: 2024-03-02 | Stop reason: HOSPADM

## 2024-03-02 RX ORDER — MORPHINE SULFATE 2 MG/ML
2 INJECTION, SOLUTION INTRAMUSCULAR; INTRAVENOUS
Status: DISCONTINUED | OUTPATIENT
Start: 2024-03-02 | End: 2024-03-04 | Stop reason: HOSPADM

## 2024-03-02 RX ORDER — ONDANSETRON HYDROCHLORIDE 2 MG/ML
4 INJECTION, SOLUTION INTRAVENOUS EVERY 6 HOURS PRN
Status: DISCONTINUED | OUTPATIENT
Start: 2024-03-02 | End: 2024-03-04 | Stop reason: HOSPADM

## 2024-03-02 RX ORDER — DIAZEPAM 5 MG/ML
5 INJECTION, SOLUTION INTRAMUSCULAR; INTRAVENOUS EVERY 8 HOURS PRN
Status: DISCONTINUED | OUTPATIENT
Start: 2024-03-02 | End: 2024-03-04 | Stop reason: HOSPADM

## 2024-03-02 RX ORDER — BISACODYL 10 MG/1
10 SUPPOSITORY RECTAL DAILY PRN
Status: DISCONTINUED | OUTPATIENT
Start: 2024-03-02 | End: 2024-03-04 | Stop reason: HOSPADM

## 2024-03-02 RX ORDER — OLANZAPINE 5 MG/1
5 TABLET ORAL EVERY 6 HOURS PRN
Status: DISCONTINUED | OUTPATIENT
Start: 2024-03-02 | End: 2024-03-04 | Stop reason: HOSPADM

## 2024-03-02 RX ORDER — GLYCOPYRROLATE 0.2 MG/ML
0.1 INJECTION INTRAMUSCULAR; INTRAVENOUS EVERY 4 HOURS PRN
Status: DISCONTINUED | OUTPATIENT
Start: 2024-03-02 | End: 2024-03-02 | Stop reason: HOSPADM

## 2024-03-02 RX ORDER — ACETAMINOPHEN 650 MG/1
650 SUPPOSITORY RECTAL EVERY 4 HOURS PRN
Status: DISCONTINUED | OUTPATIENT
Start: 2024-03-02 | End: 2024-03-02 | Stop reason: HOSPADM

## 2024-03-02 RX ORDER — GLYCOPYRROLATE 1 MG/1
1 TABLET ORAL 3 TIMES DAILY
Status: DISCONTINUED | OUTPATIENT
Start: 2024-03-02 | End: 2024-03-02

## 2024-03-02 RX ORDER — MORPHINE SULFATE 2 MG/ML
2 INJECTION, SOLUTION INTRAMUSCULAR; INTRAVENOUS EVERY 6 HOURS
Status: DISCONTINUED | OUTPATIENT
Start: 2024-03-02 | End: 2024-03-04 | Stop reason: HOSPADM

## 2024-03-02 RX ORDER — GLYCOPYRROLATE 0.2 MG/ML
0.2 INJECTION INTRAMUSCULAR; INTRAVENOUS EVERY 4 HOURS PRN
Status: DISCONTINUED | OUTPATIENT
Start: 2024-03-02 | End: 2024-03-04 | Stop reason: HOSPADM

## 2024-03-02 RX ADMIN — MORPHINE SULFATE 2 MG: 2 INJECTION, SOLUTION INTRAMUSCULAR; INTRAVENOUS at 00:32

## 2024-03-02 RX ADMIN — DIAZEPAM 5 MG: 5 INJECTION, SOLUTION INTRAMUSCULAR; INTRAVENOUS at 14:00

## 2024-03-02 RX ADMIN — DIAZEPAM 2.5 MG: 10 INJECTION, SOLUTION INTRAMUSCULAR; INTRAVENOUS at 19:46

## 2024-03-02 RX ADMIN — MORPHINE SULFATE 2 MG: 2 INJECTION, SOLUTION INTRAMUSCULAR; INTRAVENOUS at 23:02

## 2024-03-02 RX ADMIN — MORPHINE SULFATE 2 MG: 2 INJECTION, SOLUTION INTRAMUSCULAR; INTRAVENOUS at 05:58

## 2024-03-02 RX ADMIN — LORAZEPAM 0.5 MG: 2 INJECTION, SOLUTION INTRAMUSCULAR; INTRAVENOUS at 05:58

## 2024-03-02 RX ADMIN — GLYCOPYRROLATE 0.1 MG: 0.2 INJECTION INTRAMUSCULAR; INTRAVENOUS at 12:07

## 2024-03-02 RX ADMIN — MORPHINE SULFATE 2 MG: 2 INJECTION, SOLUTION INTRAMUSCULAR; INTRAVENOUS at 19:45

## 2024-03-02 RX ADMIN — MORPHINE SULFATE 2 MG: 2 INJECTION, SOLUTION INTRAMUSCULAR; INTRAVENOUS at 17:06

## 2024-03-02 RX ADMIN — LORAZEPAM 0.5 MG: 2 INJECTION, SOLUTION INTRAMUSCULAR; INTRAVENOUS at 00:31

## 2024-03-02 RX ADMIN — MORPHINE SULFATE 2 MG: 2 INJECTION, SOLUTION INTRAMUSCULAR; INTRAVENOUS at 12:08

## 2024-03-02 RX ADMIN — DIAZEPAM 5 MG: 5 INJECTION INTRAMUSCULAR; INTRAVENOUS at 17:23

## 2024-03-02 RX ADMIN — GLYCOPYRROLATE 0.2 MG: 0.2 INJECTION INTRAMUSCULAR; INTRAVENOUS at 22:55

## 2024-03-02 RX ADMIN — GLYCOPYRROLATE 0.2 MG: 0.2 INJECTION INTRAMUSCULAR; INTRAVENOUS at 17:05

## 2024-03-02 SDOH — SOCIAL STABILITY: SOCIAL INSECURITY: HAS ANYONE EVER THREATENED TO HURT YOUR FAMILY OR YOUR PETS?: UNABLE TO ASSESS

## 2024-03-02 SDOH — SOCIAL STABILITY: SOCIAL INSECURITY: ARE YOU OR HAVE YOU BEEN THREATENED OR ABUSED PHYSICALLY, EMOTIONALLY, OR SEXUALLY BY ANYONE?: UNABLE TO ASSESS

## 2024-03-02 SDOH — SOCIAL STABILITY: SOCIAL INSECURITY: ABUSE: ADULT

## 2024-03-02 SDOH — SOCIAL STABILITY: SOCIAL INSECURITY: HAVE YOU HAD THOUGHTS OF HARMING ANYONE ELSE?: UNABLE TO ASSESS

## 2024-03-02 SDOH — SOCIAL STABILITY: SOCIAL INSECURITY: WERE YOU ABLE TO COMPLETE ALL THE BEHAVIORAL HEALTH SCREENINGS?: NO

## 2024-03-02 SDOH — SOCIAL STABILITY: SOCIAL INSECURITY: ARE THERE ANY APPARENT SIGNS OF INJURIES/BEHAVIORS THAT COULD BE RELATED TO ABUSE/NEGLECT?: UNABLE TO ASSESS

## 2024-03-02 SDOH — SOCIAL STABILITY: SOCIAL INSECURITY: DO YOU FEEL UNSAFE GOING BACK TO THE PLACE WHERE YOU ARE LIVING?: UNABLE TO ASSESS

## 2024-03-02 SDOH — SOCIAL STABILITY: SOCIAL INSECURITY: DOES ANYONE TRY TO KEEP YOU FROM HAVING/CONTACTING OTHER FRIENDS OR DOING THINGS OUTSIDE YOUR HOME?: UNABLE TO ASSESS

## 2024-03-02 ASSESSMENT — PAIN SCALES - PAIN ASSESSMENT IN ADVANCED DEMENTIA (PAINAD)
CONSOLABILITY: NO NEED TO CONSOLE
TOTALSCORE: 0
BREATHING: NORMAL
FACIALEXPRESSION: SMILING OR INEXPRESSIVE
BODYLANGUAGE: RELAXED

## 2024-03-02 ASSESSMENT — RESPIRATORY DISTRESS OBSERVATION SCALE (RDOS)
RESPIRATORY RATE PER MINUTE: 1 - 19-30 BREATHS
PARADOXICAL BREATHING PATTERN: 0 - NONE
HEART RATE PER MINUTE: 2 - >109 BEATS
INVOLUNTARY NASAL FLARING: 0 - NONE
ACCESSORY MUSCLE RISE IN CLAVICLE DURING INSPIRATION: 0 - NONE
RDOS TOTAL SCORE: 5
LOOK OF FEAR: 0 - NONE
GRUNTING AT END OF EXPIRATION: 2 - PRESENT
RESTLESS NONPURPOSEFUL MOVEMENTS: 0 - NONE

## 2024-03-02 ASSESSMENT — ACTIVITIES OF DAILY LIVING (ADL)
GROOMING: UNABLE TO ASSESS
BATHING: UNABLE TO ASSESS
HEARING - RIGHT EAR: UNABLE TO ASSESS
ADEQUATE_TO_COMPLETE_ADL: UNABLE TO ASSESS
DRESSING YOURSELF: UNABLE TO ASSESS
JUDGMENT_ADEQUATE_SAFELY_COMPLETE_DAILY_ACTIVITIES: UNABLE TO ASSESS
LACK_OF_TRANSPORTATION: PATIENT UNABLE TO ANSWER
WALKS IN HOME: UNABLE TO ASSESS
FEEDING YOURSELF: UNABLE TO ASSESS
TOILETING: UNABLE TO ASSESS
PATIENT'S MEMORY ADEQUATE TO SAFELY COMPLETE DAILY ACTIVITIES?: UNABLE TO ASSESS
HEARING - LEFT EAR: UNABLE TO ASSESS

## 2024-03-02 ASSESSMENT — COGNITIVE AND FUNCTIONAL STATUS - GENERAL
STANDING UP FROM CHAIR USING ARMS: TOTAL
DAILY ACTIVITIY SCORE: 6
EATING MEALS: TOTAL
MOBILITY SCORE: 6
CLIMB 3 TO 5 STEPS WITH RAILING: TOTAL
PATIENT BASELINE BEDBOUND: NO
CLIMB 3 TO 5 STEPS WITH RAILING: TOTAL
TURNING FROM BACK TO SIDE WHILE IN FLAT BAD: TOTAL
TOILETING: TOTAL
PERSONAL GROOMING: TOTAL
HELP NEEDED FOR BATHING: TOTAL
TURNING FROM BACK TO SIDE WHILE IN FLAT BAD: TOTAL
DRESSING REGULAR UPPER BODY CLOTHING: TOTAL
MOBILITY SCORE: 6
DRESSING REGULAR LOWER BODY CLOTHING: TOTAL
WALKING IN HOSPITAL ROOM: TOTAL
MOVING TO AND FROM BED TO CHAIR: TOTAL
TOILETING: TOTAL
DAILY ACTIVITIY SCORE: 6
MOVING TO AND FROM BED TO CHAIR: TOTAL
DRESSING REGULAR UPPER BODY CLOTHING: TOTAL
EATING MEALS: TOTAL
MOVING TO AND FROM BED TO CHAIR: TOTAL
MOVING FROM LYING ON BACK TO SITTING ON SIDE OF FLAT BED WITH BEDRAILS: TOTAL
STANDING UP FROM CHAIR USING ARMS: TOTAL
TOILETING: TOTAL
DRESSING REGULAR LOWER BODY CLOTHING: TOTAL
PERSONAL GROOMING: TOTAL
DAILY ACTIVITIY SCORE: 6
DRESSING REGULAR UPPER BODY CLOTHING: TOTAL
WALKING IN HOSPITAL ROOM: TOTAL
MOBILITY SCORE: 6
MOVING FROM LYING ON BACK TO SITTING ON SIDE OF FLAT BED WITH BEDRAILS: TOTAL
MOVING FROM LYING ON BACK TO SITTING ON SIDE OF FLAT BED WITH BEDRAILS: TOTAL
HELP NEEDED FOR BATHING: TOTAL
DRESSING REGULAR LOWER BODY CLOTHING: TOTAL
TURNING FROM BACK TO SIDE WHILE IN FLAT BAD: TOTAL
EATING MEALS: TOTAL
PERSONAL GROOMING: TOTAL
HELP NEEDED FOR BATHING: TOTAL
WALKING IN HOSPITAL ROOM: TOTAL
STANDING UP FROM CHAIR USING ARMS: TOTAL
CLIMB 3 TO 5 STEPS WITH RAILING: TOTAL

## 2024-03-02 ASSESSMENT — PATIENT HEALTH QUESTIONNAIRE - PHQ9
SUM OF ALL RESPONSES TO PHQ9 QUESTIONS 1 & 2: 0
2. FEELING DOWN, DEPRESSED OR HOPELESS: NOT AT ALL
1. LITTLE INTEREST OR PLEASURE IN DOING THINGS: NOT AT ALL

## 2024-03-02 ASSESSMENT — LIFESTYLE VARIABLES
HOW MANY STANDARD DRINKS CONTAINING ALCOHOL DO YOU HAVE ON A TYPICAL DAY: PATIENT UNABLE TO ANSWER
AUDIT-C TOTAL SCORE: -1
AUDIT-C TOTAL SCORE: -1
HOW OFTEN DO YOU HAVE A DRINK CONTAINING ALCOHOL: PATIENT UNABLE TO ANSWER
HOW OFTEN DO YOU HAVE 6 OR MORE DRINKS ON ONE OCCASION: PATIENT UNABLE TO ANSWER
SKIP TO QUESTIONS 9-10: 0

## 2024-03-02 ASSESSMENT — PAIN SCALES - GENERAL: PAINLEVEL_OUTOF10: 0 - NO PAIN

## 2024-03-02 NOTE — PROGRESS NOTES
Briseida Owen is a 48 y.o. female on day 3 of admission presenting with Seizure (CMS/HCC).      Subjective   somnolent       Objective     Last Recorded Vitals  /62 (BP Location: Left arm, Patient Position: Lying)   Pulse 88   Temp 37.7 °C (99.9 °F) (Oral)   Resp 18   Wt 73.9 kg (163 lb)   SpO2 93%   Intake/Output last 3 Shifts:  No intake or output data in the 24 hours ending 03/02/24 0855      Admission Weight  Weight: 73.9 kg (163 lb) (02/28/24 0635)    Daily Weight  02/28/24 : 73.9 kg (163 lb)    Image Results  ECG 12 lead  Sinus bradycardia with sinus arrhythmia  Otherwise normal ECG  When compared with ECG of 03-JAN-2024 21:01,  Vent. rate has decreased BY  32 BPM  Confirmed by Rui Jolly ([id number]) on 2/29/2024 9:05:13 AM      Physical Exam   Constitutional:       General: She is not in acute distress.     Appearance: She is ill-appearing.   HENT:      Head: Normocephalic and atraumatic.      Mouth/Throat:      Mouth: Mucous membranes are moist.   Eyes:      Pupils: Pupils are equal, round, and reactive to light.      Comments: Significant scleral icterus bilaterally   Cardiovascular:      Rate and Rhythm: Normal rate and regular rhythm.   Pulmonary:      Effort: Pulmonary effort is normal. No respiratory distress.      Breath sounds: Normal breath sounds. No wheezing or rhonchi.   Abdominal:      General: Abdomen is flat. There is distension.      Tenderness: There is no abdominal tenderness. There is no guarding or rebound.   Musculoskeletal:         General: No swelling.   Skin:     General: Skin is dry.      Coloration: Skin is jaundiced.   Neurological:      Mental Status: She is lethargic.      GCS: GCS eye subscore is 4. GCS verbal subscore is 3. GCS motor subscore is 5.       Assessment/Plan   Subarachnoid hemorrhage  Seizure  Acute encephalopathy  Alcoholic liver cirrhosis  CODE STATUS DNR     Plan:  Appreciate palliative care's input, the [facility]   Morphine for pain  DC all meds      Kaylee Lee MD

## 2024-03-02 NOTE — CARE PLAN
The patient's goals for the shift include      The clinical goals for the shift include maintain comfort      Problem: Pain  Goal: My pain/discomfort is manageable  Outcome: Progressing     Problem: Psychosocial Needs  Goal: Collaborate with me, my family, and caregiver to identify my specific goals  Outcome: Progressing     Problem: Skin  Goal: Prevent/manage excess moisture  Outcome: Progressing  Flowsheets (Taken 3/2/2024 0928)  Prevent/manage excess moisture: Cleanse incontinence/protect with barrier cream  Goal: Prevent/minimize sheer/friction injuries  Outcome: Progressing  Flowsheets (Taken 3/2/2024 0928)  Prevent/minimize sheer/friction injuries: Complete micro-shifts as needed if patient unable. Adjust patient position to relieve pressure points, not a full turn

## 2024-03-02 NOTE — CARE PLAN
The patient's goals for the shift include      The clinical goals for the shift include visibly comfortable during shift    Problem: Pain  Goal: My pain/discomfort is manageable  Outcome: Progressing     Problem: Skin  Goal: Prevent/manage excess moisture  Outcome: Progressing  Goal: Prevent/minimize sheer/friction injuries  Outcome: Progressing

## 2024-03-02 NOTE — NURSING NOTE
RN Hospice Note    Briseida Owen is a Hospice Patient.   Hospice terminal diagnosis: subarachnoid hemorrhage   Physician: Dr. Kaylee Lee MD, Xiomara Chun CNP  Visit type: GIP Day 1       Discharge Planning:  Patient to be discharged to  [facility], if needed.    Plan of care reviewed with patient/family members   - Patient assessed at bedside: lying in bed, unresponsive, on 2L NC, RR 22-24 at times, PAINAD 4, blackwell in place with orange colored urine.   - Significant other/Fran at bedside initially. Support provided.   - TCT sister/DPOAHC Sussy - reviewed assessment. Recommended transfer to [facility] today as patient VSS. She declined and does not want patient transferred until atleast tomorrow. Discussed that [facility] flip did not happen as planned yesterday and we would need a new Hospice Election Statement completed. She agreed to sign via email. Sussy, 2 other sisters, and patients mother arrived. Spoke with them at bedside. Sussy signed new Hospice Election Statement. Aware that we will re-assess for transfer tomorrow, if patient is still with us. Family aware patient is a reportable death d/t subarachnoid hemorrhage.    Plan of care reviewed with hospital staff members:   - Dr. Lee updated via secure chat and remains in agreement with [facility] admit.   - Xiomara Chun CNP updated and placed comfort med orders in hospice chart.   - GONZÁLEZ Gaytan and JAMES López updated.    Patient is a reportable death d/t subarachnoid hemorrhage.   home: [facility] [phone]    RN visit tomorrow if patient is still with us. [facility].     Please notify [facility] of any changes in condition. Thank you.  Office: [phone] (8 am-6:30 pm M-F and 8 am-4:30 pm weekends and holidays)   [phone] (6:30 pm-8 am M-F and 4:30 pm-8 am weekends and holidays)    Myranda Zavala RN  [phone]

## 2024-03-02 NOTE — CONSULTS
Inpatient consult to Palliative Care  Consult performed by: ULISSES Giron-CNP  Consult ordered by: Kaylee Lee MD      Patient Location   [address]  Reason For Consult  Reason for Consult: communication / medical decision making     History Of Present Illness  Briseida Owen is a 48 y.o. female who presented to the ED on 2/28 due to pain and confusion, en route to ED had seizure lasted about 1 minutes, resolved on its own. Per records, no prior history of seizure disorder.  CTH notable for moderate subarachnoid hemorrhage bilateral parietal lobe hemorrhages and intraventricular hemorrhage lateral ventricle hold greater than right and subarachnoid hemorrhage from a ruptured aneurysm. She has been unresponsive. Pt is currently DNR-comfort measures only. Family has met with [facility] and enrolled with their services. Consult today with palliative med team as pt is being readmitted under GIP. Two of 3 sisters present at the bedside during visit. Pt has been calm, appears comfortable per nurse and family. She does have orders for routine morphine and ativan which will be continued. Nurse also concerned with oral secretions.       Past Medical History  She has a past medical history of Alcoholic hepatitis without ascites (08/13/2021) and Hepatic encephalopathy (CMS/HCC) (06/09/2021).    Surgical History  She has a past surgical history that includes Other surgical history (06/09/2021); US guided abdominal paracentesis (4/28/2021); US guided abdominal paracentesis (4/23/2021); US guided abdominal paracentesis (5/14/2021); US guided abdominal paracentesis (8/17/2023); US guided abdominal paracentesis (8/25/2023); US guided abdominal paracentesis (10/23/2023); US guided abdominal paracentesis (10/27/2023); US guided abdominal paracentesis (10/26/2023); and US guided abdominal paracentesis (1/4/2024).     Social History  She reports that she has never smoked. She has never used smokeless tobacco. She reports current  alcohol use of about 1.0 standard drink of alcohol per week. She reports that she does not use drugs.    Caregiving/Caregiver Support  Does the patient require assistance in some or all components of his care, including coordination of medical care? Yes  If Yes, which person serves that role?  sister   Caregiver emotional or practical needs:  unknown     Family History  Family History   Problem Relation Name Age of Onset    Diabetes Mother      Uterine cancer Mother      Heart attack Father      Other (CHEMICAL DEPENDENCY) Father      Hypertension Father         Allergies  Sulfamethoxazole-trimethoprim    Scheduled medications  diazePAM, 2.5 mg, intravenous, q6h  morphine, 2 mg, intravenous, q6h      Continuous medications     PRN medications  PRN medications: acetaminophen, bisacodyl, diazePAM, glycopyrrolate, morphine, OLANZapine, ondansetron, oxygen     Relevant Results  See CTH, other labs discontinued as she is comfort measures only  No results found for this or any previous visit (from the past 24 hour(s)).     CT head wo IV contrast  Result Date: 2/28/2024  Moderate subarachnoid hemorrhage along the bilateral parietal lobes along with intraventricular hemorrhage in the lateral ventricles, left greater than right, and subarachnoid hemorrhage along the tentorium. Findings most likely represent ruptured aneurysm, CTA is recommended for further evaluation. NOTIFICATION:  The critical results of the study were discussed with, and acknowledged by Dr. Crispin Lea by telephone on 2/28/2024 at 0712. Signed by Bartolo Torres    XR chest 1 view  Result Date: 2/28/2024  No acute cardiopulmonary disease. Signed by Bartolo Torres      Review of Systems   Reason unable to perform ROS: pt unresponsive unable to contribute.       Functional Status  Activities of Daily Living:  Will be dependent for all basic and iADLs.    Serious Illness Conversation  Pt unable to participate  What is your understanding now of where you are  with your illness:  na  How much information about what is likely to be ahead with your illness  would you like from me: na  What are your most important goals if your health situation worsens:  na  What are your biggest fears and worries about the future with your health:  na  What gives you strength as you think about the future with your illness:  na  What abilities are so critical to your life that you can’t imagine living without them:  na  If you become sicker, how much are you willing to go through for the possibility of gaining more time:  na  How much does your family know about your priorities and wishes:  na     Physical Exam  Vitals and nursing note reviewed.   Constitutional:       General: She is not in acute distress.     Appearance: She is ill-appearing and toxic-appearing.   HENT:      Mouth/Throat:      Comments: Lips dry  Eyes:      Comments: Eyes closed, no spontaneous eye opening   Cardiovascular:      Rate and Rhythm: Normal rate and regular rhythm.      Heart sounds: Normal heart sounds.   Pulmonary:      Effort: Pulmonary effort is normal. No respiratory distress.      Breath sounds: Normal breath sounds. No wheezing.   Abdominal:      General: There is no distension.      Palpations: Abdomen is soft.   Skin:     Coloration: Skin is jaundiced.   Neurological:      Comments: Somnolent, not responsive to verbal or painful stimuli   Psychiatric:      Comments: Calm, no restlessness or agitation       Last Recorded Vitals  There were no vitals taken for this visit.      PALLIATIVE MEDICINE PALLIATIVE PERFORMANCE SCALE     Palliative Performance Scale % (PPS) 10%   Oral Intake Severely reduced (< or = mouthfuls) (2.5)   Edema Absent (0)   Dyspnea at Rest Absent (0)   Delirium Present (4.0)   Palliative Prognostic Index (PPI) Total Score 6.5-8   Note: The scores from each prognostic domain are added.  A score of 0 to 2.0 was associated with a median survival of 90 days; score of 2.1 to 4.0 is 61  days, and score of >4.0 is 12 days.        Assessment/Plan   Subarachnoid hemorrhage, hepatic failure  IMP:    Subarachnoid hemorrhage - unresponsive patient, very poor prognosis  Seizure - no h/o seizure disorder, diazepam prn  Acute encephalopathy - 2/2 above unlikely to resolve  Alcoholic liver cirrhosis with ascites - hospice comfort measures only  Coagulopathy - 2/2 above conservative management    Palliative care encounter  CODE STATUS DNR comfort care only   Pt is not a capable decision maker with significant acute encephalopathy. Sister Sussy is hc-poa and these documents are on file in Epic, I have reviewed them.  Patient enrolled with [facility] for end-of-life services. Currently admitted under GIP.  No Further lab work no further testing   IV morphine and IV diazepam (d/w pharmacist, they are out of Ativan)  As needed IV morphine, diazepam. IV Robinul for secretions.  Discussed with nurse Jerome and [facility] nurse Myranda. Pt will [facility] discharge to [facility] in the next day or two. Update to Dr. Lee via secure chat.    Patient/proxy preference for information  Prefers full information    Provider estimate of survival: days to weeks  Patient Prognostic Awareness: Patient unable to respond    Is the patient hospice-eligible?   Yes  Was a discussion held re hospice services?   yes  Was a decision made re hospice services?  Yes    Goals of Care  comfort  Symptom Management  Pain: 0/10 FACES scale  Medications recommended for pain?  Yes  Tiredness: na  Nausea: zofran prn  Depression: na  Anxiety: diazepam prn  Drowsiness: na  Appetite: npo  Wellbeing: poor  Dyspnea: none  Intervention recommended for dyspnea?  yes  Intervention recommended for constipation?  Yes    Thank you for this consultation. We will follow.    I spent 110 minutes in the professional and overall care of this patient.      Xiomara Chun, APRN-CNP

## 2024-03-02 NOTE — PROGRESS NOTES
03/02/24 0810   Discharge Planning   Patient expects to be discharged to: Hospice House vs GIP   Does the patient need discharge transport arranged? Yes   RoundTrip coordination needed? No     [facility] following for discharge planning, GIP vs transfer to [facility].

## 2024-03-03 PROCEDURE — 1250000001 HC HOSPICE SEMI-PRIVATE ROOM DAILY

## 2024-03-03 PROCEDURE — 2500000004 HC RX 250 GENERAL PHARMACY W/ HCPCS (ALT 636 FOR OP/ED): Performed by: NURSE PRACTITIONER

## 2024-03-03 PROCEDURE — 99231 SBSQ HOSP IP/OBS SF/LOW 25: CPT | Performed by: NURSE PRACTITIONER

## 2024-03-03 RX ADMIN — GLYCOPYRROLATE 0.2 MG: 0.2 INJECTION INTRAMUSCULAR; INTRAVENOUS at 22:23

## 2024-03-03 RX ADMIN — DIAZEPAM 2.5 MG: 10 INJECTION, SOLUTION INTRAMUSCULAR; INTRAVENOUS at 20:27

## 2024-03-03 RX ADMIN — DIAZEPAM 2.5 MG: 10 INJECTION, SOLUTION INTRAMUSCULAR; INTRAVENOUS at 07:27

## 2024-03-03 RX ADMIN — MORPHINE SULFATE 2 MG: 2 INJECTION, SOLUTION INTRAMUSCULAR; INTRAVENOUS at 03:02

## 2024-03-03 RX ADMIN — DIAZEPAM 2.5 MG: 10 INJECTION, SOLUTION INTRAMUSCULAR; INTRAVENOUS at 14:03

## 2024-03-03 RX ADMIN — GLYCOPYRROLATE 0.2 MG: 0.2 INJECTION INTRAMUSCULAR; INTRAVENOUS at 05:51

## 2024-03-03 RX ADMIN — MORPHINE SULFATE 2 MG: 2 INJECTION, SOLUTION INTRAMUSCULAR; INTRAVENOUS at 11:51

## 2024-03-03 RX ADMIN — MORPHINE SULFATE 2 MG: 2 INJECTION, SOLUTION INTRAMUSCULAR; INTRAVENOUS at 09:30

## 2024-03-03 RX ADMIN — MORPHINE SULFATE 2 MG: 2 INJECTION, SOLUTION INTRAMUSCULAR; INTRAVENOUS at 17:44

## 2024-03-03 RX ADMIN — GLYCOPYRROLATE 0.2 MG: 0.2 INJECTION INTRAMUSCULAR; INTRAVENOUS at 09:30

## 2024-03-03 RX ADMIN — GLYCOPYRROLATE 0.2 MG: 0.2 INJECTION INTRAMUSCULAR; INTRAVENOUS at 17:44

## 2024-03-03 RX ADMIN — MORPHINE SULFATE 2 MG: 2 INJECTION, SOLUTION INTRAMUSCULAR; INTRAVENOUS at 05:51

## 2024-03-03 RX ADMIN — DIAZEPAM 2.5 MG: 10 INJECTION, SOLUTION INTRAMUSCULAR; INTRAVENOUS at 01:13

## 2024-03-03 ASSESSMENT — COGNITIVE AND FUNCTIONAL STATUS - GENERAL
HELP NEEDED FOR BATHING: TOTAL
TOILETING: TOTAL
EATING MEALS: TOTAL
TURNING FROM BACK TO SIDE WHILE IN FLAT BAD: TOTAL
DAILY ACTIVITIY SCORE: 6
STANDING UP FROM CHAIR USING ARMS: TOTAL
MOVING FROM LYING ON BACK TO SITTING ON SIDE OF FLAT BED WITH BEDRAILS: TOTAL
MOVING TO AND FROM BED TO CHAIR: TOTAL
DRESSING REGULAR UPPER BODY CLOTHING: TOTAL
PERSONAL GROOMING: TOTAL
MOBILITY SCORE: 6
CLIMB 3 TO 5 STEPS WITH RAILING: TOTAL
WALKING IN HOSPITAL ROOM: TOTAL
DRESSING REGULAR LOWER BODY CLOTHING: TOTAL

## 2024-03-03 ASSESSMENT — PAIN SCALES - PAIN ASSESSMENT IN ADVANCED DEMENTIA (PAINAD)
FACIALEXPRESSION: SMILING OR INEXPRESSIVE
CONSOLABILITY: NO NEED TO CONSOLE
TOTALSCORE: 2
BODYLANGUAGE: RELAXED
BREATHING: OCCASIONAL LABORED BREATHING, SHORT PERIOD OF HYPERVENTILATION
NEGVOCALIZATION: OCCASIONAL MOAN/GROAN, LOW SPEECH, NEGATIVE/DISAPPROVING QUALITY

## 2024-03-03 NOTE — DISCHARGE SUMMARY
Discharge Diagnosis  Seizure (CMS/HCC)    Issues Requiring Follow-Up  Massive subarachnoid hemorrhage     Discharge Meds     Your medication list        ASK your doctor about these medications        Instructions Last Dose Given Next Dose Due   acetaminophen 325 mg tablet  Commonly known as: Tylenol      Take 2 tablets (650 mg) by mouth every 6 hours if needed for mild pain (1 - 3).       benzocaine-menthol 15-3.6 mg lozenge  Commonly known as: Cepastat Sore Throat      Dissolve 1 lozenge in the mouth every 2 hours if needed for sore throat.       folic acid 1 mg tablet  Commonly known as: Folvite      Take 1 tablet (1 mg) by mouth once daily.       guaiFENesin 600 mg 12 hr tablet  Commonly known as: Mucinex      Take 1 tablet (600 mg) by mouth every 12 hours if needed for congestion. Do not crush, chew, or split.       lactulose 20 gram/30 mL oral solution      Take 45 mL (30 g) by mouth 2 times a day.       levothyroxine 50 mcg tablet  Commonly known as: Synthroid, Levoxyl           lidocaine 4 % patch      Place 1 patch over 12 hours on the skin once daily. Remove & discard patch within 12 hours or as directed by MD. Do not start before January 10, 2024.       magnesium oxide 400 mg tablet  Commonly known as: Mag-Ox      Take 1 tablet (400 mg) by mouth once daily.       midodrine 10 mg tablet  Commonly known as: Proamatine      Take 1 tablet (10 mg) by mouth 3 times a day with meals.       MULTIPLE VITAMINS ORAL           ondansetron ODT 4 mg disintegrating tablet  Commonly known as: Zofran-ODT      Take 1 tablet (4 mg) by mouth every 8 hours if needed for nausea or vomiting.       pantoprazole 40 mg EC tablet  Commonly known as: ProtoNix      Take 1 tablet (40 mg) by mouth once daily.       rifAXIMin 550 mg tablet  Commonly known as: Xifaxan      Take 1 tablet (550 mg) by mouth 2 times a day.       thiamine 100 mg tablet  Commonly known as: Vitamin B-1      Take 1 tablet (100 mg) by mouth once daily.                 Test Results Pending At Discharge  Pending Labs       No current pending labs.            Hospital Course   Briseida Owen is a 48 y.o. female who presents emergency department for evaluation of altered mental status, generalized pain and seizure. Patient has a known history of cirrhosis that significant other called EMS today as patient was acting more confused and lethargic this morning. She was complaining of generalized pain and was awake however sluggish to respond when EMS first arrived. They note that when they got into the ambulance patient did have a witnessed seizure lasting roughly 1 minute however resolved on its own prior to any medication being given. Uncertain whether patient has a history of seizures. Patient very confused at this time and unable provide any further history.   Patient remains confused and agitated, in a coma-like state.  Her prognosis of functional status is very poor.  The patient and family opted for hospice care.  The patient was transition to inpatient hospice      Kaylee Lee MD

## 2024-03-03 NOTE — CARE PLAN
The patient's goals for the shift include      The clinical goals for the shift include comfort    Problem: Skin  Goal: Decreased wound size/increased tissue granulation at next dressing change  Outcome: Progressing  Flowsheets (Taken 3/3/2024 0940)  Decreased wound size/increased tissue granulation at next dressing change: Promote sleep for wound healing  Goal: Participates in plan/prevention/treatment measures  Outcome: Progressing  Flowsheets (Taken 3/3/2024 0940)  Participates in plan/prevention/treatment measures: Discuss with provider PT/OT consult  Goal: Prevent/manage excess moisture  Outcome: Progressing  Flowsheets (Taken 3/3/2024 0940)  Prevent/manage excess moisture: Cleanse incontinence/protect with barrier cream  Goal: Prevent/minimize sheer/friction injuries  Outcome: Progressing  Flowsheets (Taken 3/3/2024 0940)  Prevent/minimize sheer/friction injuries: Complete micro-shifts as needed if patient unable. Adjust patient position to relieve pressure points, not a full turn  Goal: Promote/optimize nutrition  Outcome: Progressing  Flowsheets (Taken 3/3/2024 0940)  Promote/optimize nutrition: Offer water/supplements/favorite foods  Goal: Promote skin healing  Outcome: Progressing  Flowsheets (Taken 3/3/2024 0940)  Promote skin healing: Protective dressings over bony prominences

## 2024-03-03 NOTE — H&P
History Of Present Illness  Briseida Owen is a 48 y.o. female who presents emergency department for evaluation of altered mental status, generalized pain and seizure. Patient has a known history of cirrhosis that significant other called EMS today as patient was acting more confused and lethargic this morning. She was complaining of generalized pain and was awake however sluggish to respond when EMS first arrived. They note that when they got into the ambulance patient did have a witnessed seizure lasting roughly 1 minute however resolved on its own prior to any medication being given. Uncertain whether patient has a history of seizures. Patient very confused at this time and unable provide any further history.    Patient remains confused and agitated, in a coma-like state.  Her prognosis of functional status is very poor.  The patient and family opted for hospice care.  The patient was transition to inpatient hospice     Past Medical History  Past Medical History:   Diagnosis Date    Alcoholic hepatitis without ascites 08/13/2021    Alcoholic hepatitis    Hepatic encephalopathy (CMS/HCC) 06/09/2021    Hepatic encephalopathy       Surgical History  Past Surgical History:   Procedure Laterality Date    OTHER SURGICAL HISTORY  06/09/2021    Breast augmentation    US GUIDED ABDOMINAL PARACENTESIS  4/28/2021    US GUIDED ABDOMINAL PARACENTESIS [facility] INPATIENT LEGACY    US GUIDED ABDOMINAL PARACENTESIS  4/23/2021    US GUIDED ABDOMINAL PARACENTESIS [facility] INPATIENT LEGACY    US GUIDED ABDOMINAL PARACENTESIS  5/14/2021    US GUIDED ABDOMINAL PARACENTESIS [facility] EMERGENCY LEGACY    US GUIDED ABDOMINAL PARACENTESIS  8/17/2023    US GUIDED ABDOMINAL PARACENTESIS [facility] INPATIENT LEGACY    US GUIDED ABDOMINAL PARACENTESIS  8/25/2023    US GUIDED ABDOMINAL PARACENTESIS [facility] INPATIENT LEGACY    US GUIDED ABDOMINAL PARACENTESIS  10/23/2023    US GUIDED ABDOMINAL PARACENTESIS 10/23/2023 Huy Alvarez MD [facility] US    US GUIDED ABDOMINAL  PARACENTESIS  10/27/2023    US GUIDED ABDOMINAL PARACENTESIS 10/27/2023 Huy Alvarez MD [facility] US    US GUIDED ABDOMINAL PARACENTESIS  10/26/2023    US GUIDED ABDOMINAL PARACENTESIS 10/26/2023 [facility] US    US GUIDED ABDOMINAL PARACENTESIS  1/4/2024    US GUIDED ABDOMINAL PARACENTESIS 1/4/2024 [facility] US        Social History  She reports that she has never smoked. She has never used smokeless tobacco. She reports current alcohol use of about 1.0 standard drink of alcohol per week. She reports that she does not use drugs.    Family History  Family History   Problem Relation Name Age of Onset    Diabetes Mother      Uterine cancer Mother      Heart attack Father      Other (CHEMICAL DEPENDENCY) Father      Hypertension Father          Allergies  Sulfamethoxazole-trimethoprim    Review of Systems   Unable to obtain due to change in mental status    Physical Exam    Constitutional:       General: She is not in acute distress.     Appearance: She is ill-appearing.   HENT:      Head: Normocephalic and atraumatic.      Mouth/Throat:      Mouth: Mucous membranes are moist.   Eyes:      Pupils: Pupils are equal, round, and reactive to light.      Comments: Significant scleral icterus bilaterally   Cardiovascular:      Rate and Rhythm: Normal rate and regular rhythm.   Pulmonary:      Effort: Pulmonary effort is normal. No respiratory distress.      Breath sounds: Normal breath sounds. No wheezing or rhonchi.   Abdominal:      General: Abdomen is flat. There is distension.      Tenderness: There is no abdominal tenderness. There is no guarding or rebound.   Musculoskeletal:         General: No swelling.   Skin:     General: Skin is dry.      Coloration: Skin is jaundiced.   Neurological:      Mental Status: She is lethargic.      GCS: GCS eye subscore is 4. GCS verbal subscore is 3. GCS motor subscore is 5.   Last Recorded Vitals  Blood pressure 160/75, pulse 99, temperature 36.1 °C (97 °F), temperature source Temporal, resp. rate  19, SpO2 94 %.       Assessment/Plan   Acute encephalopathy, Coma   Massive Subarachnoid hemorrhage     Plan:  Inpatient hospice     Kaylee Lee MD

## 2024-03-03 NOTE — PROGRESS NOTES
Briseida Owen is a 48 y.o. female on day 1 of admission presenting with No Principal Problem: There is no principal problem currently on the Problem List. Please update the Problem List and refresh..      Subjective   somnolent       Objective     Last Recorded Vitals  /75 (BP Location: Left arm, Patient Position: Lying)   Pulse 99   Temp 36.1 °C (97 °F) (Temporal)   Resp 19   SpO2 94%   Intake/Output last 3 Shifts:    Intake/Output Summary (Last 24 hours) at 3/3/2024 0815  Last data filed at 3/3/2024 0600  Gross per 24 hour   Intake --   Output 475 ml   Net -475 ml         Admission Weight       Daily Weight  02/28/24 : 73.9 kg (163 lb)    Image Results  ECG 12 lead  Sinus bradycardia with sinus arrhythmia  Otherwise normal ECG  When compared with ECG of 03-JAN-2024 21:01,  Vent. rate has decreased BY  32 BPM  Confirmed by Rui Jolly ([id number]) on 2/29/2024 9:05:13 AM      Physical Exam   Constitutional:       General: She is not in acute distress.     Appearance: She is ill-appearing.   HENT:      Head: Normocephalic and atraumatic.      Mouth/Throat:      Mouth: Mucous membranes are moist.   Eyes:      Pupils: Pupils are equal, round, and reactive to light.      Comments: Significant scleral icterus bilaterally   Cardiovascular:      Rate and Rhythm: Normal rate and regular rhythm.   Pulmonary:      Effort: Pulmonary effort is normal. No respiratory distress.      Breath sounds: Normal breath sounds. No wheezing or rhonchi.   Abdominal:      General: Abdomen is flat. There is distension.      Tenderness: There is no abdominal tenderness. There is no guarding or rebound.   Musculoskeletal:         General: No swelling.   Skin:     General: Skin is dry.      Coloration: Skin is jaundiced.   Neurological:      Mental Status: She is lethargic.      GCS: GCS eye subscore is 4. GCS verbal subscore is 3. GCS motor subscore is 5.       Assessment/Plan   Subarachnoid hemorrhage  Seizure  Acute  encephalopathy  Alcoholic liver cirrhosis  CODE STATUS DNR     Plan:  Appreciate palliative care's input, the family opted for hospice  Ativan for agitation.  Morphine for pain  DC all meds      Kaylee Lee MD

## 2024-03-03 NOTE — NURSING NOTE
RN Hospice Note     Briseida Owen is a Hospice Patient.   Hospice terminal diagnosis: subarachnoid hemorrhage   Physician: Dr. Kaylee Lee MD, Chris Sanabria APRN  Visit type: [facility] Day 2        Discharge Planning:  Patient to be discharged to  [facility] on 3/4/24.     Plan of care reviewed with patient/family members   - Patient assessed at bedside: lying in bed, unresponsive, on 3L NC, PAINAD 3   - Call placed to sister/[facility] who is agreeable to transfer pt to [facility] today. Supervisor at [facility] is unable to accept pt tonight, but will hold a bed for pt tomorrow morning. Hospice RN visit tomorrow morning to finish discharge.    Plan of care reviewed with hospital staff members:   - Dr. Kaylee Lee, Chris Sanabria CNP, Isa Joseph RN      Please notify [facility] of any changes in condition. Thank you.  Office:  [phone] (8 am-6:30 pm M-F and 8 am-4:30 pm weekends and holidays)              [phone] (6:30 pm-8 am M-F and 4:30 pm-8 am weekends and holidays)  Kerri Perez RN

## 2024-03-03 NOTE — INDIVIDUALIZED OVERALL PLAN OF CARE NOTE
Family not present at the bedside  Discussed with the nurse caring for patient.    She did have some paradoxical breathing when I came to see her but her extremities are warm no mottling, with ecchymoses to the arms she is very jaundiced markedly encephalopathic her left pupil does not seem to be reacting to light right pupil was sluggish.  Conjunctival/scleral edema very icteric sclera.  Follows no commands no response to verbal tactile or noxious stimuli  Anterior breath sounds seem to be clear she is tachycardic seems to have a soft systolic murmur  Positive bowel sounds  Monge with icteric urine  No significant lower extremity edema mild upper extremity edema  Getting alternating diazepam and morphine.    Discussed with hospice nurse this afternoon.  Evaluation for potential transfer to [facility] this afternoon, this nurse Kerri will speak with the family.    /54   Pulse 100   Temp 36.8 °C (98.2 °F) (Axillary)   Resp 16   SpO2 (!) 88%   No labs.    A/P  SAH/IVH  Acute multifactorial encephalopathy  Seizure  ETOH Liver cirrhosis w ascites   Coagulopathy   Palliatice care EoL care    Currently in [facility] inpatient hospice symptom control hospice of [facility].  Continue with comfort medications.  Hospice nurse evaluating right now.  Consideration for transfer to [facility] feel that she is hemodynamically stable enough to consider.    Chris Sanabria, APRN-CNP

## 2024-03-04 VITALS
OXYGEN SATURATION: 92 % | TEMPERATURE: 101.1 F | DIASTOLIC BLOOD PRESSURE: 51 MMHG | HEART RATE: 116 BPM | RESPIRATION RATE: 16 BRPM | SYSTOLIC BLOOD PRESSURE: 116 MMHG

## 2024-03-04 PROCEDURE — 2500000004 HC RX 250 GENERAL PHARMACY W/ HCPCS (ALT 636 FOR OP/ED): Performed by: NURSE PRACTITIONER

## 2024-03-04 RX ORDER — ACETAMINOPHEN 650 MG/1
650 SUPPOSITORY RECTAL EVERY 4 HOURS PRN
Qty: 12 SUPPOSITORY | Refills: 0
Start: 2024-03-04

## 2024-03-04 RX ORDER — DIAZEPAM 5 MG/ML
5 INJECTION, SOLUTION INTRAMUSCULAR; INTRAVENOUS EVERY 8 HOURS PRN
Refills: 0
Start: 2024-03-04

## 2024-03-04 RX ORDER — MORPHINE SULFATE 2 MG/ML
2 INJECTION, SOLUTION INTRAMUSCULAR; INTRAVENOUS
Refills: 0
Start: 2024-03-04

## 2024-03-04 RX ORDER — MORPHINE SULFATE 2 MG/ML
2 INJECTION, SOLUTION INTRAMUSCULAR; INTRAVENOUS EVERY 6 HOURS
Start: 2024-03-04

## 2024-03-04 RX ORDER — DIAZEPAM 5 MG/ML
2.5 INJECTION, SOLUTION INTRAMUSCULAR; INTRAVENOUS EVERY 6 HOURS
Start: 2024-03-04

## 2024-03-04 RX ADMIN — DIAZEPAM 2.5 MG: 10 INJECTION, SOLUTION INTRAMUSCULAR; INTRAVENOUS at 07:55

## 2024-03-04 RX ADMIN — GLYCOPYRROLATE 0.2 MG: 0.2 INJECTION INTRAMUSCULAR; INTRAVENOUS at 02:26

## 2024-03-04 RX ADMIN — MORPHINE SULFATE 2 MG: 2 INJECTION, SOLUTION INTRAMUSCULAR; INTRAVENOUS at 06:10

## 2024-03-04 RX ADMIN — DIAZEPAM 2.5 MG: 10 INJECTION, SOLUTION INTRAMUSCULAR; INTRAVENOUS at 02:26

## 2024-03-04 RX ADMIN — MORPHINE SULFATE 2 MG: 2 INJECTION, SOLUTION INTRAMUSCULAR; INTRAVENOUS at 11:44

## 2024-03-04 RX ADMIN — MORPHINE SULFATE 2 MG: 2 INJECTION, SOLUTION INTRAMUSCULAR; INTRAVENOUS at 00:19

## 2024-03-04 ASSESSMENT — COGNITIVE AND FUNCTIONAL STATUS - GENERAL
TURNING FROM BACK TO SIDE WHILE IN FLAT BAD: TOTAL
DRESSING REGULAR LOWER BODY CLOTHING: TOTAL
MOBILITY SCORE: 6
MOVING TO AND FROM BED TO CHAIR: TOTAL
TOILETING: TOTAL
HELP NEEDED FOR BATHING: TOTAL
HELP NEEDED FOR BATHING: TOTAL
DRESSING REGULAR UPPER BODY CLOTHING: TOTAL
DAILY ACTIVITIY SCORE: 6
TURNING FROM BACK TO SIDE WHILE IN FLAT BAD: TOTAL
MOVING FROM LYING ON BACK TO SITTING ON SIDE OF FLAT BED WITH BEDRAILS: TOTAL
EATING MEALS: TOTAL
PERSONAL GROOMING: TOTAL
MOVING FROM LYING ON BACK TO SITTING ON SIDE OF FLAT BED WITH BEDRAILS: TOTAL
CLIMB 3 TO 5 STEPS WITH RAILING: TOTAL
STANDING UP FROM CHAIR USING ARMS: TOTAL
DRESSING REGULAR UPPER BODY CLOTHING: TOTAL
WALKING IN HOSPITAL ROOM: TOTAL
MOBILITY SCORE: 6
EATING MEALS: TOTAL
WALKING IN HOSPITAL ROOM: TOTAL
PERSONAL GROOMING: TOTAL
STANDING UP FROM CHAIR USING ARMS: TOTAL
MOVING TO AND FROM BED TO CHAIR: TOTAL
DAILY ACTIVITIY SCORE: 6
DRESSING REGULAR LOWER BODY CLOTHING: TOTAL
TOILETING: TOTAL
CLIMB 3 TO 5 STEPS WITH RAILING: TOTAL

## 2024-03-04 ASSESSMENT — PAIN - FUNCTIONAL ASSESSMENT
PAIN_FUNCTIONAL_ASSESSMENT: 0-10
PAIN_FUNCTIONAL_ASSESSMENT: PAINAD (PAIN ASSESSMENT IN ADVANCED DEMENTIA SCALE)
PAIN_FUNCTIONAL_ASSESSMENT: 0-10

## 2024-03-04 ASSESSMENT — PAIN SCALES - PAIN ASSESSMENT IN ADVANCED DEMENTIA (PAINAD)
FACIALEXPRESSION: SMILING OR INEXPRESSIVE
CONSOLABILITY: NO NEED TO CONSOLE
BREATHING: NORMAL
BODYLANGUAGE: RELAXED
TOTALSCORE: 0

## 2024-03-04 ASSESSMENT — PAIN SCALES - WONG BAKER
WONGBAKER_NUMERICALRESPONSE: NO HURT
WONGBAKER_NUMERICALRESPONSE: NO HURT

## 2024-03-04 NOTE — DISCHARGE SUMMARY
Discharge Diagnosis  Subarrachnoid hemorrhage    Issues Requiring Follow-Up  N/a    Discharge Meds     Your medication list        START taking these medications        Instructions Last Dose Given Next Dose Due   acetaminophen 650 mg suppository  Commonly known as: Tylenol  Replaces: acetaminophen 325 mg tablet      Insert 1 suppository (650 mg) into the rectum every 4 hours if needed for mild pain (1 - 3) or fever (temp greater than 38.0 C).       diazePAM 5 mg/mL injection  Commonly known as: Valium      Infuse 0.5 mL (2.5 mg) over 3 minutes into a venous catheter every 6 hours.       diazePAM 5 mg/mL injection  Commonly known as: Valium      Infuse 1 mL (5 mg) over 3 minutes into a venous catheter every 8 hours if needed for anxiety or sedation.       morphine 2 mg/mL injection      Infuse 1 mL (2 mg) into a venous catheter every 3 hours if needed for moderate pain (4 - 6) or severe pain (7 - 10).       morphine 2 mg/mL injection      Infuse 1 mL (2 mg) into a venous catheter every 6 hours.              STOP taking these medications      acetaminophen 325 mg tablet  Commonly known as: Tylenol  Replaced by: acetaminophen 650 mg suppository        benzocaine-menthol 15-3.6 mg lozenge  Commonly known as: Cepastat Sore Throat        folic acid 1 mg tablet  Commonly known as: Folvite        guaiFENesin 600 mg 12 hr tablet  Commonly known as: Mucinex        lactulose 20 gram/30 mL oral solution        levothyroxine 50 mcg tablet  Commonly known as: Synthroid, Levoxyl        lidocaine 4 % patch        magnesium oxide 400 mg tablet  Commonly known as: Mag-Ox        midodrine 10 mg tablet  Commonly known as: Proamatine        MULTIPLE VITAMINS ORAL        ondansetron ODT 4 mg disintegrating tablet  Commonly known as: Zofran-ODT        pantoprazole 40 mg EC tablet  Commonly known as: ProtoNix        rifAXIMin 550 mg tablet  Commonly known as: Xifaxan        thiamine 100 mg tablet  Commonly known as: Vitamin B-1                   Where to Get Your Medications        Information about where to get these medications is not yet available    Ask your nurse or doctor about these medications  acetaminophen 650 mg suppository  diazePAM 5 mg/mL injection  diazePAM 5 mg/mL injection  morphine 2 mg/mL injection  morphine 2 mg/mL injection         Test Results Pending At Discharge  Pending Labs       No current pending labs.            Hospital Course  This 48-year-old woman with history of chronic alcohol abuse, cirrhosis, who presented to the emergency room with altered mental status, seizure, generalized pain.  Imaging showed all large subarachnoid hemorrhage.  She had deterioration in mental status and now more consistently in a coma like state.  She was evaluated by palliative care and family opted to move forward with hospice.  Neurosurgery had deemed her not a candidate for surgical intervention given her prognosis from her other medical conditions and her pre-existing DNR order.  She is being discharged to hospice house with comfort medications ordered.    Pertinent Physical Exam At Time of Discharge  Physical Exam  General: somnolent; does not wake   Lungs: CTA BL   Heart: Regular and tachy   GI: abdomen soft, nontender, nondistended, BS present   MSK: no joint effusion or deformity   Skin: +jaundice   Neuro: somnolent, does not respond or wake to touch or voice.       Outpatient Follow-Up  No future appointments.    Discharge time spent: 35 min    Leyla Holguin DO

## 2024-03-04 NOTE — CARE PLAN
Problem: Skin  Goal: Decreased wound size/increased tissue granulation at next dressing change  Flowsheets (Taken 3/4/2024 0049)  Decreased wound size/increased tissue granulation at next dressing change: Promote sleep for wound healing  Goal: Participates in plan/prevention/treatment measures  Flowsheets (Taken 3/4/2024 0049)  Participates in plan/prevention/treatment measures: Discuss with provider PT/OT consult  Goal: Prevent/manage excess moisture  Flowsheets (Taken 3/4/2024 0049)  Prevent/manage excess moisture:   Moisturize dry skin   Cleanse incontinence/protect with barrier cream  Goal: Prevent/minimize sheer/friction injuries  Flowsheets (Taken 3/4/2024 0049)  Prevent/minimize sheer/friction injuries:   Turn/reposition every 2 hours/use positioning/transfer devices   Complete micro-shifts as needed if patient unable. Adjust patient position to relieve pressure points, not a full turn  Goal: Promote/optimize nutrition  Flowsheets (Taken 3/4/2024 0049)  Promote/optimize nutrition: Offer water/supplements/favorite foods

## 2024-03-04 NOTE — PROGRESS NOTES
Briseida Owen is a 48 y.o. female on day 2 of admission presenting with No Principal Problem: There is no principal problem currently on the Problem List. Please update the Problem List and refresh..     [facility]  nurse here to see.  Patient will transfer to [facility] at 1130 via [facility] ambulance.  Family notified by Hospice nurse of transfer plan.     Najma Edwards RN

## 2024-03-04 NOTE — PROGRESS NOTES
Nutrition Progress Note    Screened pt for MST 2, is a hospice pt. Pt plans to be transferred to [facility] today. Nutrition services will remain available and will follow-up per nutrition protocol.